# Patient Record
Sex: MALE | URBAN - METROPOLITAN AREA
[De-identification: names, ages, dates, MRNs, and addresses within clinical notes are randomized per-mention and may not be internally consistent; named-entity substitution may affect disease eponyms.]

---

## 2017-05-23 PROBLEM — Z00.00 ENCOUNTER FOR PREVENTIVE HEALTH EXAMINATION: Status: ACTIVE | Noted: 2017-05-23

## 2018-08-28 ENCOUNTER — EMERGENCY (EMERGENCY)
Facility: HOSPITAL | Age: 78
LOS: 1 days | Discharge: HOME | End: 2018-08-28
Attending: EMERGENCY MEDICINE
Payer: COMMERCIAL

## 2018-08-28 VITALS
SYSTOLIC BLOOD PRESSURE: 140 MMHG | HEART RATE: 81 BPM | OXYGEN SATURATION: 97 % | RESPIRATION RATE: 16 BRPM | DIASTOLIC BLOOD PRESSURE: 83 MMHG | TEMPERATURE: 98 F

## 2018-08-28 VITALS
DIASTOLIC BLOOD PRESSURE: 75 MMHG | SYSTOLIC BLOOD PRESSURE: 118 MMHG | OXYGEN SATURATION: 97 % | RESPIRATION RATE: 18 BRPM | TEMPERATURE: 98 F | HEART RATE: 69 BPM

## 2018-08-28 DIAGNOSIS — K40.90 UNILATERAL INGUINAL HERNIA, WITHOUT OBSTRUCTION OR GANGRENE, NOT SPECIFIED AS RECURRENT: Chronic | ICD-10-CM

## 2018-08-28 LAB
ALBUMIN SERPL ELPH-MCNC: 4 G/DL — SIGNIFICANT CHANGE UP (ref 3.3–5)
ALP SERPL-CCNC: 60 U/L — SIGNIFICANT CHANGE UP (ref 40–120)
ALT FLD-CCNC: 17 U/L — SIGNIFICANT CHANGE UP (ref 10–45)
ANION GAP SERPL CALC-SCNC: 11 MMOL/L — SIGNIFICANT CHANGE UP (ref 5–17)
APTT BLD: 39 SEC — HIGH (ref 27.5–37.4)
AST SERPL-CCNC: 18 U/L — SIGNIFICANT CHANGE UP (ref 10–40)
BASOPHILS # BLD AUTO: 0.1 K/UL — SIGNIFICANT CHANGE UP (ref 0–0.2)
BASOPHILS NFR BLD AUTO: 0.8 % — SIGNIFICANT CHANGE UP (ref 0–2)
BILIRUB SERPL-MCNC: 0.7 MG/DL — SIGNIFICANT CHANGE UP (ref 0.2–1.2)
BUN SERPL-MCNC: 21 MG/DL — SIGNIFICANT CHANGE UP (ref 7–23)
CALCIUM SERPL-MCNC: 9.4 MG/DL — SIGNIFICANT CHANGE UP (ref 8.4–10.5)
CHLORIDE SERPL-SCNC: 101 MMOL/L — SIGNIFICANT CHANGE UP (ref 96–108)
CO2 SERPL-SCNC: 25 MMOL/L — SIGNIFICANT CHANGE UP (ref 22–31)
CREAT SERPL-MCNC: 1.15 MG/DL — SIGNIFICANT CHANGE UP (ref 0.5–1.3)
EOSINOPHIL # BLD AUTO: 0.2 K/UL — SIGNIFICANT CHANGE UP (ref 0–0.5)
EOSINOPHIL NFR BLD AUTO: 2.3 % — SIGNIFICANT CHANGE UP (ref 0–6)
GLUCOSE SERPL-MCNC: 108 MG/DL — HIGH (ref 70–99)
HCT VFR BLD CALC: 44.2 % — SIGNIFICANT CHANGE UP (ref 39–50)
HGB BLD-MCNC: 14.6 G/DL — SIGNIFICANT CHANGE UP (ref 13–17)
INR BLD: 1.91 RATIO — HIGH (ref 0.88–1.16)
LYMPHOCYTES # BLD AUTO: 1.9 K/UL — SIGNIFICANT CHANGE UP (ref 1–3.3)
LYMPHOCYTES # BLD AUTO: 21.1 % — SIGNIFICANT CHANGE UP (ref 13–44)
MCHC RBC-ENTMCNC: 31.9 PG — SIGNIFICANT CHANGE UP (ref 27–34)
MCHC RBC-ENTMCNC: 32.9 GM/DL — SIGNIFICANT CHANGE UP (ref 32–36)
MCV RBC AUTO: 96.7 FL — SIGNIFICANT CHANGE UP (ref 80–100)
MONOCYTES # BLD AUTO: 0.7 K/UL — SIGNIFICANT CHANGE UP (ref 0–0.9)
MONOCYTES NFR BLD AUTO: 7.7 % — SIGNIFICANT CHANGE UP (ref 2–14)
NEUTROPHILS # BLD AUTO: 6 K/UL — SIGNIFICANT CHANGE UP (ref 1.8–7.4)
NEUTROPHILS NFR BLD AUTO: 68.1 % — SIGNIFICANT CHANGE UP (ref 43–77)
PLATELET # BLD AUTO: 179 K/UL — SIGNIFICANT CHANGE UP (ref 150–400)
POTASSIUM SERPL-MCNC: 4.2 MMOL/L — SIGNIFICANT CHANGE UP (ref 3.5–5.3)
POTASSIUM SERPL-SCNC: 4.2 MMOL/L — SIGNIFICANT CHANGE UP (ref 3.5–5.3)
PROT SERPL-MCNC: 6.8 G/DL — SIGNIFICANT CHANGE UP (ref 6–8.3)
PROTHROM AB SERPL-ACNC: 21.1 SEC — HIGH (ref 9.8–12.7)
RBC # BLD: 4.57 M/UL — SIGNIFICANT CHANGE UP (ref 4.2–5.8)
RBC # FLD: 12.3 % — SIGNIFICANT CHANGE UP (ref 10.3–14.5)
SODIUM SERPL-SCNC: 137 MMOL/L — SIGNIFICANT CHANGE UP (ref 135–145)
WBC # BLD: 8.8 K/UL — SIGNIFICANT CHANGE UP (ref 3.8–10.5)
WBC # FLD AUTO: 8.8 K/UL — SIGNIFICANT CHANGE UP (ref 3.8–10.5)

## 2018-08-28 PROCEDURE — 73090 X-RAY EXAM OF FOREARM: CPT | Mod: 26,LT

## 2018-08-28 PROCEDURE — 73502 X-RAY EXAM HIP UNI 2-3 VIEWS: CPT

## 2018-08-28 PROCEDURE — 99284 EMERGENCY DEPT VISIT MOD MDM: CPT

## 2018-08-28 PROCEDURE — 70486 CT MAXILLOFACIAL W/O DYE: CPT

## 2018-08-28 PROCEDURE — 80053 COMPREHEN METABOLIC PANEL: CPT

## 2018-08-28 PROCEDURE — 70450 CT HEAD/BRAIN W/O DYE: CPT | Mod: 26

## 2018-08-28 PROCEDURE — 85730 THROMBOPLASTIN TIME PARTIAL: CPT

## 2018-08-28 PROCEDURE — 90471 IMMUNIZATION ADMIN: CPT

## 2018-08-28 PROCEDURE — 85027 COMPLETE CBC AUTOMATED: CPT

## 2018-08-28 PROCEDURE — 85610 PROTHROMBIN TIME: CPT

## 2018-08-28 PROCEDURE — 73090 X-RAY EXAM OF FOREARM: CPT

## 2018-08-28 PROCEDURE — 90715 TDAP VACCINE 7 YRS/> IM: CPT

## 2018-08-28 PROCEDURE — 73502 X-RAY EXAM HIP UNI 2-3 VIEWS: CPT | Mod: 26,LT

## 2018-08-28 PROCEDURE — 70486 CT MAXILLOFACIAL W/O DYE: CPT | Mod: 26

## 2018-08-28 PROCEDURE — 99284 EMERGENCY DEPT VISIT MOD MDM: CPT | Mod: 25

## 2018-08-28 PROCEDURE — 70450 CT HEAD/BRAIN W/O DYE: CPT

## 2018-08-28 RX ORDER — ACETAMINOPHEN 500 MG
650 TABLET ORAL ONCE
Qty: 0 | Refills: 0 | Status: COMPLETED | OUTPATIENT
Start: 2018-08-28 | End: 2018-08-28

## 2018-08-28 RX ORDER — TETANUS TOXOID, REDUCED DIPHTHERIA TOXOID AND ACELLULAR PERTUSSIS VACCINE, ADSORBED 5; 2.5; 8; 8; 2.5 [IU]/.5ML; [IU]/.5ML; UG/.5ML; UG/.5ML; UG/.5ML
0.5 SUSPENSION INTRAMUSCULAR ONCE
Qty: 0 | Refills: 0 | Status: COMPLETED | OUTPATIENT
Start: 2018-08-28 | End: 2018-08-28

## 2018-08-28 RX ORDER — TETANUS AND DIPHTHERIA TOXOIDS ADSORBED 2; 2 [LF]/.5ML; [LF]/.5ML
0.5 INJECTION INTRAMUSCULAR ONCE
Qty: 0 | Refills: 0 | Status: DISCONTINUED | OUTPATIENT
Start: 2018-08-28 | End: 2018-08-28

## 2018-08-28 RX ADMIN — TETANUS TOXOID, REDUCED DIPHTHERIA TOXOID AND ACELLULAR PERTUSSIS VACCINE, ADSORBED 0.5 MILLILITER(S): 5; 2.5; 8; 8; 2.5 SUSPENSION INTRAMUSCULAR at 16:56

## 2018-08-28 RX ADMIN — Medication 650 MILLIGRAM(S): at 14:44
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
## 2018-08-28 NOTE — ED PROVIDER NOTE - NS ED ROS FT
PGY1/MD Liang.   CONST: no fevers, no chills, +trauma on his head/left arm  EYES: +pain when closing the eyes, no visual disturbances  ENT: no sore throat, no epistaxis, no rhinorrhea, no hearing changes  CV: no chest pain, no palpitations, no orthopnea, + lower extremity swelling  RESP: no shortness of breath, no cough, no sputum, no pleurisy, no wheezing  ABD: no abdominal pain, no nausea, no vomiting, no diarrhea, no black or bloody stool  : no dysuria, no hematuria, no frequency, no urgency  MSK: no back pain, no neck pain, no extremity pain  NEURO: no headache, no sensory disturbances, no focal weakness, no dizziness  HEME: +easy bleeding by Xarelto  SKIN: no diaphoresis, no rash

## 2018-08-28 NOTE — ED PROVIDER NOTE - PHYSICAL EXAMINATION
PGY1/MD Liang.   VITALS: reviewed  GEN: No apparent distress, A & O x 4  HEAD/EYES: ecchymosis on his left eye, conjunctival bleeding, PERRL, EOMI, anicteric sclerae, no conjunctival pallor. No discharge from nose/ears.  ENT: mucus membranes moist, oropharynx WNL, trachea midline, no JVD, neck is supple. b/l TM clear, no discharge.  RESP: lungs CTA with equal breath sounds bilaterally, chest wall nontender and atraumatic, no bruises.  CV: heart with reg rhythm S1, S2, no murmur; distal pulses intact and symmetric bilaterally  ABDOMEN: normoactive bowel sounds, soft, nondistended, nontender, no palpable masses, no bruises.  MSK: extremities atraumatic and nontender, +edema on his both lower extremities, no asymmetry. the back is without midline or lateral tenderness, there is no spinal deformity or stepoff and the back is ranged painlessly. the neck has no midline tenderness, deformity, or stepoff, and is ranged painlessly.  SKIN: warm, dry, no rash, no bruising, no cyanosis. color appropriate for ethnicity  NEURO: alert, mentating appropriately, no facial asymmetry. gross sensation, motor, coordination are intact  PSYCH: Affect appropriate PGY1/MD Liang.   VITALS: reviewed  GEN: No apparent distress, A & O x 4  HEAD/EYES: ecchymosis on his left eye, visual acuity 20/30 b/l. +conjunctival bleeding, PERRL, EOMI, intact to upper/lower gaze, anicteric sclerae, no conjunctival pallor. No discharge from nose/ears.  ENT: mucus membranes moist, oropharynx WNL, trachea midline, no JVD, neck is supple. b/l TM clear, no discharge.  RESP: lungs CTA with equal breath sounds bilaterally, chest wall nontender and atraumatic, no bruises.  CV: heart with reg rhythm S1, S2, no murmur; distal pulses intact and symmetric bilaterally  ABDOMEN: normoactive bowel sounds, soft, nondistended, nontender, no palpable masses, no bruises.  MSK: extremities atraumatic and nontender, +edema on his both lower extremities, no asymmetry. the back is without midline or lateral tenderness, there is no spinal deformity or stepoff and the back is ranged painlessly. the neck has no midline tenderness, deformity, or stepoff, and is ranged painlessly.  SKIN: warm, dry, no rash, no bruising, no cyanosis. color appropriate for ethnicity  NEURO: alert, mentating appropriately, no facial asymmetry. gross sensation, motor, coordination are intact  PSYCH: Affect appropriate PGY1/MD Liang.   VITALS: reviewed  GEN: No apparent distress, A & O x 4  HEAD/EYES: ecchymosis on his left eye, visual acuity 20/30 b/l. +conjunctival bleeding, PERRL, EOMI, intact to upper/lower gaze, anicteric sclerae, no conjunctival pallor. No discharge/CSF from nose/ears.  ENT: mucus membranes moist, oropharynx WNL, trachea midline, no JVD, neck is supple. b/l TM clear, no discharge.  RESP: lungs CTA with equal breath sounds bilaterally, chest wall nontender and atraumatic, no bruises.  CV: heart with reg rhythm S1, S2, no murmur; distal pulses intact and symmetric bilaterally  ABDOMEN: normoactive bowel sounds, soft, nondistended, nontender, no palpable masses, no bruises.  MSK: extremities atraumatic and nontender, +edema on his both lower extremities, no asymmetry. the back is without midline or lateral tenderness, there is no spinal deformity or stepoff and the back is ranged painlessly. the neck has no midline tenderness, deformity, or stepoff, and is ranged painlessly.  SKIN: warm, dry, no rash, no bruising, no cyanosis. color appropriate for ethnicity  NEURO: alert, mentating appropriately, no facial asymmetry. gross sensation, motor, coordination are intact  PSYCH: Affect appropriate
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
## 2019-07-08 NOTE — ED ADULT NURSE NOTE - EXTENSIONS OF SELF_ADULT
Previous Messages      ----- Message -----   From: Ruy Torrez MD   Sent: 7/2/2019   9:54 AM EDT   [facility]   Subject: Lab results                                       Labs from me are back and normal except for magnesium being slightly high  A very elevated magnesium level can cause confusion, low blood pressure, nausea, impaired breathing but should not affect tremor  If he is not feeling those effects, then ok  I see he is not currently on magnesium supplements specifically and the AREDs he is on does not contain magnesium as well   Advise avoiding use of magnesium-containing supplements for now        Rest of plan the same; he can continue with primidone if this is working for him      ----- Message -----   From: Interface, Transcription Incoming   Sent: 7/1/2019   5:01 PM EDT   To: Ruy Torrez MD None

## 2019-10-30 ENCOUNTER — INPATIENT (INPATIENT)
Facility: HOSPITAL | Age: 79
LOS: 6 days | Discharge: NURSING, REHAB OR LONG-TERM CARE | DRG: 481 | End: 2019-11-06
Attending: ORTHOPAEDIC SURGERY | Admitting: ORTHOPAEDIC SURGERY
Payer: MEDICARE

## 2019-10-30 ENCOUNTER — TRANSCRIPTION ENCOUNTER (OUTPATIENT)
Age: 79
End: 2019-10-30

## 2019-10-30 VITALS
TEMPERATURE: 97 F | DIASTOLIC BLOOD PRESSURE: 78 MMHG | RESPIRATION RATE: 16 BRPM | OXYGEN SATURATION: 98 % | HEART RATE: 84 BPM | SYSTOLIC BLOOD PRESSURE: 120 MMHG

## 2019-10-30 DIAGNOSIS — K40.90 UNILATERAL INGUINAL HERNIA, WITHOUT OBSTRUCTION OR GANGRENE, NOT SPECIFIED AS RECURRENT: Chronic | ICD-10-CM

## 2019-10-30 PROCEDURE — 71045 X-RAY EXAM CHEST 1 VIEW: CPT | Mod: 26

## 2019-10-30 PROCEDURE — 93010 ELECTROCARDIOGRAM REPORT: CPT

## 2019-10-30 PROCEDURE — 73502 X-RAY EXAM HIP UNI 2-3 VIEWS: CPT | Mod: 26,RT

## 2019-10-30 PROCEDURE — 99285 EMERGENCY DEPT VISIT HI MDM: CPT

## 2019-10-30 PROCEDURE — 73552 X-RAY EXAM OF FEMUR 2/>: CPT | Mod: 26,RT

## 2019-10-30 RX ORDER — MORPHINE SULFATE 50 MG/1
6 CAPSULE, EXTENDED RELEASE ORAL ONCE
Refills: 0 | Status: DISCONTINUED | OUTPATIENT
Start: 2019-10-30 | End: 2019-10-30

## 2019-10-30 RX ADMIN — MORPHINE SULFATE 6 MILLIGRAM(S): 50 CAPSULE, EXTENDED RELEASE ORAL at 23:55

## 2019-10-30 RX ADMIN — MORPHINE SULFATE 6 MILLIGRAM(S): 50 CAPSULE, EXTENDED RELEASE ORAL at 23:25

## 2019-10-30 NOTE — ED PROVIDER NOTE - PHYSICAL EXAMINATION
Attending note. Patient is alert and in moderate to severe pain. Head is normocephalic and atraumatic. There is no signs of trauma. Pupils are 2 mm equal reactive. Neck is nontender. Chest and ribs are nontender. Lungs are clear and equal bilaterally. Heart is irregularly irregular. Abdomen is obese, soft and nontender. There is no distention. There is no CVA tenderness. Pelvis is nontender. Patient is able to move the upper extremities and the left lower extremity without pain or tenderness. Patient has obvious deformity to the right lower extremity with shortening and extra rotation. Patient has tenderness in the right hip to palpation. Distal pulses are nonpalpable however the extremities pink, warm with good capillary refill. There is trace edema in both ankles. EHL is intact. There is no focal neurologic deficit.

## 2019-10-30 NOTE — ED PROVIDER NOTE - CARE PLAN
Principal Discharge DX:	Closed fracture of right hip, initial encounter  Goal:	intertrochanteric fracture of the right hip

## 2019-10-30 NOTE — ED PROVIDER NOTE - CLINICAL SUMMARY MEDICAL DECISION MAKING FREE TEXT BOX
Attending note. Patient with a trip and fall with a right intertrochanteric hip fracture which is displaced. Labs, x-rays, EKG, urinalysis, analgesia. Orthopedic consultation. Admit.

## 2019-10-30 NOTE — ED ADULT NURSE NOTE - OBJECTIVE STATEMENT
79 yr old male arrived to the ED s/p fall. per pt he was standing doing leg lifts to exercise when he lost his balance  falling on his R side. pt c/o of hit pain. upon assessment pt is A&ox4, speaking clearly, answering questions and following commands. pt moving upper extremities tomas and LLE. lower extremity shortened externally rotated. 79 yr old male arrived to the ED s/p fall. per pt he was standing doing leg lifts to exercise when he lost his balance  falling on his R side. pt c/o of hit pain. upon assessment pt is A&ox4, speaking clearly, answering questions and following commands. pt moving upper extremities tomas and LLE. R lower extremity shortened externally rotated and has pain with movement.  pedal pulses strong and equal tomas. lungs clear tomas, respirations even and unlabored. abd is soft, non tender. pt endorses hiving his head, pt on blood thinners

## 2019-10-30 NOTE — ED PROVIDER NOTE - OBJECTIVE STATEMENT
80 yo male brought into the ER by EMS for evaluation of right hip pain  s/p mechanical fall this evening. Pt states "I was doing leg raises when I lost my balance and fell and landed on my hip. I was not able to get up or move without a great deal of pain. I hit my head as well when I fell and I take Xarelto for AFIB. I did not pass out or loose consciousness when I fell". Denies cp, sob, dizziness at this time. 78 yo male brought into the ER by EMS for evaluation of right hip pain  s/p mechanical fall this evening. Pt states "I was doing leg raises when I lost my balance and fell and landed on my hip. I was not able to get up or move without a great deal of pain. I hit my head as well when I fell and I take Xarelto for AFIB. I did not pass out or loose consciousness when I fell". Denies cp, sob, dizziness at this time.      Attending note. She was seen in fast track room #6. Patient tripped and fell landing on his right hip. Patient also struck his head although all. He denies any headache, dizziness, nausea or vomiting. He denies any neck pain, spine pain, chest/rib pain or abdominal pain. He denies any other pain to the other extremities. Patient was brought in by EMS complaining of severe right hip pain. He denies numbness or paresthesia. Patient previous left hip fracture repaired by Dr. Thuan Gilbert. Patient has a history of atrial fibrillation and takes Eliquis.

## 2019-10-31 DIAGNOSIS — S72.001A FRACTURE OF UNSPECIFIED PART OF NECK OF RIGHT FEMUR, INITIAL ENCOUNTER FOR CLOSED FRACTURE: ICD-10-CM

## 2019-10-31 DIAGNOSIS — M19.90 UNSPECIFIED OSTEOARTHRITIS, UNSPECIFIED SITE: ICD-10-CM

## 2019-10-31 DIAGNOSIS — I48.11 LONGSTANDING PERSISTENT ATRIAL FIBRILLATION: ICD-10-CM

## 2019-10-31 PROBLEM — I48.91 UNSPECIFIED ATRIAL FIBRILLATION: Chronic | Status: ACTIVE | Noted: 2018-08-28

## 2019-10-31 LAB
ALBUMIN SERPL ELPH-MCNC: 3.9 G/DL — SIGNIFICANT CHANGE UP (ref 3.3–5)
ALP SERPL-CCNC: 62 U/L — SIGNIFICANT CHANGE UP (ref 40–120)
ALT FLD-CCNC: 16 U/L — SIGNIFICANT CHANGE UP (ref 10–45)
ANION GAP SERPL CALC-SCNC: 10 MMOL/L — SIGNIFICANT CHANGE UP (ref 5–17)
ANION GAP SERPL CALC-SCNC: 11 MMOL/L — SIGNIFICANT CHANGE UP (ref 5–17)
ANION GAP SERPL CALC-SCNC: 13 MMOL/L — SIGNIFICANT CHANGE UP (ref 5–17)
APPEARANCE UR: CLEAR — SIGNIFICANT CHANGE UP
APTT BLD: 33.4 SEC — SIGNIFICANT CHANGE UP (ref 27.5–36.3)
APTT BLD: 34.7 SEC — SIGNIFICANT CHANGE UP (ref 27.5–36.3)
AST SERPL-CCNC: 12 U/L — SIGNIFICANT CHANGE UP (ref 10–40)
BACTERIA # UR AUTO: NEGATIVE — SIGNIFICANT CHANGE UP
BASOPHILS # BLD AUTO: 0.04 K/UL — SIGNIFICANT CHANGE UP (ref 0–0.2)
BASOPHILS # BLD AUTO: 0.04 K/UL — SIGNIFICANT CHANGE UP (ref 0–0.2)
BASOPHILS NFR BLD AUTO: 0.2 % — SIGNIFICANT CHANGE UP (ref 0–2)
BASOPHILS NFR BLD AUTO: 0.3 % — SIGNIFICANT CHANGE UP (ref 0–2)
BILIRUB SERPL-MCNC: 0.5 MG/DL — SIGNIFICANT CHANGE UP (ref 0.2–1.2)
BILIRUB UR-MCNC: NEGATIVE — SIGNIFICANT CHANGE UP
BLD GP AB SCN SERPL QL: NEGATIVE — SIGNIFICANT CHANGE UP
BLD GP AB SCN SERPL QL: NEGATIVE — SIGNIFICANT CHANGE UP
BUN SERPL-MCNC: 24 MG/DL — HIGH (ref 7–23)
BUN SERPL-MCNC: 27 MG/DL — HIGH (ref 7–23)
BUN SERPL-MCNC: 29 MG/DL — HIGH (ref 7–23)
CALCIUM SERPL-MCNC: 8.5 MG/DL — SIGNIFICANT CHANGE UP (ref 8.4–10.5)
CALCIUM SERPL-MCNC: 9.3 MG/DL — SIGNIFICANT CHANGE UP (ref 8.4–10.5)
CALCIUM SERPL-MCNC: 9.6 MG/DL — SIGNIFICANT CHANGE UP (ref 8.4–10.5)
CHLORIDE SERPL-SCNC: 100 MMOL/L — SIGNIFICANT CHANGE UP (ref 96–108)
CHLORIDE SERPL-SCNC: 102 MMOL/L — SIGNIFICANT CHANGE UP (ref 96–108)
CHLORIDE SERPL-SCNC: 105 MMOL/L — SIGNIFICANT CHANGE UP (ref 96–108)
CO2 SERPL-SCNC: 25 MMOL/L — SIGNIFICANT CHANGE UP (ref 22–31)
CO2 SERPL-SCNC: 25 MMOL/L — SIGNIFICANT CHANGE UP (ref 22–31)
CO2 SERPL-SCNC: 29 MMOL/L — SIGNIFICANT CHANGE UP (ref 22–31)
COLOR SPEC: YELLOW — SIGNIFICANT CHANGE UP
CREAT SERPL-MCNC: 1.03 MG/DL — SIGNIFICANT CHANGE UP (ref 0.5–1.3)
CREAT SERPL-MCNC: 1.12 MG/DL — SIGNIFICANT CHANGE UP (ref 0.5–1.3)
CREAT SERPL-MCNC: 1.24 MG/DL — SIGNIFICANT CHANGE UP (ref 0.5–1.3)
DIFF PNL FLD: ABNORMAL
EOSINOPHIL # BLD AUTO: 0.02 K/UL — SIGNIFICANT CHANGE UP (ref 0–0.5)
EOSINOPHIL # BLD AUTO: 0.17 K/UL — SIGNIFICANT CHANGE UP (ref 0–0.5)
EOSINOPHIL NFR BLD AUTO: 0.1 % — SIGNIFICANT CHANGE UP (ref 0–6)
EOSINOPHIL NFR BLD AUTO: 1.4 % — SIGNIFICANT CHANGE UP (ref 0–6)
EPI CELLS # UR: 0 /HPF — SIGNIFICANT CHANGE UP
GLUCOSE SERPL-MCNC: 107 MG/DL — HIGH (ref 70–99)
GLUCOSE SERPL-MCNC: 133 MG/DL — HIGH (ref 70–99)
GLUCOSE SERPL-MCNC: 135 MG/DL — HIGH (ref 70–99)
GLUCOSE UR QL: NEGATIVE — SIGNIFICANT CHANGE UP
HCT VFR BLD CALC: 37.9 % — LOW (ref 39–50)
HCT VFR BLD CALC: 40.5 % — SIGNIFICANT CHANGE UP (ref 39–50)
HCT VFR BLD CALC: 41.9 % — SIGNIFICANT CHANGE UP (ref 39–50)
HGB BLD-MCNC: 12.2 G/DL — LOW (ref 13–17)
HGB BLD-MCNC: 13.7 G/DL — SIGNIFICANT CHANGE UP (ref 13–17)
HGB BLD-MCNC: 14 G/DL — SIGNIFICANT CHANGE UP (ref 13–17)
HYALINE CASTS # UR AUTO: 0 /LPF — SIGNIFICANT CHANGE UP (ref 0–2)
IMM GRANULOCYTES NFR BLD AUTO: 0.5 % — SIGNIFICANT CHANGE UP (ref 0–1.5)
IMM GRANULOCYTES NFR BLD AUTO: 0.7 % — SIGNIFICANT CHANGE UP (ref 0–1.5)
INR BLD: 1.76 RATIO — HIGH (ref 0.88–1.16)
KETONES UR-MCNC: ABNORMAL
LEUKOCYTE ESTERASE UR-ACNC: NEGATIVE — SIGNIFICANT CHANGE UP
LYMPHOCYTES # BLD AUTO: 11.3 % — LOW (ref 13–44)
LYMPHOCYTES # BLD AUTO: 2.05 K/UL — SIGNIFICANT CHANGE UP (ref 1–3.3)
LYMPHOCYTES # BLD AUTO: 3.75 K/UL — HIGH (ref 1–3.3)
LYMPHOCYTES # BLD AUTO: 31.1 % — SIGNIFICANT CHANGE UP (ref 13–44)
MCHC RBC-ENTMCNC: 31.9 PG — SIGNIFICANT CHANGE UP (ref 27–34)
MCHC RBC-ENTMCNC: 32.2 GM/DL — SIGNIFICANT CHANGE UP (ref 32–36)
MCHC RBC-ENTMCNC: 32.3 PG — SIGNIFICANT CHANGE UP (ref 27–34)
MCHC RBC-ENTMCNC: 32.8 PG — SIGNIFICANT CHANGE UP (ref 27–34)
MCHC RBC-ENTMCNC: 33.4 GM/DL — SIGNIFICANT CHANGE UP (ref 32–36)
MCHC RBC-ENTMCNC: 33.8 GM/DL — SIGNIFICANT CHANGE UP (ref 32–36)
MCV RBC AUTO: 96.8 FL — SIGNIFICANT CHANGE UP (ref 80–100)
MCV RBC AUTO: 96.9 FL — SIGNIFICANT CHANGE UP (ref 80–100)
MCV RBC AUTO: 99 FL — SIGNIFICANT CHANGE UP (ref 80–100)
MONOCYTES # BLD AUTO: 1.12 K/UL — HIGH (ref 0–0.9)
MONOCYTES # BLD AUTO: 1.13 K/UL — HIGH (ref 0–0.9)
MONOCYTES NFR BLD AUTO: 6.2 % — SIGNIFICANT CHANGE UP (ref 2–14)
MONOCYTES NFR BLD AUTO: 9.3 % — SIGNIFICANT CHANGE UP (ref 2–14)
NEUTROPHILS # BLD AUTO: 14.76 K/UL — HIGH (ref 1.8–7.4)
NEUTROPHILS # BLD AUTO: 6.92 K/UL — SIGNIFICANT CHANGE UP (ref 1.8–7.4)
NEUTROPHILS NFR BLD AUTO: 57.4 % — SIGNIFICANT CHANGE UP (ref 43–77)
NEUTROPHILS NFR BLD AUTO: 81.5 % — HIGH (ref 43–77)
NITRITE UR-MCNC: NEGATIVE — SIGNIFICANT CHANGE UP
NRBC # BLD: 0 /100 WBCS — SIGNIFICANT CHANGE UP (ref 0–0)
PH UR: 5.5 — SIGNIFICANT CHANGE UP (ref 5–8)
PLATELET # BLD AUTO: 167 K/UL — SIGNIFICANT CHANGE UP (ref 150–400)
PLATELET # BLD AUTO: 172 K/UL — SIGNIFICANT CHANGE UP (ref 150–400)
PLATELET # BLD AUTO: 182 K/UL — SIGNIFICANT CHANGE UP (ref 150–400)
POTASSIUM SERPL-MCNC: 4.1 MMOL/L — SIGNIFICANT CHANGE UP (ref 3.5–5.3)
POTASSIUM SERPL-MCNC: 4.5 MMOL/L — SIGNIFICANT CHANGE UP (ref 3.5–5.3)
POTASSIUM SERPL-MCNC: 4.5 MMOL/L — SIGNIFICANT CHANGE UP (ref 3.5–5.3)
POTASSIUM SERPL-SCNC: 4.1 MMOL/L — SIGNIFICANT CHANGE UP (ref 3.5–5.3)
POTASSIUM SERPL-SCNC: 4.5 MMOL/L — SIGNIFICANT CHANGE UP (ref 3.5–5.3)
POTASSIUM SERPL-SCNC: 4.5 MMOL/L — SIGNIFICANT CHANGE UP (ref 3.5–5.3)
PROT SERPL-MCNC: 6.8 G/DL — SIGNIFICANT CHANGE UP (ref 6–8.3)
PROT UR-MCNC: ABNORMAL
PROTHROM AB SERPL-ACNC: 20.6 SEC — HIGH (ref 10–12.9)
RBC # BLD: 3.83 M/UL — LOW (ref 4.2–5.8)
RBC # BLD: 4.18 M/UL — LOW (ref 4.2–5.8)
RBC # BLD: 4.33 M/UL — SIGNIFICANT CHANGE UP (ref 4.2–5.8)
RBC # FLD: 12.8 % — SIGNIFICANT CHANGE UP (ref 10.3–14.5)
RBC # FLD: 12.8 % — SIGNIFICANT CHANGE UP (ref 10.3–14.5)
RBC # FLD: 12.9 % — SIGNIFICANT CHANGE UP (ref 10.3–14.5)
RBC CASTS # UR COMP ASSIST: 11 /HPF — HIGH (ref 0–4)
RH IG SCN BLD-IMP: POSITIVE — SIGNIFICANT CHANGE UP
RH IG SCN BLD-IMP: POSITIVE — SIGNIFICANT CHANGE UP
SODIUM SERPL-SCNC: 138 MMOL/L — SIGNIFICANT CHANGE UP (ref 135–145)
SODIUM SERPL-SCNC: 140 MMOL/L — SIGNIFICANT CHANGE UP (ref 135–145)
SODIUM SERPL-SCNC: 142 MMOL/L — SIGNIFICANT CHANGE UP (ref 135–145)
SP GR SPEC: 1.03 — HIGH (ref 1.01–1.02)
UROBILINOGEN FLD QL: NEGATIVE — SIGNIFICANT CHANGE UP
WBC # BLD: 12.06 K/UL — HIGH (ref 3.8–10.5)
WBC # BLD: 16.66 K/UL — HIGH (ref 3.8–10.5)
WBC # BLD: 18.13 K/UL — HIGH (ref 3.8–10.5)
WBC # FLD AUTO: 12.06 K/UL — HIGH (ref 3.8–10.5)
WBC # FLD AUTO: 16.66 K/UL — HIGH (ref 3.8–10.5)
WBC # FLD AUTO: 18.13 K/UL — HIGH (ref 3.8–10.5)
WBC UR QL: 1 /HPF — SIGNIFICANT CHANGE UP (ref 0–5)

## 2019-10-31 PROCEDURE — 70450 CT HEAD/BRAIN W/O DYE: CPT | Mod: 26

## 2019-10-31 PROCEDURE — 72125 CT NECK SPINE W/O DYE: CPT | Mod: 26

## 2019-10-31 RX ORDER — SODIUM CHLORIDE 9 MG/ML
1000 INJECTION, SOLUTION INTRAVENOUS
Refills: 0 | Status: DISCONTINUED | OUTPATIENT
Start: 2019-10-31 | End: 2019-11-06

## 2019-10-31 RX ORDER — ONDANSETRON 8 MG/1
4 TABLET, FILM COATED ORAL ONCE
Refills: 0 | Status: DISCONTINUED | OUTPATIENT
Start: 2019-10-31 | End: 2019-10-31

## 2019-10-31 RX ORDER — HYDROMORPHONE HYDROCHLORIDE 2 MG/ML
0.5 INJECTION INTRAMUSCULAR; INTRAVENOUS; SUBCUTANEOUS EVERY 4 HOURS
Refills: 0 | Status: DISCONTINUED | OUTPATIENT
Start: 2019-10-31 | End: 2019-10-31

## 2019-10-31 RX ORDER — CEFAZOLIN SODIUM 1 G
2000 VIAL (EA) INJECTION EVERY 8 HOURS
Refills: 0 | Status: COMPLETED | OUTPATIENT
Start: 2019-11-01 | End: 2019-11-01

## 2019-10-31 RX ORDER — MAGNESIUM HYDROXIDE 400 MG/1
30 TABLET, CHEWABLE ORAL DAILY
Refills: 0 | Status: DISCONTINUED | OUTPATIENT
Start: 2019-10-31 | End: 2019-11-06

## 2019-10-31 RX ORDER — ACETAMINOPHEN 500 MG
975 TABLET ORAL EVERY 8 HOURS
Refills: 0 | Status: DISCONTINUED | OUTPATIENT
Start: 2019-10-31 | End: 2019-11-06

## 2019-10-31 RX ORDER — OXYCODONE HYDROCHLORIDE 5 MG/1
10 TABLET ORAL EVERY 4 HOURS
Refills: 0 | Status: DISCONTINUED | OUTPATIENT
Start: 2019-10-31 | End: 2019-10-31

## 2019-10-31 RX ORDER — SODIUM CHLORIDE 9 MG/ML
1000 INJECTION, SOLUTION INTRAVENOUS
Refills: 0 | Status: DISCONTINUED | OUTPATIENT
Start: 2019-10-31 | End: 2019-10-31

## 2019-10-31 RX ORDER — HYDROMORPHONE HYDROCHLORIDE 2 MG/ML
0.25 INJECTION INTRAMUSCULAR; INTRAVENOUS; SUBCUTANEOUS
Refills: 0 | Status: DISCONTINUED | OUTPATIENT
Start: 2019-10-31 | End: 2019-10-31

## 2019-10-31 RX ORDER — METOCLOPRAMIDE HCL 10 MG
10 TABLET ORAL EVERY 6 HOURS
Refills: 0 | Status: DISCONTINUED | OUTPATIENT
Start: 2019-10-31 | End: 2019-10-31

## 2019-10-31 RX ORDER — RIVAROXABAN 15 MG-20MG
20 KIT ORAL
Refills: 0 | Status: DISCONTINUED | OUTPATIENT
Start: 2019-10-31 | End: 2019-11-06

## 2019-10-31 RX ORDER — TRAMADOL HYDROCHLORIDE 50 MG/1
50 TABLET ORAL EVERY 8 HOURS
Refills: 0 | Status: DISCONTINUED | OUTPATIENT
Start: 2019-10-31 | End: 2019-11-06

## 2019-10-31 RX ORDER — TRAMADOL HYDROCHLORIDE 50 MG/1
25 TABLET ORAL EVERY 8 HOURS
Refills: 0 | Status: DISCONTINUED | OUTPATIENT
Start: 2019-10-31 | End: 2019-11-06

## 2019-10-31 RX ORDER — OXYCODONE HYDROCHLORIDE 5 MG/1
5 TABLET ORAL EVERY 4 HOURS
Refills: 0 | Status: DISCONTINUED | OUTPATIENT
Start: 2019-10-31 | End: 2019-10-31

## 2019-10-31 RX ADMIN — OXYCODONE HYDROCHLORIDE 5 MILLIGRAM(S): 5 TABLET ORAL at 05:09

## 2019-10-31 RX ADMIN — SODIUM CHLORIDE 75 MILLILITER(S): 9 INJECTION, SOLUTION INTRAVENOUS at 04:40

## 2019-10-31 RX ADMIN — SODIUM CHLORIDE 75 MILLILITER(S): 9 INJECTION, SOLUTION INTRAVENOUS at 17:19

## 2019-10-31 RX ADMIN — OXYCODONE HYDROCHLORIDE 5 MILLIGRAM(S): 5 TABLET ORAL at 04:39

## 2019-10-31 RX ADMIN — HYDROMORPHONE HYDROCHLORIDE 0.25 MILLIGRAM(S): 2 INJECTION INTRAMUSCULAR; INTRAVENOUS; SUBCUTANEOUS at 16:30

## 2019-10-31 RX ADMIN — OXYCODONE HYDROCHLORIDE 5 MILLIGRAM(S): 5 TABLET ORAL at 11:54

## 2019-10-31 RX ADMIN — HYDROMORPHONE HYDROCHLORIDE 0.25 MILLIGRAM(S): 2 INJECTION INTRAMUSCULAR; INTRAVENOUS; SUBCUTANEOUS at 16:20

## 2019-10-31 RX ADMIN — OXYCODONE HYDROCHLORIDE 5 MILLIGRAM(S): 5 TABLET ORAL at 12:24

## 2019-10-31 NOTE — CONSULT NOTE ADULT - SUBJECTIVE AND OBJECTIVE BOX
Patient is a 79y old  Male who presents with a chief complaint of right hip fracture (31 Oct 2019 01:17)      HPI:  79y Male presents to ED s/p [facility] fall c/o severe R Hip pain and inability to ambulate.  Patient denies head hit or LOC. Localizes pain to R hip/femur. Patient denies radiation of pain. Patient denies numbness/tingling/burning in the RLE. Patient denies any other injuries. Patient is a community ambulator with cane/walker at baseline. Had left hip fracture with IMN by Vladimir in 2011 and subsequent removal of hardware/revision IMN with Maeve 2018.   Takes xarelto for afib, last dose AM of 10/30/19. (31 Oct 2019 01:17)      MEDICATIONS  (STANDING):  lactated ringers. 1000 milliLiter(s) (75 mL/Hr) IV Continuous <Continuous>    MEDICATIONS  (PRN):  HYDROmorphone  Injectable 0.5 milliGRAM(s) IV Push every 4 hours PRN Severe Pain (7 - 10)  metoclopramide Injectable 10 milliGRAM(s) IV Push every 6 hours PRN Nausea and/or Vomiting  oxyCODONE    IR 10 milliGRAM(s) Oral every 4 hours PRN Moderate Pain (4 - 6)  oxyCODONE    IR 5 milliGRAM(s) Oral every 4 hours PRN Mild Pain (1 - 3)      Allergies    No Known Allergies    Intolerances      PAST MEDICAL HISTORY:  AF (atrial fibrillation)    PAST SURGICAL HISTORY:  Hernia, inguinal, right      Diet:  (  x ) Regular   (   ) Low Sodium   (   ) Low Cholesterol   (   ) Diabetic   (   ) Other    FAMILY HISTORY:      SOCIAL HISTORY:    Substance Use: ( x ) never used  (  ) other:  Tobacco Usage:  (   ) never smoked   (x   ) former smoker   (   ) current smoker  (   ) pack years  (   ) last cigarette date  Alcohol Usage:  immoderate use in the past - stopped 20 years ago    Advanced Directives: (   ) None    (   ) DNR    (   ) DNI    (   ) Health Care Proxy:     REVIEW OF SYSTEM:  CONSTITUTIONAL: No fever, No change in weight, No fatigue  HEAD: No headache, No dizziness, No recent trauma  EYES: No eye pain, No visual disturbances, No discharge  ENT:  No difficulty hearing, No tinnitus, No vertigo, No sinus pain, No throat pain  NECK: No pain, No stiffness  BREASTS: No pain, No masses, No nipple discharge  RESPIRATORY: No cough, No wheezing, No chills, No hemoptysis, No shortness of breath at rest or exertional shortness of breath  CARDIOVASCULAR: No chest pain, No palpitations, No dizziness, No CHF, No arrhythmia, No cardiomegaly, No leg swelling  GASTROINTESTINAL: No abdominal, No epigastric pain. No nausea, No vomiting, No hematemesis, No diarrhea, No constipation. No melena, No hematochezia. No GERD  GENITOURINARY: No dysuria, No frequency, No hematuria, No incontinence, No nocturia, No hesitancy,  SKIN: No itching, No burning, No rashes, No lesions   LYMPH NODES: No history of enlarged glands  ENDOCRINE: No heat or cold intolerance, No hair loss. No osteoporosis, No thyroid disease  MUSCULOSKELETAL: No joint pain or swelling, No muscle, back, or extremity pain  (+) right hip pain   PSYCHIATRIC: No depression, No anxiety, No mood swings, No difficulty sleeping  HEME/LYMPH: No easy bruising, No anticoagulants, No bleeding disorder, No bleeding gums  ALLERGY AND IMMUNOLOGIC: No hives, No eczema  NEUROLOGICAL: No memory loss, No loss of strength, No numbness, No tremors    VITALS:  Vital Signs Last 24 Hrs  T(C): 36.9 (31 Oct 2019 04:22), Max: 36.9 (31 Oct 2019 04:22)  T(F): 98.4 (31 Oct 2019 04:22), Max: 98.4 (31 Oct 2019 04:22)  HR: 68 (31 Oct 2019 04:22) (68 - 84)  BP: 149/79 (31 Oct 2019 04:22) (111/72 - 149/79)  BP(mean): --  RR: 18 (31 Oct 2019 04:22) (15 - 18)  SpO2: 95% (31 Oct 2019 04:22) (95% - 98%)  I&O's Summary      PHYSICAL EXAM:  GENERAL: NAD, well nourished and conversant  HEAD:  Atraumatic  EYES: EOM, PERRLA, conjunctiva pink and sclera white  ENT: No tonsillar erythema, exudates, or enlargement, moist mucous membranes, good dentition, no lesions  NECK: Supple, No JVD, normal thyroid, carotids with normal upstrokes and no bruits  CHEST/LUNG: Clear to auscultation bilaterally, No rales, rhonchi, wheezing, or rubs  HEART: Regular rate and rhythm, No murmurs, rubs, or gallops  ABDOMEN: Soft, nondistended, no masses, guarding, tenderness or rebound, bowel sounds present  EXTREMITIES:  2+ Peripheral Pulses, No clubbing, cyanosis, or edema.  (+) right hip fracture  LYMPH: No lymphadenopathy noted  SKIN: No rashes or lesions  NERVOUS SYSTEM:  Alert & Oriented X3, normal cognitive function. Motor Strength 5/5 right upper and right lower.  5/5 left upper and left lower extremities, DTRs 2+ intact and symmetric    LABS:    EKG: rate controlled atrial fibrillation    CBC Full  -  ( 31 Oct 2019 00:12 )  WBC Count : 12.06 K/uL  RBC Count : 4.33 M/uL  Hemoglobin : 14.0 g/dL  Hematocrit : 41.9 %  Platelet Count - Automated : 182 K/uL  Mean Cell Volume : 96.8 fl  Mean Cell Hemoglobin : 32.3 pg  Mean Cell Hemoglobin Concentration : 33.4 gm/dL  Auto Neutrophil # : 6.92 K/uL  Auto Lymphocyte # : 3.75 K/uL  Auto Monocyte # : 1.12 K/uL  Auto Eosinophil # : 0.17 K/uL  Auto Basophil # : 0.04 K/uL  Auto Neutrophil % : 57.4 %  Auto Lymphocyte % : 31.1 %  Auto Monocyte % : 9.3 %  Auto Eosinophil % : 1.4 %  Auto Basophil % : 0.3 %    10-31    142  |  102  |  29<H>  ----------------------------<  107<H>  4.5   |  29  |  1.24    Ca    9.6      31 Oct 2019 00:12    TPro  6.8  /  Alb  3.9  /  TBili  0.5  /  DBili  x   /  AST  12  /  ALT  16  /  AlkPhos  62  10-31    LIVER FUNCTIONS - ( 31 Oct 2019 00:12 )  Alb: 3.9 g/dL / Pro: 6.8 g/dL / ALK PHOS: 62 U/L / ALT: 16 U/L / AST: 12 U/L / GGT: x           PT/INR - ( 31 Oct 2019 00:12 )   PT: 20.9 sec;   INR: 1.79 ratio         PTT - ( 31 Oct 2019 00:12 )  PTT:34.7 sec    CAPILLARY BLOOD GLUCOSE    CT brain  unremarkable djd c-spine      RADIOLOGY & ADDITIONAL TESTS:    Consultant(s):    Care Discussed with Consultants/Other Providers [ ] YES  [ ] NO

## 2019-10-31 NOTE — H&P ADULT - HISTORY OF PRESENT ILLNESS
79y Male presents to ED s/p [facility] fall c/o severe R Hip pain and inability to ambulate.  Patient denies head hit or LOC. Localizes pain to R hip/femur. Patient denies radiation of pain. Patient denies numbness/tingling/burning in the RLE. Patient denies any other injuries. Patient is a community ambulator with cane/walker at baseline. Had left hip fracture with IMN by Vladimir in 2011 and subsequent removal of hardware/revision IMN with Maeve 2018.   Takes xarelto for afib, last dose AM of 10/30/19.

## 2019-10-31 NOTE — H&P ADULT - NSHPLABSRESULTS_GEN_ALL_CORE
Imaging:  XR demonstrating R intertrochanteric hip fracture                             14.0   12.06 )-----------( 182      ( 31 Oct 2019 00:12 )             41.9       10-31    142  |  102  |  29<H>  ----------------------------<  107<H>  4.5   |  29  |  1.24    Ca    9.6      31 Oct 2019 00:12    TPro  6.8  /  Alb  3.9  /  TBili  0.5  /  DBili  x   /  AST  12  /  ALT  16  /  AlkPhos  62  10-31                  PTT - ( 31 Oct 2019 00:12 )  PTT:34.7 sec    Lactate Trend            CAPILLARY BLOOD GLUCOSE

## 2019-10-31 NOTE — H&P ADULT - ASSESSMENT
A/P: 79M with R intertrochanteric hip fracture  Plan for IMN with Dr. Godinez today, 10/31/19  NPO except meds  IVFs while NPO  Hold chemical dvt ppx  SCDs  Bedrest  Medical optimization for OR  FU labs/imaging

## 2019-10-31 NOTE — H&P ADULT - NSHPPHYSICALEXAM_GEN_ALL_CORE
T(C): 36.1 (10-30-19 @ 22:50)  HR: 84 (10-30-19 @ 22:50)  BP: 120/78 (10-30-19 @ 22:50)  RR: 16 (10-30-19 @ 22:50)  SpO2: 98% (10-30-19 @ 22:50)  Wt(kg): --    PE RLE:  Skin intact; Compartments soft. No ecchymosis/soft tissue swelling.  + TTP around R Hip. No TTP to knee/leg/ankle/foot. ROM lmited 2/2 pain. Unable to SLR. + Log Roll/Heel Strike. + DP/PT pulses, SILT L2-S1. + TA/EHL/FHL/GS.    Secondary Survey: No TTP over bony prominences, SILT, palpable pulses, full/painless range of motion, compartments soft

## 2019-10-31 NOTE — CONSULT NOTE ADULT - ASSESSMENT
79y Male presents to ED s/p [facility] fall c/o severe R Hip pain and inability to ambulate.  Patient denies head hit or LOC. Localizes pain to R hip/femur. Patient denies radiation of pain. Patient denies numbness/tingling/burning in the RLE. Patient denies any other injuries. Patient is a community ambulator with cane/walker at baseline. Had left hip fracture with IMN by Vladimir in 2011 and subsequent removal of hardware/revision IMN with Maeve 2018.   Takes xarelto for afib, last dose AM of 10/30/19. (31 Oct 2019 01:17)

## 2019-10-31 NOTE — CHART NOTE - NSCHARTNOTEFT_GEN_A_CORE
Resting without complaints.  No Chest Pain, SOB, N/V.    T(C): 37.2 (10-31-19 @ 15:20), Max: 37.2 (10-31-19 @ 15:20)  HR: 70 (10-31-19 @ 16:30) (65 - 84)  BP: 123/76 (10-31-19 @ 16:30) (108/68 - 149/79)  RR: 15 (10-31-19 @ 16:30) (12 - 19)  SpO2: 96% (10-31-19 @ 16:30) (95% - 100%)      Exam:  Alert and oriented, no acute distress  Laterality: RLE hip dressings in place, proximal dressing mildly saturated with serosanguinous drainage   Calves soft, non-tender bilaterally  +PF/DF/EHL/FHL  SILT  + DP pulse    Xray: in chart                          12.2   18.13 )-----------( 172      ( 31 Oct 2019 16:13 )             37.9    10-31    138  |  100  |  27<H>  ----------------------------<  133<H>  4.5   |  25  |  1.12    Ca    9.3      31 Oct 2019 05:21    TPro  6.8  /  Alb  3.9  /  TBili  0.5  /  DBili  x   /  AST  12  /  ALT  16  /  AlkPhos  62  10-31      A/P: 79yMale S/p  R IM Nail. VSS. NAD  -PT/OT-WBAT RLE, OOB when tolerated  -IS encouraged  -DVT PPx with Xarelto 20 mg daily  -Followup AM labs  -Pain Control  -PACU to floor    Kassandra Guerrero PA-C  Team Pager #[phone]

## 2019-11-01 ENCOUNTER — TRANSCRIPTION ENCOUNTER (OUTPATIENT)
Age: 79
End: 2019-11-01

## 2019-11-01 LAB
ANION GAP SERPL CALC-SCNC: 10 MMOL/L — SIGNIFICANT CHANGE UP (ref 5–17)
BUN SERPL-MCNC: 31 MG/DL — HIGH (ref 7–23)
CALCIUM SERPL-MCNC: 8.5 MG/DL — SIGNIFICANT CHANGE UP (ref 8.4–10.5)
CHLORIDE SERPL-SCNC: 102 MMOL/L — SIGNIFICANT CHANGE UP (ref 96–108)
CO2 SERPL-SCNC: 25 MMOL/L — SIGNIFICANT CHANGE UP (ref 22–31)
CREAT SERPL-MCNC: 1.08 MG/DL — SIGNIFICANT CHANGE UP (ref 0.5–1.3)
GLUCOSE BLDC GLUCOMTR-MCNC: 199 MG/DL — HIGH (ref 70–99)
GLUCOSE SERPL-MCNC: 144 MG/DL — HIGH (ref 70–99)
HCT VFR BLD CALC: 31.2 % — LOW (ref 39–50)
HGB BLD-MCNC: 10.1 G/DL — LOW (ref 13–17)
MCHC RBC-ENTMCNC: 32.2 PG — SIGNIFICANT CHANGE UP (ref 27–34)
MCHC RBC-ENTMCNC: 32.4 GM/DL — SIGNIFICANT CHANGE UP (ref 32–36)
MCV RBC AUTO: 99.4 FL — SIGNIFICANT CHANGE UP (ref 80–100)
PLATELET # BLD AUTO: 169 K/UL — SIGNIFICANT CHANGE UP (ref 150–400)
POTASSIUM SERPL-MCNC: 4.7 MMOL/L — SIGNIFICANT CHANGE UP (ref 3.5–5.3)
POTASSIUM SERPL-SCNC: 4.7 MMOL/L — SIGNIFICANT CHANGE UP (ref 3.5–5.3)
RBC # BLD: 3.14 M/UL — LOW (ref 4.2–5.8)
RBC # FLD: 13.1 % — SIGNIFICANT CHANGE UP (ref 10.3–14.5)
SODIUM SERPL-SCNC: 137 MMOL/L — SIGNIFICANT CHANGE UP (ref 135–145)
WBC # BLD: 23.62 K/UL — HIGH (ref 3.8–10.5)
WBC # FLD AUTO: 23.62 K/UL — HIGH (ref 3.8–10.5)

## 2019-11-01 RX ORDER — SODIUM CHLORIDE 9 MG/ML
500 INJECTION, SOLUTION INTRAVENOUS ONCE
Refills: 0 | Status: COMPLETED | OUTPATIENT
Start: 2019-11-01 | End: 2019-11-01

## 2019-11-01 RX ADMIN — TRAMADOL HYDROCHLORIDE 25 MILLIGRAM(S): 50 TABLET ORAL at 16:50

## 2019-11-01 RX ADMIN — TRAMADOL HYDROCHLORIDE 25 MILLIGRAM(S): 50 TABLET ORAL at 23:17

## 2019-11-01 RX ADMIN — Medication 100 MILLIGRAM(S): at 17:55

## 2019-11-01 RX ADMIN — RIVAROXABAN 20 MILLIGRAM(S): KIT at 05:35

## 2019-11-01 RX ADMIN — TRAMADOL HYDROCHLORIDE 25 MILLIGRAM(S): 50 TABLET ORAL at 16:18

## 2019-11-01 RX ADMIN — TRAMADOL HYDROCHLORIDE 25 MILLIGRAM(S): 50 TABLET ORAL at 06:10

## 2019-11-01 RX ADMIN — Medication 100 MILLIGRAM(S): at 11:52

## 2019-11-01 RX ADMIN — SODIUM CHLORIDE 75 MILLILITER(S): 9 INJECTION, SOLUTION INTRAVENOUS at 17:55

## 2019-11-01 RX ADMIN — TRAMADOL HYDROCHLORIDE 25 MILLIGRAM(S): 50 TABLET ORAL at 05:35

## 2019-11-01 RX ADMIN — SODIUM CHLORIDE 1000 MILLILITER(S): 9 INJECTION, SOLUTION INTRAVENOUS at 09:41

## 2019-11-01 NOTE — DISCHARGE NOTE PROVIDER - NSDCMRMEDTOKEN_GEN_ALL_CORE_FT
acetaminophen 325 mg oral tablet: 3 tab(s) orally every 8 hours, As needed, Mild Pain (1 - 3)  bisacodyl 10 mg rectal suppository: 1 suppository(ies) rectal once a day, As needed, If no bowel movement  magnesium hydroxide 8% oral suspension: 30 milliliter(s) orally once a day, As needed, Constipation  rivaroxaban 20 mg oral tablet: 1 tab(s) orally once a day (before a meal)  traMADol 50 mg oral tablet: 0.5 tab(s) orally every 8 hours, As needed, Moderate Pain (4 - 6)  traMADol 50 mg oral tablet: 1 tab(s) orally every 8 hours, As needed, Severe Pain (7 - 10)

## 2019-11-01 NOTE — OCCUPATIONAL THERAPY INITIAL EVALUATION ADULT - LIVES WITH, PROFILE
children/Pt resides with daughter and grandchildren in house w/ 3STE, 1 flight inside +HRs. Pt with bedroom on 1st floor and bathroom, +tub shower.

## 2019-11-01 NOTE — OCCUPATIONAL THERAPY INITIAL EVALUATION ADULT - ADL RETRAINING, OT EVAL
GOAL: Pt will perform LB dressing independently with use of AE in 4 weeks.  GOAL: Patient will perform grooming standing sink level independently with use of rolling walker in 4 weeks.

## 2019-11-01 NOTE — OCCUPATIONAL THERAPY INITIAL EVALUATION ADULT - PRECAUTIONS/LIMITATIONS, REHAB EVAL
(cont from above) Takes xarelto for afib, last dose AM of 10/30/19. Pt s/p intramedullary nailing of right femur 10/31/19./fall precautions

## 2019-11-01 NOTE — PROGRESS NOTE ADULT - SUBJECTIVE AND OBJECTIVE BOX
Patient is a 79y old  Male who presents with a chief complaint of right hip fracture (01 Nov 2019 23:47)      HPI:  ·  POST-OP DIAGNOSIS:  Intertrochanteric fracture of right femur 31-Oct-2019 14:05:21  Michele Camp.  ·  PROCEDURES:  Intramedullary nailing of right femur 31-Oct-2019 14:04:24  Zoë, Max.  Tolerated surgery well Pain 3/10 no sob or chest pain     MEDICATIONS  (STANDING):  lactated ringers. 1000 milliLiter(s) (75 mL/Hr) IV Continuous <Continuous>  rivaroxaban 20 milliGRAM(s) Oral with dinner    MEDICATIONS  (PRN):  acetaminophen   Tablet .. 975 milliGRAM(s) Oral every 8 hours PRN Mild Pain (1 - 3)  bisacodyl Suppository 10 milliGRAM(s) Rectal daily PRN If no bowel movement  magnesium hydroxide Suspension 30 milliLiter(s) Oral daily PRN Constipation  traMADol 25 milliGRAM(s) Oral every 8 hours PRN Moderate Pain (4 - 6)  traMADol 50 milliGRAM(s) Oral every 8 hours PRN Severe Pain (7 - 10)      Allergies    No Known Allergies    Intolerances            Vital Signs Last 24 Hrs  T(C): 36.7 (01 Nov 2019 05:12), Max: 37.2 (31 Oct 2019 15:20)  T(F): 98.1 (01 Nov 2019 05:12), Max: 99 (31 Oct 2019 15:20)  HR: 78 (01 Nov 2019 05:12) (65 - 87)  BP: 130/78 (01 Nov 2019 05:12) (91/61 - 149/72)  BP(mean): 87 (31 Oct 2019 17:15) (87 - 103)  RR: 18 (01 Nov 2019 05:12) (12 - 19)  SpO2: 95% (01 Nov 2019 05:12) (93% - 100%          PHYSICAL EXAM:  GENERAL: NAD, well nourished and conversant  HEAD:  Atraumatic  EYES: EOM, PERRLA, conjunctiva pink and sclera white  ENT: No tonsillar erythema, exudates, or enlargement, moist mucous membranes, good dentition, no lesions  NECK: Supple, No JVD, normal thyroid, carotids with normal upstrokes and no bruits  CHEST/LUNG: Clear to auscultation bilaterally, No rales, rhonchi, wheezing, or rubs  HEART: Regular rate and rhythm, No murmurs, rubs, or gallops  ABDOMEN: Soft, nondistended, no masses, guarding, tenderness or rebound, bowel sounds present  EXTREMITIES:  2+ Peripheral Pulses, No clubbing, cyanosis, or edema.   (+) IM NAIL RIGHT  FEMUR  LYMPH: No lymphadenopathy noted  SKIN: No rashes or lesions  NERVOUS SYSTEM:  Alert & Oriented X3, normal cognitive function. Motor Strength 5/5 right upper and right lower.  5/5 left upper and left lower extremities, DTRs 2+ intact and symmetric    LABS:                          10.1   23.62 )-----------( 169      ( 01 Nov 2019 12:41 )             31.2     11-01    137  |  102  |  31<H>  ----------------------------<  144<H>  4.7   |  25  |  1.08  10-31    140  |  105  |  24<H>  ----------------------------<  135<H>  4.1   |  25  |  1.03  10-31    138  |  100  |  27<H>  ----------------------------<  133<H>  4.5   |  25  |  1.12    Ca    8.5      01 Nov 2019 09:55  Ca    8.5      31 Oct 2019 16:13  Ca    9.3      31 Oct 2019 05:21    TPro  6.8  /  Alb  3.9  /  TBili  0.5  /  DBili  x   /  AST  12  /  ALT  16  /  AlkPhos  62  10-31    CAPILLARY BLOOD GLUCOSE          RADIOLOGY & ADDITIONAL TESTS:      Consultant(s):    Care Discussed with Consultants/Other Providers [ ] YES  [ ] NO

## 2019-11-01 NOTE — PROGRESS NOTE ADULT - ASSESSMENT
A/P: Patient is a 79y y/o Male s/p R IMN, POD # 1    -Pain control/analgesia  -Inc spirometry  -Venodynes/foot pumps  -F/U AM Labs  -PT/OT/WBAT  -Antibiotic  -Anticoagulation

## 2019-11-01 NOTE — PHYSICAL THERAPY INITIAL EVALUATION ADULT - ADDITIONAL COMMENTS
Pt lives with 2 daughters, son-in-law, and 2 grandkids in private home with 3 steps to enter (+) HR, 1 step within. Pt owns cane, walker, crutches.

## 2019-11-01 NOTE — DISCHARGE NOTE PROVIDER - NSDCCPCAREPLAN_GEN_ALL_CORE_FT
PRINCIPAL DISCHARGE DIAGNOSIS  Diagnosis: Closed fracture of right hip, initial encounter  Assessment and Plan of Treatment: IM Nail DC Instructions:  1.	Resume previous diet, regular or diabetic as appropriate  2.	Weight Bearing as Tolerated with assistance and rolling walker  3.	Continue Xarelto 20mg Daily, dontinue until follow up with you PMD as you are taking for Atrial Fibrillation  4.	PT daily  5.	Follow up with Orthopedic Surgeon Dr Godinez in 10-14 Days after Discharge from the Hospital. Call Office asap For Appointment.  6.	Staples to be removed Post-Op Day 14, provided wound is healed, no open areas or copious drainage.  7.	Ice the hip as much as possible  8.	Keep Aquacel bandage on Hip. Change only if wet or soiled using Tegaderm/Paper tape and dry gauze.  9.                OK to shower with Aquacel beige bandage, otherwise sponge bathe only. Will need assistance to shower. PRINCIPAL DISCHARGE DIAGNOSIS  Diagnosis: Closed fracture of right hip, initial encounter  Assessment and Plan of Treatment: IM Nail DC Instructions:  1.	Resume previous diet, regular or diabetic as appropriate  2.	Weight Bearing as Tolerated with assistance and rolling walker  3.	Continue Xarelto 20mg Daily, dontinue until follow up with you PMD as you are taking for Atrial Fibrillation  4.	PT daily  5.	Follow up with Orthopedic Surgeon Dr Godinez in 7 days, 11/13 after Discharge from the Hospital. Call Office asap For Appointment.  6.	Staples to be removed Post-Op Day 14, provided wound is healed, no open areas or copious drainage.  7.	Ice the hip as much as possible  8.	Prevena dressing incsional vac dressing is in place. Please Keep On until Battery power stops. Dressing will be removed in Dr. Gonzalez office In 7 days and changed into a dry dressing.  9.                OK to shower  otherwise sponge bathe only. Will need assistance to shower.

## 2019-11-01 NOTE — DISCHARGE NOTE PROVIDER - CARE PROVIDER_API CALL
Rojas Godinez)  Orthopaedic Surgery  [address] [place] [address], [address]  [place] [address]  Phone: [phone]  Fax: [phone]  Follow Up Time:

## 2019-11-01 NOTE — PHYSICAL THERAPY INITIAL EVALUATION ADULT - PERTINENT HX OF CURRENT PROBLEM, REHAB EVAL
79y Male presents to ED s/p [facility] fall c/o severe R Hip pain and inability to ambulate.  Patient denies head hit or LOC. Localizes pain to R hip/femur 79y Male presents to ED s/p mechanical fall c/o severe R Hip pain and inability to ambulate. Patient denies head hit or LOC. Localizes pain to R hip/femur. EKG: afib with rapid ventricular response. X-ray R hip (10/30): acute comminuted IT fx proximal R femur. CT head (10/31): (-) fx, ankylosis L C2-3 facet, osteophyte at C5-7

## 2019-11-01 NOTE — OCCUPATIONAL THERAPY INITIAL EVALUATION ADULT - PERTINENT HX OF CURRENT PROBLEM, REHAB EVAL
80yo M presents to ED s/p [facility] fall c/o severe R Hip pain and inability to ambulate. Patient denies LOC. Localizes pain to R hip/femur. Patient denies radiation of pain, numbness/tingling/burning in the RLE. Patient denies any other injuries. Patient is a community ambulator with cane/walker at baseline. Had left hip fracture with IMN by Vladimir in 2011 and subsequent removal of hardware/revision IMN with Maeve 2018 (see below)

## 2019-11-01 NOTE — DISCHARGE NOTE PROVIDER - HOSPITAL COURSE
The patient is a 79 year old male status post Open Reduction Surgical Fixation of a Right Intertrochanteric hip Fracture after being admitted through [facility] Emergency Room. The Patient was medically Optimized for the Previously mentioned surgical procedure. The patient was taken to the operating room on date mentioned above. Prophylactic antibiotics were started before the procedure and continued for 24 hours.  There were no complications during the procedure and patient tolerated the procedure well.  The patient was transferred to recovery room in stable condition and subsequently to surgical floor.  Patient was placed on Xarelto for anticoagulation.  All home medications were continued.  The patient received physical therapy daily and daily labs were followed.  The dressing was kept clean, dry, intact and changed on POD 3.  The rest of the hospital stay was unremarkable. The patient was discharged in stable condition to follow up as outpatient. The patient is a 79 year old male status post Open Reduction Surgical Fixation of a Right Intertrochanteric hip Fracture after being admitted through [facility] Emergency Room. The Patient was medically Optimized for the Previously mentioned surgical procedure. The patient was taken to the operating room on date mentioned above. Prophylactic antibiotics were started before the procedure and continued for 24 hours.  There were no complications during the procedure and patient tolerated the procedure well.  The patient was transferred to recovery room in stable condition and subsequently to surgical floor.  Patient was placed on Xarelto for anticoagulation.  All home medications were continued.  The patient received physical therapy daily and daily labs were followed. Patient received  2 Units of PRBC on POD 3. The dressing was kept clean, dry, intact and changed on POD 3.  The rest of the hospital stay was unremarkable. The patient was discharged in stable condition to follow up as outpatient. The patient is a 79 year old male status post Open Reduction Surgical Fixation of a Right Intertrochanteric hip Fracture after being admitted through [facility] Emergency Room. The Patient was medically Optimized for the Previously mentioned surgical procedure. The patient was taken to the operating room on date mentioned above. Prophylactic antibiotics were started before the procedure and continued for 24 hours.  There were no complications during the procedure and patient tolerated the procedure well.  The patient was transferred to recovery room in stable condition and subsequently to surgical floor.  Patient was placed on Xarelto for anticoagulation.  All home medications were continued.  The patient received physical therapy daily and daily labs were followed. Patient received  2 Units of PRBC on POD 3. The dressing was kept clean, dry, intact and changed on POD 4 into a negative pressure vac incisional dressing. The rest of the hospital stay was unremarkable. The patient was discharged in stable condition to follow up as outpatient.

## 2019-11-01 NOTE — OCCUPATIONAL THERAPY INITIAL EVALUATION ADULT - ADDITIONAL COMMENTS
Xray femur 10/30: Status post left proximal femoral ORIF. Visualized hardware is intact. The left greater trochanter and portions of the left iliac bone are excluded from the image. Acute comminuted intertrochanteric fracture of the proximal right femur with varus deformity and superior displacement of major distal fragment. External rotation of distal fragment. Displaced fracture fragment at the medial aspect of the proximal right femoral shaft at the level of the lesser trochanter. No dislocation of the right hip. Lower lumbar spondylosis. CT C-spine & head 10/31: No acute fracture or dislocation of the cervical spine. Cervical spondylosis. Xray Chest 10/31: Clear lungs.

## 2019-11-01 NOTE — PHYSICAL THERAPY INITIAL EVALUATION ADULT - ACTIVE RANGE OF MOTION EXAMINATION, REHAB EVAL
R hip/knee AROM limited due to pain/decr. strength s/p surgery/bilateral upper extremity Active ROM was WFL (within functional limits)/Left LE Active ROM was WFL (within functional limits)

## 2019-11-01 NOTE — PHYSICAL THERAPY INITIAL EVALUATION ADULT - PLANNED THERAPY INTERVENTIONS, PT EVAL
GOAL: Pt will negotiate 3 stairs with 1 HR and step to pattern with mod A in 2 weeks./gait training/bed mobility training/balance training/transfer training/strengthening

## 2019-11-01 NOTE — OCCUPATIONAL THERAPY INITIAL EVALUATION ADULT - TRANSFER TRAINING, PT EVAL
GOAL: Patient will perform sit to stand with supervision within two weeks.  GOAL: Patient will be independent with functional transfer with use of rolling walker  in 4 weeks.

## 2019-11-01 NOTE — OCCUPATIONAL THERAPY INITIAL EVALUATION ADULT - LEVEL OF INDEPENDENCE, REHAB EVAL
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)    [address] [facility] [address]   [phone]            Jul 19, 2018  1:30 PM CDT   Folfiri with BAY 8 INFUSION   [facility]    [address] [facility] [address]   [phone]            Jul 30, 2018 10:15 AM CDT   Return Visit with [facility] ONLY CAN[facility]ER CENTER   [facility] ([facility])    [address] [facility] [address] [phone]            Jul 30, 2018 10:45 AM CDT   Return Visit with LISA Mendieta CNP   [facility])    [address] [facility] [address]   [phone]            Jul 30, 2018 11:30 AM CDT   Folfiri with BAY 4 INFUSION   [facility])    [address] [facility] [address] [phone]            Aug 15, 2018 11:45 AM CDT   Return Visit with [facility]URSE ONLY CANCER CENTER   [facility] ([facility])    [address] [place] [address]  "[facility] [address] [phone]            Aug 15, 2018 12:15 PM CDT   Return Visit with LISA Mendieta CNP   [facility])    [address] [facility] [address] [phone]            Aug 15, 2018  1:30 PM CDT   Folfiri with BAY 6 INFUSION   [facility] ([facility])    [id number] [facility] [address] [phone]              Who to contact     If you have questions or need follow up information about today's clinic visit or your schedule please contact [facility] directly at [phone].  Normal or non-critical lab and imaging results will be communicated to you by MyChart, letter or phone within 4 business days after the clinic has received the results. If you do not hear from us within 7 days, please contact the clinic through MyChart or phone. If you have a critical or abnormal lab result, we will notify you by phone as soon as possible.  Submit refill requests through [employer] or call your pharmacy and they will forward the refill request to us. Please allow 3 business days for your refill to be completed.          Additional Information About Your Visit        Care EveryWhere ID     This is your Care EveryWhere ID. This could be used by other organizations to access your [facility] medical records  [id number]        Your Vitals Were     Pulse Temperature Respirations Height Pulse Oximetry BMI (Body Mass Index)    81 98.4  F (36.9  C) 16 1.511 m (4' 11.49\") 95% 18.87 kg/m2       Blood Pressure from Last 3 Encounters:   06/28/18 154/88   06/13/18 100/68   05/30/18 139/80    Weight from Last 3 Encounters:   06/28/18 43.1 kg (95 lb)   06/13/18 45 kg (99 lb 1.6 oz)   05/30/18 44.9 kg (99 lb)               Primary Care Provider Office Phone # Fax #    Pancho Cope [phone] [address] [facility] [address]        Equal Access " to Services     JOCELINE JORDAN : Joleen Sun, waliam jaquez, qagianarolando kagonzalezcarrie barakat. So [facility] [phone].    ATENCIÓN: Si nikolsa daniels, tiene a ornelas disposición servicios gratuitos de asistencia lingüística. Llame al [phone].    We comply with applicable federal civil rights laws and [place] laws. We do not discriminate on the basis of race, color, national origin, age, disability, sex, sexual orientation, or gender identity.            Thank you!     Thank you for choosing [facility] [[email or web address]]  for your care. Our goal is always to provide you with excellent care. Hearing back from our patients is one way we can continue to improve our services. Please take a few minutes to complete the written survey that you may receive in the mail after your visit with us. Thank you!             Your Updated Medication List - Protect others around you: Learn how to safely use, store and throw away your medicines at www.disposemymeds.org.          This list is accurate as of 6/28/18 11:59 PM.  Always use your most recent med list.                   Brand Name Dispense Instructions for use Diagnosis    amylase-lipase-protease [id number] units Cpep per EC capsule    CREON    180 capsule    Take 1 capsule (24,000 Units) by mouth 3 times daily (with meals)    Pancreatic adenocarcinoma (H)       BIOTIN PO           CLARITIN PO      Take by mouth daily        latanoprost 0.005 % ophthalmic solution    XALATAN    3 Bottle    Place 1 drop into both eyes At Bedtime    Glaucoma suspect, bilateral       lidocaine-prilocaine cream    EMLA    30 g    Apply topically as needed for moderate pain (use dollop of cream to saran wrap 30 min prior to use of port)    Pancreatic adenocarcinoma (H)       loperamide 2 MG capsule    IMODIUM    30 capsule    2 caps at 1st sign of diarrhea & 1 cap every 2hrs until 12hrs diarrhea free. 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

## 2019-11-01 NOTE — DISCHARGE NOTE PROVIDER - NSDCFUADDINST_GEN_ALL_CORE_FT
IM Nail DC Instructions:    1.	Resume previous diet, regular or diabetic as appropriate  2.	Weight Bearing as Tolerated with assistance and rolling walker  3.	Continue Xarelto 20mg Daily, dontinue until follow up with you PMD as you are taking for Atrial Fibrillation  4.	PT daily  5.	Follow up with Orthopedic Surgeon Dr Godinez in 10-14 Days after Discharge from the Hospital. Call Office asap For Appointment.  6.	Staples to be removed Post-Op Day 14, provided wound is healed, no open areas or copious drainage.  7.	Ice the hip as much as possible  8.	Keep Aquacel bandage on Hip. Change only if wet or soiled using Tegaderm/Paper tape and dry gauze.  9.                OK to shower with Aquacel beige bandage, otherwise sponge bathe only. Will need assistance to shower.

## 2019-11-01 NOTE — PROGRESS NOTE ADULT - SUBJECTIVE AND OBJECTIVE BOX
Patient is seen and examined at bedside. Denies CP/SOB/Dizziness/N/V/D/HA. Pain is controlled.     Vital Signs Last 24 Hrs  T(C): 36.7 (01 Nov 2019 05:12), Max: 37.2 (31 Oct 2019 15:20)  T(F): 98.1 (01 Nov 2019 05:12), Max: 99 (31 Oct 2019 15:20)  HR: 78 (01 Nov 2019 05:12) (65 - 87)  BP: 130/78 (01 Nov 2019 05:12) (91/61 - 149/72)  BP(mean): 87 (31 Oct 2019 17:15) (87 - 103)  RR: 18 (01 Nov 2019 05:12) (12 - 19)  SpO2: 95% (01 Nov 2019 05:12) (93% - 100%)    GEN: NAD  LE: Dressing C/D/I.    Motor intact + EHL/FHL/TA/GS in the BL LE. Sensation is grossly intact distal . Extremity warm. Compartments are soft. DP 1+    Labs:                          12.2   18.13 )-----------( 172      ( 31 Oct 2019 16:13 )             37.9       10-31    140  |  105  |  24<H>  ----------------------------<  135<H>  4.1   |  25  |  1.03    Ca    8.5      31 Oct 2019 16:13    TPro  6.8  /  Alb  3.9  /  TBili  0.5  /  DBili  x   /  AST  12  /  ALT  16  /  AlkPhos  62  10-31      A/P: Patient is a 79y y/o Male s/p R IMN, POD # 1    -Pain control/analgesia  -Inc spirometry  -Venodynes/foot pumps  -F/U AM Labs  -PT/OT/WBAT  -Antibiotic  -Anticoagulation

## 2019-11-02 LAB
ANION GAP SERPL CALC-SCNC: 7 MMOL/L — SIGNIFICANT CHANGE UP (ref 5–17)
BASOPHILS # BLD AUTO: 0 K/UL — SIGNIFICANT CHANGE UP (ref 0–0.2)
BASOPHILS NFR BLD AUTO: 0 % — SIGNIFICANT CHANGE UP (ref 0–2)
BUN SERPL-MCNC: 25 MG/DL — HIGH (ref 7–23)
CALCIUM SERPL-MCNC: 8.3 MG/DL — LOW (ref 8.4–10.5)
CHLORIDE SERPL-SCNC: 101 MMOL/L — SIGNIFICANT CHANGE UP (ref 96–108)
CO2 SERPL-SCNC: 26 MMOL/L — SIGNIFICANT CHANGE UP (ref 22–31)
CREAT SERPL-MCNC: 1.03 MG/DL — SIGNIFICANT CHANGE UP (ref 0.5–1.3)
EOSINOPHIL # BLD AUTO: 0.19 K/UL — SIGNIFICANT CHANGE UP (ref 0–0.5)
EOSINOPHIL NFR BLD AUTO: 0.9 % — SIGNIFICANT CHANGE UP (ref 0–6)
GLUCOSE SERPL-MCNC: 122 MG/DL — HIGH (ref 70–99)
HCT VFR BLD CALC: 25.2 % — LOW (ref 39–50)
HGB BLD-MCNC: 8.3 G/DL — LOW (ref 13–17)
LYMPHOCYTES # BLD AUTO: 12.4 % — LOW (ref 13–44)
LYMPHOCYTES # BLD AUTO: 2.58 K/UL — SIGNIFICANT CHANGE UP (ref 1–3.3)
MCHC RBC-ENTMCNC: 32.9 GM/DL — SIGNIFICANT CHANGE UP (ref 32–36)
MCHC RBC-ENTMCNC: 33.3 PG — SIGNIFICANT CHANGE UP (ref 27–34)
MCV RBC AUTO: 101.2 FL — HIGH (ref 80–100)
MONOCYTES # BLD AUTO: 2.39 K/UL — HIGH (ref 0–0.9)
MONOCYTES NFR BLD AUTO: 11.5 % — SIGNIFICANT CHANGE UP (ref 2–14)
NEUTROPHILS # BLD AUTO: 15.63 K/UL — HIGH (ref 1.8–7.4)
NEUTROPHILS NFR BLD AUTO: 75.2 % — SIGNIFICANT CHANGE UP (ref 43–77)
PLATELET # BLD AUTO: 154 K/UL — SIGNIFICANT CHANGE UP (ref 150–400)
POTASSIUM SERPL-MCNC: 4.2 MMOL/L — SIGNIFICANT CHANGE UP (ref 3.5–5.3)
POTASSIUM SERPL-SCNC: 4.2 MMOL/L — SIGNIFICANT CHANGE UP (ref 3.5–5.3)
RBC # BLD: 2.49 M/UL — LOW (ref 4.2–5.8)
RBC # FLD: 13.1 % — SIGNIFICANT CHANGE UP (ref 10.3–14.5)
SODIUM SERPL-SCNC: 134 MMOL/L — LOW (ref 135–145)
WBC # BLD: 20.78 K/UL — HIGH (ref 3.8–10.5)
WBC # FLD AUTO: 20.78 K/UL — HIGH (ref 3.8–10.5)

## 2019-11-02 RX ADMIN — TRAMADOL HYDROCHLORIDE 25 MILLIGRAM(S): 50 TABLET ORAL at 15:01

## 2019-11-02 RX ADMIN — Medication 10 MILLIGRAM(S): at 14:56

## 2019-11-02 RX ADMIN — TRAMADOL HYDROCHLORIDE 25 MILLIGRAM(S): 50 TABLET ORAL at 15:31

## 2019-11-02 RX ADMIN — TRAMADOL HYDROCHLORIDE 25 MILLIGRAM(S): 50 TABLET ORAL at 00:00

## 2019-11-02 RX ADMIN — RIVAROXABAN 20 MILLIGRAM(S): KIT at 05:30

## 2019-11-02 RX ADMIN — MAGNESIUM HYDROXIDE 30 MILLILITER(S): 400 TABLET, CHEWABLE ORAL at 05:57

## 2019-11-02 NOTE — PROGRESS NOTE ADULT - SUBJECTIVE AND OBJECTIVE BOX
Patient is seen and examined at bedside. Denies CP/SOB/Dizziness/N/V/D/HA. Pain is controlled. Had some episodes of hypotension yesterday with PT, given 500ml bolus, improved, able to sit in chair.  Ready to try to walk with PT today.     Vital Signs Last 24 Hrs  T(C): 37 (02 Nov 2019 05:47), Max: 37.5 (01 Nov 2019 21:10)  T(F): 98.6 (02 Nov 2019 05:47), Max: 99.5 (01 Nov 2019 21:10)  HR: 86 (02 Nov 2019 05:47) (76 - 90)  BP: 101/68 (02 Nov 2019 05:47) (99/51 - 114/71)  RR: 18 (02 Nov 2019 05:47) (18 - 18)  SpO2: 95% (02 Nov 2019 05:47) (93% - 99%)    GEN: NAD  LE: Dressing C/D/I this AM, were saturated and changed by nursing  Motor intact + EHL/FHL/TA/GS in the BL LE. Sensation is grossly intact distal . Extremity warm. Compartments are soft. DP 1+, no calf TTP        A/P: Patient is a 79y y/o Male s/p R IMN, POD # 2  - doing well, pain well controlled   -Pain control/analgesia  -Inc spirometry  -Venodynes/foot pumps  -F/U AM Labs- monitor H/H  -PT/OT/WBAT  -Antibiotic  -Anticoagulation- 20mg Xar , home dose for afib  - Dispo planning, LIkely JULIANA monday

## 2019-11-02 NOTE — PROGRESS NOTE ADULT - SUBJECTIVE AND OBJECTIVE BOX
Patient is a 79y old  Male who presents with a chief complaint of right hip fracture (01 Nov 2019 23:47)      HPI:  ·  POST-OP DIAGNOSIS:  Intertrochanteric fracture of right femur 31-Oct-2019 14:05:21  Michele Camp.  ·  PROCEDURES:  Intramedullary nailing of right femur 31-Oct-2019 14:04:24  Zoë, Max.  Tolerated surgery well Pain 3/10 no sob or chest pain     MEDICATIONS  (STANDING):  lactated ringers. 1000 milliLiter(s) (75 mL/Hr) IV Continuous <Continuous>  rivaroxaban 20 milliGRAM(s) Oral with dinner    MEDICATIONS  (PRN):  acetaminophen   Tablet .. 975 milliGRAM(s) Oral every 8 hours PRN Mild Pain (1 - 3)  bisacodyl Suppository 10 milliGRAM(s) Rectal daily PRN If no bowel movement  magnesium hydroxide Suspension 30 milliLiter(s) Oral daily PRN Constipation  traMADol 25 milliGRAM(s) Oral every 8 hours PRN Moderate Pain (4 - 6)  traMADol 50 milliGRAM(s) Oral every 8 hours PRN Severe Pain (7 - 10)      Allergies    No Known Allergies    Intolerances              Vital Signs Last 24 Hrs  T(C): 37 (02 Nov 2019 05:47), Max: 37.5 (01 Nov 2019 21:10)  T(F): 98.6 (02 Nov 2019 05:47), Max: 99.5 (01 Nov 2019 21:10)  HR: 86 (02 Nov 2019 05:47) (76 - 90)  BP: 101/68 (02 Nov 2019 05:47) (99/51 - 114/71)  RR: 18 (02 Nov 2019 05:47) (18 - 18)  SpO2: 95% (02 Nov 2019 05:47) (93% - 99%)        PHYSICAL EXAM:  GENERAL: NAD, well nourished and conversant  HEAD:  Atraumatic  EYES: EOM, PERRLA, conjunctiva pink and sclera white  ENT: No tonsillar erythema, exudates, or enlargement, moist mucous membranes, good dentition, no lesions  NECK: Supple, No JVD, normal thyroid, carotids with normal upstrokes and no bruits  CHEST/LUNG: Clear to auscultation bilaterally, No rales, rhonchi, wheezing, or rubs  HEART: Regular rate and rhythm, No murmurs, rubs, or gallops  ABDOMEN: Soft, nondistended, no masses, guarding, tenderness or rebound, bowel sounds present  EXTREMITIES:  2+ Peripheral Pulses, No clubbing, cyanosis, or edema.   (+) IM NAIL RIGHT  FEMUR  LYMPH: No lymphadenopathy noted  SKIN: No rashes or lesions  NERVOUS SYSTEM:  Alert & Oriented X3, normal cognitive function. Motor Strength 5/5 right upper and right lower.  5/5 left upper and left lower extremities, DTRs 2+ intact and symmetric    LABS:      no labs 11/2                             Consultant(s):    Care Discussed with Consultants/Other Providers [ ] YES  [ ] NO

## 2019-11-03 LAB
ANION GAP SERPL CALC-SCNC: 8 MMOL/L — SIGNIFICANT CHANGE UP (ref 5–17)
BLD GP AB SCN SERPL QL: NEGATIVE — SIGNIFICANT CHANGE UP
BUN SERPL-MCNC: 18 MG/DL — SIGNIFICANT CHANGE UP (ref 7–23)
CALCIUM SERPL-MCNC: 8.1 MG/DL — LOW (ref 8.4–10.5)
CHLORIDE SERPL-SCNC: 99 MMOL/L — SIGNIFICANT CHANGE UP (ref 96–108)
CO2 SERPL-SCNC: 28 MMOL/L — SIGNIFICANT CHANGE UP (ref 22–31)
CREAT SERPL-MCNC: 0.91 MG/DL — SIGNIFICANT CHANGE UP (ref 0.5–1.3)
GLUCOSE SERPL-MCNC: 127 MG/DL — HIGH (ref 70–99)
HCT VFR BLD CALC: 21.3 % — LOW (ref 39–50)
HGB BLD-MCNC: 6.9 G/DL — CRITICAL LOW (ref 13–17)
MCHC RBC-ENTMCNC: 32.4 GM/DL — SIGNIFICANT CHANGE UP (ref 32–36)
MCHC RBC-ENTMCNC: 32.4 PG — SIGNIFICANT CHANGE UP (ref 27–34)
MCV RBC AUTO: 100 FL — SIGNIFICANT CHANGE UP (ref 80–100)
PLATELET # BLD AUTO: 153 K/UL — SIGNIFICANT CHANGE UP (ref 150–400)
POTASSIUM SERPL-MCNC: 3.8 MMOL/L — SIGNIFICANT CHANGE UP (ref 3.5–5.3)
POTASSIUM SERPL-SCNC: 3.8 MMOL/L — SIGNIFICANT CHANGE UP (ref 3.5–5.3)
RBC # BLD: 2.13 M/UL — LOW (ref 4.2–5.8)
RBC # FLD: 13.2 % — SIGNIFICANT CHANGE UP (ref 10.3–14.5)
RH IG SCN BLD-IMP: POSITIVE — SIGNIFICANT CHANGE UP
SODIUM SERPL-SCNC: 135 MMOL/L — SIGNIFICANT CHANGE UP (ref 135–145)
WBC # BLD: 14.64 K/UL — HIGH (ref 3.8–10.5)
WBC # FLD AUTO: 14.64 K/UL — HIGH (ref 3.8–10.5)

## 2019-11-03 RX ORDER — TRAMADOL HYDROCHLORIDE 50 MG/1
1 TABLET ORAL
Qty: 0 | Refills: 0 | DISCHARGE
Start: 2019-11-03

## 2019-11-03 RX ORDER — TRAMADOL HYDROCHLORIDE 50 MG/1
0.5 TABLET ORAL
Qty: 0 | Refills: 0 | DISCHARGE
Start: 2019-11-03

## 2019-11-03 RX ORDER — RIVAROXABAN 15 MG-20MG
1 KIT ORAL
Qty: 0 | Refills: 0 | DISCHARGE
Start: 2019-11-03

## 2019-11-03 RX ORDER — ACETAMINOPHEN 500 MG
3 TABLET ORAL
Qty: 0 | Refills: 0 | DISCHARGE
Start: 2019-11-03

## 2019-11-03 RX ORDER — MAGNESIUM HYDROXIDE 400 MG/1
30 TABLET, CHEWABLE ORAL
Qty: 0 | Refills: 0 | DISCHARGE
Start: 2019-11-03

## 2019-11-03 RX ADMIN — TRAMADOL HYDROCHLORIDE 25 MILLIGRAM(S): 50 TABLET ORAL at 06:30

## 2019-11-03 RX ADMIN — SODIUM CHLORIDE 75 MILLILITER(S): 9 INJECTION, SOLUTION INTRAVENOUS at 21:21

## 2019-11-03 RX ADMIN — RIVAROXABAN 20 MILLIGRAM(S): KIT at 05:53

## 2019-11-03 RX ADMIN — TRAMADOL HYDROCHLORIDE 25 MILLIGRAM(S): 50 TABLET ORAL at 05:53

## 2019-11-03 RX ADMIN — Medication 975 MILLIGRAM(S): at 10:52

## 2019-11-03 RX ADMIN — Medication 975 MILLIGRAM(S): at 12:06

## 2019-11-03 RX ADMIN — Medication 975 MILLIGRAM(S): at 21:50

## 2019-11-03 RX ADMIN — Medication 975 MILLIGRAM(S): at 21:20

## 2019-11-03 NOTE — PROGRESS NOTE ADULT - SUBJECTIVE AND OBJECTIVE BOX
Patient is a 79y old  Male who presents with a chief complaint of right hip fracture (01 Nov 2019 23:47)      HPI:  ·  POST-OP DIAGNOSIS:  Intertrochanteric fracture of right femur 31-Oct-2019 14:05:21  Michele Camp.  ·  PROCEDURES:  Intramedullary nailing of right femur 31-Oct-2019 14:04:24  Zoë, Max.  Tolerated surgery well Pain 3/10 no sob or chest pain    patient with  blood loss anemia  from the     MEDICATIONS  (STANDING):  lactated ringers. 1000 milliLiter(s) (75 mL/Hr) IV Continuous <Continuous>  rivaroxaban 20 milliGRAM(s) Oral with dinner    MEDICATIONS  (PRN):  acetaminophen   Tablet .. 975 milliGRAM(s) Oral every 8 hours PRN Mild Pain (1 - 3)  bisacodyl Suppository 10 milliGRAM(s) Rectal daily PRN If no bowel movement  magnesium hydroxide Suspension 30 milliLiter(s) Oral daily PRN Constipation  traMADol 25 milliGRAM(s) Oral every 8 hours PRN Moderate Pain (4 - 6)  traMADol 50 milliGRAM(s) Oral every 8 hours PRN Severe Pain (7 - 10)      Allergies    No Known Allergies    Intolerances    Vital Signs Last 24 Hrs  T(C): 36.9 (03 Nov 2019 21:27), Max: 37.3 (03 Nov 2019 05:52)  T(F): 98.5 (03 Nov 2019 21:27), Max: 99.2 (03 Nov 2019 16:15)  HR: 74 (03 Nov 2019 21:27) (74 - 91)  BP: 105/68 (03 Nov 2019 21:27) (94/60 - 116/73)  BP(mean): --  RR: 18 (03 Nov 2019 21:27) (17 - 18)  SpO2: 98% (03 Nov 2019 21:27) (95% - 98%)          PHYSICAL EXAM:  GENERAL: NAD, well nourished and conversant  HEAD:  Atraumatic  EYES: EOM, PERRLA, conjunctiva pink and sclera white  ENT: No tonsillar erythema, exudates, or enlargement, moist mucous membranes, good dentition, no lesions  NECK: Supple, No JVD, normal thyroid, carotids with normal upstrokes and no bruits  CHEST/LUNG: Clear to auscultation bilaterally, No rales, rhonchi, wheezing, or rubs  HEART: Regular rate and rhythm, No murmurs, rubs, or gallops  ABDOMEN: Soft, nondistended, no masses, guarding, tenderness or rebound, bowel sounds present  EXTREMITIES:  2+ Peripheral Pulses, No clubbing, cyanosis, or edema.   (+) IM NAIL RIGHT  FEMUR  LYMPH: No lymphadenopathy noted  SKIN: No rashes or lesions  NERVOUS SYSTEM:  Alert & Oriented X3, normal cognitive function. Motor Strength 5/5 right upper and right lower.  5/5 left upper and left lower extremities, DTRs 2+ intact and symmetric          CBC Full  -  ( 03 Nov 2019 09:04 )  WBC Count : 14.64 K/uL  RBC Count : 2.13 M/uL  Hemoglobin : 6.9 g/dL  Hematocrit : 21.3 %  Platelet Count - Automated : 153 K/uL  Mean Cell Volume : 100.0 fl  Mean Cell Hemoglobin : 32.4 pg  Mean Cell Hemoglobin Concentration : 32.4 gm/dL  Auto Neutrophil # : x  Auto Lymphocyte # : x  Auto Monocyte # : x  Auto Eosinophil # : x  Auto Basophil # : x  Auto Neutrophil % : x  Auto Lymphocyte % : x  Auto Monocyte % : x  Auto Eosinophil % : x  Auto Basophil % : x    11-03    135  |  99  |  18  ----------------------------<  127<H>  3.8   |  28  |  0.91    Ca    8.1<L>      03 Nov 2019 07:04      TRANSFUSE 2 UNITS OF prbc                               Consultant(s):    Care Discussed with Consultants/Other Providers [ ] YES  [ ] NO

## 2019-11-03 NOTE — PROGRESS NOTE ADULT - SUBJECTIVE AND OBJECTIVE BOX
Patient is seen and examined at bedside. Denies CP/SOB/Dizziness/N/V/D/HA. Pain is controlled.     Vital Signs Last 24 Hrs  Vital Signs Last 24 Hrs  T(C): 36.9 (03 Nov 2019 00:21), Max: 37.3 (02 Nov 2019 20:48)  T(F): 98.4 (03 Nov 2019 00:21), Max: 99.2 (02 Nov 2019 20:48)  HR: 91 (03 Nov 2019 00:21) (84 - 111)  BP: 102/61 (03 Nov 2019 00:21) (99/51 - 122/75)  BP(mean): --  RR: 18 (03 Nov 2019 00:21) (18 - 20)  SpO2: 95% (03 Nov 2019 00:21) (93% - 99%)    GEN: NAD  LE: Incisions c/d/i, dressing changed  Motor intact + EHL/FHL/TA/GS in the BL LE. Sensation is grossly intact distal . Extremity warm. Compartments are soft. DP 1+, no calf TTP        A/P: Patient is a 79y y/o Male s/p R IMN 10/31    -Pain control/analgesia  -Inc spirometry  -Venodynes/foot pumps  - Monitor H/H, FU labs  -PT/OT/WBAT  -Antibiotic  -Anticoagulation- 20mg Xar , home dose for afib  - Dispo planning, Likely [facility] monday

## 2019-11-03 NOTE — PROVIDER CONTACT NOTE (OTHER) - DATE AND TIME:
03-Nov-2019 11:01 O-T Plasty Text: The defect edges were debeveled with a #15 scalpel blade.  Given the location of the defect, shape of the defect and the proximity to free margins an O-T plasty was deemed most appropriate.  Using a sterile surgical marker, an appropriate O-T plasty was drawn incorporating the defect and placing the expected incisions within the relaxed skin tension lines where possible.    The area thus outlined was incised deep to adipose tissue with a #15 scalpel blade.  The skin margins were undermined to an appropriate distance in all directions utilizing iris scissors.

## 2019-11-04 LAB
HCT VFR BLD CALC: 26.2 % — LOW (ref 39–50)
HGB BLD-MCNC: 8.5 G/DL — LOW (ref 13–17)
MCHC RBC-ENTMCNC: 31.1 PG — SIGNIFICANT CHANGE UP (ref 27–34)
MCHC RBC-ENTMCNC: 32.4 GM/DL — SIGNIFICANT CHANGE UP (ref 32–36)
MCV RBC AUTO: 96 FL — SIGNIFICANT CHANGE UP (ref 80–100)
PLATELET # BLD AUTO: 174 K/UL — SIGNIFICANT CHANGE UP (ref 150–400)
RBC # BLD: 2.73 M/UL — LOW (ref 4.2–5.8)
RBC # FLD: 15.6 % — HIGH (ref 10.3–14.5)
WBC # BLD: 13.75 K/UL — HIGH (ref 3.8–10.5)
WBC # FLD AUTO: 13.75 K/UL — HIGH (ref 3.8–10.5)

## 2019-11-04 RX ORDER — POLYETHYLENE GLYCOL 3350 17 G/17G
17 POWDER, FOR SOLUTION ORAL ONCE
Refills: 0 | Status: COMPLETED | OUTPATIENT
Start: 2019-11-04 | End: 2019-11-04

## 2019-11-04 RX ORDER — SENNA PLUS 8.6 MG/1
2 TABLET ORAL AT BEDTIME
Refills: 0 | Status: DISCONTINUED | OUTPATIENT
Start: 2019-11-04 | End: 2019-11-06

## 2019-11-04 RX ADMIN — Medication 10 MILLIGRAM(S): at 18:54

## 2019-11-04 RX ADMIN — MAGNESIUM HYDROXIDE 30 MILLILITER(S): 400 TABLET, CHEWABLE ORAL at 11:57

## 2019-11-04 RX ADMIN — RIVAROXABAN 20 MILLIGRAM(S): KIT at 06:54

## 2019-11-04 RX ADMIN — SODIUM CHLORIDE 75 MILLILITER(S): 9 INJECTION, SOLUTION INTRAVENOUS at 06:54

## 2019-11-04 RX ADMIN — POLYETHYLENE GLYCOL 3350 17 GRAM(S): 17 POWDER, FOR SOLUTION ORAL at 16:00

## 2019-11-04 RX ADMIN — SENNA PLUS 2 TABLET(S): 8.6 TABLET ORAL at 22:53

## 2019-11-04 NOTE — PROVIDER CONTACT NOTE (OTHER) - ACTION/TREATMENT ORDERED:
Pt is ordered to get 2 units of PRBC , awaiting T+S to be verified and will order blood.
pt is encouraged to drink and will continue to monitor pt
MD will be coming to see pt   Will continue to monitor pt.
Pt placed back in bed, 500 cc LR bolus given. Will attempt to get out of bed with physical therapy. Will continue to monitor.

## 2019-11-04 NOTE — PROGRESS NOTE ADULT - SUBJECTIVE AND OBJECTIVE BOX
78 yo male brought into the ER by EMS for evaluation of right hip pain  s/p mechanical fall this evening. Pt states "I was doing leg raises when I lost my balance and fell and landed on my hip. I was not able to get up or move without a great deal of pain. I hit my head as well when I fell and I take Xarelto for AFIB. Patient seen today. pain well controlled. Patient working with physical therapy.       MEDICATIONS  (STANDING):  lactated ringers. 1000 milliLiter(s) (75 mL/Hr) IV Continuous <Continuous>  rivaroxaban 20 milliGRAM(s) Oral with dinner    MEDICATIONS  (PRN):  acetaminophen   Tablet .. 975 milliGRAM(s) Oral every 8 hours PRN Mild Pain (1 - 3)  bisacodyl Suppository 10 milliGRAM(s) Rectal daily PRN If no bowel movement  magnesium hydroxide Suspension 30 milliLiter(s) Oral daily PRN Constipation  traMADol 25 milliGRAM(s) Oral every 8 hours PRN Moderate Pain (4 - 6)  traMADol 50 milliGRAM(s) Oral every 8 hours PRN Severe Pain (7 - 10)          VITALS:   T(C): 36.7 (11-04-19 @ 17:26), Max: 37.2 (11-04-19 @ 06:17)  HR: 85 (11-04-19 @ 17:26) (64 - 85)  BP: 100/67 (11-04-19 @ 17:26) (100/67 - 115/75)  RR: 18 (11-04-19 @ 17:26) (18 - 18)  SpO2: 100% (11-04-19 @ 17:26) (93% - 100%)  Wt(kg): --      PHYSICAL EXAM:  GENERAL: NAD, well nourished and conversant  HEAD:  Atraumatic  EYES: EOM, PERRLA, conjunctiva pink and sclera white  ENT: No tonsillar erythema, exudates, or enlargement, moist mucous membranes, good dentition, no lesions  NECK: Supple, No JVD, normal thyroid, carotids with normal upstrokes and no bruits  CHEST/LUNG: Clear to auscultation bilaterally, No rales, rhonchi, wheezing, or rubs  HEART: Regular rate and rhythm, No murmurs, rubs, or gallops  ABDOMEN: Soft, nondistended, no masses, guarding, tenderness or rebound, bowel sounds present  EXTREMITIES:  2+ Peripheral Pulses, No clubbing, cyanosis, or edema. s/p re[aojf pf ;ef  LYMPH: No lymphadenopathy noted  SKIN: No rashes or lesions  NERVOUS SYSTEM:  Alert & Oriented X3, normal cognitive function. Motor Strength 5/5 right upper and right lower.  5/5 left upper and left lower extremities, DTRs 2+ intact and symmetric    LABS:        CBC Full  -  ( 04 Nov 2019 08:16 )  WBC Count : 13.75 K/uL  RBC Count : 2.73 M/uL  Hemoglobin : 8.5 g/dL  Hematocrit : 26.2 %  Platelet Count - Automated : 174 K/uL  Mean Cell Volume : 96.0 fl  Mean Cell Hemoglobin : 31.1 pg  Mean Cell Hemoglobin Concentration : 32.4 gm/dL  Auto Neutrophil # : x  Auto Lymphocyte # : x  Auto Monocyte # : x  Auto Eosinophil # : x  Auto Basophil # : x  Auto Neutrophil % : x  Auto Lymphocyte % : x  Auto Monocyte % : x  Auto Eosinophil % : x  Auto Basophil % : x    11-03    135  |  99  |  18  ----------------------------<  127<H>  3.8   |  28  |  0.91    Ca    8.1<L>      03 Nov 2019 07:04            CAPILLARY BLOOD GLUCOSE          RADIOLOGY & ADDITIONAL TESTS:

## 2019-11-04 NOTE — PROVIDER CONTACT NOTE (OTHER) - ASSESSMENT
Pt is on Xarelto   Pt has Low H+ H 21.3, and 6.9
pt has no urge to urinate
pt right leg and calf is swollen and firm to touch  pt is ecchymotic around the right hip area
Pt working with OT, vital signs laying down and sitting WDL. Upon standing pt became diaphoretic , flushed, and stated he felt dizzy and light headed. Unable to obtain blood pressure while standing patient needed to sit down.

## 2019-11-04 NOTE — PROGRESS NOTE ADULT - SUBJECTIVE AND OBJECTIVE BOX
Patient is seen and examined at bedside. Denies CP/SOB/Dizziness/N/V/D/HA. Pain is controlled.         Vital Signs Last 24 Hrs  T(C): 37.2 (04 Nov 2019 06:17), Max: 37.3 (03 Nov 2019 16:15)  T(F): 98.9 (04 Nov 2019 06:17), Max: 99.2 (03 Nov 2019 16:15)  HR: 69 (04 Nov 2019 06:17) (64 - 88)  BP: 108/67 (04 Nov 2019 06:17) (94/60 - 116/73)  BP(mean): --  RR: 18 (04 Nov 2019 06:17) (17 - 18)  SpO2: 97% (04 Nov 2019 06:17) (96% - 98%)    GEN: NAD  LE: Incisions with some saturation  Motor intact + EHL/FHL/TA/GS in the BL LE. Sensation is grossly intact distal . Extremity warm. Compartments are soft. DP 1+, no calf TTP        A/P: Patient is a 79y y/o Male s/p R IMN 10/31    -Dressing was changed today into aquacel  -Pain control/analgesia  -Inc spirometry  -Venodynes/foot pumps  - Monitor H/H, FU labs  -PT/OT/WBAT  -Antibiotic  -Anticoagulation- 20mg Xar , home dose for afib  - Dispo planning, Likely [facility] Today, 11/4

## 2019-11-05 RX ADMIN — RIVAROXABAN 20 MILLIGRAM(S): KIT at 06:08

## 2019-11-05 RX ADMIN — SODIUM CHLORIDE 75 MILLILITER(S): 9 INJECTION, SOLUTION INTRAVENOUS at 22:12

## 2019-11-05 RX ADMIN — SENNA PLUS 2 TABLET(S): 8.6 TABLET ORAL at 22:06

## 2019-11-05 RX ADMIN — SODIUM CHLORIDE 75 MILLILITER(S): 9 INJECTION, SOLUTION INTRAVENOUS at 06:09

## 2019-11-05 RX ADMIN — TRAMADOL HYDROCHLORIDE 50 MILLIGRAM(S): 50 TABLET ORAL at 13:40

## 2019-11-05 RX ADMIN — SODIUM CHLORIDE 75 MILLILITER(S): 9 INJECTION, SOLUTION INTRAVENOUS at 16:09

## 2019-11-05 RX ADMIN — TRAMADOL HYDROCHLORIDE 50 MILLIGRAM(S): 50 TABLET ORAL at 13:02

## 2019-11-05 NOTE — PROGRESS NOTE ADULT - ASSESSMENT
79y Male s/p R IMN 10/31  -Dressing to be changed today  -Pain control/analgesia  -Inc spirometry  -Venodynes/foot pumps  -PT/OT/WBAT  -DVT ppx - 20mg Xarelto  -Dispo planning

## 2019-11-05 NOTE — PROGRESS NOTE ADULT - ASSESSMENT
A/P: Patient is a 80 yo Male presents with a right hip fx seen now s/p R IMN, A/P: Patient is a 78 yo Male presents with a right hip fx seen now s/p R IMN. Right leg is very large consistent with significant loss of blood.

## 2019-11-05 NOTE — PROGRESS NOTE ADULT - SUBJECTIVE AND OBJECTIVE BOX
Ortho Progress Note    S: Patient seen and examined at bedside this morning. No acute events overnight. Pain well controlled with current regimen. Denies lightheadedness/dizziness, CP/SOB. Tolerating diet. Had a bowel movement and has been urinating well.       O:  Physical Exam:  Gen: Laying in bed, NAD, alert and oriented.   Resp: Unlabored breathing  RLE: EHL/FHL/TA/Sol intact          + SILT DP/SP/POWELL/Sa/Tib          +PT, extremity WWP          2+ pitting edema          Acquacel dressings saturated with blood          Moderate ecchymosis in hip/gluteal region    Vital Signs Last 24 Hrs  T(C): 37.2 (05 Nov 2019 06:06), Max: 37.5 (05 Nov 2019 00:43)  T(F): 99 (05 Nov 2019 06:06), Max: 99.5 (05 Nov 2019 00:43)  HR: 83 (05 Nov 2019 06:06) (69 - 85)  BP: 121/76 (05 Nov 2019 06:06) (100/67 - 121/76)  BP(mean): --  RR: 18 (05 Nov 2019 06:06) (18 - 18)  SpO2: 94% (05 Nov 2019 06:06) (93% - 100%)                          8.5    13.75 )-----------( 174      ( 04 Nov 2019 08:16 )             26.2                         6.9    14.64 )-----------( 153      ( 03 Nov 2019 09:04 )             21.3       11-03    135  |  99  |  18  ----------------------------<  127<H>  3.8   |  28  |  0.91 Ortho Progress Note    S: Patient seen and examined at bedside this morning. No acute events overnight. Pain well controlled with current regimen. Denies lightheadedness/dizziness, CP/SOB, calf pain. Tolerating diet. Had a bowel movement and has been urinating well.      O:  Physical Exam:  Gen: Laying in bed, NAD, alert and oriented.   Resp: Unlabored breathing  RLE: EHL/FHL/TA/Sol intact          + SILT DP/SP/POWELL/Sa/Tib          +PT, extremity WWP          2+ pitting edema          Acquacel dressings saturated with blood          Moderate ecchymosis in hip/gluteal region          Calf swelling, but no calf pain    Vital Signs Last 24 Hrs  T(C): 37.2 (05 Nov 2019 06:06), Max: 37.5 (05 Nov 2019 00:43)  T(F): 99 (05 Nov 2019 06:06), Max: 99.5 (05 Nov 2019 00:43)  HR: 83 (05 Nov 2019 06:06) (69 - 85)  BP: 121/76 (05 Nov 2019 06:06) (100/67 - 121/76)  BP(mean): --  RR: 18 (05 Nov 2019 06:06) (18 - 18)  SpO2: 94% (05 Nov 2019 06:06) (93% - 100%)                          8.5    13.75 )-----------( 174      ( 04 Nov 2019 08:16 )             26.2                         6.9    14.64 )-----------( 153      ( 03 Nov 2019 09:04 )             21.3       11-03    135  |  99  |  18  ----------------------------<  127<H>  3.8   |  28  |  0.91

## 2019-11-05 NOTE — PROGRESS NOTE ADULT - SUBJECTIVE AND OBJECTIVE BOX
78 yo male brought into the ER by EMS for evaluation of right hip pain  s/p mechanical fall this evening. Pt states "I was doing leg raises when I lost my balance and fell and landed on my hip. I was not able to get up or move without a great deal of pain. I hit my head as well when I fell and I take Xarelto for AFIB. Patient seen today. pain well controlled. Patient working with physical therapy.       MEDICATIONS  (STANDING):  lactated ringers. 1000 milliLiter(s) (75 mL/Hr) IV Continuous <Continuous>  rivaroxaban 20 milliGRAM(s) Oral with dinner  senna 2 Tablet(s) Oral at bedtime    MEDICATIONS  (PRN):  acetaminophen   Tablet .. 975 milliGRAM(s) Oral every 8 hours PRN Mild Pain (1 - 3)  bisacodyl Suppository 10 milliGRAM(s) Rectal daily PRN If no bowel movement  magnesium hydroxide Suspension 30 milliLiter(s) Oral daily PRN Constipation  traMADol 25 milliGRAM(s) Oral every 8 hours PRN Moderate Pain (4 - 6)  traMADol 50 milliGRAM(s) Oral every 8 hours PRN Severe Pain (7 - 10)          Vital Signs Last 24 Hrs  T(C): 37.2 (05 Nov 2019 06:06), Max: 37.5 (05 Nov 2019 00:43)  T(F): 99 (05 Nov 2019 06:06), Max: 99.5 (05 Nov 2019 00:43)  HR: 83 (05 Nov 2019 06:06) (80 - 85)  BP: 121/76 (05 Nov 2019 06:06) (100/67 - 121/76)  BP(mean): --  RR: 18 (05 Nov 2019 06:06) (18 - 18)  SpO2: 94% (05 Nov 2019 06:06) (94% - 100%)    PHYSICAL EXAM:  GENERAL: NAD, well nourished and conversant  HEAD:  Atraumatic  EYES: EOM, PERRLA, conjunctiva pink and sclera white  ENT: No tonsillar erythema, exudates, or enlargement, moist mucous membranes, good dentition, no lesions  NECK: Supple, No JVD, normal thyroid, carotids with normal upstrokes and no bruits  CHEST/LUNG: Clear to auscultation bilaterally, No rales, rhonchi, wheezing, or rubs  HEART: Regular rate and rhythm, No murmurs, rubs, or gallops  ABDOMEN: Soft, nondistended, no masses, guarding, tenderness or rebound, bowel sounds present  EXTREMITIES:  2+ Peripheral Pulses, No clubbing, cyanosis, or edema. s/p re[aojf pf ;ef  LYMPH: No lymphadenopathy noted  SKIN: No rashes or lesions  NERVOUS SYSTEM:  Alert & Oriented X3, normal cognitive function. Motor Strength 5/5 right upper and right lower.  5/5 left upper and left lower extremities, DTRs 2+ intact and symmetric    LABS: No labs obtained today                        CAPILLARY BLOOD GLUCOSE          RADIOLOGY & ADDITIONAL TESTS: 78 yo male brought into the ER by EMS for evaluation of right hip pain  s/p mechanical fall this evening. Pt states "I was doing leg raises when I lost my balance and fell and landed on my hip. I was not able to get up or move without a great deal of pain. I hit my head as well when I fell and I take Xarelto for AFIB. Patient seen today. Pain well controlled. Patient working with physical therapy. S/P Right hip IM nail ORIF on 10/31/19. Right leg with 4+ edema consistent with loss of blood on Xarelto. I asked the nurse to apply ice around the clock. To repeat CBC and transfuse  if necessary.      MEDICATIONS  (STANDING):  lactated ringers. 1000 milliLiter(s) (75 mL/Hr) IV Continuous <Continuous>  rivaroxaban 20 milliGRAM(s) Oral with dinner  senna 2 Tablet(s) Oral at bedtime    MEDICATIONS  (PRN):  acetaminophen   Tablet .. 975 milliGRAM(s) Oral every 8 hours PRN Mild Pain (1 - 3)  bisacodyl Suppository 10 milliGRAM(s) Rectal daily PRN If no bowel movement  magnesium hydroxide Suspension 30 milliLiter(s) Oral daily PRN Constipation  traMADol 25 milliGRAM(s) Oral every 8 hours PRN Moderate Pain (4 - 6)  traMADol 50 milliGRAM(s) Oral every 8 hours PRN Severe Pain (7 - 10)          Vital Signs Last 24 Hrs  T(C): 37.2 (05 Nov 2019 06:06), Max: 37.5 (05 Nov 2019 00:43)  T(F): 99 (05 Nov 2019 06:06), Max: 99.5 (05 Nov 2019 00:43)  HR: 83 (05 Nov 2019 06:06) (80 - 85)  BP: 121/76 (05 Nov 2019 06:06) (100/67 - 121/76)  BP(mean): --  RR: 18 (05 Nov 2019 06:06) (18 - 18)  SpO2: 94% (05 Nov 2019 06:06) (94% - 100%)    PHYSICAL EXAM:  GENERAL: NAD, well nourished and conversant  HEAD:  Atraumatic  EYES: EOM, PERRLA, conjunctiva pink and sclera white  ENT: No tonsillar erythema, exudates, or enlargement, moist mucous membranes, good dentition, no lesions  NECK: Supple, No JVD, normal thyroid, carotids with normal upstrokes and no bruits  CHEST/LUNG: Clear to auscultation bilaterally, No rales, rhonchi, wheezing, or rubs  HEART: Regular rate and rhythm, No murmurs, rubs, or gallops  ABDOMEN: Soft, nondistended, no masses, guarding, tenderness or rebound, bowel sounds present  EXTREMITIES:  2+ Peripheral Pulses, No clubbing, cyanosis, or edema. s/p re[aojf pf ;ef  LYMPH: No lymphadenopathy noted  SKIN: No rashes or lesions  NERVOUS SYSTEM:  Alert & Oriented X3, normal cognitive function. Motor Strength 5/5 right upper and right lower.  5/5 left upper and left lower extremities, DTRs 2+ intact and symmetric    LABS: No labs obtained today                        CAPILLARY BLOOD GLUCOSE          RADIOLOGY & ADDITIONAL TESTS:

## 2019-11-06 ENCOUNTER — TRANSCRIPTION ENCOUNTER (OUTPATIENT)
Age: 79
End: 2019-11-06

## 2019-11-06 VITALS
RESPIRATION RATE: 18 BRPM | SYSTOLIC BLOOD PRESSURE: 113 MMHG | DIASTOLIC BLOOD PRESSURE: 72 MMHG | OXYGEN SATURATION: 97 % | TEMPERATURE: 98 F | HEART RATE: 76 BPM

## 2019-11-06 LAB
ANION GAP SERPL CALC-SCNC: 10 MMOL/L — SIGNIFICANT CHANGE UP (ref 5–17)
BUN SERPL-MCNC: 19 MG/DL — SIGNIFICANT CHANGE UP (ref 7–23)
CALCIUM SERPL-MCNC: 8.3 MG/DL — LOW (ref 8.4–10.5)
CHLORIDE SERPL-SCNC: 100 MMOL/L — SIGNIFICANT CHANGE UP (ref 96–108)
CO2 SERPL-SCNC: 25 MMOL/L — SIGNIFICANT CHANGE UP (ref 22–31)
CREAT SERPL-MCNC: 0.86 MG/DL — SIGNIFICANT CHANGE UP (ref 0.5–1.3)
GLUCOSE SERPL-MCNC: 107 MG/DL — HIGH (ref 70–99)
HCT VFR BLD CALC: 26.8 % — LOW (ref 39–50)
HGB BLD-MCNC: 8.9 G/DL — LOW (ref 13–17)
MCHC RBC-ENTMCNC: 32 PG — SIGNIFICANT CHANGE UP (ref 27–34)
MCHC RBC-ENTMCNC: 33.2 GM/DL — SIGNIFICANT CHANGE UP (ref 32–36)
MCV RBC AUTO: 96.4 FL — SIGNIFICANT CHANGE UP (ref 80–100)
PLATELET # BLD AUTO: 261 K/UL — SIGNIFICANT CHANGE UP (ref 150–400)
POTASSIUM SERPL-MCNC: 4.1 MMOL/L — SIGNIFICANT CHANGE UP (ref 3.5–5.3)
POTASSIUM SERPL-SCNC: 4.1 MMOL/L — SIGNIFICANT CHANGE UP (ref 3.5–5.3)
RBC # BLD: 2.78 M/UL — LOW (ref 4.2–5.8)
RBC # FLD: 15.3 % — HIGH (ref 10.3–14.5)
SODIUM SERPL-SCNC: 135 MMOL/L — SIGNIFICANT CHANGE UP (ref 135–145)
WBC # BLD: 15.87 K/UL — HIGH (ref 3.8–10.5)
WBC # FLD AUTO: 15.87 K/UL — HIGH (ref 3.8–10.5)

## 2019-11-06 RX ORDER — RIVAROXABAN 15 MG-20MG
20 KIT ORAL
Refills: 0 | Status: DISCONTINUED | OUTPATIENT
Start: 2019-11-06 | End: 2019-11-06

## 2019-11-06 RX ADMIN — Medication 975 MILLIGRAM(S): at 06:44

## 2019-11-06 RX ADMIN — Medication 975 MILLIGRAM(S): at 06:14

## 2019-11-06 NOTE — DISCHARGE NOTE NURSING/CASE MANAGEMENT/SOCIAL WORK - PATIENT PORTAL LINK FT
You can access the FollowMyHealth Patient Portal offered by [facility] by registering at the following website: [email or web address]. By joining [facility]’s FollowMyHealth portal, you will also be able to view your health information using other applications (apps) compatible with our system.

## 2019-11-06 NOTE — DIETITIAN INITIAL EVALUATION ADULT. - ADD RECOMMEND
1) Recommend providing pt with assistance at mealtimes. 2) Pt added to mealtime mate program. 1) Continue current regular diet and monitor for tolerance. 2) Assistance with meal, pt referred to mealtime mate program. 3) Monitor need for nutrition supplements in-house. Pt currently with stock pile from home.

## 2019-11-06 NOTE — DIETITIAN INITIAL EVALUATION ADULT. - ENERGY NEEDS
Pertinent Information: Patient is a 79 year old male with a history of afib. Pt was admitted 10/31 for a right hip fracture. Pt is s/p Intramedullary nailing of right femur (10/31).

## 2019-11-06 NOTE — PROGRESS NOTE ADULT - REASON FOR ADMISSION
right hip fracture

## 2019-11-06 NOTE — DIETITIAN INITIAL EVALUATION ADULT. - OTHER INFO
Pt admitted for fracture to R femur. PTA pt states that appetite and PO intake has been good. Pt states that he eats a diet with plenty of fiber, takes a probiotic and adds psyllium powder to breakfast cereal to ensure regular BM's. Pt lives with daughter and son-in-law and they provide some meals to the pt. Pt supplements with Esnure at home when he feels his dietary intake is inadequate. Pt reports that since his last break 7 years ago he has gradually and intentionally lost weight. Pt achieved wt loss through portion control. Pt reports a stable wt of 215 lbs for the past several years. Pt denies any chewing or swallowing difficulties with no N/V/C/D PTA. Pt is not taking any vitamin or mineral supplements. Pt confirms NFKA.     Wt: Dosing 214.9 lbs,  lbs as per pt    Pt has been on a regular diet since 10/31 with good tolerance. Pt reports some difficulty opening the food containers, however is able to feed himself without assistance. As per EMR, pt has been eating 50-75% of his meals. Pt states that this is normal to him and that he doesn't eat large meals at home. Pt denies any N/V/C/D at this time. As per EMR last BM was yesterday (11/5). Pt was encouraged to eat adequate protein to promote healing in setting of recent surgery. During interview pt seemed fearful at the thought of constipation. Encouraged to drink adequate fluids to ease bowel movements. Pt admitted for fracture to R femur. PTA pt states that appetite and PO intake has been good. Pt states that he eats a diet with plenty of fiber, takes a probiotic and adds psyllium powder to breakfast cereal to ensure regular BM's. Pt lives with daughter and son-in-law and they provide some meals to the pt. Pt supplements with Ensure at home when he feels his dietary intake is inadequate. Pt reports that since his last break 7 years ago he has gradually and intentionally lost weight. Pt achieved wt loss through portion control. Pt reports a stable wt of 215 lbs for the past several years. Pt denies any chewing or swallowing difficulties with no N/V/C/D PTA. Pt is not taking any vitamin or mineral supplements. Pt confirms NFKA.     Wt: Dosing 214.9 lbs,  lbs as per pt    Pt has been on a regular diet since 10/31 with good tolerance. Pt reports some difficulty opening the food containers, however is able to feed himself without assistance. Pt may benefit from mealtime mate program, discussed with RN. As per EMR, pt has been eating 50-75% of his meals. Pt states that this is normal to him and that he doesn't eat large meals at home. Noted Ensure at bedside brought to pt from daughter, pt states he will have ~1 a day while in house, can provide supplements in-house as needed. Pt denies any N/V/C/D at this time. As per EMR, last BM was yesterday (11/5). Pt was encouraged to eat adequate protein to promote healing in setting of recent surgery. During interview pt seemed fearful of constipation. Encouraged to drink adequate fluids to ease bowel movements. Pt noted on bowel regimen.

## 2019-11-06 NOTE — DIETITIAN INITIAL EVALUATION ADULT. - PHYSICAL APPEARANCE
other (specify) Ht: 72 inches  Wt: 214.9 lbs  BMI: 27.6 kg/m2  IBW: 178 lbs (+/- 10%) IBW%: 121%  Skin: Surgical incision to Right hip as per nursing flowsheet. Pt noted with +3 edema R foot/knee/ankle/leg and +2 edema L foot/knee/ankle/leg on nursing flowsheet. As per EMR this edema is consistent with loss of blood on Xarelto.

## 2019-11-06 NOTE — PROGRESS NOTE ADULT - ASSESSMENT
A/P: Patient is a 78 yo Male presents with a right hip fx seen now s/p R IMN. Right leg is very large consistent with significant loss of blood.

## 2019-11-06 NOTE — PROGRESS NOTE ADULT - ATTENDING COMMENTS
Much more comfortable than before surgery. Moving toes/ankle well. Calf soft, nontender    WBAT, Xarelto
Patient transferred to rehab  continue current meds  PO as tolerated  activity as tolerated   follow up with ortho  Patient and family aware of plan
Beginning to ambulate and doing well. No medical complications post-op to date and to proceed with physical therapy, as tolerated. Continues pulmonary toilet to lessen atelectasis, leg exercises as taught to lessen the risk of DVT and supervised pain medications for post-op pain control. I anticipate transfer to rehabilitation to follow when cleared by orthopedics.

## 2019-11-12 PROCEDURE — 97162 PT EVAL MOD COMPLEX 30 MIN: CPT

## 2019-11-12 PROCEDURE — 85730 THROMBOPLASTIN TIME PARTIAL: CPT

## 2019-11-12 PROCEDURE — 71045 X-RAY EXAM CHEST 1 VIEW: CPT

## 2019-11-12 PROCEDURE — 97530 THERAPEUTIC ACTIVITIES: CPT

## 2019-11-12 PROCEDURE — 80053 COMPREHEN METABOLIC PANEL: CPT

## 2019-11-12 PROCEDURE — C1889: CPT

## 2019-11-12 PROCEDURE — 86850 RBC ANTIBODY SCREEN: CPT

## 2019-11-12 PROCEDURE — C1769: CPT

## 2019-11-12 PROCEDURE — 81001 URINALYSIS AUTO W/SCOPE: CPT

## 2019-11-12 PROCEDURE — 72125 CT NECK SPINE W/O DYE: CPT

## 2019-11-12 PROCEDURE — 97110 THERAPEUTIC EXERCISES: CPT

## 2019-11-12 PROCEDURE — 85027 COMPLETE CBC AUTOMATED: CPT

## 2019-11-12 PROCEDURE — 86923 COMPATIBILITY TEST ELECTRIC: CPT

## 2019-11-12 PROCEDURE — 86901 BLOOD TYPING SEROLOGIC RH(D): CPT

## 2019-11-12 PROCEDURE — 76000 FLUOROSCOPY <1 HR PHYS/QHP: CPT

## 2019-11-12 PROCEDURE — 86900 BLOOD TYPING SEROLOGIC ABO: CPT

## 2019-11-12 PROCEDURE — 70450 CT HEAD/BRAIN W/O DYE: CPT

## 2019-11-12 PROCEDURE — P9016: CPT

## 2019-11-12 PROCEDURE — 80048 BASIC METABOLIC PNL TOTAL CA: CPT

## 2019-11-12 PROCEDURE — 93005 ELECTROCARDIOGRAM TRACING: CPT

## 2019-11-12 PROCEDURE — 73502 X-RAY EXAM HIP UNI 2-3 VIEWS: CPT

## 2019-11-12 PROCEDURE — 97116 GAIT TRAINING THERAPY: CPT

## 2019-11-12 PROCEDURE — 82962 GLUCOSE BLOOD TEST: CPT

## 2019-11-12 PROCEDURE — 36430 TRANSFUSION BLD/BLD COMPNT: CPT

## 2019-11-12 PROCEDURE — 73552 X-RAY EXAM OF FEMUR 2/>: CPT

## 2019-11-12 PROCEDURE — 97165 OT EVAL LOW COMPLEX 30 MIN: CPT

## 2019-11-12 PROCEDURE — 99285 EMERGENCY DEPT VISIT HI MDM: CPT | Mod: 25

## 2019-11-12 PROCEDURE — C1713: CPT

## 2019-11-12 PROCEDURE — 85610 PROTHROMBIN TIME: CPT

## 2019-11-12 PROCEDURE — 96374 THER/PROPH/DIAG INJ IV PUSH: CPT

## 2019-12-10 ENCOUNTER — INPATIENT (INPATIENT)
Facility: HOSPITAL | Age: 79
LOS: 5 days | Discharge: NURSING, REHAB OR LONG-TERM CARE | DRG: 920 | End: 2019-12-16
Attending: ORTHOPAEDIC SURGERY | Admitting: ORTHOPAEDIC SURGERY
Payer: MEDICARE

## 2019-12-10 ENCOUNTER — TRANSCRIPTION ENCOUNTER (OUTPATIENT)
Age: 79
End: 2019-12-10

## 2019-12-10 VITALS
OXYGEN SATURATION: 100 % | TEMPERATURE: 98 F | DIASTOLIC BLOOD PRESSURE: 71 MMHG | RESPIRATION RATE: 20 BRPM | HEART RATE: 83 BPM | SYSTOLIC BLOOD PRESSURE: 118 MMHG | WEIGHT: 195.11 LBS

## 2019-12-10 DIAGNOSIS — T81.30XA DISRUPTION OF WOUND, UNSPECIFIED, INITIAL ENCOUNTER: ICD-10-CM

## 2019-12-10 DIAGNOSIS — K40.90 UNILATERAL INGUINAL HERNIA, WITHOUT OBSTRUCTION OR GANGRENE, NOT SPECIFIED AS RECURRENT: Chronic | ICD-10-CM

## 2019-12-10 LAB
ALBUMIN SERPL ELPH-MCNC: 3.7 G/DL — SIGNIFICANT CHANGE UP (ref 3.3–5)
ALP SERPL-CCNC: 95 U/L — SIGNIFICANT CHANGE UP (ref 40–120)
ALT FLD-CCNC: 13 U/L — SIGNIFICANT CHANGE UP (ref 10–45)
ANION GAP SERPL CALC-SCNC: 13 MMOL/L — SIGNIFICANT CHANGE UP (ref 5–17)
APTT BLD: 23.5 SEC — LOW (ref 27.5–36.3)
AST SERPL-CCNC: 19 U/L — SIGNIFICANT CHANGE UP (ref 10–40)
BILIRUB SERPL-MCNC: 0.5 MG/DL — SIGNIFICANT CHANGE UP (ref 0.2–1.2)
BLD GP AB SCN SERPL QL: NEGATIVE — SIGNIFICANT CHANGE UP
BUN SERPL-MCNC: 21 MG/DL — SIGNIFICANT CHANGE UP (ref 7–23)
CALCIUM SERPL-MCNC: 9.9 MG/DL — SIGNIFICANT CHANGE UP (ref 8.4–10.5)
CHLORIDE SERPL-SCNC: 101 MMOL/L — SIGNIFICANT CHANGE UP (ref 96–108)
CO2 SERPL-SCNC: 24 MMOL/L — SIGNIFICANT CHANGE UP (ref 22–31)
CREAT SERPL-MCNC: 0.94 MG/DL — SIGNIFICANT CHANGE UP (ref 0.5–1.3)
GLUCOSE SERPL-MCNC: 102 MG/DL — HIGH (ref 70–99)
HCT VFR BLD CALC: 43.8 % — SIGNIFICANT CHANGE UP (ref 39–50)
HGB BLD-MCNC: 13.5 G/DL — SIGNIFICANT CHANGE UP (ref 13–17)
INR BLD: 1.25 RATIO — HIGH (ref 0.88–1.16)
MCHC RBC-ENTMCNC: 30.2 PG — SIGNIFICANT CHANGE UP (ref 27–34)
MCHC RBC-ENTMCNC: 30.8 GM/DL — LOW (ref 32–36)
MCV RBC AUTO: 98 FL — SIGNIFICANT CHANGE UP (ref 80–100)
NRBC # BLD: 0 /100 WBCS — SIGNIFICANT CHANGE UP (ref 0–0)
PLATELET # BLD AUTO: 261 K/UL — SIGNIFICANT CHANGE UP (ref 150–400)
POTASSIUM SERPL-MCNC: 4.3 MMOL/L — SIGNIFICANT CHANGE UP (ref 3.5–5.3)
POTASSIUM SERPL-SCNC: 4.3 MMOL/L — SIGNIFICANT CHANGE UP (ref 3.5–5.3)
PROT SERPL-MCNC: 7.5 G/DL — SIGNIFICANT CHANGE UP (ref 6–8.3)
PROTHROM AB SERPL-ACNC: 14.5 SEC — HIGH (ref 10–12.9)
RBC # BLD: 4.47 M/UL — SIGNIFICANT CHANGE UP (ref 4.2–5.8)
RBC # FLD: 13.7 % — SIGNIFICANT CHANGE UP (ref 10.3–14.5)
RH IG SCN BLD-IMP: POSITIVE — SIGNIFICANT CHANGE UP
SODIUM SERPL-SCNC: 138 MMOL/L — SIGNIFICANT CHANGE UP (ref 135–145)
WBC # BLD: 12.87 K/UL — HIGH (ref 3.8–10.5)
WBC # FLD AUTO: 12.87 K/UL — HIGH (ref 3.8–10.5)

## 2019-12-10 PROCEDURE — 73552 X-RAY EXAM OF FEMUR 2/>: CPT | Mod: 26,RT

## 2019-12-10 PROCEDURE — 71045 X-RAY EXAM CHEST 1 VIEW: CPT | Mod: 26

## 2019-12-10 PROCEDURE — 73502 X-RAY EXAM HIP UNI 2-3 VIEWS: CPT | Mod: 26,RT

## 2019-12-10 PROCEDURE — 99285 EMERGENCY DEPT VISIT HI MDM: CPT

## 2019-12-10 RX ORDER — SODIUM CHLORIDE 9 MG/ML
1000 INJECTION, SOLUTION INTRAVENOUS
Refills: 0 | Status: DISCONTINUED | OUTPATIENT
Start: 2019-12-10 | End: 2019-12-11

## 2019-12-10 RX ORDER — TRAMADOL HYDROCHLORIDE 50 MG/1
25 TABLET ORAL EVERY 6 HOURS
Refills: 0 | Status: DISCONTINUED | OUTPATIENT
Start: 2019-12-10 | End: 2019-12-11

## 2019-12-10 RX ORDER — ACETAMINOPHEN 500 MG
975 TABLET ORAL EVERY 8 HOURS
Refills: 0 | Status: DISCONTINUED | OUTPATIENT
Start: 2019-12-10 | End: 2019-12-11

## 2019-12-10 RX ORDER — MAGNESIUM HYDROXIDE 400 MG/1
30 TABLET, CHEWABLE ORAL DAILY
Refills: 0 | Status: DISCONTINUED | OUTPATIENT
Start: 2019-12-10 | End: 2019-12-11

## 2019-12-10 RX ORDER — TRAMADOL HYDROCHLORIDE 50 MG/1
50 TABLET ORAL EVERY 6 HOURS
Refills: 0 | Status: DISCONTINUED | OUTPATIENT
Start: 2019-12-10 | End: 2019-12-11

## 2019-12-10 RX ADMIN — Medication 975 MILLIGRAM(S): at 23:00

## 2019-12-10 RX ADMIN — Medication 975 MILLIGRAM(S): at 22:30

## 2019-12-10 NOTE — H&P ADULT - HISTORY OF PRESENT ILLNESS
CC: R hip wound drainage, hematoma s/p R hip IMN 10/31/19  HPI: 79yMale w/ hx of afib on xarelto, s/p L hip IMN (Dr. Gilbert 3/10/13), and most recently s/p R hip IMN (Dr. Godinez 10/31/19) who was sent back from [facility] for sanguinous drainage from R hip wound. He denies numbness/tingling of the RLE. Denies fevers/chills. He is coming back for I&D of R hip/thigh wound for hematoma. He has been off his xarelto since 12/6. Has been walking with PT, ambulates with walker.    Review of systems: As per HPI, otherwise negative.     PAST MEDICAL & SURGICAL HISTORY:  AF (atrial fibrillation)  Hernia, inguinal, right    Medications: MEDICATIONS  (STANDING):  acetaminophen   Tablet .. 975 milliGRAM(s) Oral every 8 hours    MEDICATIONS  (PRN):  bisacodyl Suppository 10 milliGRAM(s) Rectal daily PRN Constipation  magnesium hydroxide Suspension 30 milliLiter(s) Oral daily PRN Constipation  traMADol 25 milliGRAM(s) Oral every 6 hours PRN Moderate Pain (4 - 6)  traMADol 50 milliGRAM(s) Oral every 6 hours PRN Severe Pain (7 - 10)      T(C): 36.7 (12-10-19 @ 17:03), Max: 36.7 (12-10-19 @ 17:03)  HR: 83 (12-10-19 @ 17:03) (83 - 83)  BP: 118/71 (12-10-19 @ 17:03) (118/71 - 118/71)  RR: 20 (12-10-19 @ 17:03) (20 - 20)  SpO2: 100% (12-10-19 @ 17:03) (100% - 100%)                        13.5   12.87 )-----------( 261      ( 10 Dec 2019 18:21 )             43.8             EXAM:  Awake, Alert and in no acute distress    Imaging  XR of the R hip/pelvis/femur and CXR pending    WBC 12.87  ESR pending  CRP pending    A/P: This is a 79y Male w/ hx of afib on xarelto s/p R hip IMN on 10/31/19 who presents with sanguinous drainage from R hip/thigh wound due to a hematoma.    - Admit to [facility], Dr. Godinez   - OR tomorrow for I&D of R hip/thigh wound  - NPO/IVF after midnight  - Consent in chart  - Medical clearance pending  - Venodynes for DVT ppx    Patient discussed with Dr. Godinez who agrees with plan    Raymond Dumont MD

## 2019-12-10 NOTE — CONSULT NOTE ADULT - SUBJECTIVE AND OBJECTIVE BOX
The patient was seen and examined tonight after right hip swelling secondary to a R hip/thigh hematoma s/p R hip IMN 10/31/19 while in rehabilitation. His comorbidities included inactive ASHD, chronic atrial fibrillation with a moderate ventricular response, BPH with mild outlet obstruction,  and advanced age. Exam, lab, EKG and CXR are stable. The patient is medically stable, medically optimized and has no medical contraindication to surgery tomorrow as required. Exam time 70 minutes including > than 50 % for bedside discussion and counseling. Comprehensive consultation dictated #[id number]. The patient was seen and examined tonight after right hip swelling secondary to a R hip/thigh hematoma s/p R hip IMN 10/31/19 while in rehabilitation. His comorbidities included inactive ASHD, chronic atrial fibrillation with a moderate ventricular response, BPH with mild outlet obstruction,  and advanced age. Exam, lab, EKG and CXR are stable. The patient is medically stable, medically optimized and has no medical contraindication to surgery tomorrow as required. Exam time 70 minutes including > than 50 % for bedside discussion and counseling. Comprehensive consultation dictated #[id number]. The patient was seen and examined tonight after right hip swelling secondary to a R hip/thigh hematoma s/p R hip IMN 10/31/19 while in rehabilitation. His comorbidities included inactive ASHD, chronic atrial fibrillation with a moderate ventricular response, BPH with mild outlet obstruction,  and advanced age. Exam, lab, EKG and CXR are stable. The patient is medically stable, medically optimized and has no medical contraindication to surgery tomorrow as required. Exam time 70 minutes including > than 50 % for bedside discussion and counseling. Comprehensive consultation dictated #[id number].      CONSULTING SERVICE:  Medicine.    REASON FOR CONSULTATION:  Right hip hematoma.    HISTORY OF PRESENT ILLNESS:  The patient was previously admitted to [facility] on 10/31/2019 and under our care.  He was seen in consultation by Dr. Hernadez and underwent surgery on 10/31/2019 with a intertrochanteric fracture treated with an IM nail of the right femur.  He has chronic atrial fibrillation and was started on Xarelto postoperatively and had no complications.  He was transferred to rehabilitation on 11/06/2019 for ambulation.  He was doing well and developed increased swelling of his right hip with drainage from the right hip.  X-rays confirmed a right hip hematoma of consequence that requires surgical drainage.  He has no pain in his leg at the present time and he is able to weightbear and ambulate with assistance.    MEDICATIONS:  His medications at the present time include, Xarelto 20 mg once a day, tramadol as needed for pain control, MiraLax as needed, melatonin 5 mg at bedtime for sleep.    ALLERGIES:  THE PATIENT HAS NO KNOWN ALLERGIES.    PAST SURGICAL HISTORY:  Limited to his prior right hip IM nail procedure.  He also had a right inguinal hernia repair in the distant past.    PAST MEDICAL HISTORY:  Limited to chronic atrial fibrillation with a moderate ventricular response on no cardiac medications.    SOCIAL HISTORY:  He is a nonsmoker, nondrinker with no drug history.  The patient socially, lives at home with his daughter who is a nurse at [employer].      DIET:  The patient's diet is regular.  His weight is stable at approximately 190 pounds.    FAMILY HISTORY:  Normal.    REVIEW OF SYSTEMS.  He has no headaches or dizziness.  No changes in hearing or vision.  No sinus trouble.  No dental disease.  No difficulty swallowing.  He has no shortness of breath, cough, congestion or wheezing.  He claims that he has been a runner prior to his hip fracture.  He has chronic atrial fibrillation with no angina, no chest pressure, palpitations or blackouts.  He has no abdominal pain, change in bowel habits or GI bleeding.  He has no dysuria, frequency or incontinence.  He has moderate prostate enlargement with nocturia x2.  He has no rashes or skin disease.  He has no diabetes or thyroid disease.  He has no neurological complaints.    PHYSICAL EXAMINATION:  At this time shows, VITAL SIGNS:  Blood pressure of 120/78, pulse of 70, respirations 16.  HEAD AND NECK:  Examination is normal.  CHEST:  Clear.  CARDIAC:  Examination is unremarkable with chronic atrial fibrillation, but no murmurs.  ABDOMEN:  Within normal limits.  There is no masses, tenderness, guarding or rebound.  EXTREMITIES:  He has got 2+ right hip edema with oozing from a puncture site.  He has good movement of his feet.  He has good 4+ pulses.  NEUROLOGICAL:  Examination is within normal limits.    LABORATORY DATA:  His laboratories that were obtainedshows a CBC with hemoglobin of 13.5, hematocrit of 43.8, white count of 12,800, platelet count of 261,000.  His INR is 1.25.  PTT is 23.5.  Sodium 138, potassium is 4.3, chloride is 101, CO2 is 24, BUN is 21, creatinine 0.9, blood sugar is 102.  Liver function tests are normal.    RADIOLOGY DATA:  EKG was seen and shows atrial fibrillation with a moderate ventricular response.  Normal ST-T waves.  X-ray of the hip was done which shows loss of continuity in the region of the prior fracture consistent with hematoma superimposed on fracture site.          IMPRESSION AND PLAN:  The impression at this time is the patient is medically stable and has no medical contraindications to incision and drainage of right hip to drain the right hip hematoma.  Examination time was 70 minutes of which greater than 50% was necessary for bedside discussion and counseling.  He will continue to be under our medical service while in hospital to lessen the risk of postoperative complications.  Xarelto is to be held with active right hip bleeding.        _______________________    DICT:	SUHA TEIXEIRA MD ([id number]) 12/10/2019 09:22 PM  TRANS:	[id number] 12/10/2019 09:27 PM  JOB:	[id number]     Electronically Signed by:  SUHA TEIXEIRA 12/11/2019 07:02:18 AM

## 2019-12-10 NOTE — ED PROVIDER NOTE - OBJECTIVE STATEMENT
71 yo male in room 2, brought into ER by EMS, sent from rehab [facility] accompanied by his family for evaluation of bleeding at site of hip surgery.  Pt was operated on 10/31 by Dr. Godinez.  Pt denies any pain or discomfort. Denies fevers/chills. 69 yo male in room 2, brought into ER by EMS, sent from rehab [facility] accompanied by his family for evaluation of bleeding at site of hip surgery.  Pt was operated on 10/31 by Dr. Godinez.  Pt denies any pain or discomfort. Denies fevers/chills. Pt denies hip pain.

## 2019-12-10 NOTE — H&P ADULT - I WAS PHYSICALLY PRESENT FOR THE KEY PORTIONS OF THE EVALUATION AND MANAGEMENT (E/M) SERVICE PROVIDED.  I AGREE WITH THE ABOVE HISTORY, PHYSICAL, AND PLAN WHICH I HAVE REVIEWED AND EDITED WHERE APPROPRIATE
Reason For Visit  HAYLEY GORDON is an established patient here today for.   A chaperone is not applicable. She is unaccompanied.        Quality    Adult Wellness CI height documented, discussion of regular exercise, exercising regularly, printed information given for activities, discussion of nutritional quality of diet, patient education given about proper diet, alcohol use (rarely), no tobacco use, has feelings of hopelessness (PHQ-2), Anhedonia (PHQ-2) and not taking medication for depression.   Asthma CI assessment & Intervention History Asthma Symptoms Evaluated - ACT: 20 no tobacco use, does not have feelings of hopelessness (PHQ-2), no Anhedonia (PHQ-2), not taking medication for depression      History of Present Illness  Mrs. Gordon presents to the office today for follow-up and she also is concerned because she has been experiencing decreased ability to concentrate, sleep disturbances highlighted by inability to sleep, weight loss for follow-up 4 pounds because she has loss of appetite associated with crying spells sense of loneliness, and she recognizes that she has depression she had been diagnosed at the age of 11 with depression she had been taking medication on and off the reason that the medication is taken off is because she does so well that that she could be prolonged periods of time without medicine at this time what has triggered his depression is the separation from her  and divorce proceedings, she keeps herself busy during the day she has 2 horses that that she tends to and that helps her stay BC besides other activities during the day she has a counselor that she is going to start seeing once again. Otherwise there is change in to the fall season also worsens her asthma and allergies and she is preparing herself to prevent acute episodes of that problem. Her ankylosing spondylitis is stable.      Review of Systems    Const:. Per HPI.   Allergy & Immunology:. Per HPI.   Eyes:  Normal.   ENT:. Per HPI.   CV: Normal.   Resp: Normal.   GI: Normal.   : Normal.   Endo: Normal.   Heme/Lymph: Normal.   Musc:. Per HPI.   Neuro: Normal.   Psych:. Per HPI.   Skin: Normal.       Allergies  Biaxin  Protonix  sulfa  Levaquin    Current Meds   1. ALPRAZolam 0.25 MG Oral Tablet; TAKE 1 TABLET 4 times daily PRN FOR ANXIETY;   Therapy: 14Nov2014 to (Evaluate:[id number]); Last Rx:07May2018 Ordered   2. Arnuity Ellipta 100 MCG/ACT Inhalation Aerosol Powder Breath Activated; INHALE 1 PUFF   DAILY;   Therapy: 07Nov2016 to Recorded   3. Astelin 137 MCG/SPRAY SOLN; USE 1 TO 2 SPRAYS IN EACH NOSTRIL TWICE DAILY   AS NEEDED;   Therapy: (Recorded:[id number]) to Recorded   4. Atorvastatin Calcium 40 MG Oral Tablet; TAKE 1 TABLET BY MOUTH  EVERY DAY;   Therapy: 14Nov2014 to (Evaluate:03Nov2018)  Requested for: 07May2018; Last   Rx:07May2018 Ordered   5. Carvedilol 6.25 MG Oral Tablet; TAKE 1 TABLET BY MOUTH  TWICE A DAY;   Therapy: 14Nov2014 to (Evaluate:[id number])  Requested for: 18Jun2018; Last   Rx:18Jun2018 Ordered   6. Claritin Reditabs 5 MG Oral Tablet Disintegrating; TAKE 1 TABLET DAILY PRN;   Therapy: (Recorded:08May2015) to Recorded   7. Claritin-D 12 Hour 5-120 MG Oral Tablet Extended Release 12 Hour; 1 po qam prn;   Therapy: (Recorded:08May2015) to Recorded   8. Colace CAPS; TAKE 1 CAPSULE TWICE DAILY AS NEEDED;   Therapy: (Recorded:08May2015) to Recorded   9. Cyclobenzaprine HCl - 10 MG Oral Tablet; TAKE 1 TABLET 3 TIMES DAILY AS NEEDED;   Therapy: (Recorded:08May2015) to Recorded   10. Eletriptan Hydrobromide 40 MG Oral Tablet; TAKE 1 TABLET AT ONSET OF  MIGRAINE.    MAY REPEAT ONCE  AFTER 2 HOURS. MAX 2  DOSES/24 HOURS;    Therapy: 06Mar2018 to (Evaluate:[id number])  Requested for: 07May2018; Last    Rx:07May2018 Ordered   11. Famotidine 10 MG Oral Tablet; take 2 pills twice daily;    Therapy: [id number] to Recorded   12. Fluticasone Propionate 50 MCG/ACT Nasal Suspension; 1 spray each nostril qd;     Therapy: 05Dec2014 to (Last Rx:05Dec2014) Ordered   13. Humira Pen 40 MG/0.8ML Subcutaneous Pen-injector Kit;    Therapy: 09Jul2015 to Recorded   14. Librax 5-2.5 MG Oral Capsule; Take 1 capsule by mouth 4  times daily as needed;    Therapy: 30Nov2015 to (Evaluate:03Nov2018)  Requested for: 07May2018; Last    Rx:07May2018 Ordered   15. Methocarbamol 500 MG Oral Tablet; 1 TAB PRN;    Therapy: (Recorded:72Cog0941) to Recorded   16. MetroNIDAZOLE 0.75 % External Cream; APPLY AND GENTLY MASSAGE INTO    AFFECTED AREA(S) TWICE DAILY  Requested for: 09May2016; Last Rx:09May2016    Ordered   17. Mirena 20 MCG/24HR Intrauterine Intrauterine Device; USE AS DIRECTED;    Therapy: 07Nov2016 to Recorded   18. Multivitamins TABS; TAKE 1 TABLET DAILY;    Therapy: (Recorded:08May2015) to Recorded   19. Omeprazole 10 MG Oral Capsule Delayed Release; One pill twice a day;    Therapy: 07May2018 to Recorded   20. Personal Best Full Range SCOTT; USE AS DIRECTED;    Therapy: (Recorded:05Jge9100) to Recorded   21. ProAir  (90 Base) MCG/ACT Inhalation Aerosol Solution; INHALE 2 PUFFS    EVERY 4-6 HOURS AS NEEDED;    Therapy: (Recorded:08May2015) to Recorded   22. Sucralfate 1 GM Oral Tablet; TAKE 1 TABLET BY MOUTH FOUR TIMES DAILY BEFORE    MEALS AND EVERY NIGHT AT BEDTIME;    Therapy: 22Sfl8900 to (Evaluate:06Jul2018)  Requested for: 07May2018; Last    Rx:07May2018 Ordered   23. Topiramate 50 MG Oral Tablet; TAKE 2 TABLETS BY MOUTH  EVERY MORNING AND 1    TABLET  BY MOUTH EVERY EVENING;    Therapy: 14Nov2014 to (Evaluate:56Rxn0857)  Requested for: 37Olp9049; Last    Rx:[id number] Ordered   24. Voltaren 1 % Transdermal Gel; APPLY SPARINGLY TO AFFECTED AREA(S) ONCE DAILY;    Therapy: (Recorded:08May2015) to Recorded    Active Problems  Allergic rhinitis (J30.9)  Anemia (D64.9)  Ankylosing spondylitis (M45.9)  Anxiety (F41.9)  Asthma (J45.909)  Benign hypertension (I10)  Deficiency of vitamin B12 (E53.8)  GERD (gastroesophageal reflux  disease) (K21.9)  History of hypercholesterolemia (Z86.39)  History of rosacea (Z87.2)  Hyperlipidemia, mixed (E78.2)  Irritable bowel syndrome with constipation (K58.1)  Marital conflict involving divorce (Z63.5)  MN (pityriasis rosea) (L42)    Past Medical History  History of Allergic sinusitis (J30.9)  History of Decreased libido (R68.82)  History of Disorder of kidney and ureter (N28.9)  History of depression (Z86.59)  History of epigastric pain (Z87.19)  History of migraine headaches (Z86.69)  History of IBS (irritable bowel syndrome) (K58.9)  History of Periumbilical abdominal pain (R10.33)  History of Renal colic (N23)    Surgical History  History of Breast Surgery Enlargement Procedure  History of Colonoscopy   12/24/2003  History of Kidney Surgery   kidney stone retrieval  History of Tonsillectomy    Family History  Family History   Family history of Diabetes mellitus  Family history of Heart disease  Family history of High cholesterol  Family history of Hypertension  Family history of Stroke    Social History  Daily caffeine consumption, 1 serving a day  Exercises daily (Z78.9)  Former smoker (Z87.891)  Marital conflict involving divorce (Z63.5)  No drug use  No tobacco/smoke exposure  Occasional alcohol use    Vitals  Signs   Recorded: 09Oct2018 09:39AM   Height: 5 ft 3 in  Weight: 99 lb 13.92 oz  BMI Calculated: 17.69  BSA Calculated: 1.44  Systolic: 109  Diastolic: 66  Heart Rate: 76  Respiration: 16  O2 Saturation: 98    Screening  PHQ-9 Depression Screening:   Over the past 2 weeks, how often have you been bothered by the following problems?   1.) Little interest or pleasure in doing things? Half the days or more.   2.) Feeling down, depressed or hopeless? Half the days or more.   3.) Trouble falling asleep or sleeping too much? Several days.   4.) Feeling tired or having little energy? Several days.   5.) Poor appetite or overeating? Half the days or more.   6.) Feeling bad about yourself, or that  you are a failure, or have let yourself or your family down? Several days.   7.) Trouble concentrating on things, such as reading a newspaper or watching television? Half the days or more.   8.) Moving or speaking so slowly that other people could have noticed, or the opposite, moving or speaking faster than usual? Not at all.   9.) Thoughts that you would be better off dead or of hurting yourself in some way? Not at all.   TOTAL SCORE: 11, severity of depression is moderate.        Physical Exam  Constitutional: alert, in no acute distress, current vital signs reviewed and  well developed, chronically ill, well nourished, underweight and appearance reflects stated age.   Head and Face: .   Eyes: no discharge, normal conjunctiva, no eyelid swelling, no ptosis and the sclerae were normal. pupils equal, round and reactive to light and accommodation, conjugate gaze and extraocular movements were intact.   ENT: normal appearing outer ear, normal appearing nose. examination of the tympanic membrane showed normal landmarks, normal appearing external canal. nasal mucosa moist and pink, no nasal discharge .  normal lips. oral mucosa pink and moist, no oral lesions.   Neck: normal appearing neck, supple neck and no mass was seen. thyroid not enlarged and no thyroid nodules.   Lymphatic: no lymphadenopathy.   Pulmonary: no respiratory distress, normal respiratory rate and effort and no accessory muscle use. breath sounds clear to auscultation bilaterally.   Cardiovascular: normal rate, no murmurs were heard, regular rhythm, normal S1 and normal S2. no right carotid bruit right dorsalis pedis 2+ no left carotid bruit left dorsalis pedis 2+ edema was not present in the lower extremities.   Abdomen: soft, nontender, nondistended, normal bowel sounds and no abdominal mass. no hepatomegaly and no splenomegaly. no umbilical hernia was discovered.   Musculoskeletal: normal gait.   Neurologic: cranial nerves grossly intact. no  sensory deficits noted. no coordination deficits. muscle strength and tone were normal.   Psychiatric: oriented to person, oriented to place and oriented to time. alert and awake, interactive and  Mood and Affect: depressed. judgement not impaired. normal attention span. short term memory intact.   Skin, Hair, Nails: normal skin color and pigmentation and no rash. normal skin turgor. no clubbing or cyanosis of the fingernails.      Immunizations  PPSV --- Series1: 25-Sep-2009     Assessment  Anemia (D64.9)  Ankylosing spondylitis (M45.9)  Anxiety (F41.9)  Benign hypertension (I10)  Deficiency of vitamin B12 (E53.8)  Hyperlipidemia, mixed (E78.2)  Major depression (F32.9)    Plan  Sertraline HCl - 25 MG Oral Tablet; TAKE 1 TABLET BY MOUTH EVERY DAY AS  DIRECTED  From  Librax 5-2.5 MG Oral Capsule Take 1 capsule by mouth 4  times daily  as needed To Chlordiazepoxide-Clidinium 5-2.5 MG Oral Capsule Take 1 capsule by  mouth 4  times daily as needed  ALPRAZolam 0.25 MG Oral Tablet; TAKE 1 TABLET 4 times daily PRN FOR  ANXIETY  Azelastine HCl - 137 MCG/SPRAY Nasal Solution; USE 1 TO 2 SPRAYS IN EACH  NOSTRIL TWICE DAILY AS NEEDED  Eletriptan Hydrobromide 40 MG Oral Tablet; TAKE 1 TABLET AT ONSET OF   MIGRAINE. MAY REPEAT ONCE  AFTER 2 HOURS. MAX 2  DOSES/24 HOURS  Fluticasone Propionate 50 MCG/ACT Nasal Suspension; 1 spray each nostril qd  CBC WITH AUTOMATED DIFFERENTIAL; Status:Active; Requested for:09Oct2018;   COMP METABOLIC PNL; Status:Active; Requested for:09Oct2018;   LIPID PNL; Status:Active; Requested for:09Oct2018;   T4, FREE; Status:Active; Requested for:09Oct2018;   TSH; Status:Active; Requested for:09Oct2018;   VITAMIN B12/FOLATE; Status:Active; Requested for:09Oct2018;   VITAMIN D,25 HYDROXY; Status:Active; Requested for:09Oct2018;   Plans:     Plan:   Anemia:  She was told in July and that she was anemic by her rheumatologist so CBC will be drawn today.  Ankylosing spondylitis:  Is stable on the care of  rheumatology  Anxiety:  Stable by the use of alprazolam, will improve control with the use of sertraline.  Vitamin B12 deficiency:  B12 level today.   Follow-up in the office in 3-6 months or as needed month(s).   Patient education completed on disease process, etiology & prognosis.    Patient expresses understanding of the plan.   Depression:   worsened.   Suicidal Ideation: not present.   Start Medications: SSRI.   The patient will be started on sertraline 25 mg once a day for about a week if she does not feel any improvement she will increase to 50 mg for 1 more week and report progress to me to use alprazolam as necessary for anxiety  The patient also will get involved with counseling right away she already has a counselor.   Hyperlipidemia:   LDL Goal:. LDL goal met.   HDL Goal:. HDL goal met.   Continued present medications.    Patient education completed on following a low cholesterol, low fat diet .   Hypertension: controlled .   continued present medication.      Signatures   Electronically signed by : JULIENNE SPRAGUE MD; Oct  9 2018 10:39AM CST    Electronically signed by : JULIENNE SPRAGUE MD; Oct  9 2018 11:46AM CST     Statement Selected

## 2019-12-10 NOTE — ED PROVIDER NOTE - CLINICAL SUMMARY MEDICAL DECISION MAKING FREE TEXT BOX
s/p right hip repair here 10/31-  sent from [facility] for admission to ortho- seen ortho resident- dressing placed.  admit to ortho

## 2019-12-10 NOTE — ED ADULT NURSE NOTE - OBJECTIVE STATEMENT
1730 pt brought in to er by ems from [facility]. pt is s/p r hip surg on 10/31, now w/bleeding and drainage from post-op surgical site. minimal amt of drainage noted at present. 1730 pt brought in to er by ems from [facility]. pt is s/p r hip surg on 10/31, now w/bleeding and drainage from post-op surgical site. minimal amt of drainage noted at present.1800 seen and eval by ortho

## 2019-12-10 NOTE — ED ADULT NURSE NOTE - NSIMPLEMENTINTERV_GEN_ALL_ED
Implemented All Fall Risk Interventions:  [place] to call system. Call bell, personal items and telephone within reach. Instruct patient to call for assistance. Room bathroom lighting operational. Non-slip footwear when patient is off stretcher. Physically safe environment: no spills, clutter or unnecessary equipment. Stretcher in lowest position, wheels locked, appropriate side rails in place. Provide visual cue, wrist band, yellow gown, etc. Monitor gait and stability. Monitor for mental status changes and reorient to person, place, and time. Review medications for side effects contributing to fall risk. Reinforce activity limits and safety measures with patient and family.

## 2019-12-11 DIAGNOSIS — I48.91 UNSPECIFIED ATRIAL FIBRILLATION: ICD-10-CM

## 2019-12-11 DIAGNOSIS — R52 PAIN, UNSPECIFIED: ICD-10-CM

## 2019-12-11 DIAGNOSIS — T81.30XA DISRUPTION OF WOUND, UNSPECIFIED, INITIAL ENCOUNTER: ICD-10-CM

## 2019-12-11 LAB
ANION GAP SERPL CALC-SCNC: 12 MMOL/L — SIGNIFICANT CHANGE UP (ref 5–17)
APTT BLD: 33.5 SEC — SIGNIFICANT CHANGE UP (ref 27.5–36.3)
BLD GP AB SCN SERPL QL: NEGATIVE — SIGNIFICANT CHANGE UP
BUN SERPL-MCNC: 15 MG/DL — SIGNIFICANT CHANGE UP (ref 7–23)
CALCIUM SERPL-MCNC: 9.6 MG/DL — SIGNIFICANT CHANGE UP (ref 8.4–10.5)
CHLORIDE SERPL-SCNC: 103 MMOL/L — SIGNIFICANT CHANGE UP (ref 96–108)
CO2 SERPL-SCNC: 24 MMOL/L — SIGNIFICANT CHANGE UP (ref 22–31)
CREAT SERPL-MCNC: 0.78 MG/DL — SIGNIFICANT CHANGE UP (ref 0.5–1.3)
CRP SERPL-MCNC: 2.69 MG/DL — HIGH (ref 0–0.4)
GLUCOSE SERPL-MCNC: 100 MG/DL — HIGH (ref 70–99)
HCG SERPL-ACNC: <2 MIU/ML — HIGH
HCT VFR BLD CALC: 39 % — SIGNIFICANT CHANGE UP (ref 39–50)
HGB BLD-MCNC: 12.5 G/DL — LOW (ref 13–17)
INR BLD: 1.31 RATIO — HIGH (ref 0.88–1.16)
MCHC RBC-ENTMCNC: 31.3 PG — SIGNIFICANT CHANGE UP (ref 27–34)
MCHC RBC-ENTMCNC: 32.1 GM/DL — SIGNIFICANT CHANGE UP (ref 32–36)
MCV RBC AUTO: 97.5 FL — SIGNIFICANT CHANGE UP (ref 80–100)
NRBC # BLD: 0 /100 WBCS — SIGNIFICANT CHANGE UP (ref 0–0)
PLATELET # BLD AUTO: 235 K/UL — SIGNIFICANT CHANGE UP (ref 150–400)
POTASSIUM SERPL-MCNC: 3.8 MMOL/L — SIGNIFICANT CHANGE UP (ref 3.5–5.3)
POTASSIUM SERPL-SCNC: 3.8 MMOL/L — SIGNIFICANT CHANGE UP (ref 3.5–5.3)
PROTHROM AB SERPL-ACNC: 15.2 SEC — HIGH (ref 10–12.9)
RBC # BLD: 4 M/UL — LOW (ref 4.2–5.8)
RBC # FLD: 13.6 % — SIGNIFICANT CHANGE UP (ref 10.3–14.5)
RH IG SCN BLD-IMP: POSITIVE — SIGNIFICANT CHANGE UP
SODIUM SERPL-SCNC: 139 MMOL/L — SIGNIFICANT CHANGE UP (ref 135–145)
WBC # BLD: 9.27 K/UL — SIGNIFICANT CHANGE UP (ref 3.8–10.5)
WBC # FLD AUTO: 9.27 K/UL — SIGNIFICANT CHANGE UP (ref 3.8–10.5)

## 2019-12-11 RX ORDER — CEFAZOLIN SODIUM 1 G
2000 VIAL (EA) INJECTION EVERY 8 HOURS
Refills: 0 | Status: COMPLETED | OUTPATIENT
Start: 2019-12-11 | End: 2019-12-11

## 2019-12-11 RX ORDER — SODIUM CHLORIDE 9 MG/ML
1000 INJECTION INTRAMUSCULAR; INTRAVENOUS; SUBCUTANEOUS
Refills: 0 | Status: DISCONTINUED | OUTPATIENT
Start: 2019-12-11 | End: 2019-12-11

## 2019-12-11 RX ORDER — POLYETHYLENE GLYCOL 3350 17 G/17G
17 POWDER, FOR SOLUTION ORAL DAILY
Refills: 0 | Status: DISCONTINUED | OUTPATIENT
Start: 2019-12-11 | End: 2019-12-16

## 2019-12-11 RX ORDER — ONDANSETRON 8 MG/1
4 TABLET, FILM COATED ORAL EVERY 6 HOURS
Refills: 0 | Status: DISCONTINUED | OUTPATIENT
Start: 2019-12-11 | End: 2019-12-16

## 2019-12-11 RX ORDER — ACETAMINOPHEN 500 MG
650 TABLET ORAL EVERY 6 HOURS
Refills: 0 | Status: DISCONTINUED | OUTPATIENT
Start: 2019-12-11 | End: 2019-12-12

## 2019-12-11 RX ORDER — RIVAROXABAN 15 MG-20MG
10 KIT ORAL DAILY
Refills: 0 | Status: DISCONTINUED | OUTPATIENT
Start: 2019-12-12 | End: 2019-12-16

## 2019-12-11 RX ORDER — FAMOTIDINE 10 MG/ML
20 INJECTION INTRAVENOUS DAILY
Refills: 0 | Status: DISCONTINUED | OUTPATIENT
Start: 2019-12-11 | End: 2019-12-16

## 2019-12-11 RX ORDER — ONDANSETRON 8 MG/1
4 TABLET, FILM COATED ORAL ONCE
Refills: 0 | Status: DISCONTINUED | OUTPATIENT
Start: 2019-12-11 | End: 2019-12-11

## 2019-12-11 RX ORDER — SODIUM CHLORIDE 9 MG/ML
1000 INJECTION, SOLUTION INTRAVENOUS
Refills: 0 | Status: DISCONTINUED | OUTPATIENT
Start: 2019-12-11 | End: 2019-12-16

## 2019-12-11 RX ORDER — MAGNESIUM HYDROXIDE 400 MG/1
30 TABLET, CHEWABLE ORAL DAILY
Refills: 0 | Status: DISCONTINUED | OUTPATIENT
Start: 2019-12-11 | End: 2019-12-16

## 2019-12-11 RX ORDER — SENNA PLUS 8.6 MG/1
2 TABLET ORAL AT BEDTIME
Refills: 0 | Status: DISCONTINUED | OUTPATIENT
Start: 2019-12-11 | End: 2019-12-16

## 2019-12-11 RX ORDER — ASCORBIC ACID 60 MG
500 TABLET,CHEWABLE ORAL DAILY
Refills: 0 | Status: DISCONTINUED | OUTPATIENT
Start: 2019-12-11 | End: 2019-12-16

## 2019-12-11 RX ORDER — OXYCODONE HYDROCHLORIDE 5 MG/1
5 TABLET ORAL EVERY 4 HOURS
Refills: 0 | Status: DISCONTINUED | OUTPATIENT
Start: 2019-12-11 | End: 2019-12-16

## 2019-12-11 RX ORDER — LANOLIN ALCOHOL/MO/W.PET/CERES
3 CREAM (GRAM) TOPICAL AT BEDTIME
Refills: 0 | Status: DISCONTINUED | OUTPATIENT
Start: 2019-12-11 | End: 2019-12-16

## 2019-12-11 RX ORDER — MORPHINE SULFATE 50 MG/1
1 CAPSULE, EXTENDED RELEASE ORAL
Refills: 0 | Status: DISCONTINUED | OUTPATIENT
Start: 2019-12-11 | End: 2019-12-16

## 2019-12-11 RX ORDER — OXYCODONE HYDROCHLORIDE 5 MG/1
2.5 TABLET ORAL EVERY 4 HOURS
Refills: 0 | Status: DISCONTINUED | OUTPATIENT
Start: 2019-12-11 | End: 2019-12-16

## 2019-12-11 RX ORDER — HYDROMORPHONE HYDROCHLORIDE 2 MG/ML
0.25 INJECTION INTRAMUSCULAR; INTRAVENOUS; SUBCUTANEOUS
Refills: 0 | Status: DISCONTINUED | OUTPATIENT
Start: 2019-12-11 | End: 2019-12-11

## 2019-12-11 RX ORDER — ACETAMINOPHEN 500 MG
1000 TABLET ORAL ONCE
Refills: 0 | Status: DISCONTINUED | OUTPATIENT
Start: 2019-12-11 | End: 2019-12-12

## 2019-12-11 RX ADMIN — Medication 100 MILLIGRAM(S): at 21:15

## 2019-12-11 RX ADMIN — POLYETHYLENE GLYCOL 3350 17 GRAM(S): 17 POWDER, FOR SOLUTION ORAL at 14:05

## 2019-12-11 RX ADMIN — Medication 1 TABLET(S): at 21:15

## 2019-12-11 RX ADMIN — SODIUM CHLORIDE 100 MILLILITER(S): 9 INJECTION, SOLUTION INTRAVENOUS at 23:59

## 2019-12-11 RX ADMIN — Medication 100 MILLIGRAM(S): at 14:05

## 2019-12-11 RX ADMIN — SODIUM CHLORIDE 125 MILLILITER(S): 9 INJECTION INTRAMUSCULAR; INTRAVENOUS; SUBCUTANEOUS at 10:48

## 2019-12-11 RX ADMIN — FAMOTIDINE 20 MILLIGRAM(S): 10 INJECTION INTRAVENOUS at 21:15

## 2019-12-11 RX ADMIN — Medication 500 MILLIGRAM(S): at 21:15

## 2019-12-11 RX ADMIN — SENNA PLUS 2 TABLET(S): 8.6 TABLET ORAL at 21:15

## 2019-12-11 NOTE — PRE-ANESTHESIA EVALUATION ADULT - NSANTHPMHFT_GEN_ALL_CORE
chart and med note reviewed. afib on xeralto (last 1 wk ago per pt). states et~ 1 flight during PT, no cp/sob. denies other cv pulm dz. no signs of active cad/chf.

## 2019-12-11 NOTE — PROGRESS NOTE ADULT - SUBJECTIVE AND OBJECTIVE BOX
79y Male w/ hx of afib on xarelto, s/p L hip IMN (Dr. Gilbert 3/10/13), and most recently s/p R hip IMN (Dr. Godinez 10/31/19) who was sent back from [facility] rehab for sanguinous drainage from R hip wound. He denies numbness/tingling of the RLE. Denies fevers/chills. He is coming back for I&D of R hip/thigh wound for hematoma. He has been off his xarelto since 12/6. Has been walking with PT, ambulates with walker. Patient seen now post op after I&D of hematoma       MEDICATIONS  (STANDING):  ceFAZolin   IVPB 2000 milliGRAM(s) IV Intermittent every 8 hours  lactated ringers. 1000 milliLiter(s) (75 mL/Hr) IV Continuous <Continuous>  polyethylene glycol 3350 17 Gram(s) Oral daily  senna 2 Tablet(s) Oral at bedtime  sodium chloride 0.9%. 1000 milliLiter(s) (125 mL/Hr) IV Continuous <Continuous>    MEDICATIONS  (PRN):  acetaminophen   Tablet .. 650 milliGRAM(s) Oral every 6 hours PRN Temp greater or equal to 38C (100.4F)  magnesium hydroxide Suspension 30 milliLiter(s) Oral daily PRN Constipation  melatonin 3 milliGRAM(s) Oral at bedtime PRN Insomnia  morphine  - Injectable 1 milliGRAM(s) IV Push every 2 hours PRN Severe Pain (7 - 10)  ondansetron Injectable 4 milliGRAM(s) IV Push every 6 hours PRN Nausea and/or Vomiting  oxyCODONE    IR 2.5 milliGRAM(s) Oral every 4 hours PRN Mild Pain (1 - 3)  oxyCODONE    IR 5 milliGRAM(s) Oral every 4 hours PRN Moderate Pain (4 - 6)          VITALS:   T(C): 36.5 (12-11-19 @ 13:10), Max: 36.7 (12-10-19 @ 17:03)  HR: 76 (12-11-19 @ 13:10) (60 - 85)  BP: 113/84 (12-11-19 @ 13:10) (98/66 - 143/86)  RR: 18 (12-11-19 @ 13:10) (14 - 20)  SpO2: 98% (12-11-19 @ 13:10) (94% - 100%)  Wt(kg): --        LABS:        CBC Full  -  ( 11 Dec 2019 08:33 )  WBC Count : 9.27 K/uL  RBC Count : 4.00 M/uL  Hemoglobin : 12.5 g/dL  Hematocrit : 39.0 %  Platelet Count - Automated : 235 K/uL  Mean Cell Volume : 97.5 fl  Mean Cell Hemoglobin : 31.3 pg  Mean Cell Hemoglobin Concentration : 32.1 gm/dL  Auto Neutrophil # : x  Auto Lymphocyte # : x  Auto Monocyte # : x  Auto Eosinophil # : x  Auto Basophil # : x  Auto Neutrophil % : x  Auto Lymphocyte % : x  Auto Monocyte % : x  Auto Eosinophil % : x  Auto Basophil % : x    12-11    139  |  103  |  15  ----------------------------<  100<H>  3.8   |  24  |  0.78    Ca    9.6      11 Dec 2019 08:33    TPro  7.5  /  Alb  3.7  /  TBili  0.5  /  DBili  x   /  AST  19  /  ALT  13  /  AlkPhos  95  12-10    LIVER FUNCTIONS - ( 10 Dec 2019 18:20 )  Alb: 3.7 g/dL / Pro: 7.5 g/dL / ALK PHOS: 95 U/L / ALT: 13 U/L / AST: 19 U/L / GGT: x           PT/INR - ( 11 Dec 2019 08:33 )   PT: 15.2 sec;   INR: 1.31 ratio         PTT - ( 11 Dec 2019 08:33 )  PTT:33.5 sec    CAPILLARY BLOOD GLUCOSE          RADIOLOGY & ADDITIONAL TESTS:

## 2019-12-11 NOTE — PROGRESS NOTE ADULT - SUBJECTIVE AND OBJECTIVE BOX
Orthopedic Surgery Progress Note  No acute events overnight.  Pain well controlled.  No chest pain, shortness of breath, light-headedness. Ready for OR today.    O:  Vital Signs Last 24 Hrs  T(C): 36.4 (11 Dec 2019 05:16), Max: 36.7 (10 Dec 2019 17:03)  T(F): 97.6 (11 Dec 2019 05:16), Max: 98 (10 Dec 2019 17:03)  HR: 74 (11 Dec 2019 05:16) (74 - 83)  BP: 98/66 (11 Dec 2019 05:16) (98/66 - 124/89)  RR: 18 (11 Dec 2019 05:16) (18 - 20)  SpO2: 99% (11 Dec 2019 05:16) (94% - 100%)    Gen: NAD  RLE  +sanguinous drainage  EHL/FHL/TA/GS intact  SILT DP/SP/POWELL/Sa  WWP distally    Labs:                        13.5   12.87 )-----------( 261      ( 10 Dec 2019 18:21 )             43.8                         11.8   10.02 )-----------( 249      ( 10 Dec 2019 08:47 )             37.9     12-10    138  |  101  |  21  ----------------------------<  102<H>  4.3   |  24  |  0.94    PT/INR - ( 10 Dec 2019 18:21 )   PT: 14.5 sec;   INR: 1.25 ratio      PTT - ( 10 Dec 2019 18:21 )  PTT:23.5 sec    A/P 79y year old male s/p R hip IMN on 10/31/19 with hip/thigh hematoma    Pain Control  DVT PPX, venodynes  OR today for I&D of R hip wound  Consent in chart  NPO/IVF  Medical clearance documented    Raymond Dumont MD

## 2019-12-11 NOTE — PRE-ANESTHESIA EVALUATION ADULT - NSANTHOSAYNRD_GEN_A_CORE
No. STEPHANY screening performed.  STOP BANG Legend: 0-2 = LOW Risk; 3-4 = INTERMEDIATE Risk; 5-8 = HIGH Risk

## 2019-12-11 NOTE — PROGRESS NOTE ADULT - ASSESSMENT
79y Male w/ hx of afib on xarelto, s/p L hip IMN and most recently s/p R hip IMN  who was sent back from [facility] for sanguinous drainage from R hip wound. Patient seen now s/p I&D of wound.

## 2019-12-11 NOTE — PROGRESS NOTE ADULT - SUBJECTIVE AND OBJECTIVE BOX
OPERATIVE FINDINGS:    Large hematoma superficial to the fascia. No gross purulence. Large dead space.    3 cultures taken.    Vac dressing applied to control the dead space.

## 2019-12-11 NOTE — CHART NOTE - NSCHARTNOTEFT_GEN_A_CORE
Resting without complaints.   in good spirits   No Chest Pain, SOB, N/V.    T(C): 36.3 (12-11-19 @ 14:07), Max: 36.7 (12-10-19 @ 17:03)  HR: 76 (12-11-19 @ 14:07) (60 - 85)  BP: 117/73 (12-11-19 @ 14:07) (98/66 - 143/86)  RR: 17 (12-11-19 @ 14:07) (14 - 20)  SpO2: 96% (12-11-19 @ 14:07) (94% - 100%)      Exam:  Alert and [place], No Acute Distress  Card: +S1/S2, RRR  Pulm: CTAB  Abdomen soft / benign  Sweet  [n ]   EXT   RLE       VAC in place       Calves soft       (+) DF  PT;  EHL 5/5        No Sensory Deficits noted        2+ pulses                            12.5<L>  9.27  )-----------( 235      ( 11 Dec 2019 08:33 )             39.0      12-11    139  |  103  |  15  ----------------------------<  100<H>  3.8   |  24  |  0.78      A/P: S/p Irrigation and debridement of right hip w/ VAC placement    - Cont VAC       VAC change on Friday 12/13 w/ PT  - Follow up OR Cx  -PT/OT-WBAT-  -Chk AM Labs  -DVT PPx: Xarelto QD  -Pain Control PO/IV Pain Rx  -Continue Current Tx  -Dispo planning: TBD      ***See Above  Alvaro DAVISON  Orthopedics  B: [phone]  S: [phone]

## 2019-12-11 NOTE — BRIEF OPERATIVE NOTE - NSICDXBRIEFPROCEDURE_GEN_ALL_CORE_FT
PROCEDURES:  Irrigation and debridement of right hip 11-Dec-2019 10:08:32 right hip superficial wound I&D with wound vac placement Jayce Kohler S

## 2019-12-12 LAB
ANION GAP SERPL CALC-SCNC: 14 MMOL/L — SIGNIFICANT CHANGE UP (ref 5–17)
BASOPHILS # BLD AUTO: 0.03 K/UL — SIGNIFICANT CHANGE UP (ref 0–0.2)
BASOPHILS NFR BLD AUTO: 0.2 % — SIGNIFICANT CHANGE UP (ref 0–2)
BUN SERPL-MCNC: 16 MG/DL — SIGNIFICANT CHANGE UP (ref 7–23)
CALCIUM SERPL-MCNC: 9.7 MG/DL — SIGNIFICANT CHANGE UP (ref 8.4–10.5)
CHLORIDE SERPL-SCNC: 99 MMOL/L — SIGNIFICANT CHANGE UP (ref 96–108)
CO2 SERPL-SCNC: 24 MMOL/L — SIGNIFICANT CHANGE UP (ref 22–31)
CREAT SERPL-MCNC: 0.86 MG/DL — SIGNIFICANT CHANGE UP (ref 0.5–1.3)
EOSINOPHIL # BLD AUTO: 0.04 K/UL — SIGNIFICANT CHANGE UP (ref 0–0.5)
EOSINOPHIL NFR BLD AUTO: 0.3 % — SIGNIFICANT CHANGE UP (ref 0–6)
GLUCOSE SERPL-MCNC: 100 MG/DL — HIGH (ref 70–99)
GRAM STN FLD: SIGNIFICANT CHANGE UP
HCT VFR BLD CALC: 38.4 % — LOW (ref 39–50)
HGB BLD-MCNC: 12.3 G/DL — LOW (ref 13–17)
IMM GRANULOCYTES NFR BLD AUTO: 0.4 % — SIGNIFICANT CHANGE UP (ref 0–1.5)
LYMPHOCYTES # BLD AUTO: 16.3 % — SIGNIFICANT CHANGE UP (ref 13–44)
LYMPHOCYTES # BLD AUTO: 2.47 K/UL — SIGNIFICANT CHANGE UP (ref 1–3.3)
MCHC RBC-ENTMCNC: 31 PG — SIGNIFICANT CHANGE UP (ref 27–34)
MCHC RBC-ENTMCNC: 32 GM/DL — SIGNIFICANT CHANGE UP (ref 32–36)
MCV RBC AUTO: 96.7 FL — SIGNIFICANT CHANGE UP (ref 80–100)
MONOCYTES # BLD AUTO: 1.23 K/UL — HIGH (ref 0–0.9)
MONOCYTES NFR BLD AUTO: 8.1 % — SIGNIFICANT CHANGE UP (ref 2–14)
NEUTROPHILS # BLD AUTO: 11.28 K/UL — HIGH (ref 1.8–7.4)
NEUTROPHILS NFR BLD AUTO: 74.7 % — SIGNIFICANT CHANGE UP (ref 43–77)
NRBC # BLD: 0 /100 WBCS — SIGNIFICANT CHANGE UP (ref 0–0)
PLATELET # BLD AUTO: 270 K/UL — SIGNIFICANT CHANGE UP (ref 150–400)
POTASSIUM SERPL-MCNC: 4.6 MMOL/L — SIGNIFICANT CHANGE UP (ref 3.5–5.3)
POTASSIUM SERPL-SCNC: 4.6 MMOL/L — SIGNIFICANT CHANGE UP (ref 3.5–5.3)
RBC # BLD: 3.97 M/UL — LOW (ref 4.2–5.8)
RBC # FLD: 13.5 % — SIGNIFICANT CHANGE UP (ref 10.3–14.5)
SODIUM SERPL-SCNC: 137 MMOL/L — SIGNIFICANT CHANGE UP (ref 135–145)
SPECIMEN SOURCE: SIGNIFICANT CHANGE UP
WBC # BLD: 15.11 K/UL — HIGH (ref 3.8–10.5)
WBC # FLD AUTO: 15.11 K/UL — HIGH (ref 3.8–10.5)

## 2019-12-12 RX ORDER — PIPERACILLIN AND TAZOBACTAM 4; .5 G/20ML; G/20ML
3.38 INJECTION, POWDER, LYOPHILIZED, FOR SOLUTION INTRAVENOUS EVERY 8 HOURS
Refills: 0 | Status: DISCONTINUED | OUTPATIENT
Start: 2019-12-12 | End: 2019-12-14

## 2019-12-12 RX ORDER — ACETAMINOPHEN 500 MG
975 TABLET ORAL EVERY 8 HOURS
Refills: 0 | Status: DISCONTINUED | OUTPATIENT
Start: 2019-12-12 | End: 2019-12-16

## 2019-12-12 RX ADMIN — SENNA PLUS 2 TABLET(S): 8.6 TABLET ORAL at 21:15

## 2019-12-12 RX ADMIN — Medication 975 MILLIGRAM(S): at 10:59

## 2019-12-12 RX ADMIN — Medication 975 MILLIGRAM(S): at 10:29

## 2019-12-12 RX ADMIN — RIVAROXABAN 10 MILLIGRAM(S): KIT at 11:12

## 2019-12-12 RX ADMIN — Medication 500 MILLIGRAM(S): at 11:12

## 2019-12-12 RX ADMIN — FAMOTIDINE 20 MILLIGRAM(S): 10 INJECTION INTRAVENOUS at 11:12

## 2019-12-12 RX ADMIN — POLYETHYLENE GLYCOL 3350 17 GRAM(S): 17 POWDER, FOR SOLUTION ORAL at 11:12

## 2019-12-12 RX ADMIN — Medication 1 TABLET(S): at 11:12

## 2019-12-12 RX ADMIN — PIPERACILLIN AND TAZOBACTAM 25 GRAM(S): 4; .5 INJECTION, POWDER, LYOPHILIZED, FOR SOLUTION INTRAVENOUS at 21:16

## 2019-12-12 NOTE — PHYSICAL THERAPY INITIAL EVALUATION ADULT - LIVES WITH, PROFILE
prior to coming from subacute rehab ambulated with RW ,patient lives in a private house with his dtr and grandkids  and has about 3 steps to negotiate. uses RW/children

## 2019-12-12 NOTE — PROGRESS NOTE ADULT - SUBJECTIVE AND OBJECTIVE BOX
79y Male w/ hx of afib on xarelto, s/p L hip IMN (Dr. Gilbert 3/10/13), and most recently s/p R hip IMN (Dr. Godinez 10/31/19) who was sent back from [facility] rehab for sanguinous drainage from R hip wound. He denies numbness/tingling of the RLE. Denies fevers/chills. He is coming back for I&D of R hip/thigh wound for hematoma. He has been off his xarelto since 12/6. Has been walking with PT, ambulates with walker. Patient seen now post op after I&D of hematoma Patient states pain has been well controlled. has been working with PT    MEDICATIONS  (STANDING):  ascorbic acid 500 milliGRAM(s) Oral daily  famotidine    Tablet 20 milliGRAM(s) Oral daily  lactated ringers. 1000 milliLiter(s) (75 mL/Hr) IV Continuous <Continuous>  multivitamin 1 Tablet(s) Oral daily  polyethylene glycol 3350 17 Gram(s) Oral daily  rivaroxaban 10 milliGRAM(s) Oral daily  senna 2 Tablet(s) Oral at bedtime    MEDICATIONS  (PRN):  acetaminophen   Tablet .. 975 milliGRAM(s) Oral every 8 hours PRN Temp greater or equal to 38C (100.4F), Mild Pain (1 - 3)  magnesium hydroxide Suspension 30 milliLiter(s) Oral daily PRN Constipation  melatonin 3 milliGRAM(s) Oral at bedtime PRN Insomnia  morphine  - Injectable 1 milliGRAM(s) IV Push every 2 hours PRN Severe Pain (7 - 10)  ondansetron Injectable 4 milliGRAM(s) IV Push every 6 hours PRN Nausea and/or Vomiting  oxyCODONE    IR 2.5 milliGRAM(s) Oral every 4 hours PRN Mild Pain (1 - 3)  oxyCODONE    IR 5 milliGRAM(s) Oral every 4 hours PRN Moderate Pain (4 - 6)          VITALS:   T(C): 36.9 (12-12-19 @ 11:05), Max: 36.9 (12-12-19 @ 11:05)  HR: 78 (12-12-19 @ 11:05) (69 - 80)  BP: 112/75 (12-12-19 @ 11:05) (100/63 - 117/73)  RR: 18 (12-12-19 @ 11:05) (17 - 18)  SpO2: 95% (12-12-19 @ 11:05) (95% - 98%)  Wt(kg): --    PHYSICAL EXAM:  GENERAL: NAD, well nourished and conversant  HEAD:  Atraumatic  EYES: EOM, PERRLA, conjunctiva pink and sclera white  ENT: No tonsillar erythema, exudates, or enlargement, moist mucous membranes, good dentition, no lesions  NECK: Supple, No JVD, normal thyroid, carotids with normal upstrokes and no bruits  CHEST/LUNG: Clear to auscultation bilaterally, No rales, rhonchi, wheezing, or rubs  HEART: Regular rate and rhythm, No murmurs, rubs, or gallops  ABDOMEN: Soft, nondistended, no masses, guarding, tenderness or rebound, bowel sounds present  EXTREMITIES:  2+ Peripheral Pulses, No clubbing, cyanosis, or edema. vaccuum dressing on right thigh functioning well  LYMPH: No lymphadenopathy noted  SKIN: No rashes or lesions  NERVOUS SYSTEM:  Alert & Oriented X3, normal cognitive function. Motor Strength 5/5 right upper and right lower.  5/5 left upper and left lower extremities, DTRs 2+ intact and symmetric      LABS:        CBC Full  -  ( 12 Dec 2019 07:00 )  WBC Count : 15.11 K/uL  RBC Count : 3.97 M/uL  Hemoglobin : 12.3 g/dL  Hematocrit : 38.4 %  Platelet Count - Automated : 270 K/uL  Mean Cell Volume : 96.7 fl  Mean Cell Hemoglobin : 31.0 pg  Mean Cell Hemoglobin Concentration : 32.0 gm/dL  Auto Neutrophil # : 11.28 K/uL  Auto Lymphocyte # : 2.47 K/uL  Auto Monocyte # : 1.23 K/uL  Auto Eosinophil # : 0.04 K/uL  Auto Basophil # : 0.03 K/uL  Auto Neutrophil % : 74.7 %  Auto Lymphocyte % : 16.3 %  Auto Monocyte % : 8.1 %  Auto Eosinophil % : 0.3 %  Auto Basophil % : 0.2 %    12-12    137  |  99  |  16  ----------------------------<  100<H>  4.6   |  24  |  0.86    Ca    9.7      12 Dec 2019 06:57    TPro  7.5  /  Alb  3.7  /  TBili  0.5  /  DBili  x   /  AST  19  /  ALT  13  /  AlkPhos  95  12-10    LIVER FUNCTIONS - ( 10 Dec 2019 18:20 )  Alb: 3.7 g/dL / Pro: 7.5 g/dL / ALK PHOS: 95 U/L / ALT: 13 U/L / AST: 19 U/L / GGT: x           PT/INR - ( 11 Dec 2019 08:33 )   PT: 15.2 sec;   INR: 1.31 ratio         PTT - ( 11 Dec 2019 08:33 )  PTT:33.5 sec    CAPILLARY BLOOD GLUCOSE          RADIOLOGY & ADDITIONAL TESTS:

## 2019-12-12 NOTE — PHYSICAL THERAPY INITIAL EVALUATION ADULT - CRITERIA FOR SKILLED THERAPEUTIC INTERVENTIONS
predicted duration of therapy intervention/therapy frequency/functional limitations in following categories/anticipated discharge recommendation/risk reduction/prevention/impairments found/rehab potential

## 2019-12-12 NOTE — PROGRESS NOTE ADULT - SUBJECTIVE AND OBJECTIVE BOX
Pt seen and examined at bedside this am, pain is well controlled. No acute overnight events. Denies numbness/tingling, fever/chill, sob. no other complaints at this time.         Vital Signs Last 24 Hrs  T(C): 36.4 (12 Dec 2019 05:06), Max: 36.8 (11 Dec 2019 16:10)  T(F): 97.5 (12 Dec 2019 05:06), Max: 98.2 (11 Dec 2019 16:10)  HR: 72 (12 Dec 2019 05:06) (60 - 85)  BP: 109/70 (12 Dec 2019 05:06) (98/66 - 143/86)  BP(mean): 94 (11 Dec 2019 12:45) (76 - 98)  RR: 18 (12 Dec 2019 05:06) (14 - 18)  SpO2: 97% (12 Dec 2019 05:06) (95% - 100%)    Exam:  Alert and [place], No Acute Distress  Card: +S1/S2, RRR  Pulm: CTAB  Abdomen soft / benign  Sweet  [n ]   EXT   RLE       VAC in place       Calves soft       (+) DF  PT;  EHL 5/5        No Sensory Deficits noted        2+ pulses                       A/P: S/p Irrigation and debridement of R Hip Hematoma s/p R Hip IMN,  w/ VAC placement    - Cont VAC  - VAC change on Friday 12/13 w/ PT  - Follow up OR Cx  -PT/OT-WBAT-  -Chk AM Labs  -DVT PPx: Xarelto QD  -Pain Control PO/IV Pain Rx  -Continue Current Tx  -Dispo planning: TBD  -Will d/w attending and advise if plan changes.

## 2019-12-12 NOTE — PHYSICAL THERAPY INITIAL EVALUATION ADULT - MODALITIES TREATMENT COMMENTS
PT WC order rec'ed to address wound VAC to pt's R hip surgical wound s/p I&D of R hip hematoma 12/11/19. Pt w/ h/o Irrigation and debridement of R Hip Hematoma s/p R Hip IMN. Pt rec'ed seated in bedside chair, NAD, +call bell, wound VAC to R hip wound, hoang PT WC eval & VAC change w/o adverse reaction. Wound rec'ed C/D/I, no odor, no purulence, no erythema. Wound measuring 4cm x 2cm x 5cm w/ undermining 6:00-11:00 w/ depth of 3cm. Cleansed w/ NS, cavilon to periwound, black granufoam, good seal 125mmHg, continuous. [employer] VAC paperwork completed & left w/ RADHA/Diya. pt left seated in bedside chair, NAD, +call bell, 5Ps.

## 2019-12-12 NOTE — CONSULT NOTE ADULT - ASSESSMENT
79y male with Afib, s/p R femur Fx leading to ORIF- IMN- 10/31.  He was sent to rehab, where oozing, serosang fluid persisted despite holding Xarelto- readmitted 12/10, underwent I&D late last night, hematoma evacuated, no purulence described.    Wound labeled as superficial.   Op Cx rare Polys, few GNRs seen on GS  No surrounding cellulitis  Afebrile since return  Leukocytosis post op likely reactive    Plan:  If indeed faced with superficial process, effective drainage typically definitive  Rare polys on GS suggests probably dealing with hematoma only, but with few GNRs seen, secondary infection possible- will add Zosyn pending final id/sensi  Left message to review op findings with Dr Herbert JAIMES as outlined

## 2019-12-12 NOTE — PHYSICAL THERAPY INITIAL EVALUATION ADULT - PERTINENT HX OF CURRENT PROBLEM, REHAB EVAL
79yMale w/ hx of afib on xarelto, s/p L hip IMN (Dr. Gilbert 3/10/13), and most recently s/p R hip IMN (Dr. Godinez 10/31/19) who was sent back from [facility] rehab for sanguinous drainage from R hip wound. He denies numbness/tingling of the RLE. . He is coming back for I&D of R hip/thigh wound for hematoma. He has been off his xarelto since 12/6. Has been walking with PT, ambulates with walker.

## 2019-12-12 NOTE — PHYSICAL THERAPY INITIAL EVALUATION ADULT - TRANSFER SAFETY CONCERNS NOTED: SIT/STAND, REHAB EVAL
stepping too close to front of assistive device/decreased weight-shifting ability/decreased step length

## 2019-12-12 NOTE — CONSULT NOTE ADULT - SUBJECTIVE AND OBJECTIVE BOX
HPI:   Patient is a 79y male with Afib, remote L hip IMN, s/p R femur Fx leading to ORIF- IMN 10/31.  He was sent to rehab, where oozing, serosang fluid  persisted  despite holding Xarelto- readmitted 12/10, underwent I&D of wound, large gelatinous hematoma evacuated, no purulence described.  Wound described as superficial.  pt denies fever or chills at rehab; afebrile since return, received Cefazolin.    REVIEW OF SYSTEMS:  All other review of systems negative (Comprehensive ROS)    PAST MEDICAL & SURGICAL HISTORY:  AF (atrial fibrillation)  Hernia, inguinal, right      Allergies  No Known Allergies    Antimicrobials Day #  s/p Cefazolin x 1    Other Medications:  acetaminophen   Tablet .. 975 milliGRAM(s) Oral every 8 hours PRN  ascorbic acid 500 milliGRAM(s) Oral daily  famotidine    Tablet 20 milliGRAM(s) Oral daily  lactated ringers. 1000 milliLiter(s) IV Continuous <Continuous>  magnesium hydroxide Suspension 30 milliLiter(s) Oral daily PRN  melatonin 3 milliGRAM(s) Oral at bedtime PRN  morphine  - Injectable 1 milliGRAM(s) IV Push every 2 hours PRN  multivitamin 1 Tablet(s) Oral daily  ondansetron Injectable 4 milliGRAM(s) IV Push every 6 hours PRN  oxyCODONE    IR 2.5 milliGRAM(s) Oral every 4 hours PRN  oxyCODONE    IR 5 milliGRAM(s) Oral every 4 hours PRN  polyethylene glycol 3350 17 Gram(s) Oral daily  rivaroxaban 10 milliGRAM(s) Oral daily  senna 2 Tablet(s) Oral at bedtime      FAMILY HISTORY:  [place]    SOCIAL HISTORY:  Smoking: former    ETOH: denies      Single,      T(F): 98.4 (12-12-19 @ 11:05), Max: 98.4 (12-12-19 @ 11:05)  HR: 78 (12-12-19 @ 11:05)  BP: 112/75 (12-12-19 @ 11:05)  RR: 18 (12-12-19 @ 11:05)  SpO2: 95% (12-12-19 @ 11:05)  Wt(kg): --    PHYSICAL EXAM:  General: alert, no acute distress  Eyes:  anicteric, no conjunctival injection, no discharge  Oropharynx: no lesions or injection 	  Neck: supple, without adenopathy  Lungs: clear to auscultation  Heart: irregular rhythm; no murmur, rubs or gallops  Abdomen: soft, nondistended, nontender, without mass or organomegaly  Skin: no rash  Extremities: minimal leg edema  R proximal thigh wound, small, VAC in place; no surrounding erythema  Neurologic: alert, oriented, moves all extremities    LAB RESULTS:                        12.3   15.11 )-----------( 270      ( 12 Dec 2019 07:00 )             38.4     12-12    137  |  99  |  16  ----------------------------<  100<H>  4.6   |  24  |  0.86    Ca    9.7      12 Dec 2019 06:57    TPro  7.5  /  Alb  3.7  /  TBili  0.5  /  DBili  x   /  AST  19  /  ALT  13  /  AlkPhos  95  12-10      MICROBIOLOGY:  RECENT CULTURES:  12-12 @ 03:16 .Tissue Other  Rare polymorphonuclear leukocytes seen per low power field  Few Gram Negative Rods seen per oil power field    RADIOLOGY REVIEWED:  Xray Hip w/ Pelvis 2 or 3 Views, Right (12.10.19 @ 19:22) >  1.  Status post right proximal femoral ORIF with intact hardware and no   acute fractures.    Xray Chest 1 View AP/PA (12.10.19 @ 19:21) >  Clear lungs.

## 2019-12-12 NOTE — PHYSICAL THERAPY INITIAL EVALUATION ADULT - TRANSFER TRAINING, PT EVAL
patient will perform transfer trianing independent within 2 weeks patient will perform transfer training independent within 2 weeks

## 2019-12-12 NOTE — PHYSICAL THERAPY INITIAL EVALUATION ADULT - IMPAIRMENTS FOUND, PT EVAL
gait, locomotion, and balance/aerobic capacity/endurance/muscle strength gait, locomotion, and balance/aerobic capacity/endurance/integumentary integrity/muscle strength

## 2019-12-12 NOTE — PHYSICAL THERAPY INITIAL EVALUATION ADULT - BED MOBILITY TRAINING, PT EVAL
patient will perform bed mobility training independtly within 2 weeks. patient will perform bed mobility training independently within 2 weeks.

## 2019-12-12 NOTE — CHART NOTE - NSCHARTNOTEFT_GEN_A_CORE
Spoke to Dr Godinez, superficial process, likely early infection of post op hematoma  7-10 day course should suffice; no need for more extended Tx

## 2019-12-12 NOTE — PHYSICAL THERAPY INITIAL EVALUATION ADULT - PLANNED THERAPY INTERVENTIONS, PT EVAL
transfer training/balance training/bed mobility training/gait training transfer training/Negative Pressure Wound Therapy/gait training/balance training/bed mobility training

## 2019-12-13 ENCOUNTER — TRANSCRIPTION ENCOUNTER (OUTPATIENT)
Age: 79
End: 2019-12-13

## 2019-12-13 LAB
-  AMIKACIN: SIGNIFICANT CHANGE UP
-  AMIKACIN: SIGNIFICANT CHANGE UP
-  AMOXICILLIN/CLAVULANIC ACID: SIGNIFICANT CHANGE UP
-  AMOXICILLIN/CLAVULANIC ACID: SIGNIFICANT CHANGE UP
-  AMPICILLIN/SULBACTAM: SIGNIFICANT CHANGE UP
-  AMPICILLIN/SULBACTAM: SIGNIFICANT CHANGE UP
-  AMPICILLIN: SIGNIFICANT CHANGE UP
-  AMPICILLIN: SIGNIFICANT CHANGE UP
-  AZTREONAM: SIGNIFICANT CHANGE UP
-  AZTREONAM: SIGNIFICANT CHANGE UP
-  CEFAZOLIN: SIGNIFICANT CHANGE UP
-  CEFAZOLIN: SIGNIFICANT CHANGE UP
-  CEFEPIME: SIGNIFICANT CHANGE UP
-  CEFEPIME: SIGNIFICANT CHANGE UP
-  CEFOXITIN: SIGNIFICANT CHANGE UP
-  CEFOXITIN: SIGNIFICANT CHANGE UP
-  CEFTRIAXONE: SIGNIFICANT CHANGE UP
-  CEFTRIAXONE: SIGNIFICANT CHANGE UP
-  CIPROFLOXACIN: SIGNIFICANT CHANGE UP
-  CIPROFLOXACIN: SIGNIFICANT CHANGE UP
-  ERTAPENEM: SIGNIFICANT CHANGE UP
-  ERTAPENEM: SIGNIFICANT CHANGE UP
-  GENTAMICIN: SIGNIFICANT CHANGE UP
-  GENTAMICIN: SIGNIFICANT CHANGE UP
-  IMIPENEM: SIGNIFICANT CHANGE UP
-  IMIPENEM: SIGNIFICANT CHANGE UP
-  LEVOFLOXACIN: SIGNIFICANT CHANGE UP
-  LEVOFLOXACIN: SIGNIFICANT CHANGE UP
-  MEROPENEM: SIGNIFICANT CHANGE UP
-  MEROPENEM: SIGNIFICANT CHANGE UP
-  PIPERACILLIN/TAZOBACTAM: SIGNIFICANT CHANGE UP
-  PIPERACILLIN/TAZOBACTAM: SIGNIFICANT CHANGE UP
-  TOBRAMYCIN: SIGNIFICANT CHANGE UP
-  TOBRAMYCIN: SIGNIFICANT CHANGE UP
-  TRIMETHOPRIM/SULFAMETHOXAZOLE: SIGNIFICANT CHANGE UP
-  TRIMETHOPRIM/SULFAMETHOXAZOLE: SIGNIFICANT CHANGE UP
ANION GAP SERPL CALC-SCNC: 8 MMOL/L — SIGNIFICANT CHANGE UP (ref 5–17)
BASOPHILS # BLD AUTO: 0.04 K/UL — SIGNIFICANT CHANGE UP (ref 0–0.2)
BASOPHILS NFR BLD AUTO: 0.3 % — SIGNIFICANT CHANGE UP (ref 0–2)
BUN SERPL-MCNC: 20 MG/DL — SIGNIFICANT CHANGE UP (ref 7–23)
CALCIUM SERPL-MCNC: 9.3 MG/DL — SIGNIFICANT CHANGE UP (ref 8.4–10.5)
CHLORIDE SERPL-SCNC: 104 MMOL/L — SIGNIFICANT CHANGE UP (ref 96–108)
CO2 SERPL-SCNC: 26 MMOL/L — SIGNIFICANT CHANGE UP (ref 22–31)
CREAT SERPL-MCNC: 0.9 MG/DL — SIGNIFICANT CHANGE UP (ref 0.5–1.3)
EOSINOPHIL # BLD AUTO: 0.24 K/UL — SIGNIFICANT CHANGE UP (ref 0–0.5)
EOSINOPHIL NFR BLD AUTO: 2.1 % — SIGNIFICANT CHANGE UP (ref 0–6)
GLUCOSE SERPL-MCNC: 99 MG/DL — SIGNIFICANT CHANGE UP (ref 70–99)
HCT VFR BLD CALC: 36.3 % — LOW (ref 39–50)
HGB BLD-MCNC: 11.3 G/DL — LOW (ref 13–17)
IMM GRANULOCYTES NFR BLD AUTO: 0.6 % — SIGNIFICANT CHANGE UP (ref 0–1.5)
LYMPHOCYTES # BLD AUTO: 2.57 K/UL — SIGNIFICANT CHANGE UP (ref 1–3.3)
LYMPHOCYTES # BLD AUTO: 22.3 % — SIGNIFICANT CHANGE UP (ref 13–44)
MCHC RBC-ENTMCNC: 30.3 PG — SIGNIFICANT CHANGE UP (ref 27–34)
MCHC RBC-ENTMCNC: 31.1 GM/DL — LOW (ref 32–36)
MCV RBC AUTO: 97.3 FL — SIGNIFICANT CHANGE UP (ref 80–100)
METHOD TYPE: SIGNIFICANT CHANGE UP
METHOD TYPE: SIGNIFICANT CHANGE UP
MONOCYTES # BLD AUTO: 1.04 K/UL — HIGH (ref 0–0.9)
MONOCYTES NFR BLD AUTO: 9 % — SIGNIFICANT CHANGE UP (ref 2–14)
NEUTROPHILS # BLD AUTO: 7.54 K/UL — HIGH (ref 1.8–7.4)
NEUTROPHILS NFR BLD AUTO: 65.7 % — SIGNIFICANT CHANGE UP (ref 43–77)
NRBC # BLD: 0 /100 WBCS — SIGNIFICANT CHANGE UP (ref 0–0)
PLATELET # BLD AUTO: 264 K/UL — SIGNIFICANT CHANGE UP (ref 150–400)
POTASSIUM SERPL-MCNC: 4.2 MMOL/L — SIGNIFICANT CHANGE UP (ref 3.5–5.3)
POTASSIUM SERPL-SCNC: 4.2 MMOL/L — SIGNIFICANT CHANGE UP (ref 3.5–5.3)
RBC # BLD: 3.73 M/UL — LOW (ref 4.2–5.8)
RBC # FLD: 13.8 % — SIGNIFICANT CHANGE UP (ref 10.3–14.5)
SODIUM SERPL-SCNC: 138 MMOL/L — SIGNIFICANT CHANGE UP (ref 135–145)
WBC # BLD: 11.5 K/UL — HIGH (ref 3.8–10.5)
WBC # FLD AUTO: 11.5 K/UL — HIGH (ref 3.8–10.5)

## 2019-12-13 RX ADMIN — OXYCODONE HYDROCHLORIDE 2.5 MILLIGRAM(S): 5 TABLET ORAL at 12:18

## 2019-12-13 RX ADMIN — SENNA PLUS 2 TABLET(S): 8.6 TABLET ORAL at 22:12

## 2019-12-13 RX ADMIN — PIPERACILLIN AND TAZOBACTAM 25 GRAM(S): 4; .5 INJECTION, POWDER, LYOPHILIZED, FOR SOLUTION INTRAVENOUS at 22:12

## 2019-12-13 RX ADMIN — FAMOTIDINE 20 MILLIGRAM(S): 10 INJECTION INTRAVENOUS at 12:18

## 2019-12-13 RX ADMIN — Medication 1 TABLET(S): at 12:18

## 2019-12-13 RX ADMIN — Medication 500 MILLIGRAM(S): at 12:18

## 2019-12-13 RX ADMIN — RIVAROXABAN 10 MILLIGRAM(S): KIT at 12:18

## 2019-12-13 RX ADMIN — PIPERACILLIN AND TAZOBACTAM 25 GRAM(S): 4; .5 INJECTION, POWDER, LYOPHILIZED, FOR SOLUTION INTRAVENOUS at 05:06

## 2019-12-13 RX ADMIN — OXYCODONE HYDROCHLORIDE 2.5 MILLIGRAM(S): 5 TABLET ORAL at 12:46

## 2019-12-13 RX ADMIN — PIPERACILLIN AND TAZOBACTAM 25 GRAM(S): 4; .5 INJECTION, POWDER, LYOPHILIZED, FOR SOLUTION INTRAVENOUS at 14:40

## 2019-12-13 NOTE — DISCHARGE NOTE PROVIDER - HOSPITAL COURSE
The patient is a 79 year old s/p R Hip IMN which formed a hematoma. Patient was decided to have hematoma evacuated surgicaly. Patient presented to [facility] after being medically cleared for an elective surgical procedure. The patient was taken to the operating room on date mentioned above. Prophylactic antibiotics were started before the procedure and continued for 24 hours.  There were no complications during the procedure and patient tolerated the procedure well.  The patient was transferred to recovery room in stable condition and subsequently to surgical floor.  Patient was placed on Xarelto for anticoagulation.  All home medications were continued.  The patient received physical therapy daily and daily labs were followed. Wound Vac was placed over incision, with MWF vac changes. Infectious Disease consultation obtained, and followed patient until OR cultures resulted, recs were given regarding antibiotic dosing and duration. The rest of the hospital stay was unremarkable.

## 2019-12-13 NOTE — PROGRESS NOTE ADULT - SUBJECTIVE AND OBJECTIVE BOX
Pt seen and examined at bedside this am, pain is well controlled. No acute overnight events. Denies numbness/tingling, fever/chill, sob. no other complaints at this time.         Vital Signs Last 24 Hrs  T(C): 36.7 (13 Dec 2019 05:52), Max: 36.9 (12 Dec 2019 11:05)  T(F): 98.1 (13 Dec 2019 05:52), Max: 98.4 (12 Dec 2019 11:05)  HR: 69 (13 Dec 2019 05:52) (61 - 84)  BP: 103/63 (13 Dec 2019 05:52) (103/63 - 116/69)  BP(mean): --  RR: 18 (13 Dec 2019 05:52) (18 - 18)  SpO2: 96% (13 Dec 2019 05:52) (95% - 98%)    Exam:  Alert and [place], No Acute Distress  Card: +S1/S2, RRR  Pulm: CTAB  Abdomen soft / benign  Sweet  [n ]   EXT   RLE       VAC in place       Calves soft       (+) DF  PT;  EHL 5/5        No Sensory Deficits noted        2+ pulses                       A/P: S/p Irrigation and debridement of R Hip Hematoma s/p R Hip IMN,  w/ VAC placement    - Cont VAC  - VAC to be changes on Friday 12/13 w/ PT  - Follow up OR Cx, Prelim Growing E Coli. ID aware, plan for short course of IV Abx due to hematoma being superficial.   -PT/OT-WBAT-  -Chk AM Labs  -DVT PPx: Xarelto QD  -Pain Control PO/IV Pain Rx  -Continue Current Tx  -Dispo planning: TBD  -Will d/w attending and advise if plan changes.

## 2019-12-13 NOTE — PROGRESS NOTE ADULT - ASSESSMENT
79y male with Afib, s/p R femur Fx leading to ORIF- IMN- 10/31.  He was sent to rehab, where oozing, serosang fluid persisted despite holding Xarelto- readmitted 12/10, underwent I&D late last night, hematoma evacuated, no purulence described.    Wound labeled as superficial. No deep infection felt to be present  Op Cx rare Polys, few GNRs seen on GS, culture with E coli  No surrounding cellulitis  Afebrile since return  Leukocytosis post op likely reactive  Process superficial  Plan:  1.zosyn okay for now  2.will convert to oral agent if  E Coli is sensitive to complete a 1 week course  3.wound vac per ortho

## 2019-12-13 NOTE — PROGRESS NOTE ADULT - SUBJECTIVE AND OBJECTIVE BOX
79y Male w/ hx of afib on xarelto, s/p L hip IMN (Dr. Gilbert 3/10/13), and most recently s/p R hip IMN (Dr. Godinez 10/31/19) who was sent back from [facility] rehab for sanguinous drainage from R hip wound. He denies numbness/tingling of the RLE. Denies fevers/chills. He is coming back for I&D of R hip/thigh wound for hematoma. He has been off his xarelto since 12/6. Has been walking with PT, ambulates with walker. Patient seen now post op after I&D of hematoma Patient states pain has been well controlled. has been working with PT    MEDICATIONS  (STANDING):  ascorbic acid 500 milliGRAM(s) Oral daily  famotidine    Tablet 20 milliGRAM(s) Oral daily  lactated ringers. 1000 milliLiter(s) (75 mL/Hr) IV Continuous <Continuous>  multivitamin 1 Tablet(s) Oral daily  polyethylene glycol 3350 17 Gram(s) Oral daily  rivaroxaban 10 milliGRAM(s) Oral daily  senna 2 Tablet(s) Oral at bedtime    MEDICATIONS  (PRN):  acetaminophen   Tablet .. 975 milliGRAM(s) Oral every 8 hours PRN Temp greater or equal to 38C (100.4F), Mild Pain (1 - 3)  magnesium hydroxide Suspension 30 milliLiter(s) Oral daily PRN Constipation  melatonin 3 milliGRAM(s) Oral at bedtime PRN Insomnia  morphine  - Injectable 1 milliGRAM(s) IV Push every 2 hours PRN Severe Pain (7 - 10)  ondansetron Injectable 4 milliGRAM(s) IV Push every 6 hours PRN Nausea and/or Vomiting  oxyCODONE    IR 2.5 milliGRAM(s) Oral every 4 hours PRN Mild Pain (1 - 3)  oxyCODONE    IR 5 milliGRAM(s) Oral every 4 hours PRN Moderate Pain (4 - 6)        Vital Signs Last 24 Hrs  T(C): 36.8 (13 Dec 2019 17:51), Max: 36.8 (13 Dec 2019 10:38)  T(F): 98.2 (13 Dec 2019 17:51), Max: 98.3 (13 Dec 2019 10:38)  HR: 87 (13 Dec 2019 17:51) (69 - 87)  BP: 115/71 (13 Dec 2019 17:51) (103/63 - 115/71)  RR: 18 (13 Dec 2019 17:51) (18 - 18)  SpO2: 97% (13 Dec 2019 17:51) (95% - 97%)      PHYSICAL EXAM:  GENERAL: NAD, well nourished and conversant  HEAD:  Atraumatic  EYES: EOM, PERRLA, conjunctiva pink and sclera white  ENT: No tonsillar erythema, exudates, or enlargement, moist mucous membranes, good dentition, no lesions  NECK: Supple, No JVD, normal thyroid, carotids with normal upstrokes and no bruits  CHEST/LUNG: Clear to auscultation bilaterally, No rales, rhonchi, wheezing, or rubs  HEART: Regular rate and rhythm, No murmurs, rubs, or gallops  ABDOMEN: Soft, nondistended, no masses, guarding, tenderness or rebound, bowel sounds present  EXTREMITIES:  2+ Peripheral Pulses, No clubbing, cyanosis, or edema. vaccuum dressing on right thigh functioning well  LYMPH: No lymphadenopathy noted  SKIN: No rashes or lesions  NERVOUS SYSTEM:  Alert & Oriented X3, normal cognitive function. Motor Strength 5/5 right upper and right lower.  5/5 left upper and left lower extremities, DTRs 2+ intact and symmetric      LABS:          CBC Full  -  ( 13 Dec 2019 07:04 )  WBC Count : 11.50 K/uL  RBC Count : 3.73 M/uL  Hemoglobin : 11.3 g/dL  Hematocrit : 36.3 %  Platelet Count - Automated : 264 K/uL  Mean Cell Volume : 97.3 fl  Mean Cell Hemoglobin : 30.3 pg  Mean Cell Hemoglobin Concentration : 31.1 gm/dL  Auto Neutrophil # : 7.54 K/uL  Auto Lymphocyte # : 2.57 K/uL  Auto Monocyte # : 1.04 K/uL  Auto Eosinophil # : 0.24 K/uL  Auto Basophil # : 0.04 K/uL  Auto Neutrophil % : 65.7 %  Auto Lymphocyte % : 22.3 %  Auto Monocyte % : 9.0 %  Auto Eosinophil % : 2.1 %  Auto Basophil % : 0.3 %    12-13    138  |  104  |  20  ----------------------------<  99  4.2   |  26  |  0.90    Ca    9.3      13 Dec 2019 07:04              CBC Full  -  ( 12 Dec 2019 07:00 )  WBC Count : 15.11 K/uL  RBC Count : 3.97 M/uL  Hemoglobin : 12.3 g/dL  Hematocrit : 38.4 %  Platelet Count - Automated : 270 K/uL  Mean Cell Volume : 96.7 fl  Mean Cell Hemoglobin : 31.0 pg  Mean Cell Hemoglobin Concentration : 32.0 gm/dL  Auto Neutrophil # : 11.28 K/uL  Auto Lymphocyte # : 2.47 K/uL  Auto Monocyte # : 1.23 K/uL  Auto Eosinophil # : 0.04 K/uL  Auto Basophil # : 0.03 K/uL  Auto Neutrophil % : 74.7 %  Auto Lymphocyte % : 16.3 %  Auto Monocyte % : 8.1 %  Auto Eosinophil % : 0.3 %  Auto Basophil % : 0.2 %    12-12    137  |  99  |  16  ----------------------------<  100<H>  4.6   |  24  |  0.86    Ca    9.7      12 Dec 2019 06:57    TPro  7.5  /  Alb  3.7  /  TBili  0.5  /  DBili  x   /  AST  19  /  ALT  13  /  AlkPhos  95  12-10    LIVER FUNCTIONS - ( 10 Dec 2019 18:20 )  Alb: 3.7 g/dL / Pro: 7.5 g/dL / ALK PHOS: 95 U/L / ALT: 13 U/L / AST: 19 U/L / GGT: x           PT/INR - ( 11 Dec 2019 08:33 )   PT: 15.2 sec;   INR: 1.31 ratio         PTT - ( 11 Dec 2019 08:33 )  PTT:33.5 sec    CAPILLARY BLOOD GLUCOSE          RADIOLOGY & ADDITIONAL TESTS:

## 2019-12-13 NOTE — DISCHARGE NOTE PROVIDER - NSDCMRMEDTOKEN_GEN_ALL_CORE_FT
acetaminophen 325 mg oral tablet: 3 tab(s) orally every 8 hours, As needed, Mild Pain (1 - 3)  bisacodyl 10 mg rectal suppository: 1 suppository(ies) rectal once a day, As needed, If no bowel movement  magnesium hydroxide 8% oral suspension: 30 milliliter(s) orally once a day, As needed, Constipation  rivaroxaban 20 mg oral tablet: 1 tab(s) orally once a day (before a meal)  traMADol 50 mg oral tablet: 0.5 tab(s) orally every 8 hours, As needed, Moderate Pain (4 - 6)  traMADol 50 mg oral tablet: 1 tab(s) orally every 8 hours, As needed, Severe Pain (7 - 10) acetaminophen 325 mg oral tablet: 3 tab(s) orally every 8 hours, As needed, Temp greater or equal to 38C (100.4F), Mild Pain (1 - 3)  ascorbic acid 500 mg oral tablet: 1 tab(s) orally once a day  bisacodyl 10 mg rectal suppository: 1 suppository(ies) rectal once a day, As needed, If no bowel movement  cefuroxime 250 mg oral tablet: 1 tab(s) orally every 12 hours  famotidine 20 mg oral tablet: 1 tab(s) orally once a day  magnesium hydroxide 8% oral suspension: 30 milliliter(s) orally once a day, As needed, Constipation  melatonin 3 mg oral tablet: 1 tab(s) orally once a day (at bedtime), As needed, Insomnia  Multiple Vitamins oral tablet: 1 tab(s) orally once a day  oxyCODONE 5 mg oral tablet: 1 tab(s) orally every 4 hours, As needed, Severe Pain (7 - 10)  polyethylene glycol 3350 oral powder for reconstitution: 17 gram(s) orally once a day  rivaroxaban 20 mg oral tablet: 1 tab(s) orally once a day (before a meal)  senna oral tablet: 2 tab(s) orally once a day (at bedtime)  traMADol 50 mg oral tablet: 0.5 tab(s) orally every 8 hours, As needed, Moderate Pain (4 - 6)  traMADol 50 mg oral tablet: 1 tab(s) orally every 8 hours, As needed, Moderte Pain (4 - 6)

## 2019-12-13 NOTE — PROGRESS NOTE ADULT - SUBJECTIVE AND OBJECTIVE BOX
CC: f/u for rt hip hematoma    Patient reports; no complaints, comfortable with wound vac in place    REVIEW OF SYSTEMS:  All other review of systems negative (Comprehensive ROS)    Antimicrobials Day #  :day 2  piperacillin/tazobactam IVPB.. 3.375 Gram(s) IV Intermittent every 8 hours    Other Medications Reviewed    T(F): 98 (12-13-19 @ 13:29), Max: 98.3 (12-13-19 @ 10:38)  HR: 69 (12-13-19 @ 13:29)  BP: 108/69 (12-13-19 @ 13:29)  RR: 18 (12-13-19 @ 13:29)  SpO2: 96% (12-13-19 @ 13:29)  Wt(kg): --    PHYSICAL EXAM:  General: alert, no acute distress  Eyes:  anicteric, no conjunctival injection, no discharge  Oropharynx: no lesions or injection 	  Neck: supple, without adenopathy  Lungs: clear to auscultation  Heart: irregular rate and rhythm; no murmur, rubs or gallops  Abdomen: soft, nondistended, nontender, without mass or organomegaly  Skin: no lesions, wound vac in rt hip  Extremities: no clubbing, cyanosis, trace edema on rt.  Neurologic: alert, oriented, moves all extremities    LAB RESULTS:                        11.3   11.50 )-----------( 264      ( 13 Dec 2019 07:04 )             36.3     12-13    138  |  104  |  20  ----------------------------<  99  4.2   |  26  |  0.90    Ca    9.3      13 Dec 2019 07:04          MICROBIOLOGY:  RECENT CULTURES:  12-12 @ 03:16 .Tissue Other     Moderate Escherichia coli    Rare polymorphonuclear leukocytes seen per low power field  Few Gram Negative Rods seen per oil power field    12-12 @ 00:43 .Other Other     Testing in progress      12-12 @ 00:39 .Other Other     Testing in progress      12-12 @ 00:38 .Surgical Swab 2. right hip #2     Moderate Escherichia coli          RADIOLOGY REVIEWED:

## 2019-12-13 NOTE — DISCHARGE NOTE PROVIDER - CARE PROVIDER_API CALL
Rojas Godinez)  Orthopaedic Surgery  [address] [place] [address], [address]  [place] [address]  Phone: [phone]  Fax: [phone]  Follow Up Time:

## 2019-12-13 NOTE — DISCHARGE NOTE PROVIDER - NSDCCPCAREPLAN_GEN_ALL_CORE_FT
PRINCIPAL DISCHARGE DIAGNOSIS  Diagnosis: Wound dehiscence  Assessment and Plan of Treatment: 1.	Pain Control  2.	Walking with full weight bearing as tolerated, with assistive devices (walker/Cane as Needed)  3.	DVT Prophylaxis for 30 days, Xaraleto  4.	PT   5.	Follow up with Dr. Baugh as Outpatient in 10-14 Days after Discharge from the Hospital or Rehab. Call Office For Appointment.  6.	Wound Vac in place, definitive wound closure plan with Vac. M/W/F vac Changes.   7.	Ice affected area as Needed  8.	Keep Dressing  Clean and dry.   9. Continue taking antibiotics prescribed. See MEd rec for duration and dosing. Follow up with Infectious Disease physisian as outpatient.

## 2019-12-14 LAB
-  AMIKACIN: SIGNIFICANT CHANGE UP
-  AMOXICILLIN/CLAVULANIC ACID: SIGNIFICANT CHANGE UP
-  AMPICILLIN/SULBACTAM: SIGNIFICANT CHANGE UP
-  AMPICILLIN: SIGNIFICANT CHANGE UP
-  AZTREONAM: SIGNIFICANT CHANGE UP
-  CEFAZOLIN: SIGNIFICANT CHANGE UP
-  CEFEPIME: SIGNIFICANT CHANGE UP
-  CEFOXITIN: SIGNIFICANT CHANGE UP
-  CEFTRIAXONE: SIGNIFICANT CHANGE UP
-  CIPROFLOXACIN: SIGNIFICANT CHANGE UP
-  ERTAPENEM: SIGNIFICANT CHANGE UP
-  GENTAMICIN: SIGNIFICANT CHANGE UP
-  IMIPENEM: SIGNIFICANT CHANGE UP
-  LEVOFLOXACIN: SIGNIFICANT CHANGE UP
-  MEROPENEM: SIGNIFICANT CHANGE UP
-  PIPERACILLIN/TAZOBACTAM: SIGNIFICANT CHANGE UP
-  TOBRAMYCIN: SIGNIFICANT CHANGE UP
-  TRIMETHOPRIM/SULFAMETHOXAZOLE: SIGNIFICANT CHANGE UP
ANION GAP SERPL CALC-SCNC: 14 MMOL/L — SIGNIFICANT CHANGE UP (ref 5–17)
BASOPHILS # BLD AUTO: 0.06 K/UL — SIGNIFICANT CHANGE UP (ref 0–0.2)
BASOPHILS NFR BLD AUTO: 0.6 % — SIGNIFICANT CHANGE UP (ref 0–2)
BUN SERPL-MCNC: 15 MG/DL — SIGNIFICANT CHANGE UP (ref 7–23)
CALCIUM SERPL-MCNC: 9.7 MG/DL — SIGNIFICANT CHANGE UP (ref 8.4–10.5)
CHLORIDE SERPL-SCNC: 99 MMOL/L — SIGNIFICANT CHANGE UP (ref 96–108)
CO2 SERPL-SCNC: 23 MMOL/L — SIGNIFICANT CHANGE UP (ref 22–31)
CREAT SERPL-MCNC: 0.96 MG/DL — SIGNIFICANT CHANGE UP (ref 0.5–1.3)
EOSINOPHIL # BLD AUTO: 0.23 K/UL — SIGNIFICANT CHANGE UP (ref 0–0.5)
EOSINOPHIL NFR BLD AUTO: 2.2 % — SIGNIFICANT CHANGE UP (ref 0–6)
GLUCOSE SERPL-MCNC: 94 MG/DL — SIGNIFICANT CHANGE UP (ref 70–99)
HCT VFR BLD CALC: 38.7 % — LOW (ref 39–50)
HGB BLD-MCNC: 12.5 G/DL — LOW (ref 13–17)
IMM GRANULOCYTES NFR BLD AUTO: 0.6 % — SIGNIFICANT CHANGE UP (ref 0–1.5)
LYMPHOCYTES # BLD AUTO: 2.85 K/UL — SIGNIFICANT CHANGE UP (ref 1–3.3)
LYMPHOCYTES # BLD AUTO: 27.7 % — SIGNIFICANT CHANGE UP (ref 13–44)
MCHC RBC-ENTMCNC: 31.1 PG — SIGNIFICANT CHANGE UP (ref 27–34)
MCHC RBC-ENTMCNC: 32.3 GM/DL — SIGNIFICANT CHANGE UP (ref 32–36)
MCV RBC AUTO: 96.3 FL — SIGNIFICANT CHANGE UP (ref 80–100)
METHOD TYPE: SIGNIFICANT CHANGE UP
MONOCYTES # BLD AUTO: 1.04 K/UL — HIGH (ref 0–0.9)
MONOCYTES NFR BLD AUTO: 10.1 % — SIGNIFICANT CHANGE UP (ref 2–14)
NEUTROPHILS # BLD AUTO: 6.06 K/UL — SIGNIFICANT CHANGE UP (ref 1.8–7.4)
NEUTROPHILS NFR BLD AUTO: 58.8 % — SIGNIFICANT CHANGE UP (ref 43–77)
NRBC # BLD: 0 /100 WBCS — SIGNIFICANT CHANGE UP (ref 0–0)
OB PNL STL: NEGATIVE — SIGNIFICANT CHANGE UP
PLATELET # BLD AUTO: 276 K/UL — SIGNIFICANT CHANGE UP (ref 150–400)
POTASSIUM SERPL-MCNC: 3.7 MMOL/L — SIGNIFICANT CHANGE UP (ref 3.5–5.3)
POTASSIUM SERPL-SCNC: 3.7 MMOL/L — SIGNIFICANT CHANGE UP (ref 3.5–5.3)
RBC # BLD: 4.02 M/UL — LOW (ref 4.2–5.8)
RBC # FLD: 13.7 % — SIGNIFICANT CHANGE UP (ref 10.3–14.5)
SODIUM SERPL-SCNC: 136 MMOL/L — SIGNIFICANT CHANGE UP (ref 135–145)
WBC # BLD: 10.3 K/UL — SIGNIFICANT CHANGE UP (ref 3.8–10.5)
WBC # FLD AUTO: 10.3 K/UL — SIGNIFICANT CHANGE UP (ref 3.8–10.5)

## 2019-12-14 RX ORDER — CEFUROXIME AXETIL 250 MG
250 TABLET ORAL EVERY 12 HOURS
Refills: 0 | Status: DISCONTINUED | OUTPATIENT
Start: 2019-12-14 | End: 2019-12-16

## 2019-12-14 RX ADMIN — Medication 500 MILLIGRAM(S): at 11:43

## 2019-12-14 RX ADMIN — POLYETHYLENE GLYCOL 3350 17 GRAM(S): 17 POWDER, FOR SOLUTION ORAL at 11:43

## 2019-12-14 RX ADMIN — Medication 975 MILLIGRAM(S): at 17:38

## 2019-12-14 RX ADMIN — FAMOTIDINE 20 MILLIGRAM(S): 10 INJECTION INTRAVENOUS at 11:43

## 2019-12-14 RX ADMIN — Medication 1 TABLET(S): at 11:43

## 2019-12-14 RX ADMIN — PIPERACILLIN AND TAZOBACTAM 25 GRAM(S): 4; .5 INJECTION, POWDER, LYOPHILIZED, FOR SOLUTION INTRAVENOUS at 05:49

## 2019-12-14 RX ADMIN — RIVAROXABAN 10 MILLIGRAM(S): KIT at 11:43

## 2019-12-14 RX ADMIN — Medication 250 MILLIGRAM(S): at 17:38

## 2019-12-14 NOTE — PROGRESS NOTE ADULT - ASSESSMENT
79y male with Afib, s/p R femur Fx leading to ORIF- IMN- 10/31.  He was sent to rehab, where oozing, serosang fluid persisted despite holding Xarelto- readmitted 12/10, underwent I&D, hematoma evacuated, no purulence described.    Wound superficial as r/w Dr Godinez  Op Cx sensitive E coli  No surrounding cellulitis  Afebrile since return  Leukocytosis resolved    Plan:  Switch to Ceftin 250 mg po BID- another 4 days should suffice  Wound vac per ortho

## 2019-12-14 NOTE — PROGRESS NOTE ADULT - SUBJECTIVE AND OBJECTIVE BOX
CC: f/u for R hip infected hematoma    Patient remains comfortable, VAC continues    REVIEW OF SYSTEMS:  All other review of systems negative (Comprehensive ROS)    Antimicrobials Day # 3  piperacillin/tazobactam IVPB.. 3.375 Gram(s) IV Intermittent every 8 hours    Other Medications Reviewed    Vital Signs Last 24 Hrs  T(F): 98 (14 Dec 2019 08:55), Max: 98.3 (13 Dec 2019 10:38)  HR: 74 (14 Dec 2019 08:55) (68 - 87)  BP: 113/73 (14 Dec 2019 08:55) (99/66 - 115/71)  BP(mean): --  RR: 17 (14 Dec 2019 08:55) (16 - 18)  SpO2: 96% (14 Dec 2019 08:55) (95% - 97%)    PHYSICAL EXAM:  General: alert, no acute distress  Eyes:  anicteric, no conjunctival injection, no discharge  Oropharynx: no lesions or injection 	  Neck: supple, without adenopathy  Lungs: clear to auscultation  Heart: irregular rate and rhythm; no murmur, rubs or gallops  Abdomen: soft, nondistended, nontender, without mass or organomegaly  Skin: no lesions  Extremities: trace edema, R hip VAC intact, no surrounding erythema  Neurologic: alert, oriented, moves all extremities    LAB RESULTS:                        12.5   10.30 )-----------( 276      ( 14 Dec 2019 07:35 )             38.7   12-14    136  |  99  |  15  ----------------------------<  94  3.7   |  23  |  0.96    Ca    9.7      14 Dec 2019 07:29    MICROBIOLOGY:  RECENT CULTURES:  Culture - Tissue with Gram Stain (12.12.19 @ 03:16)   Moderate Escherichia coli    Culture - Surgical Swab (12.12.19 @ 00:43)    -  Ampicillin: S <=8 These ampicillin results predict results for amoxicillin    -  Ampicillin/Sulbactam: S <=4/2 Enterobacter, Citrobacter, and Serratia may develop resistance during prolonged therapy (3-4 days)    -  Amoxicillin/Clavulanic Acid: S <=8/4    -  Amikacin: S <=16    -  Piperacillin/Tazobactam: S <=8    -  Meropenem: S <=1    -  Levofloxacin: S <=2    -  Imipenem: S <=1    -  Gentamicin: S <=2    -  Ertapenem: S <=0.5    -  Ciprofloxacin: S <=1    -  Ceftriaxone: S <=1 Enterobacter, Citrobacter, and Serratia may develop resistance during prolonged therapy    -  Cefoxitin: S <=8    -  Cefepime: S <=2    -  Cefazolin: S <=2 Enterobacter, Citrobacter, and Serratia may develop resistance during prolonged therapy (3-4 days)    -  Aztreonam: S <=4    -  Trimethoprim/Sulfamethoxazole: S <=2/38    -  Tobramycin: S <=2    Specimen Source: .Surgical Swab 3. right hip #3    Culture Results:   Moderate Escherichia coli    RADIOLOGY REVIEWED

## 2019-12-14 NOTE — PROVIDER CONTACT NOTE (OTHER) - ASSESSMENT
blood when cleaning patient, blood coating the stool. Perianal skin intact. Pt states he has history of internal hemorrhoids.

## 2019-12-14 NOTE — PROGRESS NOTE ADULT - SUBJECTIVE AND OBJECTIVE BOX
Pt seen and examined at bedside this am, pain is well controlled. No acute overnight events. Denies numbness/tingling, fever/chill, sob. no other complaints at this time.         Vital Signs Last 24 Hrs  T(C): 36.7 (14 Dec 2019 08:55), Max: 36.8 (13 Dec 2019 10:38)  T(F): 98 (14 Dec 2019 08:55), Max: 98.3 (13 Dec 2019 10:38)  HR: 74 (14 Dec 2019 08:55) (68 - 87)  BP: 113/73 (14 Dec 2019 08:55) (99/66 - 115/71)  BP(mean): --  RR: 17 (14 Dec 2019 08:55) (16 - 18)  SpO2: 96% (14 Dec 2019 08:55) (95% - 97%)      Exam:  Alert and [place], No Acute Distress  Card: +S1/S2, RRR  Pulm: CTAB  Abdomen soft / benign  Sweet  [n ]   EXT   RLE       VAC in place       Calves soft       (+) DF  PT;  EHL 5/5        No Sensory Deficits noted        2+ pulses                       A/P: S/p Irrigation and debridement of R Hip Hematoma s/p R Hip IMN,  w/ VAC placement    - Cont VAC  - VAC to be changes on Friday 12/13 w/ PT. Patient will be discharged to [facility] with VAC.   - Follow up OR Cx, Prelim Growing E Coli. ID aware, plan for short course of IV Abx due to hematoma being superficial.   -Spoke with ID yesterday 12/13, plan for transition to PO Abx when sensitivities present. Sensitivities are back, FU Regarding which PO abx pt may be discharged to rehab with.   -PT/OT-WBAT-  -Chk AM Labs  -DVT PPx: Xarelto QD  -Pain Control PO/IV Pain Rx  -Continue Current Tx  -Dispo planning: TBD  -Will d/w attending and advise if plan changes.

## 2019-12-14 NOTE — PROGRESS NOTE ADULT - SUBJECTIVE AND OBJECTIVE BOX
79y Male w/ hx of afib on xarelto, s/p L hip IMN (Dr. Gilbert 3/10/13), and most recently s/p R hip IMN (Dr. Godinez 10/31/19) who was sent back from [facility] rehab for sanguinous drainage from R hip wound. He denies numbness/tingling of the RLE. Denies fevers/chills. He is coming back for I&D of R hip/thigh wound for hematoma. He has been off his xarelto since 12/6. Has been walking with PT, ambulates with walker. Patient seen now post op after I&D of hematoma Patient states pain has been well controlled. has been working with PT.  infection seems superficial and not involving the hardware  Continue antibiotics as per ID    MEDICATIONS  (STANDING):  ascorbic acid 500 milliGRAM(s) Oral daily  famotidine    Tablet 20 milliGRAM(s) Oral daily  lactated ringers. 1000 milliLiter(s) (75 mL/Hr) IV Continuous <Continuous>  multivitamin 1 Tablet(s) Oral daily  polyethylene glycol 3350 17 Gram(s) Oral daily  rivaroxaban 10 milliGRAM(s) Oral daily  senna 2 Tablet(s) Oral at bedtime    MEDICATIONS  (PRN):  acetaminophen   Tablet .. 975 milliGRAM(s) Oral every 8 hours PRN Temp greater or equal to 38C (100.4F), Mild Pain (1 - 3)  magnesium hydroxide Suspension 30 milliLiter(s) Oral daily PRN Constipation  melatonin 3 milliGRAM(s) Oral at bedtime PRN Insomnia  morphine  - Injectable 1 milliGRAM(s) IV Push every 2 hours PRN Severe Pain (7 - 10)  ondansetron Injectable 4 milliGRAM(s) IV Push every 6 hours PRN Nausea and/or Vomiting  oxyCODONE    IR 2.5 milliGRAM(s) Oral every 4 hours PRN Mild Pain (1 - 3)  oxyCODONE    IR 5 milliGRAM(s) Oral every 4 hours PRN Moderate Pain (4 - 6)        Vital Signs Last 24 Hrs  T(C): 36.8 (13 Dec 2019 17:51), Max: 36.8 (13 Dec 2019 10:38)  T(F): 98.2 (13 Dec 2019 17:51), Max: 98.3 (13 Dec 2019 10:38)  HR: 87 (13 Dec 2019 17:51) (69 - 87)  BP: 115/71 (13 Dec 2019 17:51) (103/63 - 115/71)  RR: 18 (13 Dec 2019 17:51) (18 - 18)  SpO2: 97% (13 Dec 2019 17:51) (95% - 97%)      PHYSICAL EXAM:  GENERAL: NAD, well nourished and conversant  HEAD:  Atraumatic  EYES: EOM, PERRLA, conjunctiva pink and sclera white  ENT: No tonsillar erythema, exudates, or enlargement, moist mucous membranes, good dentition, no lesions  NECK: Supple, No JVD, normal thyroid, carotids with normal upstrokes and no bruits  CHEST/LUNG: Clear to auscultation bilaterally, No rales, rhonchi, wheezing, or rubs  HEART: Regular rate and rhythm, No murmurs, rubs, or gallops  ABDOMEN: Soft, nondistended, no masses, guarding, tenderness or rebound, bowel sounds present  EXTREMITIES:  2+ Peripheral Pulses, No clubbing, cyanosis, or edema. vaccuum dressing on right thigh functioning well  LYMPH: No lymphadenopathy noted  SKIN: No rashes or lesions  NERVOUS SYSTEM:  Alert & Oriented X3, normal cognitive function. Motor Strength 5/5 right upper and right lower.  5/5 left upper and left lower extremities, DTRs 2+ intact and symmetric      LABS:            CBC Full  -  ( 14 Dec 2019 07:35 )  WBC Count : 10.30 K/uL  RBC Count : 4.02 M/uL  Hemoglobin : 12.5 g/dL  Hematocrit : 38.7 %  Platelet Count - Automated : 276 K/uL  Mean Cell Volume : 96.3 fl  Mean Cell Hemoglobin : 31.1 pg  Mean Cell Hemoglobin Concentration : 32.3 gm/dL  Auto Neutrophil # : 6.06 K/uL  Auto Lymphocyte # : 2.85 K/uL  Auto Monocyte # : 1.04 K/uL  Auto Eosinophil # : 0.23 K/uL  Auto Basophil # : 0.06 K/uL  Auto Neutrophil % : 58.8 %  Auto Lymphocyte % : 27.7 %  Auto Monocyte % : 10.1 %  Auto Eosinophil % : 2.2 %  Auto Basophil % : 0.6 %    12-14    136  |  99  |  15  ----------------------------<  94  3.7   |  23  |  0.96    Ca    9.7      14 Dec 2019 07:29              CBC Full  -  ( 13 Dec 2019 07:04 )  WBC Count : 11.50 K/uL  RBC Count : 3.73 M/uL  Hemoglobin : 11.3 g/dL  Hematocrit : 36.3 %  Platelet Count - Automated : 264 K/uL  Mean Cell Volume : 97.3 fl  Mean Cell Hemoglobin : 30.3 pg  Mean Cell Hemoglobin Concentration : 31.1 gm/dL  Auto Neutrophil # : 7.54 K/uL  Auto Lymphocyte # : 2.57 K/uL  Auto Monocyte # : 1.04 K/uL  Auto Eosinophil # : 0.24 K/uL  Auto Basophil # : 0.04 K/uL  Auto Neutrophil % : 65.7 %  Auto Lymphocyte % : 22.3 %  Auto Monocyte % : 9.0 %  Auto Eosinophil % : 2.1 %  Auto Basophil % : 0.3 %    12-13    138  |  104  |  20  ----------------------------<  99  4.2   |  26  |  0.90    Ca    9.3      13 Dec 2019 07:04              CBC Full  -  ( 12 Dec 2019 07:00 )  WBC Count : 15.11 K/uL  RBC Count : 3.97 M/uL  Hemoglobin : 12.3 g/dL  Hematocrit : 38.4 %  Platelet Count - Automated : 270 K/uL  Mean Cell Volume : 96.7 fl  Mean Cell Hemoglobin : 31.0 pg  Mean Cell Hemoglobin Concentration : 32.0 gm/dL  Auto Neutrophil # : 11.28 K/uL  Auto Lymphocyte # : 2.47 K/uL  Auto Monocyte # : 1.23 K/uL  Auto Eosinophil # : 0.04 K/uL  Auto Basophil # : 0.03 K/uL  Auto Neutrophil % : 74.7 %  Auto Lymphocyte % : 16.3 %  Auto Monocyte % : 8.1 %  Auto Eosinophil % : 0.3 %  Auto Basophil % : 0.2 %    12-12    137  |  99  |  16  ----------------------------<  100<H>  4.6   |  24  |  0.86    Ca    9.7      12 Dec 2019 06:57    TPro  7.5  /  Alb  3.7  /  TBili  0.5  /  DBili  x   /  AST  19  /  ALT  13  /  AlkPhos  95  12-10    LIVER FUNCTIONS - ( 10 Dec 2019 18:20 )  Alb: 3.7 g/dL / Pro: 7.5 g/dL / ALK PHOS: 95 U/L / ALT: 13 U/L / AST: 19 U/L / GGT: x           PT/INR - ( 11 Dec 2019 08:33 )   PT: 15.2 sec;   INR: 1.31 ratio         PTT - ( 11 Dec 2019 08:33 )  PTT:33.5 sec    CAPILLARY BLOOD GLUCOSE          RADIOLOGY & ADDITIONAL TESTS:

## 2019-12-14 NOTE — PROGRESS NOTE ADULT - ASSESSMENT
79y Male w/ hx of afib on xarelto, s/p L hip IMN and most recently s/p R hip IMN  who was sent back from [facility] re[facility] for sanguinous drainage from R hip wound. Patient seen now s/p I&D of wound.  It appears thst the infection is superficial and does nor involve the  hardware   Continue iv antibiotics

## 2019-12-14 NOTE — PROVIDER CONTACT NOTE (OTHER) - SITUATION
pt on  xarelto, had BM. End of BM noted to be coated w blood and when cleaning the patient blood is present.

## 2019-12-15 RX ADMIN — OXYCODONE HYDROCHLORIDE 5 MILLIGRAM(S): 5 TABLET ORAL at 05:45

## 2019-12-15 RX ADMIN — Medication 500 MILLIGRAM(S): at 12:04

## 2019-12-15 RX ADMIN — RIVAROXABAN 10 MILLIGRAM(S): KIT at 12:04

## 2019-12-15 RX ADMIN — OXYCODONE HYDROCHLORIDE 5 MILLIGRAM(S): 5 TABLET ORAL at 04:45

## 2019-12-15 RX ADMIN — POLYETHYLENE GLYCOL 3350 17 GRAM(S): 17 POWDER, FOR SOLUTION ORAL at 12:04

## 2019-12-15 RX ADMIN — SENNA PLUS 2 TABLET(S): 8.6 TABLET ORAL at 22:52

## 2019-12-15 RX ADMIN — Medication 250 MILLIGRAM(S): at 04:45

## 2019-12-15 RX ADMIN — FAMOTIDINE 20 MILLIGRAM(S): 10 INJECTION INTRAVENOUS at 12:04

## 2019-12-15 RX ADMIN — Medication 1 TABLET(S): at 12:04

## 2019-12-15 RX ADMIN — Medication 250 MILLIGRAM(S): at 17:11

## 2019-12-15 NOTE — PROGRESS NOTE ADULT - SUBJECTIVE AND OBJECTIVE BOX
79y Male w/ hx of afib on xarelto, s/p L hip IMN (Dr. Gilbert 3/10/13), and most recently s/p R hip IMN (Dr. Godinez 10/31/19) who was sent back from [facility] rehab for sanguinous drainage from R hip wound. He denies numbness/tingling of the RLE. Denies fevers/chills. He is coming back for I&D of R hip/thigh wound for hematoma. He has been off his xarelto since 12/6. Has been walking with PT, ambulates with walker. Patient seen now post op after I&D of hematoma Patient states pain has been well controlled. has been working with PT.  infection seems superficial and not involving the hardware  Continue antibiotics as per ID    MEDICATIONS  (STANDING):  ascorbic acid 500 milliGRAM(s) Oral daily  famotidine    Tablet 20 milliGRAM(s) Oral daily  lactated ringers. 1000 milliLiter(s) (75 mL/Hr) IV Continuous <Continuous>  multivitamin 1 Tablet(s) Oral daily  polyethylene glycol 3350 17 Gram(s) Oral daily  rivaroxaban 10 milliGRAM(s) Oral daily  senna 2 Tablet(s) Oral at bedtime    MEDICATIONS  (PRN):  acetaminophen   Tablet .. 975 milliGRAM(s) Oral every 8 hours PRN Temp greater or equal to 38C (100.4F), Mild Pain (1 - 3)  magnesium hydroxide Suspension 30 milliLiter(s) Oral daily PRN Constipation  melatonin 3 milliGRAM(s) Oral at bedtime PRN Insomnia  morphine  - Injectable 1 milliGRAM(s) IV Push every 2 hours PRN Severe Pain (7 - 10)  ondansetron Injectable 4 milliGRAM(s) IV Push every 6 hours PRN Nausea and/or Vomiting  oxyCODONE    IR 2.5 milliGRAM(s) Oral every 4 hours PRN Mild Pain (1 - 3)  oxyCODONE    IR 5 milliGRAM(s) Oral every 4 hours PRN Moderate Pain (4 - 6)      Vital Signs Last 24 Hrs  T(F): 98.2 (15 Dec 2019 13:29), Max: 98.4 (15 Dec 2019 10:21)  HR: 73 (15 Dec 2019 13:29) (61 - 81)  BP: 109/73 (15 Dec 2019 13:29) (100/67 - 134/78)  BP(mean): --  RR: 16 (15 Dec 2019 13:29) (16 - 18)  SpO2: 95% (15 Dec 2019 13:29) (95% - 97%)            PHYSICAL EXAM:  GENERAL: NAD, well nourished and conversant  HEAD:  Atraumatic  EYES: EOM, PERRLA, conjunctiva pink and sclera white  ENT: No tonsillar erythema, exudates, or enlargement, moist mucous membranes, good dentition, no lesions  NECK: Supple, No JVD, normal thyroid, carotids with normal upstrokes and no bruits  CHEST/LUNG: Clear to auscultation bilaterally, No rales, rhonchi, wheezing, or rubs  HEART: Regular rate and rhythm, No murmurs, rubs, or gallops  ABDOMEN: Soft, nondistended, no masses, guarding, tenderness or rebound, bowel sounds present  EXTREMITIES:  2+ Peripheral Pulses, No clubbing, cyanosis, or edema. vaccuum dressing on right thigh functioning well  LYMPH: No lymphadenopathy noted  SKIN: No rashes or lesions  NERVOUS SYSTEM:  Alert & Oriented X3, normal cognitive function. Motor Strength 5/5 right upper and right lower.  5/5 left upper and left lower extremities, DTRs 2+ intact and symmetric      LABS    No labs 12/15    CBC Full  -  ( 14 Dec 2019 07:35 )  WBC Count : 10.30 K/uL  RBC Count : 4.02 M/uL  Hemoglobin : 12.5 g/dL  Hematocrit : 38.7 %  Platelet Count - Automated : 276 K/uL  Mean Cell Volume : 96.3 fl  Mean Cell Hemoglobin : 31.1 pg  Mean Cell Hemoglobin Concentration : 32.3 gm/dL  Auto Neutrophil # : 6.06 K/uL  Auto Lymphocyte # : 2.85 K/uL  Auto Monocyte # : 1.04 K/uL  Auto Eosinophil # : 0.23 K/uL  Auto Basophil # : 0.06 K/uL  Auto Neutrophil % : 58.8 %  Auto Lymphocyte % : 27.7 %  Auto Monocyte % : 10.1 %  Auto Eosinophil % : 2.2 %  Auto Basophil % : 0.6 %    12-14    136  |  99  |  15  ----------------------------<  94  3.7   |  23  |  0.96    Ca    9.7      14 Dec 2019 07:29              CBC Full  -  ( 13 Dec 2019 07:04 )  WBC Count : 11.50 K/uL  RBC Count : 3.73 M/uL  Hemoglobin : 11.3 g/dL  Hematocrit : 36.3 %  Platelet Count - Automated : 264 K/uL  Mean Cell Volume : 97.3 fl  Mean Cell Hemoglobin : 30.3 pg  Mean Cell Hemoglobin Concentration : 31.1 gm/dL  Auto Neutrophil # : 7.54 K/uL  Auto Lymphocyte # : 2.57 K/uL  Auto Monocyte # : 1.04 K/uL  Auto Eosinophil # : 0.24 K/uL  Auto Basophil # : 0.04 K/uL  Auto Neutrophil % : 65.7 %  Auto Lymphocyte % : 22.3 %  Auto Monocyte % : 9.0 %  Auto Eosinophil % : 2.1 %  Auto Basophil % : 0.3 %    12-13    138  |  104  |  20  ----------------------------<  99  4.2   |  26  |  0.90    Ca    9.3      13 Dec 2019 07:04              CBC Full  -  ( 12 Dec 2019 07:00 )  WBC Count : 15.11 K/uL  RBC Count : 3.97 M/uL  Hemoglobin : 12.3 g/dL  Hematocrit : 38.4 %  Platelet Count - Automated : 270 K/uL  Mean Cell Volume : 96.7 fl  Mean Cell Hemoglobin : 31.0 pg  Mean Cell Hemoglobin Concentration : 32.0 gm/dL  Auto Neutrophil # : 11.28 K/uL  Auto Lymphocyte # : 2.47 K/uL  Auto Monocyte # : 1.23 K/uL  Auto Eosinophil # : 0.04 K/uL  Auto Basophil # : 0.03 K/uL  Auto Neutrophil % : 74.7 %  Auto Lymphocyte % : 16.3 %  Auto Monocyte % : 8.1 %  Auto Eosinophil % : 0.3 %  Auto Basophil % : 0.2 %    12-12    137  |  99  |  16  ----------------------------<  100<H>  4.6   |  24  |  0.86    Ca    9.7      12 Dec 2019 06:57    TPro  7.5  /  Alb  3.7  /  TBili  0.5  /  DBili  x   /  AST  19  /  ALT  13  /  AlkPhos  95  12-10    LIVER FUNCTIONS - ( 10 Dec 2019 18:20 )  Alb: 3.7 g/dL / Pro: 7.5 g/dL / ALK PHOS: 95 U/L / ALT: 13 U/L / AST: 19 U/L / GGT: x           PT/INR - ( 11 Dec 2019 08:33 )   PT: 15.2 sec;   INR: 1.31 ratio         PTT - ( 11 Dec 2019 08:33 )  PTT:33.5 sec    CAPILLARY BLOOD GLUCOSE          RADIOLOGY & ADDITIONAL TESTS:

## 2019-12-15 NOTE — PROGRESS NOTE ADULT - SUBJECTIVE AND OBJECTIVE BOX
Pt seen and examined at bedside this am, pain is well controlled. No acute overnight events. Denies numbness/tingling, fever/chill, sob. no other complaints at this time.  Has been ambulating with PT.     Vital Signs Last 24 Hrs  T(C): 36.3 (15 Dec 2019 05:54), Max: 36.7 (14 Dec 2019 08:55)  T(F): 97.4 (15 Dec 2019 05:54), Max: 98 (14 Dec 2019 08:55)  HR: 61 (15 Dec 2019 05:54) (61 - 81)  BP: 114/73 (15 Dec 2019 05:54) (104/66 - 134/78)  BP(mean): --  RR: 16 (15 Dec 2019 05:54) (16 - 17)  SpO2: 97% (15 Dec 2019 05:54) (95% - 98%)      Exam:  Alert and [place], No Acute Distress    RLE       VAC in place       Calves soft       (+) DF/PT;  EHL 5/5        No Sensory Deficits noted        2+ pulses        compartments soft                   A/P: S/p Irrigation and debridement of R Hip Hematoma s/p R Hip IMN,  w/ VAC placement POD 4    - Cont VAC  - Patient will be discharged to [facility] with VAC.   - Follow up OR Cx, Prelim Growing E Coli. ID aware, plan for short course of IV Abx due to hematoma being superficial.   -ID recommends short course of oral abx  -PT/OT-WBAT  -DVT PPx: Xarelto QD  -Pain Control PO/IV Pain Rx  -Continue Current Tx  -Dispo planning: [facility] tomorrow   -Will d/w attending and advise if plan changes.

## 2019-12-15 NOTE — PROGRESS NOTE ADULT - SUBJECTIVE AND OBJECTIVE BOX
CC: f/u for R hip infected hematoma    Patient remains comfortable, VAC continues    REVIEW OF SYSTEMS:  All other review of systems negative (Comprehensive ROS)    Antimicrobials Day # 4  Medications Reviewed    Vital Signs Last 24 Hrs  T(F): 98.2 (15 Dec 2019 13:29), Max: 98.4 (15 Dec 2019 10:21)  HR: 73 (15 Dec 2019 13:29) (61 - 81)  BP: 109/73 (15 Dec 2019 13:29) (100/67 - 134/78)  BP(mean): --  RR: 16 (15 Dec 2019 13:29) (16 - 18)  SpO2: 95% (15 Dec 2019 13:29) (95% - 97%)    PHYSICAL EXAM:  General: alert, no acute distress  Eyes:  anicteric, no conjunctival injection, no discharge  Oropharynx: no lesions or injection 	  Neck: supple, without adenopathy  Lungs: clear to auscultation  Heart: irregular rate and rhythm; no murmur, rubs or gallops  Abdomen: soft, nondistended, nontender, without mass or organomegaly  Skin: no lesions  Extremities: R post thigh edema, R hip VAC intact, no surrounding erythema  Neurologic: alert, oriented, moves all extremities    LAB RESULTS:                        12.5   10.30 )-----------( 276      ( 14 Dec 2019 07:35 )             38.7   12-14    136  |  99  |  15  ----------------------------<  94  3.7   |  23  |  0.96    Ca    9.7      14 Dec 2019 07:29      MICROBIOLOGY:  RECENT CULTURES:  Culture - Tissue with Gram Stain (12.12.19 @ 03:16)   Moderate Escherichia coli    Culture - Surgical Swab (12.12.19 @ 00:43)    -  Ampicillin: S <=8 These ampicillin results predict results for amoxicillin    -  Ampicillin/Sulbactam: S <=4/2 Enterobacter, Citrobacter, and Serratia may develop resistance during prolonged therapy (3-4 days)    -  Amoxicillin/Clavulanic Acid: S <=8/4    -  Amikacin: S <=16    -  Piperacillin/Tazobactam: S <=8    -  Meropenem: S <=1    -  Levofloxacin: S <=2    -  Imipenem: S <=1    -  Gentamicin: S <=2    -  Ertapenem: S <=0.5    -  Ciprofloxacin: S <=1    -  Ceftriaxone: S <=1 Enterobacter, Citrobacter, and Serratia may develop resistance during prolonged therapy    -  Cefoxitin: S <=8    -  Cefepime: S <=2    -  Cefazolin: S <=2 Enterobacter, Citrobacter, and Serratia may develop resistance during prolonged therapy (3-4 days)    -  Aztreonam: S <=4    -  Trimethoprim/Sulfamethoxazole: S <=2/38    -  Tobramycin: S <=2    Specimen Source: .Surgical Swab 3. right hip #3    Culture Results:   Moderate Escherichia coli    RADIOLOGY REVIEWED

## 2019-12-15 NOTE — PROGRESS NOTE ADULT - ASSESSMENT
79y Male w/ hx of afib on xarelto, s/p L hip IMN and most recently s/p R hip IMN  who was sent back from [facility] rehab for sanguinous drainage from R hip wound. Patient seen now s/p I&D of wound.  It appears thst the infection is superficial and does nor involve the  hardware   Continue iv antibiotics as per Dr Serrano.

## 2019-12-15 NOTE — PROGRESS NOTE ADULT - ASSESSMENT
79y male with Afib, s/p R femur Fx leading to ORIF- IMN- 10/31.  He was sent to rehab, where oozing, serosang fluid persisted despite holding Xarelto- readmitted 12/10, underwent I&D, hematoma evacuated, no purulence described.    Wound superficial as r/w Dr Godinez  All Op Cxs sensitive E coli  No surrounding cellulitis  Afebrile since return  Leukocytosis resolved    Plan:  Continue Ceftin 250 mg po BID- another 3 days should suffice  Wound vac per ortho

## 2019-12-16 ENCOUNTER — TRANSCRIPTION ENCOUNTER (OUTPATIENT)
Age: 79
End: 2019-12-16

## 2019-12-16 VITALS
SYSTOLIC BLOOD PRESSURE: 119 MMHG | DIASTOLIC BLOOD PRESSURE: 78 MMHG | OXYGEN SATURATION: 98 % | RESPIRATION RATE: 16 BRPM | HEART RATE: 69 BPM | TEMPERATURE: 98 F

## 2019-12-16 LAB
CULTURE RESULTS: SIGNIFICANT CHANGE UP
CULTURE RESULTS: SIGNIFICANT CHANGE UP
ORGANISM # SPEC MICROSCOPIC CNT: SIGNIFICANT CHANGE UP
SPECIMEN SOURCE: SIGNIFICANT CHANGE UP
SPECIMEN SOURCE: SIGNIFICANT CHANGE UP

## 2019-12-16 PROCEDURE — 85027 COMPLETE CBC AUTOMATED: CPT

## 2019-12-16 PROCEDURE — 87186 SC STD MICRODIL/AGAR DIL: CPT

## 2019-12-16 PROCEDURE — 73502 X-RAY EXAM HIP UNI 2-3 VIEWS: CPT

## 2019-12-16 PROCEDURE — 97605 NEG PRS WND THER DME<=50SQCM: CPT

## 2019-12-16 PROCEDURE — 84702 CHORIONIC GONADOTROPIN TEST: CPT

## 2019-12-16 PROCEDURE — 73552 X-RAY EXAM OF FEMUR 2/>: CPT

## 2019-12-16 PROCEDURE — 93005 ELECTROCARDIOGRAM TRACING: CPT

## 2019-12-16 PROCEDURE — 86901 BLOOD TYPING SEROLOGIC RH(D): CPT

## 2019-12-16 PROCEDURE — 85610 PROTHROMBIN TIME: CPT

## 2019-12-16 PROCEDURE — 97162 PT EVAL MOD COMPLEX 30 MIN: CPT

## 2019-12-16 PROCEDURE — 86140 C-REACTIVE PROTEIN: CPT

## 2019-12-16 PROCEDURE — 86900 BLOOD TYPING SEROLOGIC ABO: CPT

## 2019-12-16 PROCEDURE — 80048 BASIC METABOLIC PNL TOTAL CA: CPT

## 2019-12-16 PROCEDURE — 99285 EMERGENCY DEPT VISIT HI MDM: CPT

## 2019-12-16 PROCEDURE — 80053 COMPREHEN METABOLIC PANEL: CPT

## 2019-12-16 PROCEDURE — 87070 CULTURE OTHR SPECIMN AEROBIC: CPT

## 2019-12-16 PROCEDURE — 86850 RBC ANTIBODY SCREEN: CPT

## 2019-12-16 PROCEDURE — 85730 THROMBOPLASTIN TIME PARTIAL: CPT

## 2019-12-16 PROCEDURE — 71045 X-RAY EXAM CHEST 1 VIEW: CPT

## 2019-12-16 PROCEDURE — 82272 OCCULT BLD FECES 1-3 TESTS: CPT

## 2019-12-16 PROCEDURE — 85652 RBC SED RATE AUTOMATED: CPT

## 2019-12-16 PROCEDURE — 87102 FUNGUS ISOLATION CULTURE: CPT

## 2019-12-16 RX ORDER — CEFUROXIME AXETIL 250 MG
1 TABLET ORAL
Qty: 0 | Refills: 0 | DISCHARGE
Start: 2019-12-16 | End: 2019-12-19

## 2019-12-16 RX ORDER — LANOLIN ALCOHOL/MO/W.PET/CERES
1 CREAM (GRAM) TOPICAL
Qty: 0 | Refills: 0 | DISCHARGE
Start: 2019-12-16

## 2019-12-16 RX ORDER — FAMOTIDINE 10 MG/ML
1 INJECTION INTRAVENOUS
Qty: 0 | Refills: 0 | DISCHARGE
Start: 2019-12-16

## 2019-12-16 RX ORDER — POLYETHYLENE GLYCOL 3350 17 G/17G
17 POWDER, FOR SOLUTION ORAL
Qty: 0 | Refills: 0 | DISCHARGE
Start: 2019-12-16

## 2019-12-16 RX ORDER — OXYCODONE HYDROCHLORIDE 5 MG/1
1 TABLET ORAL
Qty: 0 | Refills: 0 | DISCHARGE
Start: 2019-12-16

## 2019-12-16 RX ORDER — SENNA PLUS 8.6 MG/1
2 TABLET ORAL
Qty: 0 | Refills: 0 | DISCHARGE
Start: 2019-12-16

## 2019-12-16 RX ORDER — ACETAMINOPHEN 500 MG
3 TABLET ORAL
Qty: 0 | Refills: 0 | DISCHARGE
Start: 2019-12-16

## 2019-12-16 RX ORDER — ASCORBIC ACID 60 MG
1 TABLET,CHEWABLE ORAL
Qty: 0 | Refills: 0 | DISCHARGE
Start: 2019-12-16

## 2019-12-16 RX ADMIN — Medication 1 TABLET(S): at 11:56

## 2019-12-16 RX ADMIN — Medication 250 MILLIGRAM(S): at 05:17

## 2019-12-16 RX ADMIN — Medication 500 MILLIGRAM(S): at 11:56

## 2019-12-16 RX ADMIN — RIVAROXABAN 10 MILLIGRAM(S): KIT at 11:56

## 2019-12-16 RX ADMIN — FAMOTIDINE 20 MILLIGRAM(S): 10 INJECTION INTRAVENOUS at 11:56

## 2019-12-16 NOTE — DIETITIAN INITIAL EVALUATION ADULT. - ETIOLOGY
Increased physiological needs in setting of recent surgery, now with hematoma Increased demand for nutrients

## 2019-12-16 NOTE — PROGRESS NOTE ADULT - NSHPATTENDINGPLANDISCUSS_GEN_ALL_CORE
Patient and staff

## 2019-12-16 NOTE — DIETITIAN INITIAL EVALUATION ADULT. - NS FNS WEIGHT CHANGE REASON
intentional/Pt reports intentional weight loss through decreased PO intake, from  pounds to 200 pounds. Weight as per previous RD note (11/6/19) 214.9 pounds. Weight as per current documentation (12/16) 208.3 pounds.

## 2019-12-16 NOTE — DIETITIAN INITIAL EVALUATION ADULT. - OTHER INFO
Pt reports good appetite and PO intake at home; states he followed a regular diet. As per chart, NKFA. Pt reports taking vitamin D and vitamin C at home.     Pt reports good appetite and PO intake at this time. Denies difficulties chewing/swallowing. Denies nausea/vomiting presently. Last BM as per flow sheets (12/14).    Briefly spoke to patient about weight maintenance and the importance of protein intake to help maintain muscle mass and to help with recovery following surgery and drainage. Pt was amenable, RD remains available. Pt reports good appetite and PO intake at home; states he followed a regular diet. As per chart, NKFA. Pt reports taking vitamin D and vitamin C at home. Pt reports living alone, with his daughter providing assistance shopping and cooking.    Pt reports good appetite and PO intake at this time. Denies difficulties chewing/swallowing. Denies nausea/vomiting presently. Last BM as per flow sheets (12/14).    Briefly spoke to patient about current weight maintenance and the importance of protein intake to help maintain muscle mass and to help with recovery following surgery and drainage. Pt was amenable, RD remains available.

## 2019-12-16 NOTE — DIETITIAN INITIAL EVALUATION ADULT. - ADD RECOMMEND
1. Continue monitoring weight, skin integrity, diet tolerance, and intake as applicable. 2. Encourage PO intake and obtain menu preferences as needed. 3. Monitor need for nutritional supplements, will provide upon request. 4. Educated pt on importance of protein rich foods and current weight maintenance at this time. Pt was amenable, RD remains available.

## 2019-12-16 NOTE — PROGRESS NOTE ADULT - REASON FOR ADMISSION
R hip/thigh hematoma s/p R hip IMN 10/31/19

## 2019-12-16 NOTE — PROGRESS NOTE ADULT - SUBJECTIVE AND OBJECTIVE BOX
79y Male w/ hx of afib on xarelto, s/p L hip IMN (Dr. Gilbert 3/10/13), and most recently s/p R hip IMN (Dr. Godinez 10/31/19) who was sent back from [facility] rehab for sanguinous drainage from R hip wound. He denies numbness/tingling of the RLE. Denies fevers/chills. He is coming back for I&D of R hip/thigh wound for hematoma. He has been off his xarelto since 12/6. Has been walking with PT, ambulates with walker. Patient seen now post op after I&D of hematoma Patient states pain has been well controlled. has been working with PT.  infection seems superficial and not involving the hardware      MEDICATIONS  (STANDING):  ascorbic acid 500 milliGRAM(s) Oral daily  famotidine    Tablet 20 milliGRAM(s) Oral daily  lactated ringers. 1000 milliLiter(s) (75 mL/Hr) IV Continuous <Continuous>  multivitamin 1 Tablet(s) Oral daily  polyethylene glycol 3350 17 Gram(s) Oral daily  rivaroxaban 10 milliGRAM(s) Oral daily  senna 2 Tablet(s) Oral at bedtime    MEDICATIONS  (PRN):  acetaminophen   Tablet .. 975 milliGRAM(s) Oral every 8 hours PRN Temp greater or equal to 38C (100.4F), Mild Pain (1 - 3)  magnesium hydroxide Suspension 30 milliLiter(s) Oral daily PRN Constipation  melatonin 3 milliGRAM(s) Oral at bedtime PRN Insomnia  morphine  - Injectable 1 milliGRAM(s) IV Push every 2 hours PRN Severe Pain (7 - 10)  ondansetron Injectable 4 milliGRAM(s) IV Push every 6 hours PRN Nausea and/or Vomiting  oxyCODONE    IR 2.5 milliGRAM(s) Oral every 4 hours PRN Mild Pain (1 - 3)  oxyCODONE    IR 5 milliGRAM(s) Oral every 4 hours PRN Moderate Pain (4 - 6)      Vital Signs Last 24 Hrs  T(F): 98.2 (15 Dec 2019 13:29), Max: 98.4 (15 Dec 2019 10:21)  HR: 73 (15 Dec 2019 13:29) (61 - 81)  BP: 109/73 (15 Dec 2019 13:29) (100/67 - 134/78)  BP(mean): --  RR: 16 (15 Dec 2019 13:29) (16 - 18)  SpO2: 95% (15 Dec 2019 13:29) (95% - 97%)            PHYSICAL EXAM:  GENERAL: NAD, well nourished and conversant  HEAD:  Atraumatic  EYES: EOM, PERRLA, conjunctiva pink and sclera white  ENT: No tonsillar erythema, exudates, or enlargement, moist mucous membranes, good dentition, no lesions  NECK: Supple, No JVD, normal thyroid, carotids with normal upstrokes and no bruits  CHEST/LUNG: Clear to auscultation bilaterally, No rales, rhonchi, wheezing, or rubs  HEART: Regular rate and rhythm, No murmurs, rubs, or gallops  ABDOMEN: Soft, nondistended, no masses, guarding, tenderness or rebound, bowel sounds present  EXTREMITIES:  2+ Peripheral Pulses, No clubbing, cyanosis, or edema. vaccuum dressing on right thigh functioning well  LYMPH: No lymphadenopathy noted  SKIN: No rashes or lesions  NERVOUS SYSTEM:  Alert & Oriented X3, normal cognitive function. Motor Strength 5/5 right upper and right lower.  5/5 left upper and left lower extremities, DTRs 2+ intact and symmetric      LABS    No labs 12/15    CBC Full  -  ( 14 Dec 2019 07:35 )  WBC Count : 10.30 K/uL  RBC Count : 4.02 M/uL  Hemoglobin : 12.5 g/dL  Hematocrit : 38.7 %  Platelet Count - Automated : 276 K/uL  Mean Cell Volume : 96.3 fl  Mean Cell Hemoglobin : 31.1 pg  Mean Cell Hemoglobin Concentration : 32.3 gm/dL  Auto Neutrophil # : 6.06 K/uL  Auto Lymphocyte # : 2.85 K/uL  Auto Monocyte # : 1.04 K/uL  Auto Eosinophil # : 0.23 K/uL  Auto Basophil # : 0.06 K/uL  Auto Neutrophil % : 58.8 %  Auto Lymphocyte % : 27.7 %  Auto Monocyte % : 10.1 %  Auto Eosinophil % : 2.2 %  Auto Basophil % : 0.6 %    12-14    136  |  99  |  15  ----------------------------<  94  3.7   |  23  |  0.96    Ca    9.7      14 Dec 2019 07:29              CBC Full  -  ( 13 Dec 2019 07:04 )  WBC Count : 11.50 K/uL  RBC Count : 3.73 M/uL  Hemoglobin : 11.3 g/dL  Hematocrit : 36.3 %  Platelet Count - Automated : 264 K/uL  Mean Cell Volume : 97.3 fl  Mean Cell Hemoglobin : 30.3 pg  Mean Cell Hemoglobin Concentration : 31.1 gm/dL  Auto Neutrophil # : 7.54 K/uL  Auto Lymphocyte # : 2.57 K/uL  Auto Monocyte # : 1.04 K/uL  Auto Eosinophil # : 0.24 K/uL  Auto Basophil # : 0.04 K/uL  Auto Neutrophil % : 65.7 %  Auto Lymphocyte % : 22.3 %  Auto Monocyte % : 9.0 %  Auto Eosinophil % : 2.1 %  Auto Basophil % : 0.3 %    12-13    138  |  104  |  20  ----------------------------<  99  4.2   |  26  |  0.90    Ca    9.3      13 Dec 2019 07:04              CBC Full  -  ( 12 Dec 2019 07:00 )  WBC Count : 15.11 K/uL  RBC Count : 3.97 M/uL  Hemoglobin : 12.3 g/dL  Hematocrit : 38.4 %  Platelet Count - Automated : 270 K/uL  Mean Cell Volume : 96.7 fl  Mean Cell Hemoglobin : 31.0 pg  Mean Cell Hemoglobin Concentration : 32.0 gm/dL  Auto Neutrophil # : 11.28 K/uL  Auto Lymphocyte # : 2.47 K/uL  Auto Monocyte # : 1.23 K/uL  Auto Eosinophil # : 0.04 K/uL  Auto Basophil # : 0.03 K/uL  Auto Neutrophil % : 74.7 %  Auto Lymphocyte % : 16.3 %  Auto Monocyte % : 8.1 %  Auto Eosinophil % : 0.3 %  Auto Basophil % : 0.2 %    12-12    137  |  99  |  16  ----------------------------<  100<H>  4.6   |  24  |  0.86    Ca    9.7      12 Dec 2019 06:57    TPro  7.5  /  Alb  3.7  /  TBili  0.5  /  DBili  x   /  AST  19  /  ALT  13  /  AlkPhos  95  12-10    LIVER FUNCTIONS - ( 10 Dec 2019 18:20 )  Alb: 3.7 g/dL / Pro: 7.5 g/dL / ALK PHOS: 95 U/L / ALT: 13 U/L / AST: 19 U/L / GGT: x           PT/INR - ( 11 Dec 2019 08:33 )   PT: 15.2 sec;   INR: 1.31 ratio         PTT - ( 11 Dec 2019 08:33 )  PTT:33.5 sec    CAPILLARY BLOOD GLUCOSE          RADIOLOGY & ADDITIONAL TESTS:

## 2019-12-16 NOTE — DIETITIAN INITIAL EVALUATION ADULT. - REASON INDICATOR FOR ASSESSMENT
Pt seen for length of stay initial assessment 9MON.  Information obtained from: medical record, pt, RN.    Pertinent information as per chart: Pt is a 80 y/o M with PMH of chronic atrial fibrillation, inguinal hernia. Presented following increased swelling and oozing of right hip and thigh S/P right hip IMN. Found with right hip hematoma requiring surgical drainage (12/11). Per RN, pt is scheduled for release to rehab later today (12/16).

## 2019-12-16 NOTE — DISCHARGE NOTE NURSING/CASE MANAGEMENT/SOCIAL WORK - PATIENT PORTAL LINK FT
You can access the FollowMyHealth Patient Portal offered by [facility] by registering at the following website: [email or web address]. By joining [employer]’s FollowMyHealth portal, you will also be able to view your health information using other applications (apps) compatible with our system.

## 2019-12-16 NOTE — DIETITIAN INITIAL EVALUATION ADULT. - SIGNS/SYMPTOMS
Pt S/P intramedullary nail surgery in right hip, now with hematoma and drainage Pt S/P IMN surgery in right hip, now with hematoma and E.coli infection

## 2019-12-16 NOTE — DIETITIAN INITIAL EVALUATION ADULT. - PHYSICAL APPEARANCE
other (specify)/Pt showed no visible, overt signs of muscle/fat loss at this time; will continue to monitor and perform as needed. Ht: 73 inches Wt: 208.3 pounds BMI: 27.5 kg/m2 IBW: 184 pounds (+/-10%) %IBW: 113%  Edema: 1+ right leg, right hip noted as per documentation.  Skin: right hip surgical wound noted, no pressure injuries noted as per documentation.

## 2019-12-16 NOTE — PROGRESS NOTE ADULT - ASSESSMENT
A/P 79y year old female s/p Irrigation and debridement of right hip with vac placement POD    Pain Control  DVT PPX with xarelto  continue po antibiotics per ID  OR cultures growing E. coli, FU finals  PT/OOB   WBAT   Dispo Planning - discharge to rehab today

## 2019-12-16 NOTE — PROGRESS NOTE ADULT - SUBJECTIVE AND OBJECTIVE BOX
Orthopedic Surgery Progress Note    S: No acute events overnight, pain is well controlled.  No chest pain, shortness of breath, light-headedness.    O:  Vital Signs Last 24 Hrs  T(C): 36.5 (16 Dec 2019 05:16), Max: 36.9 (15 Dec 2019 10:21)  T(F): 97.7 (16 Dec 2019 05:16), Max: 98.4 (15 Dec 2019 10:21)  HR: 65 (16 Dec 2019 05:16) (64 - 90)  BP: 120/80 (16 Dec 2019 05:16) (100/67 - 120/80)  BP(mean): --  RR: 17 (16 Dec 2019 05:16) (16 - 18)  SpO2: 99% (16 Dec 2019 05:16) (95% - 99%)    Gen: Alert and oriented x3, NAD  RLE:  wound vac in place holding good suction, no surrounding erythema/swelling  EHL/FHL/TA/GS motor intact  SILT DP/SP/Oden/Sa  WWP distally, cap refill <2s    Labs:                        12.5   10.30 )-----------( 276      ( 14 Dec 2019 07:35 )             38.7       12-14    136  |  99  |  15  ----------------------------<  94  3.7   |  23  |  0.96

## 2019-12-16 NOTE — PROGRESS NOTE ADULT - ATTENDING COMMENTS
Patient Dced to rehab  continue current meds  PO as tolerated  activity as tolerated  Patient and family aware of plan  follow up with ortho

## 2019-12-17 LAB
CULTURE RESULTS: SIGNIFICANT CHANGE UP
ORGANISM # SPEC MICROSCOPIC CNT: SIGNIFICANT CHANGE UP
ORGANISM # SPEC MICROSCOPIC CNT: SIGNIFICANT CHANGE UP
SPECIMEN SOURCE: SIGNIFICANT CHANGE UP

## 2020-01-11 LAB
CULTURE RESULTS: SIGNIFICANT CHANGE UP
SPECIMEN SOURCE: SIGNIFICANT CHANGE UP

## 2020-02-18 ENCOUNTER — APPOINTMENT (OUTPATIENT)
Dept: WOUND CARE | Facility: CLINIC | Age: 80
End: 2020-02-18
Payer: MEDICARE

## 2020-02-18 PROCEDURE — 99204 OFFICE O/P NEW MOD 45 MIN: CPT

## 2020-02-18 RX ORDER — RIVAROXABAN 20 MG/1
20 TABLET, FILM COATED ORAL
Refills: 0 | Status: ACTIVE | COMMUNITY

## 2020-02-26 NOTE — HISTORY OF PRESENT ILLNESS
[FreeTextEntry1] : Mr. MARY BO   presents to the office with a wound for over 2 months duration.\par  Pt is s/p fall with pinning of right hip, 10/19. Then in 12/19 developed a hematoma, went for evacuation of hematoma, with subequent vac placement, recently removed from vac, has visiting nurse ]\par The wound is located on  the right hip.  \par The patient has complaints of pain in wound and radiating.   \par The patient has been dressing the wound with 1/4 inch iodoform.\par   The patient denies fevers or chills.  The patient has localized pain to the wound upon dressing changes.  The patient has no other complaints or associated symptoms.  \par had a vac before\par \par

## 2020-02-26 NOTE — DATA REVIEWED
[FreeTextEntry1] : admit dec 10-16 IMN 10/31/19  with hematoma, s/p drainage\par previously on tylenol, leticia c , dulcolax, cefuroxime, fantomidine, mom, melatonin, mvi, peg, rivaroxaban, senna, tramadol

## 2020-02-26 NOTE — REVIEW OF SYSTEMS
[Nocturia] : nocturia [Joint Stiffness] : joint stiffness [Arthralgias] : arthralgias [Skin Wound] : skin wound [Negative] : Neurological [FreeTextEntry3] : dry eyes

## 2020-02-26 NOTE — PHYSICAL EXAM
[Ankle Swelling (On Exam)] : present [Ankle Swelling Bilaterally] : bilaterally  [Ankle Swelling On The Left] : moderate [JVD] : no jugular venous distention  [Normal Breath Sounds] : Normal breath sounds [Normal Rate and Rhythm] : normal rate and rhythm [2+] : right 2+ [Abdomen Tenderness] : ~T ~M No abdominal tenderness [1+] : left 1+ [de-identified] : NAD- previously 214lb lost weight [de-identified] : antolin [FreeTextEntry1] : hip [FreeTextEntry2] : 1 [FreeTextEntry3] : 0.4 [de-identified] : 6 0'clock 2 cm [FreeTextEntry4] : 2 [de-identified] : aquacel [FreeTextEntry5] : carlosette #3 1 mm deeper into sub cu [TWNoteComboBox6] : Surgical [de-identified] : Yes [TWNoteComboBox1] : Right [de-identified] : Yes [de-identified] : Debridement excisional [TWNoteComboBox7] : Aurora

## 2020-02-26 NOTE — PLAN
[FreeTextEntry1] : 2/18/20\par Plan -\par pack with aquacel, prontosan gel to wound base. prontosan ordered.\par vac reordered

## 2020-02-26 NOTE — REASON FOR VISIT
[Family Member] : family member [Consultation] : a consultation visit [FreeTextEntry1] : right hip ulcer

## 2020-02-26 NOTE — ASSESSMENT
[FreeTextEntry1] : 79 yr old male with a fib, open wound right hip, s/p hematoma evacuation\par  s/p fall with pinning of right hip for femur frax, 10/19.n 12/19 developed a hematoma s/p evacuation , subequent vac placement, recently removed from vac, has visiting nurse ]\par   pain in wound and radiating.   doing pt\par dc 1/4 inch iodoform., restart vac\par no  fevers or sepsis no angina/ complaints  \par  (on blood thinners - tolerated sharp debridement  minimal bleeding stopped with packing\par  datacomplexity-mod  lab, xr, old rec, test resultsreviewed, visualize image  previous\par if there is no progress will have to revaluate \par risk surgery-mod\par

## 2020-03-05 ENCOUNTER — APPOINTMENT (OUTPATIENT)
Dept: WOUND CARE | Facility: CLINIC | Age: 80
End: 2020-03-05
Payer: MEDICARE

## 2020-03-05 PROCEDURE — 99213 OFFICE O/P EST LOW 20 MIN: CPT

## 2020-03-05 NOTE — REVIEW OF SYSTEMS
[Nocturia] : nocturia [Arthralgias] : arthralgias [Joint Stiffness] : joint stiffness [Skin Wound] : skin wound [Negative] : Psychiatric [FreeTextEntry3] : dry eyes

## 2020-03-05 NOTE — PHYSICAL EXAM
[Normal Breath Sounds] : Normal breath sounds [Normal Rate and Rhythm] : normal rate and rhythm [2+] : left 2+ [1+] : left 1+ [Ankle Swelling (On Exam)] : present [Ankle Swelling Bilaterally] : bilaterally  [Ankle Swelling On The Left] : moderate [JVD] : no jugular venous distention  [Abdomen Tenderness] : ~T ~M No abdominal tenderness [de-identified] : NAD- previously 214lb lost weight [de-identified] : antolin [FreeTextEntry1] : hip [FreeTextEntry2] : .8 [FreeTextEntry3] : .7 [FreeTextEntry4] : 1.5 [de-identified] : 6 0'clock 1.5 cm [de-identified] : vac [de-identified] : previously1/0.4/2 [TWNoteComboBox1] : Right [TWNoteComboBox6] : Surgical [de-identified] : Yes [de-identified] : None [de-identified] : Yes [TWNoteComboBox7] : Mechanical [de-identified] : False

## 2020-03-05 NOTE — PLAN
[FreeTextEntry1] : 3/5/20\par Plan - re- order VAC, white foam, c/w drop of prontosan gel into wound bed, \par orders for nurse given,\par  advised to take more protein in diet and take calcium with vitamin D supplement.\par

## 2020-03-05 NOTE — HISTORY OF PRESENT ILLNESS
[FreeTextEntry1] : Mr. MARY BO   presents to the office with a wound for over 2 months duration.\par started alginate last visit, no fevers\par \par  Pt is s/p fall with pinning of right hip, 10/19. Then in 12/19 developed a hematoma, went for evacuation of hematoma, with subequent vac placement, recently removed from vac, has visiting nurse ]\par The wound is located on  the right hip.  \par The patient has complaints of pain in wound and radiating.   \par The patient has been dressing the wound with 1/4 inch iodoform.\par   The patient denies fevers or chills.  The patient has localized pain to the wound upon dressing changes.  The patient has no other complaints or associated symptoms.  \par had a vac before\par \par

## 2020-03-05 NOTE — ASSESSMENT
[FreeTextEntry1] : 79 yr old male with a fib, open wound right hip, s/p hematoma evacuation\par  s/p fall with pinning of right hip for femur frax, 10/19.n 12/19 developed a hematoma s/p evacuation , \par subequent \par improvement in depth, but volume similar\par discussed starting the vac\par  \par complexlow-lab, xr lance,test reviewed\par risk- low, no sharp debridement\par right hip wound  improved but depth and undermining still persist\par  using aquacel and prontosan gel

## 2020-03-05 NOTE — REASON FOR VISIT
[Consultation] : a consultation visit [Family Member] : family member [FreeTextEntry1] : right hip ulcer

## 2020-04-02 ENCOUNTER — APPOINTMENT (OUTPATIENT)
Dept: WOUND CARE | Facility: CLINIC | Age: 80
End: 2020-04-02
Payer: MEDICARE

## 2020-04-02 VITALS — HEIGHT: 74 IN | TEMPERATURE: 97.6 F | BODY MASS INDEX: 25.67 KG/M2 | WEIGHT: 200 LBS

## 2020-04-02 PROCEDURE — 11042 DBRDMT SUBQ TIS 1ST 20SQCM/<: CPT

## 2020-04-02 NOTE — PHYSICAL EXAM
[Normal Breath Sounds] : Normal breath sounds [Normal Rate and Rhythm] : normal rate and rhythm [2+] : left 2+ [1+] : left 1+ [Ankle Swelling (On Exam)] : present [Ankle Swelling Bilaterally] : bilaterally  [Ankle Swelling On The Left] : moderate [JVD] : no jugular venous distention  [Abdomen Tenderness] : ~T ~M No abdominal tenderness [de-identified] : NAD- previously 214lb lost weight [FreeTextEntry1] : hip [FreeTextEntry2] : 2 cm [FreeTextEntry3] : 1 cm [FreeTextEntry4] : 0.2 cm [de-identified] : skin and subcutaneous tissue to allow for proper packing [de-identified] : VAC  [de-identified] : previously .8/.7 [FreeTextEntry7] : lateral hip [FreeTextEntry8] : 1 [FreeTextEntry9] : 1 [de-identified] : 0.3 [de-identified] : skin and subcutaneous tissue [de-identified] : alginate [TWNoteComboBox1] : Right [TWNoteComboBox3] : FT [TWNoteComboBox4] : Moderate [TWNoteComboBox5] : No [TWNoteComboBox6] : Surgical [de-identified] : No [de-identified] : <20% [de-identified] : 50% [de-identified] : Yes [de-identified] : 1% Lidocaine Injection [TWNoteComboBox7] : Aurora [de-identified] : Debridement performed of all devitalized tissue to bleeding viable tissue [TWNoteComboBox9] : Right [de-identified] : FT [de-identified] : Small [de-identified] : No [de-identified] : Surgical [de-identified] : No [de-identified] : Normal [de-identified] : None [de-identified] : >75% [de-identified] : Yes [de-identified] : 2.5% Lidocaine Topical [TWNoteComboBox8] : Aurora [de-identified] : Debridement performed of all devitalized tissue to bleeding viable tissue

## 2020-04-02 NOTE — ASSESSMENT
[FreeTextEntry1] : 79 yr old male with a fib, open wound right hip, s/p hematoma evacuation\par s/p fall with pinning of right hip for femur frax, 10/19.n 12/19 developed a hematoma s/p evacuation , \par with subsequent improvement on VAC therapy.  Patient had incisional site <5mm today which required an incision to allow for proper VAC placement.\par Continue VAC placement\par No clinical sign of infection\par  \par

## 2020-04-02 NOTE — PLAN
[FreeTextEntry1] : 3/5/20\par Plan - re- order VAC, white foam, irrigate with  prontosan into wound bed, \par orders for nurse given,\par  advised to take more protein in diet and take calcium with vitamin D supplement.\par

## 2020-04-05 ENCOUNTER — TRANSCRIPTION ENCOUNTER (OUTPATIENT)
Age: 80
End: 2020-04-05

## 2020-04-06 ENCOUNTER — APPOINTMENT (OUTPATIENT)
Dept: WOUND CARE | Facility: CLINIC | Age: 80
End: 2020-04-06
Payer: MEDICARE

## 2020-04-06 PROCEDURE — 99423 OL DIG E/M SVC 21+ MIN: CPT

## 2020-04-08 NOTE — REASON FOR VISIT
[Consultation] : a consultation visit [Family Member] : family member [Formal Caregiver] : formal caregiver [FreeTextEntry1] : urgent - telehealth visit for right hip ulcer

## 2020-04-08 NOTE — PHYSICAL EXAM
[Normal Breath Sounds] : Normal breath sounds [Normal Rate and Rhythm] : normal rate and rhythm [2+] : left 2+ [1+] : left 1+ [Ankle Swelling (On Exam)] : present [Ankle Swelling Bilaterally] : bilaterally  [Ankle Swelling On The Left] : moderate [JVD] : no jugular venous distention  [Abdomen Tenderness] : ~T ~M No abdominal tenderness [de-identified] : NAD- previously 214lb lost weight [de-identified] : antolin [de-identified] : telfa  / adaptic  on base [FreeTextEntry1] : hip [FreeTextEntry2] : 2 cm [FreeTextEntry3] : 1 cm [FreeTextEntry4] : 2 [de-identified] : skin and subcutaneous tissue to allow for proper packing [de-identified] : VAC -duoderm  [de-identified] :  \par 2.5x1.2x 1.6\par previously .8/.7\par \par prontosan [FreeTextEntry7] : lateral hip [de-identified] : skin and subcutaneous tissue [TWNoteComboBox1] : Right [TWNoteComboBox3] : FT [TWNoteComboBox4] : Moderate [TWNoteComboBox5] : No [TWNoteComboBox6] : Surgical [de-identified] : No [de-identified] : <20% [de-identified] : >75% [de-identified] : Yes [de-identified] : 1% Lidocaine Injection [TWNoteComboBox7] : Aurora [de-identified] : Debridement performed of all devitalized tissue to bleeding viable tissue [TWNoteComboBox9] : Right [de-identified] : FT [de-identified] : Small [de-identified] : No [de-identified] : Surgical [de-identified] : No [de-identified] : Normal [de-identified] : None [de-identified] : >75% [de-identified] : Yes [de-identified] : 2.5% Lidocaine Topical [TWNoteComboBox8] : Aurora [de-identified] : Debridement performed of all devitalized tissue to bleeding viable tissue

## 2020-04-08 NOTE — ASSESSMENT
[FreeTextEntry1] : 79 yr old male with a fib, open wound right hip, s/p hematoma evacuation\par s/p fall with pinning of right hip for femur frax, 10/19.n 12/19 developed a hematoma s/p evacuation , \par with subsequent improvement on VAC therapy.  Patient had incisional site <5mm today which required an incision to allow for proper VAC placement.\par Continue VAC placement\par No clinical sign of infection\par \par  technical difficuty:- none-\par appoint for F-T-F -na\par virtual appointment 1 week\par referral to PCPna\par \par \par

## 2020-04-08 NOTE — PLAN
[FreeTextEntry1] :  \par Plan - re- order VAC, dc white foam, black foam with adaptic on base and duoderm periwound, telfa for skin tear\par  irrigate with  prontosan into wound bed, \par orders for nurse given,\par  advised to take more protein in diet and take calcium with vitamin D supplement.\par

## 2020-04-08 NOTE — HISTORY OF PRESENT ILLNESS
[Home] : at home, [unfilled] , at the time of the visit. [Other Location: e.g. Home (Enter Location, City,State)___] : at [unfilled] [FreeTextEntry4] : [place] [FreeTextEntry1] : Mr. MARY BO   presents to the office with a wound for over 2 months duration.\par started alginate last visit, no fevers\par vac is still on  opened up space , some bleeding\par \par  Pt is s/p fall with pinning of right hip, 10/19. Then in 12/19 developed a hematoma, went for evacuation of hematoma, with subequent vac placement, recently removed from vac, has visiting nurse ]\par The wound is located on  the right hip.  \par The patient has complaints of pain in wound and radiating.   \par The patient has been dressing the wound with 1/4 inch iodoform.\par  The patient denies fevers or chills.  The patient has localized pain to the wound upon dressing changes.  The patient has no other complaints or associated symptoms.  \par had a vac before\par \par

## 2020-04-13 ENCOUNTER — APPOINTMENT (OUTPATIENT)
Dept: WOUND CARE | Facility: CLINIC | Age: 80
End: 2020-04-13
Payer: MEDICARE

## 2020-04-13 DIAGNOSIS — Z78.9 OTHER SPECIFIED HEALTH STATUS: ICD-10-CM

## 2020-04-13 PROCEDURE — 99423 OL DIG E/M SVC 21+ MIN: CPT

## 2020-04-13 NOTE — REVIEW OF SYSTEMS
[Nocturia] : nocturia [Arthralgias] : arthralgias [Joint Stiffness] : joint stiffness [Skin Wound] : skin wound [Negative] : Psychiatric

## 2020-04-15 PROBLEM — Z78.9 NON-SMOKER: Status: ACTIVE | Noted: 2020-02-26

## 2020-04-20 ENCOUNTER — APPOINTMENT (OUTPATIENT)
Dept: WOUND CARE | Facility: CLINIC | Age: 80
End: 2020-04-20
Payer: MEDICARE

## 2020-04-20 DIAGNOSIS — S71.001A UNSPECIFIED OPEN WOUND, RIGHT HIP, INITIAL ENCOUNTER: ICD-10-CM

## 2020-04-20 PROCEDURE — 99423 OL DIG E/M SVC 21+ MIN: CPT

## 2020-04-20 NOTE — HISTORY OF PRESENT ILLNESS
[Home] : at home, [unfilled] , at the time of the visit. [FreeTextEntry1] : Mr. MARY BO   presents to the office with a wound for over 2 months duration.\par  vac these past weeks since last visit, no fevers\par vac is still on  opened up space , some bleeding\par no more undermining, kept vac on all week\par \par  Pt is s/p fall with pinning of right hip, 10/19. Then in 12/19 developed a hematoma, went for evacuation of hematoma, with subequent vac placement, recently removed from vac, has visiting nurse ]\par The wound is located on  the right hip.  \par The patient has complaints of pain in wound and radiating.   \par The patient has been dressing the wound with 1/4 inch iodoform.\par  The patient denies fevers or chills.  The patient has localized pain to the wound upon dressing changes.  The patient has no other complaints or associated symptoms.  \par had a vac before\par \par  [Other Location: e.g. Home (Enter Location, City,State)___] : at [unfilled] [FreeTextEntry4] : [place]

## 2020-04-20 NOTE — PLAN
[FreeTextEntry1] :  \par Plan - hold VAC, this week\par s/p black foam with adaptic on base and duoderm periwound,\par healed telfa for skin tear\par  irrigate with  prontosan into wound bed, \par orders for nurse given,\par  advised to take more protein in diet and take calcium with vitamin D supplement.\par

## 2020-04-20 NOTE — ASSESSMENT
[FreeTextEntry1] : 79 yr old male with a fib, open wound right hip, s/p hematoma evacuation ,s/p fall with pinning of right hip for femur frax, 10/19.n 12/19 developed a hematoma s/p evacuation , \par with subsequent improvement on VAC therapy. s/p incision to allow for proper VAC placement.\par vac vacation this week due to depth reduction/VAC placement\par No clinical sign of infection\par has some right knee pain- consider supprt garment/wrap\par  technical difficuty:- none-\par appoint for F-T-F -na\par virtual appointment 1 week\par referral to PCPna\par \par \par

## 2020-04-20 NOTE — PHYSICAL EXAM
[Normal Breath Sounds] : Normal breath sounds [Normal Rate and Rhythm] : normal rate and rhythm [2+] : left 2+ [1+] : left 1+ [Ankle Swelling (On Exam)] : present [Ankle Swelling Bilaterally] : bilaterally  [Ankle Swelling On The Left] : moderate [JVD] : no jugular venous distention  [Abdomen Tenderness] : ~T ~M No abdominal tenderness [de-identified] : NAD- previously 214lb lost weight [de-identified] : antolin [de-identified] : s/p hip replacemt [de-identified] : s/p adaptic  on base [FreeTextEntry1] : hip [FreeTextEntry2] : 1 [FreeTextEntry3] : .5 [FreeTextEntry4] : .5 [de-identified] : telehealth visit [de-identified] : 0 .5 depth- only\par vac vacation this week to start            ag quacel\par prontosan\par \par previous no undermining\par 2.5x1.2x 1.6\par previously .8/.7\par \par    [FreeTextEntry7] : lateral hip [de-identified] : pigmented at 9ock from drape [TWNoteComboBox1] : Right [TWNoteComboBox3] : FT [TWNoteComboBox4] : Small [TWNoteComboBox5] : No [TWNoteComboBox6] : Surgical [de-identified] : >75% [de-identified] : No [de-identified] : Yes [TWNoteComboBox9] : Right [de-identified] : FT [de-identified] : None [de-identified] : No [de-identified] : Surgical [de-identified] : No [de-identified] : other [de-identified] : None [de-identified] : >75%

## 2020-04-20 NOTE — REASON FOR VISIT
[Consultation] : a consultation visit [Formal Caregiver] : formal caregiver [Family Member] : family member [FreeTextEntry1] : urgent - telehealth visit for right hip ulcer

## 2020-04-27 ENCOUNTER — APPOINTMENT (OUTPATIENT)
Dept: WOUND CARE | Facility: CLINIC | Age: 80
End: 2020-04-27

## 2020-04-27 ENCOUNTER — APPOINTMENT (OUTPATIENT)
Dept: WOUND CARE | Facility: CLINIC | Age: 80
End: 2020-04-27
Payer: MEDICARE

## 2020-04-27 DIAGNOSIS — Z86.79 PERSONAL HISTORY OF OTHER DISEASES OF THE CIRCULATORY SYSTEM: ICD-10-CM

## 2020-04-27 DIAGNOSIS — L97.112 NON-PRESSURE CHRONIC ULCER OF RIGHT THIGH WITH FAT LAYER EXPOSED: ICD-10-CM

## 2020-04-27 PROCEDURE — 99214 OFFICE O/P EST MOD 30 MIN: CPT | Mod: 95

## 2020-04-27 NOTE — HISTORY OF PRESENT ILLNESS
[FreeTextEntry1] : Mr. MARY BO   presents to the office with a wound for months duration.\par had vac for several weeks, has currently been on vac vacation with alginate/prontosan\par getting smaller\par started alginate last visit, no fevers - has vac black foam s/p visit with  r o, and prontosan\par vac is still on  opened up space , some bleeding\par \par  Pt is s/p fall with pinning of right hip, 10/19. Then in 12/19 developed a hematoma, went for evacuation of hematoma, with subequent vac placement, recently removed from vac, has visiting nurse ]\par The wound is located on  the right hip.  \par The patient has complaints of pain in wound and radiating.   \par The patient has been dressing the wound with 1/4 inch iodoform.\par  The patient denies fevers or chills.  The patient has localized pain to the wound upon dressing changes.  The patient has no other complaints or associated symptoms.  \par had a vac before\par \par  [Patient] : the patient [Self] : self [FreeTextEntry4] : aman elias

## 2020-04-27 NOTE — ASSESSMENT
[FreeTextEntry1] : 79 yr old male with a fib, open wound right hip, s/p hematoma evacuation\par s/p fall with pinning of right hip for femur frax, 10/19.n 12/19 developed a hematoma s/p evacuation , \par with subsequent improvement on VAC therapy.\par doing well with downstaged to alginate, wound appears smaller\par \par  s/p incisional site  \par  No clinical sign of infection\par  \par  technical difficuty:- none-\par appoint for F-T-F -na\par virtual appointment2 week\par referral to PCPna\par \par \par

## 2020-04-27 NOTE — PLAN
[FreeTextEntry1] :  \par Plan   duoderm periwound,alginate/foam\par  irrigate with  prontosan into wound bed, \par orders for nurse given,\par  advised to take more protein in diet and take calcium with vitamin D supplement.\par fup 2 weeks\par

## 2020-04-27 NOTE — PHYSICAL EXAM
[JVD] : no jugular venous distention  [Abdomen Tenderness] : ~T ~M No abdominal tenderness [de-identified] : antolin [de-identified] : NAD- previously 214lb lost weight [de-identified] : s/p hip replacemt [de-identified] : telfa  / adaptic  on base [FreeTextEntry1] : hip [FreeTextEntry7] : lateral hip [de-identified] : teleheath/ alginate [de-identified] :  smaller\par prontosan\par \par no undermining\par 2.5x1.2x 1.6\par previously .8/.7\par \par prontosan [de-identified] : skin and subcutaneous tissue to          [de-identified] : tape  [TWNoteComboBox1] : Right [TWNoteComboBox4] : Moderate [TWNoteComboBox3] : FT [de-identified] : No [TWNoteComboBox6] : Surgical [TWNoteComboBox5] : No [TWNoteComboBox9] : Right [de-identified] : Yes [de-identified] : >75% [de-identified] : FT [de-identified] : Small [de-identified] : No [de-identified] : No [de-identified] : Normal [de-identified] : Surgical [de-identified] : None [de-identified] : Yes [de-identified] : >75%

## 2020-05-06 NOTE — PHYSICAL EXAM
[Normal Breath Sounds] : Normal breath sounds [Normal Rate and Rhythm] : normal rate and rhythm [Ankle Swelling Bilaterally] : bilaterally  [Ankle Swelling (On Exam)] : present [Ankle Swelling On The Left] : moderate [Skin Ulcer] : ulcer [Alert] : alert [Oriented to Place] : oriented to place [Oriented to Person] : oriented to person [Oriented to Time] : oriented to time [Calm] : calm [de-identified] : s/p hip replacemt [de-identified] : tape  [JVD] : no jugular venous distention  [Abdomen Tenderness] : ~T ~M No abdominal tenderness [de-identified] : NAD- previously 214lb lost weight [de-identified] : antolin [de-identified] : telfa  / adaptic  on base [FreeTextEntry1] : hip [FreeTextEntry2] : 2 cm [FreeTextEntry3] : 1 cm [FreeTextEntry4] : 2 [de-identified] : skin and subcutaneous tissue to allow for proper packing [de-identified] : VAC -duoderm  [de-identified] :  \par 2.5x1.2x 1.6\par previously .8/.7\par \par prontosan [FreeTextEntry7] : lateral hip [de-identified] : skin and subcutaneous tissue [TWNoteComboBox1] : Right [TWNoteComboBox3] : FT [TWNoteComboBox4] : Moderate [TWNoteComboBox5] : No [TWNoteComboBox6] : Surgical [de-identified] : No [de-identified] : >75% [de-identified] : Yes [de-identified] : 1% Lidocaine Injection [de-identified] : Debridement performed of all devitalized tissue to bleeding viable tissue [TWNoteComboBox9] : Right [de-identified] : FT [de-identified] : Small [de-identified] : No [de-identified] : Surgical [de-identified] : No [de-identified] : Normal [de-identified] : None [de-identified] : >75% [de-identified] : Yes [de-identified] : 2.5% Lidocaine Topical [TWNoteComboBox8] : Aurora [de-identified] : Debridement performed of all devitalized tissue to bleeding viable tissue

## 2020-05-06 NOTE — PHYSICAL EXAM
[Normal Breath Sounds] : Normal breath sounds [Normal Rate and Rhythm] : normal rate and rhythm [Ankle Swelling (On Exam)] : present [Ankle Swelling Bilaterally] : bilaterally  [Ankle Swelling On The Left] : moderate [Skin Ulcer] : ulcer [Alert] : alert [Oriented to Place] : oriented to place [Oriented to Person] : oriented to person [Oriented to Time] : oriented to time [Calm] : calm [de-identified] : s/p hip replacemt [de-identified] : tape  [JVD] : no jugular venous distention  [Abdomen Tenderness] : ~T ~M No abdominal tenderness [de-identified] : NAD- previously 214lb lost weight [de-identified] : antolin [de-identified] : telfa  / adaptic  on base [FreeTextEntry1] : hip [FreeTextEntry2] : 2 cm [FreeTextEntry3] : 1 cm [FreeTextEntry4] : 2 [de-identified] : skin and subcutaneous tissue to allow for proper packing [de-identified] : VAC -duoderm  [de-identified] :  \par 2.5x1.2x 1.6\par previously .8/.7\par \par prontosan [FreeTextEntry7] : lateral hip [de-identified] : skin and subcutaneous tissue [TWNoteComboBox1] : Right [TWNoteComboBox3] : FT [TWNoteComboBox4] : Moderate [TWNoteComboBox5] : No [TWNoteComboBox6] : Surgical [de-identified] : No [de-identified] : >75% [de-identified] : Yes [de-identified] : 1% Lidocaine Injection [de-identified] : Debridement performed of all devitalized tissue to bleeding viable tissue [TWNoteComboBox9] : Right [de-identified] : FT [de-identified] : Small [de-identified] : No [de-identified] : Surgical [de-identified] : No [de-identified] : Normal [de-identified] : None [de-identified] : >75% [de-identified] : Yes [de-identified] : 2.5% Lidocaine Topical [TWNoteComboBox8] : Aurora [de-identified] : Debridement performed of all devitalized tissue to bleeding viable tissue

## 2020-05-06 NOTE — HISTORY OF PRESENT ILLNESS
[Home] : at home, [unfilled] , at the time of the visit. [Medical Office: ([facility])___] : at ~his/her~ medical office located in [place] [Spouse] : spouse [Formal Caregiver] : formal caregiver [FreeTextEntry1] : Mr. MARY BO   presents to the office with a wound for over 2 months duration.\par started alginate last visit, no fevers\par vac is still on  opened up space , some bleeding\par \par  Pt is s/p fall with pinning of right hip, 10/19. Then in 12/19 developed a hematoma, went for evacuation of hematoma, with subequent vac placement, recently removed from vac, has visiting nurse ]\par The wound is located on  the right hip.  \par The patient has complaints of pain in wound and radiating.   \par The patient has been dressing the wound with 1/4 inch iodoform.\par  The patient denies fevers or chills.  The patient has localized pain to the wound upon dressing changes.  The patient has no other complaints or associated symptoms.  \par had a vac before\par \par  [FreeTextEntry4] : aman elias

## 2021-02-19 NOTE — OCCUPATIONAL THERAPY INITIAL EVALUATION ADULT - STANDING BALANCE: STATIC
Drink plenty of fluids. Rest.  Take cough medicine as needed. Return for worsening or concerning symptoms.
poor balance

## 2022-09-12 NOTE — ED PROVIDER NOTE - NS ED MD DISPO ISOLATION TYPES
Quality 111:Pneumonia Vaccination Status For Older Adults: Pneumococcal Vaccination Previously Received Detail Level: Detailed Quality 110: Preventive Care And Screening: Influenza Immunization: Influenza Immunization previously received during influenza season None

## 2022-12-05 ENCOUNTER — INPATIENT (INPATIENT)
Facility: HOSPITAL | Age: 82
LOS: 15 days | Discharge: NURSING, REHAB OR LONG-TERM CARE | DRG: 470 | End: 2022-12-21
Attending: INTERNAL MEDICINE | Admitting: INTERNAL MEDICINE
Payer: MEDICARE

## 2022-12-05 VITALS
WEIGHT: 210.1 LBS | HEIGHT: 72 IN | HEART RATE: 88 BPM | DIASTOLIC BLOOD PRESSURE: 71 MMHG | OXYGEN SATURATION: 100 % | SYSTOLIC BLOOD PRESSURE: 107 MMHG | RESPIRATION RATE: 18 BRPM

## 2022-12-05 DIAGNOSIS — R29.898 OTHER SYMPTOMS AND SIGNS INVOLVING THE MUSCULOSKELETAL SYSTEM: ICD-10-CM

## 2022-12-05 DIAGNOSIS — Z29.9 ENCOUNTER FOR PROPHYLACTIC MEASURES, UNSPECIFIED: ICD-10-CM

## 2022-12-05 DIAGNOSIS — R53.1 WEAKNESS: ICD-10-CM

## 2022-12-05 DIAGNOSIS — K40.90 UNILATERAL INGUINAL HERNIA, WITHOUT OBSTRUCTION OR GANGRENE, NOT SPECIFIED AS RECURRENT: Chronic | ICD-10-CM

## 2022-12-05 DIAGNOSIS — D72.829 ELEVATED WHITE BLOOD CELL COUNT, UNSPECIFIED: ICD-10-CM

## 2022-12-05 DIAGNOSIS — R09.89 OTHER SPECIFIED SYMPTOMS AND SIGNS INVOLVING THE CIRCULATORY AND RESPIRATORY SYSTEMS: ICD-10-CM

## 2022-12-05 DIAGNOSIS — Z91.89 OTHER SPECIFIED PERSONAL RISK FACTORS, NOT ELSEWHERE CLASSIFIED: ICD-10-CM

## 2022-12-05 DIAGNOSIS — I48.20 CHRONIC ATRIAL FIBRILLATION, UNSPECIFIED: ICD-10-CM

## 2022-12-05 LAB
ALBUMIN SERPL ELPH-MCNC: 4.3 G/DL — SIGNIFICANT CHANGE UP (ref 3.3–5)
ALP SERPL-CCNC: 76 U/L — SIGNIFICANT CHANGE UP (ref 40–120)
ALT FLD-CCNC: 20 U/L — SIGNIFICANT CHANGE UP (ref 10–45)
ANION GAP SERPL CALC-SCNC: 14 MMOL/L — SIGNIFICANT CHANGE UP (ref 5–17)
APPEARANCE UR: CLEAR — SIGNIFICANT CHANGE UP
APTT BLD: 35.2 SEC — SIGNIFICANT CHANGE UP (ref 27.5–35.5)
AST SERPL-CCNC: 24 U/L — SIGNIFICANT CHANGE UP (ref 10–40)
BACTERIA # UR AUTO: NEGATIVE — SIGNIFICANT CHANGE UP
BASE EXCESS BLDV CALC-SCNC: 1.9 MMOL/L — SIGNIFICANT CHANGE UP (ref -2–3)
BASOPHILS # BLD AUTO: 0.05 K/UL — SIGNIFICANT CHANGE UP (ref 0–0.2)
BASOPHILS NFR BLD AUTO: 0.3 % — SIGNIFICANT CHANGE UP (ref 0–2)
BILIRUB SERPL-MCNC: 0.8 MG/DL — SIGNIFICANT CHANGE UP (ref 0.2–1.2)
BILIRUB UR-MCNC: NEGATIVE — SIGNIFICANT CHANGE UP
BUN SERPL-MCNC: 25 MG/DL — HIGH (ref 7–23)
CA-I SERPL-SCNC: 1.24 MMOL/L — SIGNIFICANT CHANGE UP (ref 1.15–1.33)
CALCIUM SERPL-MCNC: 9.8 MG/DL — SIGNIFICANT CHANGE UP (ref 8.4–10.5)
CHLORIDE BLDV-SCNC: 100 MMOL/L — SIGNIFICANT CHANGE UP (ref 96–108)
CHLORIDE SERPL-SCNC: 101 MMOL/L — SIGNIFICANT CHANGE UP (ref 96–108)
CO2 BLDV-SCNC: 30 MMOL/L — HIGH (ref 22–26)
CO2 SERPL-SCNC: 24 MMOL/L — SIGNIFICANT CHANGE UP (ref 22–31)
COLOR SPEC: YELLOW — SIGNIFICANT CHANGE UP
CREAT SERPL-MCNC: 1.08 MG/DL — SIGNIFICANT CHANGE UP (ref 0.5–1.3)
DIFF PNL FLD: ABNORMAL
EGFR: 69 ML/MIN/1.73M2 — SIGNIFICANT CHANGE UP
EOSINOPHIL # BLD AUTO: 0.02 K/UL — SIGNIFICANT CHANGE UP (ref 0–0.5)
EOSINOPHIL NFR BLD AUTO: 0.1 % — SIGNIFICANT CHANGE UP (ref 0–6)
EPI CELLS # UR: 2 /HPF — SIGNIFICANT CHANGE UP
FLUAV AG NPH QL: SIGNIFICANT CHANGE UP
FLUBV AG NPH QL: SIGNIFICANT CHANGE UP
GAS PNL BLDV: 135 MMOL/L — LOW (ref 136–145)
GAS PNL BLDV: SIGNIFICANT CHANGE UP
GLUCOSE BLDV-MCNC: 98 MG/DL — SIGNIFICANT CHANGE UP (ref 70–99)
GLUCOSE SERPL-MCNC: 106 MG/DL — HIGH (ref 70–99)
GLUCOSE UR QL: NEGATIVE — SIGNIFICANT CHANGE UP
HCO3 BLDV-SCNC: 28 MMOL/L — SIGNIFICANT CHANGE UP (ref 22–29)
HCT VFR BLD CALC: 44.8 % — SIGNIFICANT CHANGE UP (ref 39–50)
HCT VFR BLDA CALC: 40 % — SIGNIFICANT CHANGE UP (ref 39–51)
HGB BLD CALC-MCNC: 13.3 G/DL — SIGNIFICANT CHANGE UP (ref 12.6–17.4)
HGB BLD-MCNC: 14.6 G/DL — SIGNIFICANT CHANGE UP (ref 13–17)
HYALINE CASTS # UR AUTO: 1 /LPF — SIGNIFICANT CHANGE UP (ref 0–2)
IMM GRANULOCYTES NFR BLD AUTO: 0.9 % — SIGNIFICANT CHANGE UP (ref 0–0.9)
INR BLD: 2.29 RATIO — HIGH (ref 0.88–1.16)
KETONES UR-MCNC: ABNORMAL
LACTATE BLDV-MCNC: 1.8 MMOL/L — SIGNIFICANT CHANGE UP (ref 0.5–2)
LEUKOCYTE ESTERASE UR-ACNC: NEGATIVE — SIGNIFICANT CHANGE UP
LYMPHOCYTES # BLD AUTO: 1.15 K/UL — SIGNIFICANT CHANGE UP (ref 1–3.3)
LYMPHOCYTES # BLD AUTO: 6.7 % — LOW (ref 13–44)
MCHC RBC-ENTMCNC: 32.6 GM/DL — SIGNIFICANT CHANGE UP (ref 32–36)
MCHC RBC-ENTMCNC: 32.7 PG — SIGNIFICANT CHANGE UP (ref 27–34)
MCV RBC AUTO: 100.2 FL — HIGH (ref 80–100)
MONOCYTES # BLD AUTO: 1.05 K/UL — HIGH (ref 0–0.9)
MONOCYTES NFR BLD AUTO: 6.1 % — SIGNIFICANT CHANGE UP (ref 2–14)
NEUTROPHILS # BLD AUTO: 14.8 K/UL — HIGH (ref 1.8–7.4)
NEUTROPHILS NFR BLD AUTO: 85.9 % — HIGH (ref 43–77)
NITRITE UR-MCNC: NEGATIVE — SIGNIFICANT CHANGE UP
NRBC # BLD: 0 /100 WBCS — SIGNIFICANT CHANGE UP (ref 0–0)
PCO2 BLDV: 50 MMHG — SIGNIFICANT CHANGE UP (ref 42–55)
PH BLDV: 7.36 — SIGNIFICANT CHANGE UP (ref 7.32–7.43)
PH UR: 6 — SIGNIFICANT CHANGE UP (ref 5–8)
PLATELET # BLD AUTO: 168 K/UL — SIGNIFICANT CHANGE UP (ref 150–400)
PO2 BLDV: 28 MMHG — SIGNIFICANT CHANGE UP (ref 25–45)
POTASSIUM BLDV-SCNC: 4.6 MMOL/L — SIGNIFICANT CHANGE UP (ref 3.5–5.1)
POTASSIUM SERPL-MCNC: 4.1 MMOL/L — SIGNIFICANT CHANGE UP (ref 3.5–5.3)
POTASSIUM SERPL-SCNC: 4.1 MMOL/L — SIGNIFICANT CHANGE UP (ref 3.5–5.3)
PROT SERPL-MCNC: 7.4 G/DL — SIGNIFICANT CHANGE UP (ref 6–8.3)
PROT UR-MCNC: ABNORMAL
PROTHROM AB SERPL-ACNC: 26.8 SEC — HIGH (ref 10.5–13.4)
RBC # BLD: 4.47 M/UL — SIGNIFICANT CHANGE UP (ref 4.2–5.8)
RBC # FLD: 12.9 % — SIGNIFICANT CHANGE UP (ref 10.3–14.5)
RBC CASTS # UR COMP ASSIST: 11 /HPF — HIGH (ref 0–4)
RSV RNA NPH QL NAA+NON-PROBE: SIGNIFICANT CHANGE UP
SAO2 % BLDV: 47.6 % — LOW (ref 67–88)
SARS-COV-2 RNA SPEC QL NAA+PROBE: SIGNIFICANT CHANGE UP
SODIUM SERPL-SCNC: 139 MMOL/L — SIGNIFICANT CHANGE UP (ref 135–145)
SP GR SPEC: 1.05 — HIGH (ref 1.01–1.02)
TROPONIN T, HIGH SENSITIVITY RESULT: 26 NG/L — SIGNIFICANT CHANGE UP (ref 0–51)
UROBILINOGEN FLD QL: NEGATIVE — SIGNIFICANT CHANGE UP
WBC # BLD: 17.22 K/UL — HIGH (ref 3.8–10.5)
WBC # FLD AUTO: 17.22 K/UL — HIGH (ref 3.8–10.5)
WBC UR QL: 3 /HPF — SIGNIFICANT CHANGE UP (ref 0–5)

## 2022-12-05 PROCEDURE — 70496 CT ANGIOGRAPHY HEAD: CPT | Mod: 26,MA

## 2022-12-05 PROCEDURE — 70498 CT ANGIOGRAPHY NECK: CPT | Mod: 26,MA

## 2022-12-05 PROCEDURE — 99223 1ST HOSP IP/OBS HIGH 75: CPT

## 2022-12-05 PROCEDURE — 72131 CT LUMBAR SPINE W/O DYE: CPT | Mod: 26

## 2022-12-05 PROCEDURE — 73502 X-RAY EXAM HIP UNI 2-3 VIEWS: CPT | Mod: 26,RT

## 2022-12-05 PROCEDURE — 71045 X-RAY EXAM CHEST 1 VIEW: CPT | Mod: 26

## 2022-12-05 PROCEDURE — 99285 EMERGENCY DEPT VISIT HI MDM: CPT | Mod: GC

## 2022-12-05 PROCEDURE — 73552 X-RAY EXAM OF FEMUR 2/>: CPT | Mod: 26,RT

## 2022-12-05 RX ORDER — LANOLIN ALCOHOL/MO/W.PET/CERES
3 CREAM (GRAM) TOPICAL AT BEDTIME
Refills: 0 | Status: DISCONTINUED | OUTPATIENT
Start: 2022-12-05 | End: 2022-12-16

## 2022-12-05 RX ORDER — ONDANSETRON 8 MG/1
4 TABLET, FILM COATED ORAL EVERY 8 HOURS
Refills: 0 | Status: DISCONTINUED | OUTPATIENT
Start: 2022-12-05 | End: 2022-12-16

## 2022-12-05 RX ORDER — ACETAMINOPHEN 500 MG
650 TABLET ORAL EVERY 6 HOURS
Refills: 0 | Status: DISCONTINUED | OUTPATIENT
Start: 2022-12-05 | End: 2022-12-16

## 2022-12-05 RX ORDER — SODIUM CHLORIDE 9 MG/ML
1000 INJECTION INTRAMUSCULAR; INTRAVENOUS; SUBCUTANEOUS ONCE
Refills: 0 | Status: COMPLETED | OUTPATIENT
Start: 2022-12-05 | End: 2022-12-05

## 2022-12-05 RX ORDER — RIVAROXABAN 15 MG-20MG
20 KIT ORAL
Refills: 0 | Status: DISCONTINUED | OUTPATIENT
Start: 2022-12-05 | End: 2022-12-09

## 2022-12-05 RX ADMIN — SODIUM CHLORIDE 1000 MILLILITER(S): 9 INJECTION INTRAMUSCULAR; INTRAVENOUS; SUBCUTANEOUS at 15:37

## 2022-12-05 RX ADMIN — Medication 100 MILLIGRAM(S): at 19:57

## 2022-12-05 NOTE — ED PROVIDER NOTE - CARE PLAN
1 Principal Discharge DX:	Weakness   Principal Discharge DX:	Right leg weakness  Secondary Diagnosis:	Leukocytosis

## 2022-12-05 NOTE — H&P ADULT - ASSESSMENT
82M w/ pmhx of afib on xarelto, s/p L hip IMN (most recently s/p R hip IMN (Dr. Godinez 10/31/19) presents with acute onset R sided weakness

## 2022-12-05 NOTE — H&P ADULT - PROBLEM SELECTOR PLAN 1
Unclear etiology of weakness. PT with known orthopedic issue in R hip. Appreciate neurology recommendations. Current exam relatively reassuring, CT head negative for acute CVA but will still need MRI for f/u. Overall wide differential. Discussed hip plain film findings with ortho briefly: if progression then may need more hip surgery on non-urgent basis, would further eval spine on acute basis. Will look for other possible medical/ metabolic etiologies as well.  -Falls precautions  -MRI brain w/o contrast ordered  -CT L spine ordered  -PT consult  -Cont. Telemetry for now  -TTE, can cancel if other source determined  -Orthostatics  -F/u neurology recommendations  -F/u leukocytosis work up  -F/u bilateral LE dopplers (daughter requested)  -Need to place formal consult with ortho for recommendations.

## 2022-12-05 NOTE — H&P ADULT - NSHPLABSRESULTS_GEN_ALL_CORE
I have reviewed the labs, imaging and EKG. CXR w/o focal consolidations. EKG with afib HR 70 QTc 400s, RBBB, PVC

## 2022-12-05 NOTE — H&P ADULT - HISTORY OF PRESENT ILLNESS
82M w/ pmhx of afib on xarelto, s/p L hip IMN (most recently s/p R hip IMN (Dr. Godinez 10/31/19) presents with acute onset RLE weakness. LKN 12/5/22 at 7am. Pt last took xarelto at 6:30am. Per pt and pt's daughter at bedside, pt woke up normally this morning and went to shower. While showering, he felt RLE sudden onset weakness, no associated pain or back pain at that time. He denies any numbness/tingling, vision changes, or UE weakness. He went to shave and while at sink felt too weak to stand and had to hold on to sink. Daughter came to bathroom ~2 hours later to find him holding sink leaning with R side. He aslo reports increased R hip area pain and numbness/tingling with associated leg shaking due to pain. Pt was hanging onto the sink for at least 2-3 hours and reports not being able to get up due to RLE weakness. At baseline, pt ambulates with a walker, lives with his daughter, does most ADLs independently. Denies any recent symptoms such as fevers, chills, cough, dysuria, change in urination, incontinence. States he had mechanical fall 3 months ago which he did not tell daughter in living room.     In ER: Given NS 1L, IV Clindamycin

## 2022-12-05 NOTE — ED ADULT NURSE NOTE - CAS TRG GENERAL AIRWAY, MLM
She nicked her skin in armpit when shaving, it is healing well   This happened 2 weeks ago   Has some pus coming from the site, it is red but not painful anymore and is getting smaller  No fever, chills or appetite changes  No similar spots anywhere else in body    CHIEF COMPLAINT:  Chief Complaint   Patient presents with   • Lesion     LT armpit       HISTORY OF PRESENT ILLNESS: Soledad Murphy is a pleasant 63 year old female who presents with the followin.abscess left axilla      Allergies  ALLERGIES:   Allergen Reactions   • Codeine HIVES     fever  Itching,rash,fever,nausea   • Keflex NAUSEA       Medical History  Past Medical History:   Diagnosis Date   • Bilateral bunions    • Colon polyps    • DM (diabetes mellitus) (CMS/HCC)     diet controlled   • HTN (hypertension)    • Hypercholesterolemia    • Keloid 2013    mid-abdomen - first treated  w/unknown type laser; lesion recurred 7 years later. Lesion \"removed approx \". Lesion recurred approx     • Venous insufficiency        Surgical History  Past Surgical History:   Procedure Laterality Date   • Colonoscopy  10/01/2008   • Colonoscopy w/ polypectomy  2014    Dr. Ratliff( [facility])- Colon polyp noted- repeat in 5 years   • Ct guided needle placement  2015    SAY Injection, L5-S1   • Keloid excision  2013    CO2 laser keloid excision, mid-abdomen   • Mammo screening bilateral  2012   • Pap smear,routine  2007   • Fernandez earlobes  2006   • Pre-malignant / benign skin lesion excision  2006   • Total abdominal hysterectomy w/ bilateral salpingoophorectomy  1990        Social History  Social History     Socioeconomic History   • Marital status: /Civil Union     Spouse name: Not on file   • Number of children: Not on file   • Years of education: Not on file   • Highest education level: Not on file   Occupational History   • Not on file   Tobacco Use   • Smoking status: Former Smoker      Packs/day: 0.50     Years: 5.00     Pack years: 2.50     Types: Cigarettes     Quit date: 1993     Years since quittin.3   • Smokeless tobacco: Never Used   Vaping Use   • Vaping Use: never used   Substance and Sexual Activity   • Alcohol use: No   • Drug use: No   • Sexual activity: Not on file   Other Topics Concern   • Not on file   Social History Narrative        Lives with  and 3 children    No pets    Exercise - 1-2/day- at home    Works at [employer]     Social Determinants of Health     Financial Resource Strain:    • Social Determinants: Financial Resource Strain:    Food Insecurity:    • Social Determinants: Food Insecurity:    Transportation Needs:    • Lack of Transportation (Medical):    • Lack of Transportation (Non-Medical):    Physical Activity:    • Days of Exercise per Week:    • Minutes of Exercise per Session:    Stress:    • Social Determinants: Stress:    Social Connections:    • Social Determinants: Social Connections:    Intimate Partner Violence:    • Social Determinants: Intimate Partner Violence Past Fear:    • Social Determinants: Intimate Partner Violence Current Fear:        Family History  Family History   Problem Relation Age of Onset   • Diabetes Mother    • Diabetes Sister    • Heart disease Sister    • Diabetes Brother        Medications  Current Outpatient Medications   Medication Sig Dispense Refill   • amLODIPine (NORVASC) 10 MG tablet TAKE 1 TABLET DAILY 90 tablet 3   • lisinopril (ZESTRIL) 40 MG tablet Take 1 tablet by mouth daily. 90 tablet 1   • raloxifene (EVISTA) 60 MG tablet Take 1 tablet by mouth daily. 90 tablet 3   • atenolol (TENORMIN) 50 MG tablet Take 1 tablet by mouth daily. 90 tablet 3   • ibuprofen (MOTRIN) 600 MG tablet Take 1 tablet by mouth every 6 hours as needed for Pain. 60 tablet 3   • rosuvastatin (CRESTOR) 10 MG tablet TAKE 1 TABLET DAILY ( NEED MD APPOINTMENT BEFORE FURTHER REFILLS ) 30 tablet 11   • aspirin 81 MG tablet Take 1 tablet  by mouth daily.     • Cholecalciferol (VITAMIN D3) 5000 UNITS Tab Take 1 tablet by mouth daily.     • Ferrous Gluconate 324 (37.5 FE) MG Tab Take  by mouth 2 times daily.     • amoxicillin-clavulanate (Augmentin) 875-125 MG per tablet Take 1 tablet by mouth every 12 hours. 20 tablet 0   • potassium CHLORIDE (KLOR-CON M) 20 MEQ kristina ER tablet Take 1 tablet by mouth daily. 7 tablet 0     No current facility-administered medications for this visit.       REVIEW OF SYSTEMS:   As above per the HPI (History of Present Illness).    Constitutional: Negative for fever and chills.   Skin: positive for rash.   HEENT: Negative for ear pain and sore throat.  Respiratory: Negative cough and  shortness of breath.    Cardiovascular: Negative for chest pain and chest pressure.   Gastrointestinal: Negative for nausea, vomiting, diarrhea and  abdominal pain.   Genitourinary: Negative for dysuria, urgency and  frequency.  Extremities:  Negative for joint swelling and joint pain or edema.         PHYSICAL EXAM:   Visit Vitals  /81 (BP Location: RUE - Right upper extremity, Patient Position: Sitting, Cuff Size: Regular)   Pulse 66   Temp 98.5 °F (36.9 °C) (Oral)   Ht 5' 6\" (1.676 m)   Wt 74 kg   SpO2 99%   BMI 26.33 kg/m²      GENERAL: The patient appears in no acute distress.   HEENT: Head is normocephalic, atraumatic. Pupils equal and reacting to light. Extraocular muscles intact.   Nose: midline septum, normal nasal mucosa.   Ears TM (tympanic membrane)  reflective of light. No erythema.  Mouth: moist oral mucosa. No drainage noted.  NECK: Neck is supple, midline trachea. No JVD (jugular venous distention). No lymphadenopathy.  CHEST: Clear to auscultation bilaterally. Respiratory system clear.  CARDIOVASCULAR SYSTEM: S1-S2 heard.  .  SKIN: tender around 3 cm payton indurated area noted left axilla with raised tender lump with central pore oozing pus noted left axilla  PSYCHIATRIC: Mood: Congruent affect.  NEUROLOGIC: Grossly  intact.         DEPRESSION ASSESSMENT/PLAN:  Depression screening is negative no further plan needed.    Body mass index is 26.33 kg/m².    BMI (BODY MASS INDEX) ASSESSMENT/PLAN:  Patient is overweight.    45 minutes of physical activity a day       ASSESSMENT/PLAN:    Abscess of axilla, left  (primary encounter diagnosis)    Plan: DRAIN SKIN ABSCESS SIMPLE         I and D done after explaining procedure to pt, verbal consent obtained, pt tolerated procedure well   no packing done   Advised motrin tid, warm compresses, advised to  Squeeze the area everyday after cleaning to get any remaining drainage out  augmentin BID for 10 days, neosporin ,guaze and bandaid applied on top, advised to keep dressing the area like this once a day  Fu if not better  Would need packing if it is not working well      Discussed with pt regarding risk, benefit and se of the medications in detail and of not treating the condition    Recent External records - notes and labs reviewed , interpreted and explained  with pt    Current  and recent labs ,  timing of Future labs  discussed with pt  Patient will be informed on the lab results and further management     Treatment options discussed with patient and explained in detail. The risks, benefits and potential side effects of possible medications were reviewed. Alternatives were discussed. Medication instructions and consequences of not taking the medications were discussed. Patient's understanding was assessed and patient agreed with the plan. Monitoring parameters and expected course outlined. Patient to call or come in if symptoms fail to respond as outlined, or worsen in any way.    If symptoms are not better need to follow up here or if worse need to go to   ED (Emergency Department).    Patient explained of the pathophysiology of the disease process and treatment plan.  Medication side effects versus benefits discussed with patient. Patient verbalizes understanding and agreement of the  treatment plan.       Patent

## 2022-12-05 NOTE — ED PROVIDER NOTE - NS ED ROS FT
GENERAL: no fever, no chills, no weight loss  EYES: no change in vision, no irritation, no discharge, no redness, no pain  HEENT: no trouble swallowing or speaking, no throat pain, no ear pain  CARDIAC: no chest pain, no palpitations   PULMONARY: no cough, no shortness of breath, no wheezing  GI: no abdominal pain, no nausea, no vomiting, no diarrhea, no constipation, no melena, no hematochezia, no hematemesis  : no changes in urination, no dysuria, no frequency, no hematuria, no discharge  SKIN: no rashes  NEURO: no headache, no numbness, +weakness  MSK: no joint pain, no muscle pain, no back pain, no calf pain

## 2022-12-05 NOTE — H&P ADULT - NSHPPHYSICALEXAM_GEN_ALL_CORE
Vital Signs Last 24 Hrs  T(C): 36.9 (12-05-22 @ 19:50), Max: 36.9 (12-05-22 @ 19:50)  T(F): 98.5 (12-05-22 @ 19:50), Max: 98.5 (12-05-22 @ 19:50)  HR: 72 (12-05-22 @ 19:50) (67 - 88)  BP: 120/78 (12-05-22 @ 19:50) (107/71 - 130/75)  BP(mean): 87 (12-05-22 @ 19:50) (87 - 87)  RR: 15 (12-05-22 @ 19:50) (15 - 18)  SpO2: 98% (12-05-22 @ 19:50) (96% - 100%)

## 2022-12-05 NOTE — ED PROVIDER NOTE - OBJECTIVE STATEMENT
83 yo M PMHx of Afib on xarelto, L and R IMN presents with R sided LE weakness since 9.00 AM this morning. Patient was shaving when he had sudden R lower leg weakness that made him stumble. Has had hx of surgery on the leg. No fevers, chills, nausea, vomiting, numbness, tingling. 81 yo M PMHx of Afib on xarelto, L and R IMN presents with R sided LE weakness since 9.00 AM this morning. Patient was shaving when he had sudden R lower leg weakness that made him lose balance and stumble, becoming wedged in standing position between bathroom sink and wall for couple hours before dtr found him. No fall to ground. Still w c/o R leg weakness and unable to walk. No HA, vision changes, CP, back pain, SOB, slurred speech, loc,  fevers, chills, nausea, vomiting, numbness, tingling.

## 2022-12-05 NOTE — CONSULT NOTE ADULT - ASSESSMENT
79M RH w/ hx of afib on xarelto, s/p L hip IMN (2013), s/p R hip IMN (2019) presents with acute onset RLE weakness. LKN 12/5/22 at 7am. Pt last took xarelto at 6:30am. While showering, he felt RLE sudden onset weakness, no associated pain or back pain at that time. He reports increased R hip area pain and numbness/tingling with associated leg shaking due to pain. Pt was hanging onto the sink for at least 3-4 hours and reports not being able to get up due to RLE weakness. NIHSS: 0, preMRS: 2 (ambulates with a walker). Neuro exam notable for slight +R pronator, +straight leg raise.    Impression: acute RLE weakness and pain likely 2/2 R hip pathology vs. lumbosacral pathology, less likely stroke    Recommendations:  []          82M RH w/ hx of afib on xarelto, s/p L hip IMN (2013), s/p R hip IMN (2019) presents with acute onset RLE weakness. LKN 12/5/22 at 7am. Pt last took xarelto at 6:30am. While showering, he felt RLE sudden onset weakness, no associated pain or back pain at that time. He reports increased R hip area pain and numbness/tingling with associated leg shaking due to pain. Pt was hanging onto the sink for at least 3-4 hours and reports not being able to get up due to RLE weakness. NIHSS: 0, preMRS: 2 (ambulates with a walker). Neuro exam notable for slight +R pronator, +straight leg raise. CTH/CTA neg    Impression: acute RLE weakness and pain likely 2/2 R hip pathology vs. lumbosacral pathology, less likely stroke    Recommendations:  [] f/u hip imaging  [] MRI brain w/o contrast   [] consider orthopedic evaluation  [] continue home xarelto for stroke prevention  [] monitor on telemetry  [] stroke risk factor modification and counseling   [] check HA1c, lipid panel, Utox  [] NPO until bedside dysphagia screen    Case discussed with Dr. Forrest, to be seen in AM with attending

## 2022-12-05 NOTE — ED PROVIDER NOTE - PHYSICAL EXAMINATION
GENERAL: no acute distress, non-toxic appearing  HEAD: normocephalic, atraumatic  HEENT: normal conjunctiva, oral mucosa moist, neck supple  CARDIAC: regular rate and rhythm, normal S1 and S2,  no appreciable murmurs  PULM: clear to ascultation bilaterally, no crackles, rales, rhonchi, or wheezing  GI: abdomen nondistended, soft, nontender, no guarding or rebound tenderness  : no CVA tenderness, no suprapubic tenderness  NEURO: +R sided lower extremity weakness 4/5, 5/5 LLE, alert and oriented x 3, normal speech, PERRLA, EOMI,  MSK: no visible deformities, no peripheral edema, calf tenderness/redness/swelling  SKIN: no visible rashes, dry, well-perfused  PSYCH: appropriate mood and affect no GENERAL: no acute distress, non-toxic appearing  HEAD: normocephalic, atraumatic  HEENT: normal conjunctiva, oral mucosa moist, neck supple  CARDIAC: normal S1 and S2,  no appreciable murmurs  PULM: clear to ascultation bilaterally, no crackles, rales, rhonchi, or wheezing  GI: abdomen nondistended, soft, nontender, no guarding or rebound tenderness  : no CVA tenderness, no suprapubic tenderness  NEURO: +R sided lower extremity weakness 4/5, 5/5 LLE, alert and oriented x 3, normal speech, PERRLA, EOMI,  MSK: no visible deformities, no peripheral edema, no calf tenderness/redness/swelling, no pelvic girdle ttp   SKIN: no visible rashes, dry, well-perfused  PSYCH: appropriate mood and affect

## 2022-12-05 NOTE — CONSULT NOTE ADULT - ATTENDING COMMENTS
code stroke called in ED and neurology emergently assessed patient. Briefly   82M RH w/ afib on xarelto, s/p L hip IMN (2013), s/p R hip IMN (2019) presents with acute onset RLE weakness. LKN 12/5/22 at 7am. Pt last took xarelto at 6:30am. While showering, he felt RLE sudden onset weakness, no associated pain or back pain at that time. He reports increased R hip area pain and numbness/tingling with associated leg shaking due to pain. Pt was hanging onto the sink for at least 3-4 hours and reports not being able to get up due to RLE weakness. NIHSS: 0, preMRS: 2 (ambulates with a walker). Neuro exam notable for slight +R pronator, +straight leg raise. CTH/CTA neg  NIHSS4  premrs2-3 walks with cane/walker     Impression: acute RLE weakness and pain likely 2/2 R hip pathology vs. lumbosacral pathology, less likely stroke    Recommendations:  - f/u hip imaging  - r/o infection   - MRI brain w/o contrast can be obtained but unlikely to  as he will need DOAC for AF regardless  - consider orthopedic evaluation  - continue home xarelto for stroke prevention  - monitor on telemetry  - stroke risk factor modification and counseling   - check HA1c, lipid panel, Utox  - NPO until bedside dysphagia screen   - High dose statin therapy - atorvastatin 40mg PO daily. LDL goal <70mg/dL.   - TTE no tneed   - PT/OT/SS/SLP, OOBC  - check FS, glucose control <180  - GI/DVT ppx  - Counseling on diet, exercise, and medication adherence was done  - Counseling on smoking cessation and alcohol consumption offered when appropriate.  - Pain assessed and judicious use of narcotics when appropriate was discussed.    - Stroke education given when appropriate.  - Importance of fall prevention discussed.   - Differential diagnosis and plan of care discussed with patient and/or family and primary team  - Thank you for allowing me to participate in the care of this patient. Call with questions.       Carloz Del Castillo MD  Vascular Neurology  Office: [phone]

## 2022-12-05 NOTE — ED ADULT NURSE NOTE - OBJECTIVE STATEMENT
81 yo presents to the ED from home. A&Ox4 by EMS c/o weakness. as per EMS, reports bilateral leg weakness but pt reports R side leg weakness starting after shower at approximately 1000. pt woke up and was in normal state at 0630 as per daughter. pt reports that when he felt weak he leaned against sink to avoid falling and daughter found him in this position 4 hours later when she returned home. pt lives at home with both daughters, ambulates with walker. history of Afib on xarelto. denies recent fever chills urinary symptoms. CODE STROKE called upon exam of pt 1449. 20G LAC. Patient undressed and placed into gown, call bell in hand and side rails up for safety. warm blanket provided, vital signs stable, pt in no acute distress. MD at bedside for eval.

## 2022-12-05 NOTE — ED PROVIDER NOTE - CLINICAL SUMMARY MEDICAL DECISION MAKING FREE TEXT BOX
83 yo F PMHx of b/l IMN, Afib on xarelto presents with RLE weakness sudden onset. Concern for CVA. patient will be a code stroke. Will XR extremity if negative and do an infectious workup (UA/CXR) to assess for weakness. 81 yo M PMHx of b/l IMN, Afib on xarelto presents with RLE weakness sudden onset. Concern for CVA vs rhabdo vs fracture vs infection vs electrolyte abn as cause for sx. Code stroke activated given focal RLE weakness with onset of ~9AM, though not tPA candidate due to time of onset and on AC. In addition to CTH/CTA H/N for code stroke, Will XR extremityto r/o fracture and do an infectious/metabolic workup to assess for causes for weakness. Plan to admit given continued RLE weakness and inability to safely ambulate as result.

## 2022-12-05 NOTE — CONSULT NOTE ADULT - SUBJECTIVE AND OBJECTIVE BOX
HPI:  79yMale w/ hx of afib on xarelto, s/p L hip IMN (Dr. Gilbert 3/10/13), and most recently s/p R hip IMN (Dr. Godinez 10/31/19)     (Stroke only)  LKN:  NIHSS:   preMRS: 2 (ambulates with a walker)  Pt is not a candidate for tpa due to [outside tpa window   Pt is not a candidate for mechanical thrombectomy due to no large vessel occlusion on CTA    REVIEW OF SYSTEMS    A 10-system ROS was performed and is negative except for those items noted above and/or in the HPI.    PAST MEDICAL & SURGICAL HISTORY:  AF (atrial fibrillation)      Hernia, inguinal, right    MEDICATIONS (HOME):  Home Medications:  acetaminophen 325 mg oral tablet: 3 tab(s) orally every 8 hours, As needed, Temp greater or equal to 38C (100.4F), Mild Pain (1 - 3) (16 Dec 2019 08:48)  ascorbic acid 500 mg oral tablet: 1 tab(s) orally once a day (16 Dec 2019 08:48)  bisacodyl 10 mg rectal suppository: 1 suppository(ies) rectal once a day, As needed, If no bowel movement (03 Nov 2019 06:13)  cefuroxime 250 mg oral tablet: 1 tab(s) orally every 12 hours (16 Dec 2019 08:48)  famotidine 20 mg oral tablet: 1 tab(s) orally once a day (16 Dec 2019 08:48)  magnesium hydroxide 8% oral suspension: 30 milliliter(s) orally once a day, As needed, Constipation (03 Nov 2019 06:13)  melatonin 3 mg oral tablet: 1 tab(s) orally once a day (at bedtime), As needed, Insomnia (16 Dec 2019 08:48)  Multiple Vitamins oral tablet: 1 tab(s) orally once a day (16 Dec 2019 08:48)  oxyCODONE 5 mg oral tablet: 1 tab(s) orally every 4 hours, As needed, Severe Pain (7 - 10) (16 Dec 2019 08:48)  polyethylene glycol 3350 oral powder for reconstitution: 17 gram(s) orally once a day (16 Dec 2019 08:48)  rivaroxaban 20 mg oral tablet: 1 tab(s) orally once a day (before a meal) (03 Nov 2019 06:13)  senna oral tablet: 2 tab(s) orally once a day (at bedtime) (16 Dec 2019 08:48)  traMADol 50 mg oral tablet: 1 tab(s) orally every 8 hours, As needed, Moderte Pain (4 - 6) (16 Dec 2019 08:48)    MEDICATIONS  (STANDING):    MEDICATIONS  (PRN):    ALLERGIES/INTOLERANCES:  Allergies  No Known Allergies    Intolerances    VITALS & EXAMINATION:  Vital Signs Last 24 Hrs  T(C): --  T(F): --  HR: 88 (05 Dec 2022 14:04) (88 - 88)  BP: 107/71 (05 Dec 2022 14:04) (107/71 - 107/71)  BP(mean): --  RR: 18 (05 Dec 2022 14:04) (18 - 18)  SpO2: 100% (05 Dec 2022 14:04) (100% - 100%)    Parameters below as of 05 Dec 2022 14:04  Patient On (Oxygen Delivery Method): room air      General:  Constitutional: Male, appears stated age, in no apparent distress including pain  Head: Normocephalic & atraumatic.  Respiratory: No increased work of breathing  Extremities: No cyanosis, clubbing, or edema.  Skin: No rashes, bruising, or discoloration.    Cardiovascular (>2): RRR no murmurs. Carotid pulsations symmetric, no bruits. Normal capillary beds refill, 1-2 seconds or less.     Neurological (>12):  MS: Awake, alert, oriented to person, place, situation, time. Normal affect. Follows all commands.    Language: Speech is clear, fluent with good repetition & comprehension (able to name objects___)    CNs: PERRLA (R = 3mm, L = 3mm). VFF. EOMI no nystagmus, no diplopia. V1-3 intact to LT/pinprick, well developed masseter muscles b/l. No facial asymmetry b/l, full eye closure strength b/l. Hearing grossly normal (rubbing fingers) b/l. Symmetric palate elevation in midline. Gag reflex deferred. Head turning & shoulder shrug intact b/l. Tongue midline, normal movements, no atrophy.    Motor: Normal muscle bulk & tone. No noticeable tremor or seizure. No pronator drift.              Deltoid	Biceps	Triceps	Wrist	Finger ABd	   R	5	5	5	5	5		5 	  L	5	5	5	5	5		5    	H-Flex	H-Ext	H-ABd	H-ADd	K-Flex	K-Ext	D-Flex	P-Flex  R	5	5	5	5	5	5	5	5 	   L	5	5	5	5	5	5	5	5	     Sensation: Intact to LT/PP/Temp/Vibration/Position b/l throughout.     Cortical: Extinction on DSS (neglect): none    Reflexes:              Biceps(C5)       BR(C6)     Triceps(C7)               Patellar(L4)    Achilles(S1)    Plantar Resp  R	2	          2	             2		        2		    2		Down   L	2	          2	             2		        2		    2		Down     Coordination: intact rapid-alt movements. No dysmetria to FTN/HTS    Gait: Normal Romberg. No postural instability. Normal stance and tandem gait.     LABORATORY:      STUDIES & IMAGING:   HPI:  79M RH w/ hx of afib on xarelto, s/p L hip IMN (Dr. Gilbert 3/10/13), and most recently s/p R hip IMN (Dr. Godinez 10/31/19) presents with acute onset RLE weakness. LKN 12/5/22 at 7am. Pt last took xarelto at 6:30am. Per pt and pt's daughter at bedside, pt woke up normally this morning and went to shower. While showering, he felt RLE sudden onset weakness, no associated pain or back pain at that time. He denies any numbness/tingling, vision changes, or UE weakness. He reports increased R hip area pain and numbness/tingling with associated leg shaking due to pain. Pt was hanging onto the sink for at least 3-4 hours and reports not being able to get up due to RLE weakness. At baseline, pt ambulates with a walker, lives with his daughter, does most ADLs independently.     LKN 12/5/22 at 7am  NIHSS: 0  preMRS: 2 (ambulates with a walker)  Pt is not a candidate for tpa due to outside tpa window   Pt is not a candidate for mechanical thrombectomy due to no large vessel occlusion on CTA    REVIEW OF SYSTEMS    A 10-system ROS was performed and is negative except for those items noted above and/or in the HPI.    PAST MEDICAL & SURGICAL HISTORY:  AF (atrial fibrillation)      Hernia, inguinal, right    MEDICATIONS (HOME):  Home Medications:  acetaminophen 325 mg oral tablet: 3 tab(s) orally every 8 hours, As needed, Temp greater or equal to 38C (100.4F), Mild Pain (1 - 3) (16 Dec 2019 08:48)  ascorbic acid 500 mg oral tablet: 1 tab(s) orally once a day (16 Dec 2019 08:48)  bisacodyl 10 mg rectal suppository: 1 suppository(ies) rectal once a day, As needed, If no bowel movement (03 Nov 2019 06:13)  cefuroxime 250 mg oral tablet: 1 tab(s) orally every 12 hours (16 Dec 2019 08:48)  famotidine 20 mg oral tablet: 1 tab(s) orally once a day (16 Dec 2019 08:48)  magnesium hydroxide 8% oral suspension: 30 milliliter(s) orally once a day, As needed, Constipation (03 Nov 2019 06:13)  melatonin 3 mg oral tablet: 1 tab(s) orally once a day (at bedtime), As needed, Insomnia (16 Dec 2019 08:48)  Multiple Vitamins oral tablet: 1 tab(s) orally once a day (16 Dec 2019 08:48)  oxyCODONE 5 mg oral tablet: 1 tab(s) orally every 4 hours, As needed, Severe Pain (7 - 10) (16 Dec 2019 08:48)  polyethylene glycol 3350 oral powder for reconstitution: 17 gram(s) orally once a day (16 Dec 2019 08:48)  rivaroxaban 20 mg oral tablet: 1 tab(s) orally once a day (before a meal) (03 Nov 2019 06:13)  senna oral tablet: 2 tab(s) orally once a day (at bedtime) (16 Dec 2019 08:48)  traMADol 50 mg oral tablet: 1 tab(s) orally every 8 hours, As needed, Moderte Pain (4 - 6) (16 Dec 2019 08:48)    MEDICATIONS  (STANDING):    MEDICATIONS  (PRN):    ALLERGIES/INTOLERANCES:  Allergies  No Known Allergies    Intolerances    VITALS & EXAMINATION:  Vital Signs Last 24 Hrs  T(C): --  T(F): --  HR: 88 (05 Dec 2022 14:04) (88 - 88)  BP: 107/71 (05 Dec 2022 14:04) (107/71 - 107/71)  BP(mean): --  RR: 18 (05 Dec 2022 14:04) (18 - 18)  SpO2: 100% (05 Dec 2022 14:04) (100% - 100%)    Parameters below as of 05 Dec 2022 14:04  Patient On (Oxygen Delivery Method): room air      General:  Constitutional: Male, appears stated age, in no apparent distress including pain  Head: Normocephalic & atraumatic.  Respiratory: No increased work of breathing  Extremities: b/l LE pitting edema  Skin: +b/l overlying chronic skin changes over b/l LE    Cardiovascular (>2): atrial fibrillation on monitor, HR 80s.    Neurological (>12):  MS: Awake, alert, oriented to person, place, situation, time. Normal affect. Follows all commands.    Language: Speech is clear, fluent with good repetition & comprehension (able to name objects thumb)    CNs: VFF to counting fingers. EOMI no nystagmus, no diplopia. V1-3 intact to LT, well developed masseter muscles b/l. No facial asymmetry b/l, full eye closure strength b/l. Hearing grossly normal (rubbing fingers) b/l. Symmetric palate elevation in midline. Gag reflex deferred. Head turning & shoulder shrug intact b/l. Tongue midline, normal movements, no atrophy.    Motor: Normal muscle bulk. b/l UE postural and action tremor.  Slight R pronator drift.              Deltoid	Biceps	Triceps	Wrist	Finger ABd	   R	4+	5	5	5	4+		4+ 	  L	5	5	5	5	5		5    	H-Flex	K-Flex	K-Ext	D-Flex	P-Flex  R	4+	5	5	4+	5	RLE with +straight raise	   L	5	5	5	5	5	     Sensation: Intact to LT b/l throughout.     Cortical: Extinction on DSS (neglect): none    Reflexes: with significant pain when checking for ankle clonus, deferred              Biceps(C5)       BR(C6)     Triceps(C7)               Patellar(L4)    Achilles(S1)    Plantar Resp  R	2	          2	             2		        1		    		mute  L	2	          2	             2		        1		    		mute    Coordination: intact rapid-alt movements. No dysmetria to FTN     Gait: unable to assess due to pain    LABORATORY:                        14.6   17.22 )-----------( 168      ( 05 Dec 2022 15:00 )             44.8       12-05    139  |  101  |  25<H>  ----------------------------<  106<H>  4.1   |  24  |  1.08    Ca    9.8      05 Dec 2022 15:00    TPro  7.4  /  Alb  4.3  /  TBili  0.8  /  DBili  x   /  AST  24  /  ALT  20  /  AlkPhos  76  12-05       LIVER FUNCTIONS - ( 05 Dec 2022 15:00 )  Alb: 4.3 g/dL / Pro: 7.4 g/dL / ALK PHOS: 76 U/L / ALT: 20 U/L / AST: 24 U/L / GGT: x                PT/INR - ( 05 Dec 2022 15:00 )   PT: 26.8 sec;   INR: 2.29 ratio         PTT - ( 05 Dec 2022 15:00 )  PTT:35.2 sec    CAPILLARY BLOOD GLUCOSE  98 (05 Dec 2022 15:00)  POCT Blood Glucose.: 104 mg/dL (05 Dec 2022 15:11)      RADIOLOGY & ADDITIONAL TESTS:     < from: CT Angio Neck Stroke Protocol w/ IV Cont (12.05.22 @ 15:25) >  IMPRESSION:    HEAD CT: No acute intracranial hemorrhage or acute territorial infarction.    If symptoms persist consider follow up head CT or MRI, MRA  if no   contraindication.    CTA COW:  Patent intracranial circulation without flow limiting stenosis   or large vessel occlusion.    CTA NECK: Patent, ECAs, ICAs, no  hemodynamically significant stenosis at    ICA origins by NASCET criteria.  Bilateral vertebral arteries are patent without flow limiting stenosis.    < end of copied text >   HPI:  82M RH w/ hx of afib on xarelto, s/p L hip IMN (Dr. Gilbert 3/10/13), and most recently s/p R hip IMN (Dr. Godinez 10/31/19) presents with acute onset RLE weakness. LKN 12/5/22 at 7am. Pt last took xarelto at 6:30am. Per pt and pt's daughter at bedside, pt woke up normally this morning and went to shower. While showering, he felt RLE sudden onset weakness, no associated pain or back pain at that time. He denies any numbness/tingling, vision changes, or UE weakness. He reports increased R hip area pain and numbness/tingling with associated leg shaking due to pain. Pt was hanging onto the sink for at least 3-4 hours and reports not being able to get up due to RLE weakness. At baseline, pt ambulates with a walker, lives with his daughter, does most ADLs independently.     LKN 12/5/22 at 7am  NIHSS: 0  preMRS: 2 (ambulates with a walker)  Pt is not a candidate for tpa due to outside tpa window   Pt is not a candidate for mechanical thrombectomy due to no large vessel occlusion on CTA    REVIEW OF SYSTEMS    A 10-system ROS was performed and is negative except for those items noted above and/or in the HPI.    PAST MEDICAL & SURGICAL HISTORY:  AF (atrial fibrillation)      Hernia, inguinal, right    MEDICATIONS (HOME):  Home Medications:  acetaminophen 325 mg oral tablet: 3 tab(s) orally every 8 hours, As needed, Temp greater or equal to 38C (100.4F), Mild Pain (1 - 3) (16 Dec 2019 08:48)  ascorbic acid 500 mg oral tablet: 1 tab(s) orally once a day (16 Dec 2019 08:48)  bisacodyl 10 mg rectal suppository: 1 suppository(ies) rectal once a day, As needed, If no bowel movement (03 Nov 2019 06:13)  cefuroxime 250 mg oral tablet: 1 tab(s) orally every 12 hours (16 Dec 2019 08:48)  famotidine 20 mg oral tablet: 1 tab(s) orally once a day (16 Dec 2019 08:48)  magnesium hydroxide 8% oral suspension: 30 milliliter(s) orally once a day, As needed, Constipation (03 Nov 2019 06:13)  melatonin 3 mg oral tablet: 1 tab(s) orally once a day (at bedtime), As needed, Insomnia (16 Dec 2019 08:48)  Multiple Vitamins oral tablet: 1 tab(s) orally once a day (16 Dec 2019 08:48)  oxyCODONE 5 mg oral tablet: 1 tab(s) orally every 4 hours, As needed, Severe Pain (7 - 10) (16 Dec 2019 08:48)  polyethylene glycol 3350 oral powder for reconstitution: 17 gram(s) orally once a day (16 Dec 2019 08:48)  rivaroxaban 20 mg oral tablet: 1 tab(s) orally once a day (before a meal) (03 Nov 2019 06:13)  senna oral tablet: 2 tab(s) orally once a day (at bedtime) (16 Dec 2019 08:48)  traMADol 50 mg oral tablet: 1 tab(s) orally every 8 hours, As needed, Moderte Pain (4 - 6) (16 Dec 2019 08:48)    MEDICATIONS  (STANDING):    MEDICATIONS  (PRN):    ALLERGIES/INTOLERANCES:  Allergies  No Known Allergies    Intolerances    VITALS & EXAMINATION:  Vital Signs Last 24 Hrs  T(C): --  T(F): --  HR: 88 (05 Dec 2022 14:04) (88 - 88)  BP: 107/71 (05 Dec 2022 14:04) (107/71 - 107/71)  BP(mean): --  RR: 18 (05 Dec 2022 14:04) (18 - 18)  SpO2: 100% (05 Dec 2022 14:04) (100% - 100%)    Parameters below as of 05 Dec 2022 14:04  Patient On (Oxygen Delivery Method): room air      General:  Constitutional: Male, appears stated age, in no apparent distress including pain  Head: Normocephalic & atraumatic.  Respiratory: No increased work of breathing  Extremities: b/l LE pitting edema  Skin: +b/l overlying chronic skin changes over b/l LE    Cardiovascular (>2): atrial fibrillation on monitor, HR 80s.    Neurological (>12):  MS: Awake, alert, oriented to person, place, situation, time. Normal affect. Follows all commands.    Language: Speech is clear, fluent with good repetition & comprehension (able to name objects thumb)    CNs: VFF to counting fingers. EOMI no nystagmus, no diplopia. V1-3 intact to LT, well developed masseter muscles b/l. No facial asymmetry b/l, full eye closure strength b/l. Hearing grossly normal (rubbing fingers) b/l. Symmetric palate elevation in midline. Gag reflex deferred. Head turning & shoulder shrug intact b/l. Tongue midline, normal movements, no atrophy.    Motor: Normal muscle bulk. b/l UE postural and action tremor.  Slight R pronator drift.              Deltoid	Biceps	Triceps	Wrist	Finger ABd	   R	4+	5	5	5	4+		4+ 	  L	5	5	5	5	5		5    	H-Flex	K-Flex	K-Ext	D-Flex	P-Flex  R	4+	5	5	4+	5	RLE with +straight raise	   L	5	5	5	5	5	     Sensation: Intact to LT b/l throughout.     Cortical: Extinction on DSS (neglect): none    Reflexes: with significant pain when checking for ankle clonus, deferred              Biceps(C5)       BR(C6)     Triceps(C7)               Patellar(L4)    Achilles(S1)    Plantar Resp  R	2	          2	             2		        1		    		mute  L	2	          2	             2		        1		    		mute    Coordination: intact rapid-alt movements. No dysmetria to FTN     Gait: unable to assess due to pain    LABORATORY:                        14.6   17.22 )-----------( 168      ( 05 Dec 2022 15:00 )             44.8       12-05    139  |  101  |  25<H>  ----------------------------<  106<H>  4.1   |  24  |  1.08    Ca    9.8      05 Dec 2022 15:00    TPro  7.4  /  Alb  4.3  /  TBili  0.8  /  DBili  x   /  AST  24  /  ALT  20  /  AlkPhos  76  12-05       LIVER FUNCTIONS - ( 05 Dec 2022 15:00 )  Alb: 4.3 g/dL / Pro: 7.4 g/dL / ALK PHOS: 76 U/L / ALT: 20 U/L / AST: 24 U/L / GGT: x                PT/INR - ( 05 Dec 2022 15:00 )   PT: 26.8 sec;   INR: 2.29 ratio         PTT - ( 05 Dec 2022 15:00 )  PTT:35.2 sec    CAPILLARY BLOOD GLUCOSE  98 (05 Dec 2022 15:00)  POCT Blood Glucose.: 104 mg/dL (05 Dec 2022 15:11)      RADIOLOGY & ADDITIONAL TESTS:     < from: CT Angio Neck Stroke Protocol w/ IV Cont (12.05.22 @ 15:25) >  IMPRESSION:    HEAD CT: No acute intracranial hemorrhage or acute territorial infarction.    If symptoms persist consider follow up head CT or MRI, MRA  if no   contraindication.    CTA COW:  Patent intracranial circulation without flow limiting stenosis   or large vessel occlusion.    CTA NECK: Patent, ECAs, ICAs, no  hemodynamically significant stenosis at    ICA origins by NASCET criteria.  Bilateral vertebral arteries are patent without flow limiting stenosis.    < end of copied text >

## 2022-12-06 LAB
ALBUMIN SERPL ELPH-MCNC: 3.6 G/DL — SIGNIFICANT CHANGE UP (ref 3.3–5)
ALP SERPL-CCNC: 58 U/L — SIGNIFICANT CHANGE UP (ref 40–120)
ALT FLD-CCNC: 26 U/L — SIGNIFICANT CHANGE UP (ref 10–45)
ANION GAP SERPL CALC-SCNC: 13 MMOL/L — SIGNIFICANT CHANGE UP (ref 5–17)
AST SERPL-CCNC: 57 U/L — HIGH (ref 10–40)
BASOPHILS # BLD AUTO: 0 K/UL — SIGNIFICANT CHANGE UP (ref 0–0.2)
BASOPHILS NFR BLD AUTO: 0 % — SIGNIFICANT CHANGE UP (ref 0–2)
BILIRUB SERPL-MCNC: 1 MG/DL — SIGNIFICANT CHANGE UP (ref 0.2–1.2)
BUN SERPL-MCNC: 23 MG/DL — SIGNIFICANT CHANGE UP (ref 7–23)
CALCIUM SERPL-MCNC: 9.1 MG/DL — SIGNIFICANT CHANGE UP (ref 8.4–10.5)
CHLORIDE SERPL-SCNC: 104 MMOL/L — SIGNIFICANT CHANGE UP (ref 96–108)
CO2 SERPL-SCNC: 23 MMOL/L — SIGNIFICANT CHANGE UP (ref 22–31)
CREAT SERPL-MCNC: 0.96 MG/DL — SIGNIFICANT CHANGE UP (ref 0.5–1.3)
EGFR: 79 ML/MIN/1.73M2 — SIGNIFICANT CHANGE UP
EOSINOPHIL # BLD AUTO: 0.11 K/UL — SIGNIFICANT CHANGE UP (ref 0–0.5)
EOSINOPHIL NFR BLD AUTO: 0.9 % — SIGNIFICANT CHANGE UP (ref 0–6)
GLUCOSE BLDC GLUCOMTR-MCNC: 102 MG/DL — HIGH (ref 70–99)
GLUCOSE SERPL-MCNC: 86 MG/DL — SIGNIFICANT CHANGE UP (ref 70–99)
HCT VFR BLD CALC: 37.5 % — LOW (ref 39–50)
HGB BLD-MCNC: 12.2 G/DL — LOW (ref 13–17)
LYMPHOCYTES # BLD AUTO: 1.39 K/UL — SIGNIFICANT CHANGE UP (ref 1–3.3)
LYMPHOCYTES # BLD AUTO: 11.3 % — LOW (ref 13–44)
MAGNESIUM SERPL-MCNC: 1.8 MG/DL — SIGNIFICANT CHANGE UP (ref 1.6–2.6)
MCHC RBC-ENTMCNC: 32.5 GM/DL — SIGNIFICANT CHANGE UP (ref 32–36)
MCHC RBC-ENTMCNC: 32.8 PG — SIGNIFICANT CHANGE UP (ref 27–34)
MCV RBC AUTO: 100.8 FL — HIGH (ref 80–100)
MONOCYTES # BLD AUTO: 1.28 K/UL — HIGH (ref 0–0.9)
MONOCYTES NFR BLD AUTO: 10.4 % — SIGNIFICANT CHANGE UP (ref 2–14)
NEUTROPHILS # BLD AUTO: 9.54 K/UL — HIGH (ref 1.8–7.4)
NEUTROPHILS NFR BLD AUTO: 77.4 % — HIGH (ref 43–77)
NT-PROBNP SERPL-SCNC: 2760 PG/ML — HIGH (ref 0–300)
PLATELET # BLD AUTO: 128 K/UL — LOW (ref 150–400)
POTASSIUM SERPL-MCNC: 3.9 MMOL/L — SIGNIFICANT CHANGE UP (ref 3.5–5.3)
POTASSIUM SERPL-SCNC: 3.9 MMOL/L — SIGNIFICANT CHANGE UP (ref 3.5–5.3)
PROT SERPL-MCNC: 6 G/DL — SIGNIFICANT CHANGE UP (ref 6–8.3)
RBC # BLD: 3.72 M/UL — LOW (ref 4.2–5.8)
RBC # FLD: 13.2 % — SIGNIFICANT CHANGE UP (ref 10.3–14.5)
SODIUM SERPL-SCNC: 140 MMOL/L — SIGNIFICANT CHANGE UP (ref 135–145)
TROPONIN T, HIGH SENSITIVITY RESULT: 45 NG/L — SIGNIFICANT CHANGE UP (ref 0–51)
WBC # BLD: 12.32 K/UL — HIGH (ref 3.8–10.5)
WBC # FLD AUTO: 12.32 K/UL — HIGH (ref 3.8–10.5)

## 2022-12-06 PROCEDURE — 93970 EXTREMITY STUDY: CPT | Mod: 26

## 2022-12-06 PROCEDURE — 93306 TTE W/DOPPLER COMPLETE: CPT | Mod: 26

## 2022-12-06 PROCEDURE — 70551 MRI BRAIN STEM W/O DYE: CPT | Mod: 26

## 2022-12-06 RX ADMIN — Medication 650 MILLIGRAM(S): at 23:51

## 2022-12-06 RX ADMIN — Medication 650 MILLIGRAM(S): at 17:30

## 2022-12-06 RX ADMIN — Medication 100 MILLIGRAM(S): at 05:30

## 2022-12-06 RX ADMIN — Medication 650 MILLIGRAM(S): at 02:30

## 2022-12-06 RX ADMIN — Medication 650 MILLIGRAM(S): at 01:29

## 2022-12-06 RX ADMIN — Medication 650 MILLIGRAM(S): at 16:44

## 2022-12-06 RX ADMIN — RIVAROXABAN 20 MILLIGRAM(S): KIT at 16:44

## 2022-12-06 NOTE — CONSULT NOTE ADULT - SUBJECTIVE AND OBJECTIVE BOX
[facility] DISE[facility]ES  GIOVANY Ferguson S. Shah, Y. Patel, G. Delmer  [phone]  ([phone] - weekdays after 5pm and weekends)    MARY BO  82y, Male  [id number]    HPI:  82M w/ pmhx of afib on xarelto, s/p L hip IMN (most recently s/p R hip IMN (Dr. Godinez 10/31/19) presents with acute onset RLE weakness. LKN 22 at 7am. Pt last took xarelto at 6:30am. Per pt and pt's daughter at bedside, pt woke up normally this morning and went to shower. While showering, he felt RLE sudden onset weakness, no associated pain or back pain at that time. He denies any numbness/tingling, vision changes, or UE weakness. He went to shave and while at sink felt too weak to stand and had to hold on to sink. Daughter came to bathroom ~2 hours later to find him holding sink leaning with R side. He aslo reports increased R hip area pain and numbness/tingling with associated leg shaking due to pain. Pt was hanging onto the sink for at least 2-3 hours and reports not being able to get up due to RLE weakness. At baseline, pt ambulates with a walker, lives with his daughter, does most ADLs independently. Denies any recent symptoms such as fevers, chills, cough, dysuria, change in urination, incontinence. States he had mechanical fall 3 months ago which he did not tell daughter in living room.   In ER: Given NS 1L, IV Clindamycin (05 Dec 2022 21:53) Patient seen and examined at bedside this morning, daughter at bedside.   ROS: 14 point review of systems completed, pertinent positives and negatives as per HPI.    Allergies: No Known Allergies  PMH -- AF (atrial fibrillation)  PSH -- Hernia, inguinal, right  FH -- No pertinent family history in first degree relatives  Social History -- denies tobacco, alcohol or illicit drug use    Physical Exam--  Vital Signs Last 24 Hrs  T(F): 97.7 (06 Dec 2022 09:23), Max: 98.5 (05 Dec 2022 19:50)  HR: 65 (06 Dec 2022 09:23) (55 - 88)  BP: 94/64 (06 Dec 2022 09:23) (94/64 - 132/69)  RR: 18 (06 Dec 2022 09:23) (15 - 18)  SpO2: 97% (06 Dec 2022 09:23) (96% - 100%)  General: no acute distress  HEENT: NC/AT, EOMI, anicteric, neck supple  Lungs: Clear bilaterally without rales, wheezing or rhonchi  Heart: S1, S2 present, RRR. No murmur, rub or gallop.  Abdomen: Soft. Nondistended. Nontender. BS present.   Neuro: AAOx3, no obvious focal deficits   Back: No spinal tenderness. No costovertebral angle tenderness.  Extremities: No cyanosis or clubbing. No edema.   Skin: Warm. Dry. Good turgor. No visible rash. No vasculitic stigmata.  Psychiatric: Appropriate affect and mood for situation.   Lines: PIV    Laboratory & Imaging Data--  CBC:                       12.2   12.32 )-----------( 128      ( 06 Dec 2022 07:43 )             37.5     WBC Count: 12.32 K/uL (22 @ 07:43)  WBC Count: 17.22 K/uL (22 @ 15:00)    CMP:     140  |  104  |  23  ----------------------------<  86  3.9   |  23  |  0.96    Ca    9.1      06 Dec 2022 07:43  Mg     1.8         TPro  6.0  /  Alb  3.6  /  TBili  1.0  /  DBili  x   /  AST  57<H>  /  ALT  26  /  AlkPhos  58  12    LIVER FUNCTIONS - ( 06 Dec 2022 07:43 )  Alb: 3.6 g/dL / Pro: 6.0 g/dL / ALK PHOS: 58 U/L / ALT: 26 U/L / AST: 57 U/L / GGT: x           Urinalysis Basic - ( 05 Dec 2022 18:24 )    Color: Yellow / Appearance: Clear / S.049 / pH: x  Gluc: x / Ketone: Small  / Bili: Negative / Urobili: Negative   Blood: x / Protein: 30 mg/dL / Nitrite: Negative   Leuk Esterase: Negative / RBC: 11 /hpf / WBC 3 /HPF   Sq Epi: x / Non Sq Epi: 2 /hpf / Bacteria: Negative    Microbiology: reviewed   SARS-CoV-2 Result: Danyell (05 Dec 2022 18:25)    Radiology--reviewed    Active Medications--  acetaminophen     Tablet .. 650 milliGRAM(s) Oral every 6 hours PRN  clindamycin IVPB      clindamycin IVPB 600 milliGRAM(s) IV Intermittent every 8 hours  melatonin 3 milliGRAM(s) Oral at bedtime PRN  ondansetron Injectable 4 milliGRAM(s) IV Push every 8 hours PRN  rivaroxaban 20 milliGRAM(s) Oral with dinner    Current Antimicrobials:   clindamycin IVPB      clindamycin IVPB 600 milliGRAM(s) IV Intermittent every 8 hours    Prior/Completed Antimicrobials:  clindamycin IVPB     [facility]  GIOVANY Ferguson S. Shah, Y. Patel, G. [facility]  [phone]  ([phone] - weekdays after 5pm and weekends)    MARY BO  82y, Male  [id number]    HPI:  Patient is a 82 year old male with PMH of Afib on xarelto, s/p L hip IMN (most recently s/p R hip IMN (Dr. Godinez 10/31/19) presents with acute onset RLE weakness. LKN 22 at 7am. Pt last took xarelto at 6:30am. Per pt and pt's daughter at bedside, pt woke up normally this morning and went to shower. While showering, he felt RLE sudden onset weakness, no associated pain or back pain at that time. He denies any numbness/tingling, vision changes, or UE weakness. He went to shave and while at sink felt too weak to stand and had to hold on to sink. Daughter came to bathroom ~4 hours later to find him holding sink leaning with R side. He also reports increased R hip area pain and numbness/tingling with associated leg shaking due to pain. Pt was hanging onto the sink for at least 2-3 hours and reports not being able to get up due to RLE weakness, states he couldn't move to get to the phone. Daughter called and given no answer, became concerned and came home. At baseline, pt ambulates with a walker, lives with his daughter, does most ADLs independently. Denies any recent symptoms such as fevers, chills, cough, dysuria, change in urination, incontinence. States he had mechanical fall 3 months ago which he did not tell daughter in living room.  In ER: Given NS 1L, IV Clindamycin (05 Dec 2022 21:53)   Patient seen and examined at bedside this morning, daughter at bedside, she confirms above history. She has a picture of patients legs when she found him, they were erythematous and swollen, now much improved and closer to his baseline. He currently denies any pain, fevers, chills, tooth pain, chest pain, cough, abdominal pain, nausea, vomiting, diarrhea, dysuria. He has 2 small chronic wounds on his b/l 2nd toes, per daughter they appear the same and have not had any drainage. She does take him for pedicures monthly and feels the location is clean.   ROS: 14 point review of systems completed, pertinent positives and negatives as per HPI.    Allergies: No Known Allergies  PMH -- AF (atrial fibrillation)  PSH -- Hernia, inguinal, right  FH -- No pertinent family history in first degree relatives  Social History -- denies tobacco, alcohol or illicit drug use    Physical Exam--  Vital Signs Last 24 Hrs  T(F): 97.7 (06 Dec 2022 09:23), Max: 98.5 (05 Dec 2022 19:50)  HR: 65 (06 Dec 2022 09:23) (55 - 88)  BP: 94/64 (06 Dec 2022 09:23) (94/64 - 132/69)  RR: 18 (06 Dec 2022 09:23) (15 - 18)  SpO2: 97% (06 Dec 2022 09:23) (96% - 100%)  General: no acute distress  HEENT: NC/AT, EOMI, anicteric, neck supple  Lungs: Clear bilaterally without rales, wheezing or rhonchi  Heart: S1, S2 present, RRR. No murmur, rub or gallop.  Abdomen: Soft. Nondistended. Nontender. BS present.   Neuro: AAOx3, no obvious focal deficits   Back: No spinal tenderness. No costovertebral angle tenderness.  Extremities: No cyanosis or clubbing. No edema.   Skin: Warm. Dry. Good turgor. No visible rash. No vasculitic stigmata.  Psychiatric: Appropriate affect and mood for situation.   Lines: PIV    Laboratory & Imaging Data--  CBC:                       12.2   12.32 )-----------( 128      ( 06 Dec 2022 07:43 )             37.5     WBC Count: 12.32 K/uL (22 @ 07:43)  WBC Count: 17.22 K/uL (22 @ 15:00)    CMP:     140  |  104  |  23  ----------------------------<  86  3.9   |  23  |  0.96    Ca    9.1      06 Dec 2022 07:43  Mg     1.8         TPro  6.0  /  Alb  3.6  /  TBili  1.0  /  DBili  x   /  AST  57<H>  /  ALT  26  /  AlkPhos  58      LIVER FUNCTIONS - ( 06 Dec 2022 07:43 )  Alb: 3.6 g/dL / Pro: 6.0 g/dL / ALK PHOS: 58 U/L / ALT: 26 U/L / AST: 57 U/L / GGT: x           Urinalysis Basic - ( 05 Dec 2022 18:24 )  Color: Yellow / Appearance: Clear / S.049 / pH: x  Gluc: x / Ketone: Small  / Bili: Negative / Urobili: Negative   Blood: x / Protein: 30 mg/dL / Nitrite: Negative   Leuk Esterase: Negative / RBC: 11 /hpf / WBC 3 /HPF   Sq Epi: x / Non Sq Epi: 2 /hpf / Bacteria: Negative    Microbiology: reviewed   SARS-CoV-2 Result: NotDetec (05 Dec 2022 18:25)    Radiology--reviewed  VA Duplex Lower Ext Vein Scan, Bilat (22 @ 11:32) > IMPRESSION: No evidence of deep venous thrombosis in either lower extremity.    CT Lumbar Spine No Cont (22 @ 23:56) > IMPRESSION:  Advanced degenerative changes as described which contributes to central   canal, subarticular zone and neural foraminal narrowing throughout the   lumbar spine. Notably at L4-L5 there is severe right subarticular zone   stenosis and severe right neural foraminal stenosis. No definite   high-grade central canal stenosis throughout the lumbar spine.    Xray Hip 2-3 Views, Right (22 @ 17:33) > IMPRESSION:  Bilateral hip fixation hardware with intramedullary rods with associated   heterotopic ossification. Right hip screw purchase his the femoral head   cortex, not present on prior radiograph and may represent some degree of   subsidence of the bone at the fracture site. The distal tip of the right   intramedullary simón appears to extend into the right lateral cortex with   cortical thinning.  No acute fractures.  Contrast opacifies the bladder.    Xray Chest 1 View AP/PA (22 @ 17:31) > IMPRESSION: Clear lungs    CT Angio Neck Stroke Protocol w/ IV Cont (22 @ 15:25) > IMPRESSION:  HEAD CT: No acute intracranial hemorrhage or acute territorial infarction. If symptoms persist consider follow up head CT or MRI, MRA  if no  contraindication.  CTA COW:  Patent intracranial circulation without flow limiting stenosis  or large vessel occlusion.  CTA NECK: Patent, ECAs, ICAs, no  hemodynamically significant stenosis at   ICA origins by NASCET criteria. Bilateral vertebral arteries are patent without flow limiting stenosis.  Notification to clinician of alert:Dr. Ferraro was notified about the noncontrast head CT findings at 3:25 PM on  2022 with readback confirmation. The opportunity for questions was provided and all questions asked were answered.    Active Medications--  acetaminophen     Tablet .. 650 milliGRAM(s) Oral every 6 hours PRN  clindamycin IVPB      clindamycin IVPB 600 milliGRAM(s) IV Intermittent every 8 hours  melatonin 3 milliGRAM(s) Oral at bedtime PRN  ondansetron Injectable 4 milliGRAM(s) IV Push every 8 hours PRN  rivaroxaban 20 milliGRAM(s) Oral with dinner    Current Antimicrobials:   clindamycin IVPB      clindamycin IVPB 600 milliGRAM(s) IV Intermittent every 8 hours    Prior/Completed Antimicrobials:  clindamycin IVPB

## 2022-12-06 NOTE — PHYSICAL THERAPY INITIAL EVALUATION ADULT - PERTINENT HX OF CURRENT PROBLEM, REHAB EVAL
82M w/ pmhx of afib on xarelto, s/p L hip IMN (most recently s/p R hip IMN (Dr. Godinez 10/31/19) presents with acute onset RLE weakness. LKN 12/5/22 at 7am. Pt last took xarelto at 6:30am. Per pt and pt's daughter at bedside, pt woke up normally this morning and went to shower. While showering, he felt RLE sudden onset weakness, no associated pain or back pain at that time. He denies any numbness/tingling, vision changes, or UE weakness. He also reports increased R hip area pain and numbness/tingling with associated leg shaking due to pain. At baseline, pt ambulates with a walker, lives with his daughter, does most ADLs independently. States he had mechanical fall 3 months ago which he did not tell daughter in living room. 82M w/ pmhx of afib on xarelto, s/p L hip IMN (most recently s/p R hip IMN (Dr. Godinez 10/31/19) presents with acute onset RLE weakness. LKN 12/5/22 at 7am. Pt last took xarelto at 6:30am. Per pt and pt's daughter at bedside, pt woke up normally this morning and went to shower. While showering, he felt RLE sudden onset weakness, no associated pain or back pain at that time. He denies any numbness/tingling, vision changes, or UE weakness. He also reports increased R hip area pain and numbness/tingling with associated leg shaking due to pain. At baseline, pt ambulates with a walker, lives with his daughter, does most ADLs independently. States he had mechanical fall 3 months ago which he did not tell daughter in living room. Hosp course: 12/5 CT Head, Brain, Neck: HEAD CT: No acute intracranial hemorrhage or acute territorial infarction. CTA COW:  Patent intracranial circulation without flow limiting stenosis or large vessel occlusion. CTA NECK: Patent, ECAs, ICAs, no hemodynamically significant stenosis at ICA origins by NASCET criteria. Bilateral vertebral arteries are patent without flow limiting stenosis. 12/5 Negative CXR; 12/5 Xray R femur/hip/pelvis bilateral hip fixation hardware with intramedullary rods with associated heterotopic ossification. Right hip screw purchase his the femoral head cortex, not present on prior radiograph and may represent some degree of subsidence of the bone at the fracture site. The distal tip of the right intramedullary simón appears to extend into the right lateral cortex with cortical thinning. 12/5 CT L/s Advanced degenerative changes as described which contributes to central canal, subarticular zone and neural foraminal narrowing throughout the lumbar spine. Notably at L4-L5 there is severe right subarticular zone stenosis and severe right neural foraminal stenosis. No definite high-grade central canal stenosis throughout the lumbar spine. 12/6 BLE Duplex negative DVT.

## 2022-12-06 NOTE — PROGRESS NOTE ADULT - SUBJECTIVE AND OBJECTIVE BOX
Patient is a 82y old  Male who presents with a chief complaint of R sided weakness (06 Dec 2022 07:43)      SUBJECTIVE / OVERNIGHT EVENTS:    Patient seen and examined. co left hip pain leg and foot pain even with lying flat.      Vital Signs Last 24 Hrs  T(C): 36.5 (06 Dec 2022 09:23), Max: 36.9 (05 Dec 2022 19:50)  T(F): 97.7 (06 Dec 2022 09:23), Max: 98.5 (05 Dec 2022 19:50)  HR: 65 (06 Dec 2022 09:23) (55 - 88)  BP: 94/64 (06 Dec 2022 09:23) (94/64 - 132/69)  BP(mean): 87 (05 Dec 2022 23:46) (87 - 87)  RR: 18 (06 Dec 2022 09:23) (15 - 18)  SpO2: 97% (06 Dec 2022 09:23) (96% - 100%)    Parameters below as of 06 Dec 2022 09:23  Patient On (Oxygen Delivery Method): room air      I&O's Summary    05 Dec 2022 07:01  -  06 Dec 2022 07:00  --------------------------------------------------------  IN: 0 mL / OUT: 400 mL / NET: -400 mL        PE:  GENERAL: NAD, AAOx3  CHEST/LUNG: CTABL, No wheeze  HEART: Regular rate and rhythm; + murmur  ABDOMEN: Soft, Nontender, Nondistended; Bowel sounds present  EXTREMITIES:  2+ Peripheral Pulses, chronic venous stasis, FROM, + 2 le edema  NEURO: No focal deficits    LABS:                        12.2   12.32 )-----------( 128      ( 06 Dec 2022 07:43 )             37.5     12-06    140  |  104  |  23  ----------------------------<  86  3.9   |  23  |  0.96    Ca    9.1      06 Dec 2022 07:43  Mg     1.8     12-06    TPro  6.0  /  Alb  3.6  /  TBili  1.0  /  DBili  x   /  AST  57<H>  /  ALT  26  /  AlkPhos  58  12-06    PT/INR - ( 05 Dec 2022 15:00 )   PT: 26.8 sec;   INR: 2.29 ratio         PTT - ( 05 Dec 2022 15:00 )  PTT:35.2 sec  CAPILLARY BLOOD GLUCOSE  98 (05 Dec 2022 15:00)      POCT Blood Glucose.: 102 mg/dL (06 Dec 2022 07:37)  POCT Blood Glucose.: 104 mg/dL (05 Dec 2022 15:11)    CARDIAC MARKERS ( 05 Dec 2022 15:00 )  x     / x     / 192 U/L / x     / x          Urinalysis Basic - ( 05 Dec 2022 18:24 )    Color: Yellow / Appearance: Clear / S.049 / pH: x  Gluc: x / Ketone: Small  / Bili: Negative / Urobili: Negative   Blood: x / Protein: 30 mg/dL / Nitrite: Negative   Leuk Esterase: Negative / RBC: 11 /hpf / WBC 3 /HPF   Sq Epi: x / Non Sq Epi: 2 /hpf / Bacteria: Negative        RADIOLOGY & ADDITIONAL TESTS:    Imaging Personally Reviewed:  [x] YES  [ ] NO    Consultant(s) Notes Reviewed:  [x] YES  [ ] NO    MEDICATIONS  (STANDING):  clindamycin IVPB      clindamycin IVPB 600 milliGRAM(s) IV Intermittent every 8 hours  rivaroxaban 20 milliGRAM(s) Oral with dinner    MEDICATIONS  (PRN):  acetaminophen     Tablet .. 650 milliGRAM(s) Oral every 6 hours PRN Temp greater or equal to 38C (100.4F), Mild Pain (1 - 3)  melatonin 3 milliGRAM(s) Oral at bedtime PRN Insomnia  ondansetron Injectable 4 milliGRAM(s) IV Push every 8 hours PRN Nausea and/or Vomiting      Care Discussed with Consultants/Other Providers [x] YES  [ ] NO    HEALTH ISSUES - PROBLEM Dx:  Suspected pulmonary embolism    Suspected deep vein thrombosis (DVT)    Weakness of lower extremity, unspecified laterality    Chronic atrial fibrillation    Leukocytosis    At risk for depression    DVT prophylaxis

## 2022-12-06 NOTE — PHYSICAL THERAPY INITIAL EVALUATION ADULT - ADDITIONAL COMMENTS
Per chart review, pt lives with his daughter and ambulated with RW prior to admission. Pt reported that he was independent with most ADLs. Pt lives with family in private home, 3 steps to enter, first floor set-up. Prior to admission pt was independent with all functional mobility with use of RW. Pt is home alone for most of the day.

## 2022-12-06 NOTE — PROGRESS NOTE ADULT - ASSESSMENT
82M w/ pmhx of afib on xarelto, s/p L hip IMN (most recently s/p R hip IMN (Dr. Godinez 10/31/19) presents with acute onset R sided weakness.    # Weakness of lower extremity, unspecified laterality  Unclear etiology of weakness. PT with known orthopedic issue in R hip. Appreciate neurology recommendations. Current exam relatively reassuring, CT head negative for acute CVA but will still need MRI for f/u. Overall wide differential. Discussed hip plain film findings with ortho briefly: if progression then may need more hip surgery on non-urgent basis.  -Falls precautions  -MRI brain w/o contrast ordered  -CT L spine ordered  -PT consult  -Cont. Telemetry for now  -TTE  -Orthostatics  -F/u neurology recommendations  -F/u leukocytosis work up  -F/u bilateral LE dopplers (daughter requested)  -ortho consult for "subsidence of the bone at the fracture site"    # Leukocytosis  No obvious systemic infectious symptoms. CXR and UA relatively unremarkable. Pt has known infected tooth with plans to pull, just received clearance from cards to hold Xarelto so has not yet scheduled appt with dentist. 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  ID consult  fu BCxs  Trend leukocytosis and fever curve    # Chronic atrial fibrillation.  Cont. Xarelto QHS    # At risk for depression  On Lexapro at home, cannot remember dose currently    DVT prophylaxis. Xarelto.    Please call [facility] with questions [phone]. 82M w/ pmhx of afib on xarelto, s/p L hip IMN (most recently s/p R hip IMN (Dr. Godinez 10/31/19) presents with acute onset R sided weakness.    # Weakness of lower extremity, unspecified laterality  Unclear etiology of weakness. PT with known orthopedic issue in R hip. Appreciate neurology recommendations. Current exam relatively reassuring, CT head negative for acute CVA but will still need MRI for f/u. Overall wide differential. Discussed hip plain film findings with ortho briefly: if progression then may need more hip surgery on non-urgent basis.  -Falls precautions  -MRI brain w/o contrast ordered  -CT L spine ordered  -PT consult  -Cont. Telemetry for now  -TTE  -Orthostatics  -F/u neurology recommendations  -F/u leukocytosis work up  -F/u bilateral LE dopplers  -ortho consult for "subsidence of the bone at the fracture site"    # Leukocytosis  No obvious systemic infectious symptoms. CXR and UA relatively unremarkable. Pt has known infected tooth with plans to pull, just received clearance from cards to hold Xarelto so has not yet scheduled appt with dentist. 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  ID consult  fu BCxs  Trend leukocytosis and fever curve    # Chronic atrial fibrillation  Cont. Xarelto QHS    # At risk for depression  On Lexapro at home, cannot remember dose currently    DVT prophylaxis. Xarelto.    Please call [facility] with questions [phone]. 82M w/ pmhx of afib on xarelto, s/p L hip IMN (most recently s/p R hip IMN (Dr. Godinez 10/31/19) presents with acute onset R sided weakness.    # Weakness of lower extremity, unspecified laterality  Unclear etiology of weakness. PT with known orthopedic issue in R hip. Appreciate neurology recommendations. Current exam relatively reassuring, CT head negative for acute CVA but will still need MRI for f/u. Overall wide differential. Discussed hip plain film findings with ortho briefly: if progression then may need more hip surgery on non-urgent basis.  -Falls precautions  -MRI brain w/o contrast ordered  -CT L spine L4-L5 there is severe right subarticular zone stenosis and severe right neural foraminal stenosis  -PT consult  -Cont. Telemetry for now  -TTE, BNP 2700  -Orthostatics  -neuro following  -F/u bilateral LE dopplers  -ortho consult for "subsidence of the bone at the fracture site"    # Leukocytosis  No obvious systemic infectious symptoms. CXR and UA relatively unremarkable. Pt has known infected tooth with plans to pull, just received clearance from cards to hold Xarelto so has not yet scheduled appt with dentist. 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  ID consult  fu BCxs  Trend leukocytosis and fever curve    # Chronic atrial fibrillation  Cont. Xarelto QHS    # At risk for depression  On Lexapro at home, cannot remember dose currently    DVT prophylaxis. Xarelto.    Please call [facility] with questions [phone].

## 2022-12-06 NOTE — CONSULT NOTE ADULT - ASSESSMENT
Patient is a 82 year old male with PMH of Afib on xarelto, s/p L hip IMN (most recently s/p R hip IMN (Dr. Godinez 10/31/19) presents with acute onset RLE weakness and was unable to move from sink for at least 2-3 hours until daughter came home and called EMS. Patient noted with significant erythematous legs with swelling, daughter has picture on phone- now has resolved.  Acute onset RLE weakness, unclear etiology  Leukocytosis - suspect likely reactive in setting of above events   Rule out infection   afebrile, WBC trended down   UA negative, no urinary sx   CXR negative, no resp sx    abd benign, nontender   started on empiric clindamycin in the ER   has loose tooth with no pain, was planning for outpt extraction    b/l LE erythema resolved, 2 small b/l 2nd toe wounds with eschar, does not appear infected    Imaging reviewed as above     CTH negative for CVA, plan for MRI    DVT study negative    CT L spine with no infectious source, has advanced degenerative changes, high grade spinal stenosis      Recommendations:  Follow blood cultures  Discontinue clindamycin - no s/o infection noted  Monitor off antibiotics  Neurology following  PT following  Monitor temps/WBC    D/w Dr. Yuan and daughter  Slim Jackson M.D.  [facility], Division of Infectious Diseases  [phone]  After 5pm on weekdays and all day on weekends - please call [phone]

## 2022-12-07 LAB
CULTURE RESULTS: SIGNIFICANT CHANGE UP
HCT VFR BLD CALC: 39.8 % — SIGNIFICANT CHANGE UP (ref 39–50)
HGB BLD-MCNC: 13 G/DL — SIGNIFICANT CHANGE UP (ref 13–17)
MCHC RBC-ENTMCNC: 32.3 PG — SIGNIFICANT CHANGE UP (ref 27–34)
MCHC RBC-ENTMCNC: 32.7 GM/DL — SIGNIFICANT CHANGE UP (ref 32–36)
MCV RBC AUTO: 99 FL — SIGNIFICANT CHANGE UP (ref 80–100)
NRBC # BLD: 0 /100 WBCS — SIGNIFICANT CHANGE UP (ref 0–0)
PLATELET # BLD AUTO: 131 K/UL — LOW (ref 150–400)
RBC # BLD: 4.02 M/UL — LOW (ref 4.2–5.8)
RBC # FLD: 13 % — SIGNIFICANT CHANGE UP (ref 10.3–14.5)
SPECIMEN SOURCE: SIGNIFICANT CHANGE UP
WBC # BLD: 10.23 K/UL — SIGNIFICANT CHANGE UP (ref 3.8–10.5)
WBC # FLD AUTO: 10.23 K/UL — SIGNIFICANT CHANGE UP (ref 3.8–10.5)

## 2022-12-07 RX ORDER — FUROSEMIDE 40 MG
20 TABLET ORAL
Refills: 0 | Status: DISCONTINUED | OUTPATIENT
Start: 2022-12-07 | End: 2022-12-16

## 2022-12-07 RX ORDER — NYSTATIN CREAM 100000 [USP'U]/G
1 CREAM TOPICAL
Refills: 0 | Status: DISCONTINUED | OUTPATIENT
Start: 2022-12-07 | End: 2022-12-16

## 2022-12-07 RX ORDER — CHLORHEXIDINE GLUCONATE 213 G/1000ML
1 SOLUTION TOPICAL
Refills: 0 | Status: DISCONTINUED | OUTPATIENT
Start: 2022-12-07 | End: 2022-12-16

## 2022-12-07 RX ORDER — FUROSEMIDE 40 MG
20 TABLET ORAL ONCE
Refills: 0 | Status: COMPLETED | OUTPATIENT
Start: 2022-12-07 | End: 2022-12-07

## 2022-12-07 RX ADMIN — Medication 650 MILLIGRAM(S): at 01:09

## 2022-12-07 RX ADMIN — NYSTATIN CREAM 1 APPLICATION(S): 100000 CREAM TOPICAL at 05:12

## 2022-12-07 RX ADMIN — RIVAROXABAN 20 MILLIGRAM(S): KIT at 17:14

## 2022-12-07 RX ADMIN — CHLORHEXIDINE GLUCONATE 1 APPLICATION(S): 213 SOLUTION TOPICAL at 17:16

## 2022-12-07 RX ADMIN — NYSTATIN CREAM 1 APPLICATION(S): 100000 CREAM TOPICAL at 17:14

## 2022-12-07 RX ADMIN — Medication 650 MILLIGRAM(S): at 08:55

## 2022-12-07 RX ADMIN — Medication 650 MILLIGRAM(S): at 08:25

## 2022-12-07 RX ADMIN — Medication 20 MILLIGRAM(S): at 15:02

## 2022-12-07 NOTE — PROGRESS NOTE ADULT - SUBJECTIVE AND OBJECTIVE BOX
Neurology Progress Note    S: Patient seen and examined. No new events overnight MR neg for stroke     Medication:  acetaminophen     Tablet .. 650 milliGRAM(s) Oral every 6 hours PRN  melatonin 3 milliGRAM(s) Oral at bedtime PRN  nystatin Powder 1 Application(s) Topical two times a day  ondansetron Injectable 4 milliGRAM(s) IV Push every 8 hours PRN  rivaroxaban 20 milliGRAM(s) Oral with dinner      Vitals:  Vital Signs Last 24 Hrs  T(C): 36.8 (07 Dec 2022 06:30), Max: 37.1 (06 Dec 2022 21:40)  T(F): 98.2 (07 Dec 2022 06:30), Max: 98.7 (06 Dec 2022 21:40)  HR: 51 (07 Dec 2022 06:30) (51 - 58)  BP: 123/78 (07 Dec 2022 06:30) (108/71 - 129/81)  BP(mean): --  RR: 18 (07 Dec 2022 06:30) (18 - 19)  SpO2: 94% (07 Dec 2022 06:30) (93% - 96%)    Parameters below as of 07 Dec 2022 06:30  Patient On (Oxygen Delivery Method): room air        General Exam:   Constitutional: Male, appears stated age, in no apparent distress including pain  Head: Normocephalic & atraumatic.  Respiratory: No increased work of breathing  Extremities: b/l LE pitting edema  Skin: +b/l overlying chronic skin changes over b/l LE    Cardiovascular (>2): atrial fibrillation on monitor, HR 80s.    Neurological (>12):  MS: Awake, alert, oriented to person, place, situation, time. Normal affect. Follows all commands.    Language: Speech is clear, fluent with good repetition & comprehension (able to name objects thumb)    CNs: VFF to counting fingers. EOMI no nystagmus, no diplopia. V1-3 intact to LT, well developed masseter muscles b/l. No facial asymmetry b/l, full eye closure strength b/l. Hearing grossly normal (rubbing fingers) b/l. Symmetric palate elevation in midline. Gag reflex deferred. Head turning & shoulder shrug intact b/l. Tongue midline, normal movements, no atrophy.    Motor: Normal muscle bulk. b/l UE postural and action tremor.  Slight R pronator drift.              Deltoid	Biceps	Triceps	Wrist	Finger ABd	   R	4+	5	5	5	4+		4+ 	  L	5	5	5	5	5		5    	H-Flex	K-Flex	K-Ext	D-Flex	P-Flex  R	4+	5	5	4+	5	RLE with +straight raise	   L	5	5	5	5	5	     Sensation: Intact to LT b/l throughout.     Cortical: Extinction on DSS (neglect): none    Reflexes: with significant pain when checking for ankle clonus, deferred              Biceps(C5)       BR(C6)     Triceps(C7)               Patellar(L4)    Achilles(S1)    Plantar Resp  R	2	          2	             2		        1		    		mute  L	2	          2	             2		        1		    		mute    Coordination: intact rapid-alt movements. No dysmetria to FTN     Gait: unable to assess due to pain      I personally reviewed the below data/images/labs:      CBC Full  -  ( 06 Dec 2022 07:43 )  WBC Count : 12.32 K/uL  RBC Count : 3.72 M/uL  Hemoglobin : 12.2 g/dL  Hematocrit : 37.5 %  Platelet Count - Automated : 128 K/uL  Mean Cell Volume : 100.8 fl  Mean Cell Hemoglobin : 32.8 pg  Mean Cell Hemoglobin Concentration : 32.5 gm/dL  Auto Neutrophil # : 9.54 K/uL  Auto Lymphocyte # : 1.39 K/uL  Auto Monocyte # : 1.28 K/uL  Auto Eosinophil # : 0.11 K/uL  Auto Basophil # : 0.00 K/uL  Auto Neutrophil % : 77.4 %  Auto Lymphocyte % : 11.3 %  Auto Monocyte % : 10.4 %  Auto Eosinophil % : 0.9 %  Auto Basophil % : 0.0 %        140  |  104  |  23  ----------------------------<  86  3.9   |  23  |  0.96    Ca    9.1      06 Dec 2022 07:43  Mg     1.8         TPro  6.0  /  Alb  3.6  /  TBili  1.0  /  DBili  x   /  AST  57<H>  /  ALT  26  /  AlkPhos  58      LIVER FUNCTIONS - ( 06 Dec 2022 07:43 )  Alb: 3.6 g/dL / Pro: 6.0 g/dL / ALK PHOS: 58 U/L / ALT: 26 U/L / AST: 57 U/L / GGT: x           PT/INR - ( 05 Dec 2022 15:00 )   PT: 26.8 sec;   INR: 2.29 ratio         PTT - ( 05 Dec 2022 15:00 )  PTT:35.2 sec  Urinalysis Basic - ( 05 Dec 2022 18:24 )    Color: Yellow / Appearance: Clear / S.049 / pH: x  Gluc: x / Ketone: Small  / Bili: Negative / Urobili: Negative   Blood: x / Protein: 30 mg/dL / Nitrite: Negative   Leuk Esterase: Negative / RBC: 11 /hpf / WBC 3 /HPF   Sq Epi: x / Non Sq Epi: 2 /hpf / Bacteria: Negative      < from: CT Angio Neck Stroke Protocol w/ IV Cont (22 @ 15:25) >  IMPRESSION:    HEAD CT: No acute intracranial hemorrhage or acute territorial infarction.    If symptoms persist consider follow up head CT or MRI, MRA  if no   contraindication.    CTA COW:  Patent intracranial circulation without flow limiting stenosis   or large vessel occlusion.    CTA NECK: Patent, ECAs, ICAs, no  hemodynamically significant stenosis at    ICA origins by NASCET criteria.  Bilateral vertebral arteries are patent without flow limiting stenosis.    < end of copied text >    < from: MR Head No Cont (22 @ 19:43) >    ACC: [id number] EXAM:  MR BRAIN                          PROCEDURE DATE:  2022          INTERPRETATION:  CLINICAL STATEMENT: Pain.    TECHNIQUE: MRI of the brain was performed without gadolinium.    COMPARISON: CT head 2022    FINDINGS:  There is mild diffuse parenchymal volume loss. There are T2 prolongation   signal abnormalities in the periventricular subcortical white matter   likely related to mild chronic microvascular ischemic changes.    There is no acute parenchymal hemorrhage, parenchymal mass, mass effect   or midline shift. There is no extra-axial fluid collection.  There is no   hydrocephalus.  There is no acute infarct.    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    IMPRESSION:  No acute intracranial hemorrhage or acute infarct.    --- End of Report ---            WOLF PENA MD; Attending Radiologist  This document has been electronically signed. Dec  7 2022  8:44AM    < end of copied text >

## 2022-12-07 NOTE — CONSULT NOTE ADULT - ASSESSMENT
82M w/ pmhx of afib on xarelto, s/p L hip IMN (most recently s/p R hip IMN (Dr. Godinez 10/31/19) presents with acute onset RLE weakness.    # WCT -7 beats  -ECHO revealed normal LVEF, w R sided enlargement and mod-severe TR  -some pitting edema in the LE  -Venous dopplers negative for DVT  -likely related to r sided CHF  -start low dose lasix 20mg PO Q48hrs  -pt in slow afib off meds which preclude BB use  -if he develops further arrhythmia may need to consider PPM eval    #Afib  -cont AC  -rate controlled/freda off meds  -ECHO as above    #Weakness  -neuro w/u neg for CVA  -w/u per primary team    70 minutes spent on total encounter; more than 50% of the visit was spent counseling and/or coordinating care by the attending physician.

## 2022-12-07 NOTE — PROGRESS NOTE ADULT - ASSESSMENT
82M w/ pmhx of afib on xarelto, s/p L hip IMN (most recently s/p R hip IMN (Dr. Godinez 10/31/19) presents with acute onset R sided weakness.    # unsteady gait  MRI no acute infarct  fall precautions  CT L spine L4-L5 there is severe right subarticular zone stenosis and severe right neural foraminal stenosis  PT consult  LE dopplers neg dvt  TTE Severe right atrial enlargement. borderline pulmonary hypertension. Moderate-severe tricuspid regurgitation. EF64%  ortho consult for "subsidence of the bone at the fracture site" sp recent IMN    # Leukocytosis  # LE wound  BC NTD  wound care  fu CBC  Trend leukocytosis and fever curve  ID following monitor off abx    # Chronic atrial fibrillation  Cont. Xarelto QHS    # At risk for depression  On Lexapro    DVT prophylaxis. Xarelto.    Please call [facility] with questions [phone]. 82M w/ pmhx of afib on xarelto, s/p L hip IMN (most recently s/p R hip IMN (Dr. Godinez 10/31/19) presents with acute onset R sided weakness.    # unsteady gait  MRI no acute infarct  fall precautions  CT L spine L4-L5 there is severe right subarticular zone stenosis and severe right neural foraminal stenosis  PT consult  LE dopplers neg dvt  ortho consult for "subsidence of the bone at the fracture site" sp recent IMN    # Leukocytosis  # LE wound  BC NTD  wound care  fu CBC  Trend leukocytosis and fever curve  ID following monitor off abx    # Chronic atrial fibrillation  Cont. Xarelto QHS  TTE Severe right atrial enlargement. borderline pulmonary hypertension. Moderate-severe tricuspid regurgitation. EF64%  cardio consult for WCT    # At risk for depression  On Lexapro    DVT prophylaxis. Xarelto.    Please call [facility] with questions [phone]. 82M w/ pmhx of afib on xarelto, s/p L hip IMN (most recently s/p R hip IMN (Dr. Godinez 10/31/19) presents with acute onset R sided weakness.    # unsteady gait  MRI no acute infarct  fall precautions  CT L spine L4-L5 there is severe right subarticular zone stenosis and severe right neural foraminal stenosis  PT consult  LE dopplers neg dvt  ortho consult for "subsidence of the bone at the fracture site" sp recent IMN    # Leukocytosis  # LE wound  BC NTD  wound care  fu CBC  Trend leukocytosis and fever curve  ID following monitor off abx    # Chronic atrial fibrillation  Cont. Xarelto QHS  TTE Severe right atrial enlargement. borderline pulmonary hypertension. Moderate-severe tricuspid regurgitation. EF64%  cardio consult for WCT    # At risk for depression  On Lexapro    DVT prophylaxis. Xarelto.    dw son in law at bedside  lives home with whole family including son in law    Please call [facility] with questions [phone].

## 2022-12-07 NOTE — PROGRESS NOTE ADULT - ASSESSMENT
82M RH w/ afib on xarelto, s/p L hip IMN (2013), s/p R hip IMN (2019) presents with acute onset RLE weakness. LKN 12/5/22 at 7am. Pt last took xarelto at 6:30am. While showering, he felt RLE sudden onset weakness, no associated pain or back pain at that time. He reports increased R hip area pain and numbness/tingling with associated leg shaking due to pain. Pt was hanging onto the sink for at least 3-4 hours and reports not being able to get up due to RLE weakness. NIHSS: 0, preMRS: 2 (ambulates with a walker). Neuro exam notable for slight +R pronator, +straight leg raise. CTH/CTA neg  NIHSS4  premrs2-3 walks with cane/walker   MRI brain neg for infarct     Impression: acute RLE weakness and pain likely 2/2 R hip pathology vs. lumbosacral pathology, less likely stroke; Stroke ruled out     Recommendations:  - f/u hip imaging  - r/o infection   - consider orthopedic evaluation  - continue home xarelto for stroke prevention  - monitor on telemetry  - stroke risk factor modification and counseling   - check HA1c, lipid panel, Utox  - NPO until bedside dysphagia screen   - High dose statin therapy - atorvastatin 40mg PO daily. LDL goal <70mg/dL.   - PT/OT/SS/SLP, OOBC  - check FS, glucose control <180  - GI/DVT ppx  - Thank you for allowing me to participate in the care of this patient. Call with questions.       Carloz Del Castillo MD  Vascular Neurology  Office: [phone] .

## 2022-12-07 NOTE — PATIENT PROFILE ADULT - FALL HARM RISK - HARM RISK INTERVENTIONS

## 2022-12-07 NOTE — PROGRESS NOTE ADULT - ASSESSMENT
Patient is a 82 year old male with PMH of Afib on xarelto, s/p L hip IMN (most recently s/p R hip IMN (Dr. Godinez 10/31/19) presents with acute onset RLE weakness and was unable to move from sink for at least 2-3 hours until daughter came home and called EMS. Patient noted with significant erythematous legs with swelling, daughter has picture on phone- now has resolved.  Acute onset RLE weakness, unclear etiology, no stroke on MRI  Leukocytosis - suspect likely reactive in setting of above events   Rule out infection   afebrile, WBC trended down   UA/Ucx negative, no urinary sx   CXR negative, no resp sx    abd benign, nontender   started on empiric clindamycin in the ER   has loose tooth with no pain, was planning for outpt extraction    b/l LE erythema resolved, 2 small b/l 2nd toe wounds with eschar, does not appear infected    R groin/inguinal wound with no s/o infection   Imaging reviewed as above     CTH and MRI negative for CVA    DVT study negative    CT L spine with no infectious source, has advanced degenerative changes, high grade spinal stenosis      Recommendations:  Follow blood cultures  off clindamycin - no s/o infection noted  Monitor off antibiotics  Neurology following  PT following  Monitor temps/WBC      D/w Dr. Kwabena Jackson M.D.  [facility], Division of Infectious Diseases  [phone]  After 5pm on weekdays and all day on weekends - please call [phone]

## 2022-12-07 NOTE — CONSULT NOTE ADULT - SUBJECTIVE AND OBJECTIVE BOX
CARDIOLOGY CONSULT - Dr. Marrufo  Date of Service: 12/7/2022      HPI:  82M w/ pmhx of afib on xarelto, s/p L hip IMN (most recently s/p R hip IMN (Dr. Godinez 10/31/19) presents with acute onset RLE weakness. LKN 12/5/22 at 7am. Pt last took xarelto at 6:30am. Per pt and pt's daughter at bedside, pt woke up normally this morning and went to shower. While showering, he felt RLE sudden onset weakness, no associated pain or back pain at that time. He denies any numbness/tingling, vision changes, or UE weakness. He went to shave and while at sink felt too weak to stand and had to hold on to sink. Daughter came to bathroom ~2 hours later to find him holding sink leaning with R side. He aslo reports increased R hip area pain and numbness/tingling with associated leg shaking due to pain. Pt was hanging onto the sink for at least 2-3 hours and reports not being able to get up due to RLE weakness. At baseline, pt ambulates with a walker, lives with his daughter, does most ADLs independently. Denies any recent symptoms such as fevers, chills, cough, dysuria, change in urination, incontinence. States he had mechanical fall 3 months ago which he did not tell daughter in living room.     In ER: Given NS 1L, IV Clindamycin (05 Dec 2022 21:53)      PAST MEDICAL & SURGICAL HISTORY:  AF (atrial fibrillation)      Hernia, inguinal, right              PREVIOUS DIAGNOSTIC TESTING:    [ ] Echocardiogram:  [ ]  Catheterization:  [ ] Stress Test:  	    MEDICATIONS:  MEDICATIONS  (STANDING):  nystatin Powder 1 Application(s) Topical two times a day  rivaroxaban 20 milliGRAM(s) Oral with dinner      FAMILY HISTORY:  No pertinent family history in first degree relatives        SOCIAL HISTORY:    [ ] Non-smoker  [ ] Smoker  [ ] Alcohol    Allergies    No Known Allergies    Intolerances    	    REVIEW OF SYSTEMS:  CONSTITUTIONAL: No fever, weight loss, or fatigue  EYES: No eye pain, visual disturbances, or discharge  ENMT:  No difficulty hearing, tinnitus, vertigo; No sinus or throat pain  NECK: No pain or stiffness  RESPIRATORY: No cough, wheezing, chills or hemoptysis; No Shortness of Breath  CARDIOVASCULAR: No chest pain, palpitations, passing out, dizziness, or leg swelling  GASTROINTESTINAL: No abdominal or epigastric pain. No nausea, vomiting, or hematemesis; No diarrhea or constipation. No melena or hematochezia.  GENITOURINARY: No dysuria, frequency, hematuria, or incontinence  NEUROLOGICAL: No headaches, memory loss, loss of strength, numbness, or tremors  SKIN: No itching, burning, rashes, or lesions   	    [ ] All others negative	  [ ] Unable to obtain    PHYSICAL EXAM:  T(C): 36.7 (12-07-22 @ 11:13), Max: 37.1 (12-06-22 @ 21:40)  HR: 55 (12-07-22 @ 11:13) (51 - 58)  BP: 120/74 (12-07-22 @ 11:13) (108/71 - 129/81)  RR: 18 (12-07-22 @ 11:13) (18 - 19)  SpO2: 94% (12-07-22 @ 11:13) (93% - 96%)  Wt(kg): --  I&O's Summary    06 Dec 2022 07:01  -  07 Dec 2022 07:00  --------------------------------------------------------  IN: 240 mL / OUT: 300 mL / NET: -60 mL    07 Dec 2022 07:01  -  07 Dec 2022 13:35  --------------------------------------------------------  IN: 480 mL / OUT: 1250 mL / NET: -770 mL        Appearance: Normal	  Psychiatry: A & O x 3, Mood & affect appropriate  HEENT:   Normal oral mucosa, PERRL, EOMI	  Lymphatic: No lymphadenopathy  Cardiovascular: Normal S1 S2,RRR, No JVD, No murmurs  Respiratory: Lungs clear to auscultation	  Gastrointestinal:  Soft, Non-tender, + BS	  Skin: No rashes, No ecchymoses, No cyanosis	  Neurologic: Non-focal  Extremities: Normal range of motion, No clubbing, cyanosis or edema  Vascular: Peripheral pulses palpable 2+ bilaterally    TELEMETRY: 	    ECG:  	  RADIOLOGY:  OTHER: 	  	  LABS:	 	    CARDIAC MARKERS:                                  12.2   12.32 )-----------( 128      ( 06 Dec 2022 07:43 )             37.5     12-06    140  |  104  |  23  ----------------------------<  86  3.9   |  23  |  0.96    Ca    9.1      06 Dec 2022 07:43  Mg     1.8     12-06    TPro  6.0  /  Alb  3.6  /  TBili  1.0  /  DBili  x   /  AST  57<H>  /  ALT  26  /  AlkPhos  58  12-06    PT/INR - ( 05 Dec 2022 15:00 )   PT: 26.8 sec;   INR: 2.29 ratio         PTT - ( 05 Dec 2022 15:00 )  PTT:35.2 sec  proBNP:   Lipid Profile:   HgA1c:   TSH:     ASSESSMENT/PLAN: 	              70 minutes spent on total encounter; more than 50% of the visit was spent counseling and/or coordinating care by the attending physician.

## 2022-12-07 NOTE — PROVIDER CONTACT NOTE (OTHER) - BACKGROUND
82 year old male, admitted for weakness
82 year old male admitted for weakness. PMG AFib, leukocytosis.

## 2022-12-07 NOTE — PROVIDER CONTACT NOTE (OTHER) - ACTION/TREATMENT ORDERED:
Monitoring in progress.
FRAN- Niyah Guevara made aware and agreed to put an order for wound care consult and to continue monitor the patient.

## 2022-12-07 NOTE — PROGRESS NOTE ADULT - SUBJECTIVE AND OBJECTIVE BOX
Patient is a 82y old  Male who presents with a chief complaint of R sided weakness (07 Dec 2022 09:04)      SUBJECTIVE / OVERNIGHT EVENTS:    Patient seen and examined. no cp sob.      Vital Signs Last 24 Hrs  T(C): 36.7 (07 Dec 2022 11:13), Max: 37.1 (06 Dec 2022 21:40)  T(F): 98 (07 Dec 2022 11:), Max: 98.7 (06 Dec 2022 21:40)  HR: 55 (07 Dec 2022 11:) (51 - 58)  BP: 120/74 (07 Dec 2022 11:13) (108/71 - 129/81)  BP(mean): --  RR: 18 (07 Dec 2022 11:) (18 - 19)  SpO2: 94% (07 Dec 2022 11:) (93% - 96%)    Parameters below as of 07 Dec 2022 11:13  Patient On (Oxygen Delivery Method): room air      I&O's Summary    06 Dec 2022 07:01  -  07 Dec 2022 07:00  --------------------------------------------------------  IN: 240 mL / OUT: 300 mL / NET: -60 mL    07 Dec 2022 07:01  -  07 Dec 2022 12:13  --------------------------------------------------------  IN: 480 mL / OUT: 800 mL / NET: -320 mL        PE:  GENERAL: NAD, AAOx3  CHEST/LUNG: CTABL, No wheeze  HEART: Regular rate and rhythm; + murmur  ABDOMEN: Soft, Nontender, Nondistended; Bowel sounds present  EXTREMITIES:  2+ Peripheral Pulses, chronic venous stasis, FROM, + 2 le edema  NEURO: No focal deficits    LABS:                        12.2   12.32 )-----------( 128      ( 06 Dec 2022 07:43 )             37.5     12-06    140  |  104  |  23  ----------------------------<  86  3.9   |  23  |  0.96    Ca    9.1      06 Dec 2022 07:43  Mg     1.8         TPro  6.0  /  Alb  3.6  /  TBili  1.0  /  DBili  x   /  AST  57<H>  /  ALT  26  /  AlkPhos  58  12    PT/INR - ( 05 Dec 2022 15:00 )   PT: 26.8 sec;   INR: 2.29 ratio         PTT - ( 05 Dec 2022 15:00 )  PTT:35.2 sec  CAPILLARY BLOOD GLUCOSE        CARDIAC MARKERS ( 05 Dec 2022 15:00 )  x     / x     / 192 U/L / x     / x          Urinalysis Basic - ( 05 Dec 2022 18:24 )    Color: Yellow / Appearance: Clear / S.049 / pH: x  Gluc: x / Ketone: Small  / Bili: Negative / Urobili: Negative   Blood: x / Protein: 30 mg/dL / Nitrite: Negative   Leuk Esterase: Negative / RBC: 11 /hpf / WBC 3 /HPF   Sq Epi: x / Non Sq Epi: 2 /hpf / Bacteria: Negative        RADIOLOGY & ADDITIONAL TESTS:    Imaging Personally Reviewed:  [x] YES  [ ] NO    Consultant(s) Notes Reviewed:  [x] YES  [ ] NO    MEDICATIONS  (STANDING):  nystatin Powder 1 Application(s) Topical two times a day  rivaroxaban 20 milliGRAM(s) Oral with dinner    MEDICATIONS  (PRN):  acetaminophen     Tablet .. 650 milliGRAM(s) Oral every 6 hours PRN Temp greater or equal to 38C (100.4F), Mild Pain (1 - 3)  melatonin 3 milliGRAM(s) Oral at bedtime PRN Insomnia  ondansetron Injectable 4 milliGRAM(s) IV Push every 8 hours PRN Nausea and/or Vomiting      Care Discussed with Consultants/Other Providers [x] YES  [ ] NO    HEALTH ISSUES - PROBLEM Dx:  Suspected pulmonary embolism    Suspected deep vein thrombosis (DVT)    Weakness of lower extremity, unspecified laterality    Chronic atrial fibrillation    Leukocytosis    At risk for depression    DVT prophylaxis

## 2022-12-07 NOTE — PROVIDER CONTACT NOTE (OTHER) - ASSESSMENT
Pt resting, asymptomatic VSS: /78, HR 51, Temp 98.2, SpO2 94%, RR 18
pt. AOx4. VSS. c/o pain when ambulating. DTI on Right heel noted.

## 2022-12-07 NOTE — PROGRESS NOTE ADULT - SUBJECTIVE AND OBJECTIVE BOX
[facility] DIVISION OF INFECTIOUS DISEASES  GIOVANY Ferguson Y. Patel, S. Shah, G. Delmer  [phone]  ([phone] - weekdays after 5pm and weekends)    Name: MARY BO  Age/Gender: 82y Male  MRN: [id number]    Interval History:  Patient seen and examined this morning.   Feels better, has some R rib pain from standing too long at home  States he has as superficial wound along his right groin/inguinal area  Notes reviewed. No concerning overnight events.  Afebrile.   Allergies: No Known Allergies      Objective:  Vitals:   T(F): 98.2 (22 @ 06:30), Max: 98.7 (22 @ 21:40)  HR: 51 (22 @ 06:30) (51 - 58)  BP: 123/78 (22 @ 06:30) (108/71 - 129/81)  RR: 18 (22 @ 06:30) (18 - 19)  SpO2: 94% (22 @ 06:30) (93% - 96%)  Physical Examination:  General: no acute distress  HEENT: NC/AT, EOMI, anicteric, neck supple  Cardio: S1, S2 present, normal rate  Resp: clear to auscultation bilaterally  Abd: soft, NT, ND, + BS  Neuro: AAOx3, no obvious focal deficits  Ext: bl LE edema, no cyanosis  Skin: no visible rash, superficial wound in R inguinal area no s/o infection  Psych: appropriate affect and mood for situation  Lines: PIV    Laboratory Studies:  CBC:                       12.2   12.32 )-----------( 128      ( 06 Dec 2022 07:43 )             37.5     WBC Trend:  12.32 22 @ 07:43  17.22 22 @ 15:00    CMP:     140  |  104  |  23  ----------------------------<  86  3.9   |  23  |  0.96    Ca    9.1      06 Dec 2022 07:43  Mg     1.8         TPro  6.0  /  Alb  3.6  /  TBili  1.0  /  DBili  x   /  AST  57<H>  /  ALT  26  /  AlkPhos  58      Creatinine, Serum: 0.96 mg/dL (22 @ 07:43)  Creatinine, Serum: 1.08 mg/dL (22 @ 15:00)    LIVER FUNCTIONS - ( 06 Dec 2022 07:43 )  Alb: 3.6 g/dL / Pro: 6.0 g/dL / ALK PHOS: 58 U/L / ALT: 26 U/L / AST: 57 U/L / GGT: x           Urinalysis Basic - ( 05 Dec 2022 18:24 )  Color: Yellow / Appearance: Clear / S.049 / pH: x  Gluc: x / Ketone: Small  / Bili: Negative / Urobili: Negative   Blood: x / Protein: 30 mg/dL / Nitrite: Negative   Leuk Esterase: Negative / RBC: 11 /hpf / WBC 3 /HPF   Sq Epi: x / Non Sq Epi: 2 /hpf / Bacteria: Negative    Microbiology: reviewed   Culture - Urine (collected 22 @ 18:24)  Source: Clean Catch Clean Catch (Midstream)  Final Report (22 @ 07:48):    <10,000 CFU/mL Normal Urogenital Marla    SARS-CoV-2 Result: NotDetec (05 Dec 2022 18:25)    Radiology: reviewed     Medications:  acetaminophen     Tablet .. 650 milliGRAM(s) Oral every 6 hours PRN  melatonin 3 milliGRAM(s) Oral at bedtime PRN  nystatin Powder 1 Application(s) Topical two times a day  ondansetron Injectable 4 milliGRAM(s) IV Push every 8 hours PRN  rivaroxaban 20 milliGRAM(s) Oral with dinner    Current Antimicrobials: none  Prior/Completed Antimicrobials:  clindamycin IVPB

## 2022-12-08 LAB
CRP SERPL-MCNC: 22 MG/L — HIGH (ref 0–4)
ERYTHROCYTE [SEDIMENTATION RATE] IN BLOOD: 5 MM/HR — SIGNIFICANT CHANGE UP (ref 0–20)
HCT VFR BLD CALC: 40.2 % — SIGNIFICANT CHANGE UP (ref 39–50)
HGB BLD-MCNC: 13.4 G/DL — SIGNIFICANT CHANGE UP (ref 13–17)
MCHC RBC-ENTMCNC: 32.4 PG — SIGNIFICANT CHANGE UP (ref 27–34)
MCHC RBC-ENTMCNC: 33.3 GM/DL — SIGNIFICANT CHANGE UP (ref 32–36)
MCV RBC AUTO: 97.1 FL — SIGNIFICANT CHANGE UP (ref 80–100)
MRSA PCR RESULT.: SIGNIFICANT CHANGE UP
NRBC # BLD: 0 /100 WBCS — SIGNIFICANT CHANGE UP (ref 0–0)
PLATELET # BLD AUTO: 139 K/UL — LOW (ref 150–400)
RBC # BLD: 4.14 M/UL — LOW (ref 4.2–5.8)
RBC # FLD: 13 % — SIGNIFICANT CHANGE UP (ref 10.3–14.5)
S AUREUS DNA NOSE QL NAA+PROBE: SIGNIFICANT CHANGE UP
WBC # BLD: 10.71 K/UL — HIGH (ref 3.8–10.5)
WBC # FLD AUTO: 10.71 K/UL — HIGH (ref 3.8–10.5)

## 2022-12-08 PROCEDURE — 76377 3D RENDER W/INTRP POSTPROCES: CPT | Mod: 26

## 2022-12-08 PROCEDURE — 72192 CT PELVIS W/O DYE: CPT | Mod: 26

## 2022-12-08 RX ORDER — LIDOCAINE 4 G/100G
1 CREAM TOPICAL EVERY 24 HOURS
Refills: 0 | Status: DISCONTINUED | OUTPATIENT
Start: 2022-12-08 | End: 2022-12-16

## 2022-12-08 RX ADMIN — NYSTATIN CREAM 1 APPLICATION(S): 100000 CREAM TOPICAL at 17:43

## 2022-12-08 RX ADMIN — LIDOCAINE 1 PATCH: 4 CREAM TOPICAL at 13:12

## 2022-12-08 RX ADMIN — CHLORHEXIDINE GLUCONATE 1 APPLICATION(S): 213 SOLUTION TOPICAL at 05:23

## 2022-12-08 RX ADMIN — NYSTATIN CREAM 1 APPLICATION(S): 100000 CREAM TOPICAL at 05:23

## 2022-12-08 RX ADMIN — Medication 650 MILLIGRAM(S): at 22:31

## 2022-12-08 RX ADMIN — RIVAROXABAN 20 MILLIGRAM(S): KIT at 16:39

## 2022-12-08 RX ADMIN — LIDOCAINE 1 PATCH: 4 CREAM TOPICAL at 19:00

## 2022-12-08 RX ADMIN — Medication 2 DROP(S): at 05:43

## 2022-12-08 RX ADMIN — Medication 650 MILLIGRAM(S): at 23:30

## 2022-12-08 NOTE — PROGRESS NOTE ADULT - ASSESSMENT
Patient is a 82 year old male with PMH of Afib on xarelto, s/p L hip IMN (most recently s/p R hip IMN (Dr. Godinez 10/31/19) presents with acute onset RLE weakness and was unable to move from sink for at least 2-3 hours until daughter came home and called EMS. Patient noted with significant erythematous legs with swelling, daughter has picture on phone- now has resolved.    Acute onset RLE weakness, unclear etiology, no stroke on MRI  Leukocytosis - suspect likely reactive in setting of above events   No infectious source found to date    afebrile, WBC trended down   UA/Ucx negative, CXR negative   Blood cultures NGTD  x2   abd benign, nontender   s/p clindamycin in the ER, now off abx    has loose tooth with no pain, was planning for outpt extraction    b/l LE erythema resolved, 2 small b/l 2nd toe wounds with eschar, does not appear infected    R groin/inguinal wound with no s/o infection   Imaging reviewed, no infectious source found     Recommendations:  Continue off antibiotics  PT following  Monitor temps/WBC      Slim Jackson M.D.  [facility], Division of Infectious Diseases  [phone]  After 5pm on weekdays and all day on weekends - please call [phone]

## 2022-12-08 NOTE — PROGRESS NOTE ADULT - ASSESSMENT
82M w/ pmhx of afib on xarelto, s/p L hip IMN (most recently s/p R hip IMN (Dr. Godinez 10/31/19) presents with acute onset R sided weakness.    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 Xarelto QHS  TTE Severe right atrial enlargement. borderline pulmonary hypertension. Moderate-severe tricuspid regurgitation. EF64%  cardio following  lasix  MWF    # depression  On Lexapro    DVT prophylaxis. Xarelto.    dw son in law at bedside 12/7  lives home with whole family including son in law    Please call [facility] with questions [phone].

## 2022-12-08 NOTE — PROGRESS NOTE ADULT - SUBJECTIVE AND OBJECTIVE BOX
CARDIOLOGY FOLLOW UP NOTE - DR. ALVES    Patient Name: MARY BO  Date of Service: 12-08-22 @ 10:47    Patient seen and examined    Subjective:    cv: denies chest pain, dyspnea, palpitations, dizziness  pulmonary: denies cough  GI: denies abdominal pain, nausea, vomiting  vascular/legs: no edema   skin: no rash  ROS: otherwise negative   overnight events:      PHYSICAL EXAM:  T(C): 36.5 (12-08-22 @ 08:15), Max: 37 (12-08-22 @ 04:19)  HR: 57 (12-08-22 @ 08:15) (54 - 74)  BP: 132/74 (12-08-22 @ 08:15) (118/67 - 133/86)  RR: 18 (12-08-22 @ 08:15) (18 - 18)  SpO2: 95% (12-08-22 @ 08:15) (93% - 96%)  Wt(kg): --  I&O's Summary    07 Dec 2022 07:01  -  08 Dec 2022 07:00  --------------------------------------------------------  IN: 480 mL / OUT: 1250 mL / NET: -770 mL      Daily     Daily     Appearance: Normal	  Cardiovascular: Normal S1 S2, freda irreg   Respiratory: Lungs clear to auscultation	  Gastrointestinal:  Soft, Non-tender, + BS	  Extremities: Normal range of motion, b/l edema       Home Medications:  escitalopram:  (05 Dec 2022 21:58)  rivaroxaban 20 mg oral tablet: 1 tab(s) orally once a day (before a meal) (05 Dec 2022 21:58)      MEDICATIONS  (STANDING):  chlorhexidine 2% Cloths 1 Application(s) Topical <User Schedule>  furosemide    Tablet 20 milliGRAM(s) Oral <User Schedule>  nystatin Powder 1 Application(s) Topical two times a day  rivaroxaban 20 milliGRAM(s) Oral with dinner      TELEMETRY: 	    ECG:  	  RADIOLOGY:   DIAGNOSTIC TESTING:  [ ] Echocardiogram:  [ ] Catheterization:  [ ] Stress Test:    OTHER: 	    LABS:	 	    CARDIAC MARKERS:        Troponin T, High Sensitivity Result: 45 ng/L (12-06 @ 07:43)  Troponin T, High Sensitivity Result: 26 ng/L (12-05 @ 15:00)                                13.4   10.71 )-----------( 139      ( 08 Dec 2022 06:42 )             40.2           proBNP:     Lipid Profile:   HgA1c:     Creatinine, Serum: 0.96 mg/dL (12-06-22 @ 07:43)  Creatinine, Serum: 1.08 mg/dL (12-05-22 @ 15:00)

## 2022-12-08 NOTE — PROGRESS NOTE ADULT - SUBJECTIVE AND OBJECTIVE BOX
Patient is a 82y old  Male who presents with a chief complaint of R sided weakness (08 Dec 2022 10:47)      SUBJECTIVE / OVERNIGHT EVENTS:    Patient seen and examined. no cp sob. denies hip pain sitting. co right flank pain.      Vital Signs Last 24 Hrs  T(C): 36.6 (08 Dec 2022 11:15), Max: 37 (08 Dec 2022 04:19)  T(F): 97.8 (08 Dec 2022 11:15), Max: 98.6 (08 Dec 2022 04:19)  HR: 55 (08 Dec 2022 11:15) (55 - 74)  BP: 121/68 (08 Dec 2022 11:15) (118/67 - 133/86)  BP(mean): --  RR: 18 (08 Dec 2022 11:15) (18 - 18)  SpO2: 95% (08 Dec 2022 11:15) (93% - 96%)    Parameters below as of 08 Dec 2022 11:15  Patient On (Oxygen Delivery Method): room air      I&O's Summary    07 Dec 2022 07:01  -  08 Dec 2022 07:00  --------------------------------------------------------  IN: 480 mL / OUT: 1250 mL / NET: -770 mL    08 Dec 2022 07:01  -  08 Dec 2022 12:11  --------------------------------------------------------  IN: 480 mL / OUT: 0 mL / NET: 480 mL        PE:  GENERAL: NAD, AAOx3  CHEST/LUNG: CTABL, No wheeze  HEART: Regular rate and rhythm; + murmur  ABDOMEN: Soft, Nontender, Nondistended; Bowel sounds present  EXTREMITIES:  2+ Peripheral Pulses, chronic venous stasis, FROM, + 1 le edema  NEURO: No focal deficits    LABS:                        13.4   10.71 )-----------( 139      ( 08 Dec 2022 06:42 )             40.2             CAPILLARY BLOOD GLUCOSE                RADIOLOGY & ADDITIONAL TESTS:    Imaging Personally Reviewed:  [x] YES  [ ] NO    Consultant(s) Notes Reviewed:  [x] YES  [ ] NO    MEDICATIONS  (STANDING):  chlorhexidine 2% Cloths 1 Application(s) Topical <User Schedule>  furosemide    Tablet 20 milliGRAM(s) Oral <User Schedule>  nystatin Powder 1 Application(s) Topical two times a day  rivaroxaban 20 milliGRAM(s) Oral with dinner    MEDICATIONS  (PRN):  acetaminophen     Tablet .. 650 milliGRAM(s) Oral every 6 hours PRN Temp greater or equal to 38C (100.4F), Mild Pain (1 - 3)  artificial tears (preservative free) Ophthalmic Solution 2 Drop(s) Both EYES daily PRN Dry Eyes  melatonin 3 milliGRAM(s) Oral at bedtime PRN Insomnia  ondansetron Injectable 4 milliGRAM(s) IV Push every 8 hours PRN Nausea and/or Vomiting      Care Discussed with Consultants/Other Providers [x] YES  [ ] NO    HEALTH ISSUES - PROBLEM Dx:  Suspected pulmonary embolism    Suspected deep vein thrombosis (DVT)    Weakness of lower extremity, unspecified laterality    Chronic atrial fibrillation    Leukocytosis    At risk for depression    DVT prophylaxis

## 2022-12-08 NOTE — PROGRESS NOTE ADULT - SUBJECTIVE AND OBJECTIVE BOX
Neurology Progress Note    S: Patient seen and examined. No new events overnight MR neg for stroke     Medication:    MEDICATIONS  (STANDING):  chlorhexidine 2% Cloths 1 Application(s) Topical <User Schedule>  furosemide    Tablet 20 milliGRAM(s) Oral <User Schedule>  nystatin Powder 1 Application(s) Topical two times a day  rivaroxaban 20 milliGRAM(s) Oral with dinner    MEDICATIONS  (PRN):  acetaminophen     Tablet .. 650 milliGRAM(s) Oral every 6 hours PRN Temp greater or equal to 38C (100.4F), Mild Pain (1 - 3)  artificial tears (preservative free) Ophthalmic Solution 2 Drop(s) Both EYES daily PRN Dry Eyes  melatonin 3 milliGRAM(s) Oral at bedtime PRN Insomnia  ondansetron Injectable 4 milliGRAM(s) IV Push every 8 hours PRN Nausea and/or Vomiting      Vitals:    Vital Signs Last 24 Hrs  T(C): 36.5 (22 @ 08:15), Max: 37 (22 @ 04:19)  T(F): 97.7 (22 @ 08:15), Max: 98.6 (22 @ 04:19)  HR: 57 (22 @ 08:15) (54 - 74)  BP: 132/74 (22 @ 08:15) (118/67 - 133/86)  BP(mean): --  RR: 18 (22 @ 08:15) (18 - 18)  SpO2: 95% (22 @ 08:15) (93% - 96%)          General Exam:   Constitutional: Male, appears stated age, in no apparent distress including pain  Head: Normocephalic & atraumatic.  Respiratory: No increased work of breathing  Extremities: b/l LE pitting edema  Skin: +b/l overlying chronic skin changes over b/l LE    Cardiovascular (>2): atrial fibrillation on monitor, HR 80s.    Neurological (>12):  MS: Awake, alert, oriented to person, place, situation, time. Normal affect. Follows all commands.    Language: Speech is clear, fluent with good repetition & comprehension (able to name objects thumb)    CNs: VFF to counting fingers. EOMI no nystagmus, no diplopia. V1-3 intact to LT, well developed masseter muscles b/l. No facial asymmetry b/l, full eye closure strength b/l. Hearing grossly normal (rubbing fingers) b/l. Symmetric palate elevation in midline. Gag reflex deferred. Head turning & shoulder shrug intact b/l. Tongue midline, normal movements, no atrophy.    Motor: Normal muscle bulk. b/l UE postural and action tremor.  Slight R pronator drift.              Deltoid	Biceps	Triceps	Wrist	Finger ABd	   R	4+	5	5	5	4+		4+ 	  L	5	5	5	5	5		5    	H-Flex	K-Flex	K-Ext	D-Flex	P-Flex  R	4+	5	5	4+	5	RLE with +straight raise	   L	5	5	5	5	5	     Sensation: Intact to LT b/l throughout.     Cortical: Extinction on DSS (neglect): none    Reflexes: with significant pain when checking for ankle clonus, deferred              Biceps(C5)       BR(C6)     Triceps(C7)               Patellar(L4)    Achilles(S1)    Plantar Resp  R	2	          2	             2		        1		    		mute  L	2	          2	             2		        1		    		mute    Coordination: intact rapid-alt movements. No dysmetria to FTN     Gait: unable to assess due to pain      I personally reviewed the below data/images/labs:  CBC Full  -  ( 08 Dec 2022 06:42 )  WBC Count : 10.71 K/uL  RBC Count : 4.14 M/uL  Hemoglobin : 13.4 g/dL  Hematocrit : 40.2 %  Platelet Count - Automated : 139 K/uL  Mean Cell Volume : 97.1 fl  Mean Cell Hemoglobin : 32.4 pg  Mean Cell Hemoglobin Concentration : 33.3 gm/dL  Auto Neutrophil # : x  Auto Lymphocyte # : x  Auto Monocyte # : x  Auto Eosinophil # : x  Auto Basophil # : x  Auto Neutrophil % : x  Auto Lymphocyte % : x  Auto Monocyte % : x  Auto Eosinophil % : x  Auto Basophil % : x        Urinalysis Basic - ( 05 Dec 2022 18:24 )    Color: Yellow / Appearance: Clear / S.049 / pH: x  Gluc: x / Ketone: Small  / Bili: Negative / Urobili: Negative   Blood: x / Protein: 30 mg/dL / Nitrite: Negative   Leuk Esterase: Negative / RBC: 11 /hpf / WBC 3 /HPF   Sq Epi: x / Non Sq Epi: 2 /hpf / Bacteria: Negative      < from: CT Angio Neck Stroke Protocol w/ IV Cont (22 @ 15:25) >  IMPRESSION:    HEAD CT: No acute intracranial hemorrhage or acute territorial infarction.    If symptoms persist consider follow up head CT or MRI, MRA  if no   contraindication.    CTA COW:  Patent intracranial circulation without flow limiting stenosis   or large vessel occlusion.    CTA NECK: Patent, ECAs, ICAs, no  hemodynamically significant stenosis at    ICA origins by NASCET criteria.  Bilateral vertebral arteries are patent without flow limiting stenosis.    < end of copied text >    < from: MR Head No Cont (22 @ 19:43) >    ACC: [id number] EXAM:  MR BRAIN                          PROCEDURE DATE:  2022          INTERPRETATION:  CLINICAL STATEMENT: Pain.    TECHNIQUE: MRI of the brain was performed without gadolinium.    COMPARISON: CT head 2022    FINDINGS:  There is mild diffuse parenchymal volume loss. There are T2 prolongation   signal abnormalities in the periventricular subcortical white matter   likely related to mild chronic microvascular ischemic changes.    There is no acute parenchymal hemorrhage, parenchymal mass, mass effect   or midline shift. There is no extra-axial fluid collection.  There is no   hydrocephalus.  There is no acute infarct.    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    IMPRESSION:  No acute intracranial hemorrhage or acute infarct.    --- End of Report ---            WOLF PENA MD; Attending Radiologist  This document has been electronically signed. Dec  7 2022  8:44AM    < end of copied text >

## 2022-12-08 NOTE — PROGRESS NOTE ADULT - SUBJECTIVE AND OBJECTIVE BOX
[facility] DIVISION OF INFECTIOUS DISEASES  GIOVANY Ferguson Y. Patel, S. Shah, G. Delmer  [phone]  ([phone] - weekdays after 5pm and weekends)    Name: MARY BO  Age/Gender: 82y Male  MRN: [id number]    Interval History:  Patient seen and examined this morning.   Resting comfortably, has R rib pain unchanged  No new complaints noted. Notes reviewed  Afebrile   Allergies: No Known Allergies    Objective:  Vitals:   T(F): 97.7 (12-08-22 @ 08:15), Max: 98.6 (12-08-22 @ 04:19)  HR: 57 (12-08-22 @ 08:15) (54 - 74)  BP: 132/74 (12-08-22 @ 08:15) (118/67 - 133/86)  RR: 18 (12-08-22 @ 08:15) (18 - 18)  SpO2: 95% (12-08-22 @ 08:15) (93% - 96%)  Physical Examination:  General: no acute distress  HEENT: NC/AT, anicteric, neck supple  Respiratory: no acc muscle use, breathing comfortably  Cardiovascular: S1 and S2 present  Gastrointestinal: normal appearing, nondistended  Extremities: mild LE edema, no cyanosis  Skin: no visible rash    Laboratory Studies:  CBC:                       13.4   10.71 )-----------( 139      ( 08 Dec 2022 06:42 )             40.2     WBC Trend:  10.71 12-08-22 @ 06:42  10.23 12-07-22 @ 13:33  12.32 12-06-22 @ 07:43  17.22 12-05-22 @ 15:00    CMP:   Creatinine, Serum: 0.96 mg/dL (12-06-22 @ 07:43)  Creatinine, Serum: 1.08 mg/dL (12-05-22 @ 15:00)    Microbiology: reviewed   Culture - Blood (collected 12-06-22 @ 05:05)  Source: .Blood Blood-Venous  Preliminary Report (12-07-22 @ 18:02):    No growth to date.    Culture - Blood (collected 12-06-22 @ 05:00)  Source: .Blood Blood-Venous  Preliminary Report (12-07-22 @ 18:02):    No growth to date.    Culture - Urine (collected 12-05-22 @ 18:24)  Source: Clean Catch Clean Catch (Midstream)  Final Report (12-07-22 @ 07:48):    <10,000 CFU/mL Normal Urogenital Marla    SARS-CoV-2 Result: NotDetec (05 Dec 2022 18:25)    Radiology: reviewed     Medications:  acetaminophen     Tablet .. 650 milliGRAM(s) Oral every 6 hours PRN  artificial tears (preservative free) Ophthalmic Solution 2 Drop(s) Both EYES daily PRN  chlorhexidine 2% Cloths 1 Application(s) Topical <User Schedule>  furosemide    Tablet 20 milliGRAM(s) Oral <User Schedule>  melatonin 3 milliGRAM(s) Oral at bedtime PRN  nystatin Powder 1 Application(s) Topical two times a day  ondansetron Injectable 4 milliGRAM(s) IV Push every 8 hours PRN  rivaroxaban 20 milliGRAM(s) Oral with dinner    Current Antimicrobials: none  Prior/Completed Antimicrobials:  clindamycin IVPB

## 2022-12-08 NOTE — CONSULT NOTE ADULT - SUBJECTIVE AND OBJECTIVE BOX
p ([id number])     HPI: 82M Afib xarelto, mult med hx, p/f episode of acute RLE weakness (just gave out) associated R hip pain. Ortho following for R hip IM nail subsidance and R calcar frx. CTA neck w/C3/4 & C4/5 disc osteophytes w/sig canal stenosis. CT L spine showing multilevel degenerative changes. Normal MR brain. No incontinence/paresthesias, no radicular symptoms.  Exam: BUE 5/5, BLE 5/5, SILT, neg gray, neg clonus.    --Anticoagulation:  rivaroxaban 20 milliGRAM(s) Oral with dinner    =====================  PAST MEDICAL HISTORY   AF (atrial fibrillation)      PAST SURGICAL HISTORY   Hernia, inguinal, right          MEDICATIONS:  Antibiotics:    Neuro:  acetaminophen     Tablet .. 650 milliGRAM(s) Oral every 6 hours PRN  melatonin 3 milliGRAM(s) Oral at bedtime PRN  ondansetron Injectable 4 milliGRAM(s) IV Push every 8 hours PRN    Other:  furosemide    Tablet 20 milliGRAM(s) Oral <User Schedule>      SOCIAL HISTORY:   Occupation:   Marital Status:     FAMILY HISTORY:  No pertinent family history in first degree relatives        ROS: Negative except per HPI    LABS:                          13.4   10.71 )-----------( 139      ( 08 Dec 2022 06:42 )             40.2

## 2022-12-08 NOTE — CONSULT NOTE ADULT - SUBJECTIVE AND OBJECTIVE BOX
82y Male admitted for R hip pain and weakness. Patient states the pain started 3 years ago following fixation of his R hip fracture with IMN. Patient had an IMN in 2019 with Dr. Godinez that subsequently underwent washout for suspected infection later that year. Patient has been ambulating on the RLE with a walker but notes that of late the right hip has become more weak and painful. Denies numbness/tingling in the affected extremity. Denies head strike/LOC/other orthopedic injuries at this time.       AF (atrial fibrillation)      Hernia, inguinal, right        Home Medications:  escitalopram:  (05 Dec 2022 21:58)  rivaroxaban 20 mg oral tablet: 1 tab(s) orally once a day (before a meal) (05 Dec 2022 21:58)    Allergies    No Known Allergies    Intolerances                              13.0   10.23 )-----------( 131      ( 07 Dec 2022 13:33 )             39.8     12-06    140  |  104  |  23  ----------------------------<  86  3.9   |  23  |  0.96    Ca    9.1      06 Dec 2022 07:43  Mg     1.8     12-06    TPro  6.0  /  Alb  3.6  /  TBili  1.0  /  DBili  x   /  AST  57<H>  /  ALT  26  /  AlkPhos  58  12-06            Vital Signs Last 24 Hrs  T(C): 37 (08 Dec 2022 04:19), Max: 37 (08 Dec 2022 04:19)  T(F): 98.6 (08 Dec 2022 04:19), Max: 98.6 (08 Dec 2022 04:19)  HR: 67 (08 Dec 2022 04:19) (51 - 74)  BP: 126/73 (08 Dec 2022 04:19) (118/67 - 133/86)  BP(mean): --  RR: 18 (08 Dec 2022 04:19) (18 - 18)  SpO2: 93% (08 Dec 2022 04:19) (93% - 96%)    Parameters below as of 08 Dec 2022 04:19  Patient On (Oxygen Delivery Method): room air        PHYSICAL EXAM  General: NAD, Awake and Alert    RIGHT Lower Extremity:   Skin intact, well healed surgical incision, no erythema, ecchymosis, edema, or gross deformity   Mild TTP over the GT o/w NTTP over the bony prominences of the hip/knee/ankle/foot/toes  Painless passive/active ROM of the hip/knee/ankle/foot  L2-S1 SILT  Motor grossly intact throughout hip flexors/quads/hams/TA/EHL/FHL/GSC  + DP/PT pulses  No pain with log roll, No pain on axial loading  Compartments soft and compressible  Calf nontender     Secondary Survey:   No TTP over bony prominences, SILT, palpable pulses, full/painless A/PROM, compartments soft. No TTP over spinous processes or paraspinal muscles at C/T/L spine. No palpable step off. No other injuries or complaints.       IMAGING:  XR R hip: possible IM nail subsidence    Assessment/Plan:  82y Male with possible R hip IM nail subsidence    -Pain control as needed  -DVT ppx per primary team  -WBAT RLE  -Patient may need surgical intervention in the future, possible calcar replacing JESS, however this can planned electively.  -Will discuss with attending, and advise if plan changes

## 2022-12-08 NOTE — PROGRESS NOTE ADULT - ASSESSMENT
82M RH w/ afib on xarelto, s/p L hip IMN (2013), s/p R hip IMN (2019) presents with acute onset RLE weakness. LKN 12/5/22 at 7am. Pt last took xarelto at 6:30am. While showering, he felt RLE sudden onset weakness, no associated pain or back pain at that time. He reports increased R hip area pain and numbness/tingling with associated leg shaking due to pain. Pt was hanging onto the sink for at least 3-4 hours and reports not being able to get up due to RLE weakness. NIHSS: 0, preMRS: 2 (ambulates with a walker). Neuro exam notable for slight +R pronator, +straight leg raise. CTH/CTA neg  NIHSS4  premrs2-3 walks with cane/walker   MRI brain neg for infarct   CRP 22     Impression: acute RLE weakness and pain likely 2/2 R hip pathology vs. lumbosacral pathology, less likely stroke; Stroke ruled out     Recommendations:     - r/o infection ; ID recs appreciated. monitoring off abx   -  f/u orthopedic    - continue home xarelto for stroke prevention  - monitor on telemetry  - stroke risk factor modification and counseling   - check HA1c, lipid panel, Utox  - NPO until bedside dysphagia screen   - High dose statin therapy - atorvastatin 40mg PO daily. LDL goal <70mg/dL.   - PT/OT/SS/SLP, OOBC  - check FS, glucose control <180  - GI/DVT ppx  - Thank you for allowing me to participate in the care of this patient. Call with questions.       Carloz Del Castillo MD  Vascular Neurology  Office: [phone] .

## 2022-12-08 NOTE — PROGRESS NOTE ADULT - ASSESSMENT
82M w/ pmhx of afib on xarelto, s/p L hip IMN (most recently s/p R hip IMN (Dr. Godinez 10/31/19) presents with acute onset RLE weakness.    # WCT -7 beats  -cont to monitor   -no further sig WCT, unable to give bb due to freda   -echo with nl EF    #Right Sided HF  -ECHO revealed normal LVEF, w R sided enlargement and mod-severe TR  -pitting edema in the LE  -Venous dopplers negative for DVT  -likely related to r sided CHF  -cont lasix 20mg PO Q48hrs  -pt in slow afib off meds which preclude BB use  -if he develops further arrhythmia may need to consider PPM eval    #Afib  -cont AC  -rate controlled/freda off meds  -no sig freda, pauses    #Weakness  -neuro w/u neg for CVA  -w/u per primary team    35 minutes spent on total encounter; more than 50% of the visit was spent counseling and/or coordinating care by the attending physician.

## 2022-12-08 NOTE — CONSULT NOTE ADULT - ASSESSMENT
Cory Youssef  82M Afib xarelto, mult med hx, p/f episode of acute RLE weakness (just gave out) associated R hip pain. Ortho following for R hip IM nail subsidance and R calcar frx. CTA neck w/C3/4 & C4/5 disc osteophytes w/sig canal stenosis. CT L spine showing multilevel degenerative changes. Normal MR brain. No incontinence/paresthesias, no radicular symptoms.  Exam: BUE 5/5, BLE 5/5, SILT, neg gray, neg clonus.  -MR C-spine  -Patient would be high risk for OR 2/2 comorbidities/age. Please assess for risk/optimize for possible OR

## 2022-12-09 LAB
ANION GAP SERPL CALC-SCNC: 12 MMOL/L — SIGNIFICANT CHANGE UP (ref 5–17)
APTT BLD: 36.5 SEC — HIGH (ref 27.5–35.5)
BUN SERPL-MCNC: 19 MG/DL — SIGNIFICANT CHANGE UP (ref 7–23)
CALCIUM SERPL-MCNC: 9.1 MG/DL — SIGNIFICANT CHANGE UP (ref 8.4–10.5)
CHLORIDE SERPL-SCNC: 104 MMOL/L — SIGNIFICANT CHANGE UP (ref 96–108)
CO2 SERPL-SCNC: 23 MMOL/L — SIGNIFICANT CHANGE UP (ref 22–31)
CREAT SERPL-MCNC: 0.9 MG/DL — SIGNIFICANT CHANGE UP (ref 0.5–1.3)
EGFR: 85 ML/MIN/1.73M2 — SIGNIFICANT CHANGE UP
GLUCOSE SERPL-MCNC: 99 MG/DL — SIGNIFICANT CHANGE UP (ref 70–99)
HCT VFR BLD CALC: 41.8 % — SIGNIFICANT CHANGE UP (ref 39–50)
HGB BLD-MCNC: 13.9 G/DL — SIGNIFICANT CHANGE UP (ref 13–17)
INR BLD: 1.66 RATIO — HIGH (ref 0.88–1.16)
MCHC RBC-ENTMCNC: 32.7 PG — SIGNIFICANT CHANGE UP (ref 27–34)
MCHC RBC-ENTMCNC: 33.3 GM/DL — SIGNIFICANT CHANGE UP (ref 32–36)
MCV RBC AUTO: 98.4 FL — SIGNIFICANT CHANGE UP (ref 80–100)
NRBC # BLD: 0 /100 WBCS — SIGNIFICANT CHANGE UP (ref 0–0)
PLATELET # BLD AUTO: 142 K/UL — LOW (ref 150–400)
POTASSIUM SERPL-MCNC: 4.1 MMOL/L — SIGNIFICANT CHANGE UP (ref 3.5–5.3)
POTASSIUM SERPL-SCNC: 4.1 MMOL/L — SIGNIFICANT CHANGE UP (ref 3.5–5.3)
PROTHROM AB SERPL-ACNC: 19.4 SEC — HIGH (ref 10.5–13.4)
RBC # BLD: 4.25 M/UL — SIGNIFICANT CHANGE UP (ref 4.2–5.8)
RBC # FLD: 13 % — SIGNIFICANT CHANGE UP (ref 10.3–14.5)
SARS-COV-2 RNA SPEC QL NAA+PROBE: SIGNIFICANT CHANGE UP
SODIUM SERPL-SCNC: 139 MMOL/L — SIGNIFICANT CHANGE UP (ref 135–145)
WBC # BLD: 9.1 K/UL — SIGNIFICANT CHANGE UP (ref 3.8–10.5)
WBC # FLD AUTO: 9.1 K/UL — SIGNIFICANT CHANGE UP (ref 3.8–10.5)

## 2022-12-09 PROCEDURE — 72141 MRI NECK SPINE W/O DYE: CPT | Mod: 26

## 2022-12-09 PROCEDURE — 20611 DRAIN/INJ JOINT/BURSA W/US: CPT | Mod: RT

## 2022-12-09 RX ADMIN — Medication 650 MILLIGRAM(S): at 11:15

## 2022-12-09 RX ADMIN — CHLORHEXIDINE GLUCONATE 1 APPLICATION(S): 213 SOLUTION TOPICAL at 05:22

## 2022-12-09 RX ADMIN — LIDOCAINE 1 PATCH: 4 CREAM TOPICAL at 16:29

## 2022-12-09 RX ADMIN — NYSTATIN CREAM 1 APPLICATION(S): 100000 CREAM TOPICAL at 05:22

## 2022-12-09 RX ADMIN — LIDOCAINE 1 PATCH: 4 CREAM TOPICAL at 01:04

## 2022-12-09 RX ADMIN — Medication 20 MILLIGRAM(S): at 09:13

## 2022-12-09 RX ADMIN — Medication 650 MILLIGRAM(S): at 23:34

## 2022-12-09 RX ADMIN — Medication 650 MILLIGRAM(S): at 22:34

## 2022-12-09 RX ADMIN — Medication 650 MILLIGRAM(S): at 10:30

## 2022-12-09 RX ADMIN — LIDOCAINE 1 PATCH: 4 CREAM TOPICAL at 10:30

## 2022-12-09 NOTE — DIETITIAN INITIAL EVALUATION ADULT - NSFNSNUTRHOMESUPPLEMENTFT_GEN_A_CORE
Patient reports drinking Ensure at home, unsure of which type; no other supplements or vitamins reported. Patient agreeable to receiving Ensure in-house.

## 2022-12-09 NOTE — DIETITIAN INITIAL EVALUATION ADULT - ENERGY INTAKE
Patient reports good, normal appetite and PO intake currently; patient preferences taken and to be honored by RD as able. Adequate (%)

## 2022-12-09 NOTE — DIETITIAN INITIAL EVALUATION ADULT - REASON INDICATOR FOR ASSESSMENT
RD consult for pressure injury stage 2 or greater. Chart reviewed, events noted.  Source: EMR, patient  RD consult for right heel DTI. Chart reviewed, events noted.  Source: EMR, patient

## 2022-12-09 NOTE — PRE PROCEDURE NOTE - PRE PROCEDURE EVALUATION
Interventional Radiology Pre-Procedure Note    Diagnosis/Indication: Patient is a 82y old  Male with right femur intramedullary simón with loosening presenting for right hip aspiration.    Per chart "82M w/ pmhx of afib on xarelto, s/p L hip IMN (most recently s/p R hip IMN (Dr. Godinez 10/31/19) presents with acute onset R sided weakness." (09 Dec 2022 14:13)      PAST MEDICAL & SURGICAL HISTORY:  AF (atrial fibrillation)      Hernia, inguinal, right           Allergies: No Known Allergies      LABS:                        13.9   9.10  )-----------( 142      ( 09 Dec 2022 06:57 )             41.8     12-09    139  |  104  |  19  ----------------------------<  99  4.1   |  23  |  0.90    Ca    9.1      09 Dec 2022 06:54      PT/INR - ( 09 Dec 2022 11:09 )   PT: 19.4 sec;   INR: 1.66 ratio         PTT - ( 09 Dec 2022 11:09 )  PTT:36.5 sec    Procedure/ risks/ benefits were explained, informed consent obtained from patient's daughter, verbalizes understanding.

## 2022-12-09 NOTE — PROGRESS NOTE ADULT - ASSESSMENT
Assessment/plan:    Right posterior heel/Achilles DTI/bulla: Present on admission noninfected    Aseptic drainage of bulla right posterior heel with use of suture scissors.   Recommend Betadine paint with use of Allevyn foam dressing daily to right right heel wound.  Recommend Continued decubitus precautions and use of Z flow boots.  Will continue to monitor for signs of infection and wound care recommendations

## 2022-12-09 NOTE — PROGRESS NOTE ADULT - ASSESSMENT
82M w/ pmhx of afib on xarelto, s/p L hip IMN (most recently s/p R hip IMN (Dr. Godinez 10/31/19) presents with acute onset R sided weakness.    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 Xarelto QHS  TTE Severe right atrial enlargement. borderline pulmonary hypertension. Moderate-severe tricuspid regurgitation. EF64%  cardio following  lasix MWF    # depression  On Lexapro    DVT prophylaxis. Xarelto.    dw son in law at bedside 12/7  lives home with whole family including son in law    dispo fu MRI cervical spine, fu nsgy and ortho recs    Please call [facility] with questions [phone].

## 2022-12-09 NOTE — PROGRESS NOTE ADULT - ASSESSMENT
82M RH w/ afib on xarelto, s/p L hip IMN (2013), s/p R hip IMN (2019) presents with acute onset RLE weakness. LKN 12/5/22 at 7am. Pt last took xarelto at 6:30am. While showering, he felt RLE sudden onset weakness, no associated pain or back pain at that time. He reports increased R hip area pain and numbness/tingling with associated leg shaking due to pain. Pt was hanging onto the sink for at least 3-4 hours and reports not being able to get up due to RLE weakness. NIHSS: 0, preMRS: 2 (ambulates with a walker). Neuro exam notable for slight +R pronator, +straight leg raise. CTH/CTA neg  NIHSS4  premrs2-3 walks with cane/walker   MRI brain neg for infarct   CRP 22   CT pelvis: Redemonstration of open reduction internal fixation of the right hip.   Interval development of loosening throughout the right femoral neck   compression screw with migration of the tip into the joint space.   Interval development of lateral deviation of the right femoral   intramedullary simón with the tip now abutting and markedly   remodeling/thinning the lateral cortex at the mid diaphysis.         Impression: acute RLE weakness and pain likely 2/2 R hip pathology vs. lumbosacral pathology, less likely stroke; Stroke ruled out     Recommendations:     - r/o infection ; ID recs appreciated. monitoring off abx   -  f/u orthopedic  , possible OR   - continue home xarelto for stroke prevention  - monitor on telemetry  - stroke risk factor modification and counseling   - check HA1c, lipid panel, Utox  - NPO until bedside dysphagia screen   - High dose statin therapy - atorvastatin 40mg PO daily. LDL goal <70mg/dL.   - PT/OT/SS/SLP, OOBC  - check FS, glucose control <180  - GI/DVT ppx  - Thank you for allowing me to participate in the care of this patient. Call with questions.       Carloz Del Castillo MD  Vascular Neurology  Office: [phone] .

## 2022-12-09 NOTE — PROGRESS NOTE ADULT - SUBJECTIVE AND OBJECTIVE BOX
Pt seen/examined. Doing well. Pain controlled. No acute overnight complaints or events.    T(C): 36.9 (12-08-22 @ 20:18), Max: 37 (12-08-22 @ 04:19)  HR: 69 (12-08-22 @ 20:18) (55 - 73)  BP: 134/77 (12-08-22 @ 20:18) (121/68 - 137/82)  RR: 18 (12-08-22 @ 20:18) (18 - 18)  SpO2: 91% (12-08-22 @ 20:18) (91% - 96%)  Wt(kg): --  - Gen: NAD    PHYSICAL EXAM  General: NAD, Awake and Alert    RIGHT Lower Extremity:   Skin intact, well healed surgical incision, no erythema, ecchymosis, edema, or gross deformity   Mild TTP over the GT o/w NTTP over the bony prominences of the hip/knee/ankle/foot/toes  Painless passive/active ROM of the hip/knee/ankle/foot  L2-S1 SILT  Motor grossly intact throughout hip flexors/quads/hams/TA/EHL/FHL/GSC  + DP/PT pulses  No pain with log roll, No pain on axial loading  Compartments soft and compressible  Calf nontender     82y Male with possible R hip IM nail subsidence and screw out    -Pain control as needed  -DVT ppx per primary team  -WBAT RLE  - Please optimize and document medical clearance for OR if patient stays!!!  - patient is also cleared from orthopaedic standpoint for discharge home and f/u outpatient with Dr. Godinez

## 2022-12-09 NOTE — DIETITIAN INITIAL EVALUATION ADULT - OTHER CALCULATIONS
Defer fluid needs to team  Estimations based on dosing wt (12-05) 210.1lb/95.3kg Defer fluid needs to team  Estimations based on IBW 178lb/80.7kg with consideration for DTI.

## 2022-12-09 NOTE — PROGRESS NOTE ADULT - ASSESSMENT
Patient is a 82 year old male with PMH of Afib on xarelto, s/p L hip IMN (most recently s/p R hip IMN (Dr. Godinez 10/31/19) presents with acute onset RLE weakness and was unable to move from sink for at least 2-3 hours until daughter came home and called EMS. Patient noted with significant erythematous legs with swelling, daughter has picture on phone- now has resolved.    Acute onset RLE weakness, unclear etiology, no stroke on MRI  Leukocytosis - suspect likely reactive in setting of above events  No infectious source found to date    afebrile, WBC trended down and normalized    UA/Ucx negative, CXR negative   Blood cultures NGTD  x2   abd benign, nontender   s/p clindamycin in the ER, now off abx    has loose tooth with no pain, was planning for outpt extraction    b/l LE erythema resolved, 2 small b/l 2nd toe wounds with eschar, does not appear infected    R groin/inguinal wound with no s/o infection   Imaging reviewed, no infectious source found   Orthopedics and Neurosurgery following for possible R hip IM nail subsidence and screw out     Recommendations:  Continue off antibiotics  PT following    Stable from ID standpoint at this time  ID will sign off at this time but remains available for any further questions/concerns.    Slim Jackson M.D.  [facility], Division of Infectious Diseases  [phone]  After 5pm on weekdays and all day on weekends - please call [phone]

## 2022-12-09 NOTE — PROGRESS NOTE ADULT - SUBJECTIVE AND OBJECTIVE BOX
Patient seen and examined at bedside.    --Anticoagulation--  rivaroxaban 20 milliGRAM(s) Oral with dinner    T(C): 36.9 (12-08-22 @ 20:18), Max: 36.9 (12-08-22 @ 20:18)  HR: 69 (12-08-22 @ 20:18) (55 - 73)  BP: 134/77 (12-08-22 @ 20:18) (121/68 - 137/82)  RR: 18 (12-08-22 @ 20:18) (18 - 18)  SpO2: 91% (12-08-22 @ 20:18) (91% - 96%)  Wt(kg): --    Exam: BUE 5/5, BLE 5/5, SILT, neg gray, neg clonus

## 2022-12-09 NOTE — DIETITIAN INITIAL EVALUATION ADULT - NSFNSPHYEXAMSKINFT_GEN_A_CORE
Right heel DTI per flowsheets Right heel DTI per flowsheets, per podiatry 12-09: "Right posterior heel/Achilles DTI/bulla: Present on admission noninfected".

## 2022-12-09 NOTE — DIETITIAN INITIAL EVALUATION ADULT - REASON FOR ADMISSION
Weakness    Per chart "82M w/ pmhx of afib on xarelto, s/p L hip IMN (most recently s/p R hip IMN (Dr. Godinez 10/31/19) presents with acute onset R sided weakness."

## 2022-12-09 NOTE — DIETITIAN INITIAL EVALUATION ADULT - ADD RECOMMEND
1) Continue current diet as ordered: regular, defer texture/consistency to SLP  2) Recommend adding Ensure Plus High Protein x 1/day (provides 350 kcal, 20 g protein) to assist in meeting nutritional needs.  3) Recommend adding multivitamin pending no medical contraindications.  4) Will continue to monitor PO intake, diet, weight, labs, skin, GI symptoms, and BM regularity. 1) Continue current diet as ordered: regular, adjust texture as tolerated/per patient preference per MD/SLP discretion.  2) Recommend adding Ensure Plus High Protein x 1/day (provides 350 kcal, 20 g protein) to assist in meeting nutritional needs.  3) Recommend adding multivitamin and vitamin C pending no medical contraindications to promote wound healing.  4) Will continue to monitor PO intake, diet, weight, labs, skin, GI symptoms, and BM regularity.

## 2022-12-09 NOTE — PROGRESS NOTE ADULT - ASSESSMENT
ECHO 12/6/22:  Ef 64%;  mild MR, nl LV sys fx  , mod to sev TR     a/p  82M w/ pmhx of afib on xarelto, s/p L hip IMN (most recently s/p R hip IMN (Dr. Godinez 10/31/19) presents with acute onset RLE weakness.    # WCT  -cont to monitor   -no further sig WCT, unable to give bb due to freda   -echo with nl EF    #Right Sided HF  -ECHO revealed normal LVEF, w R sided enlargement and mod-severe TR  -Venous dopplers negative for DVT  -likely related to r sided CHF  -cont lasix 20mg PO Q48hrs- volume status stable   -pt in slow afib off meds which preclude BB use  -if he develops further arrhythmia may need to consider PPM eval    #Afib  -cont AC  -rate controlled/freda off meds  -no sig freda, pauses    #Weakness  -neuro w/u neg for CVA  -w/u per primary team  -neuro eval noted- pending MR cervical spine to eval cervical stenosis- ?possible OR , if or planned- pt can proceed from a cv standpoint   -per ortho  possible R hip IM nail subsidence and screw out-- if OR planned- pt can proceed from a cv standpoint   -can hold ORAL AC and start Hep gtt if planning OR

## 2022-12-09 NOTE — PROGRESS NOTE ADULT - SUBJECTIVE AND OBJECTIVE BOX
Patient is a 82y old  Male who presents with a chief complaint of R sided weakness (09 Dec 2022 12:37)      SUBJECTIVE / OVERNIGHT EVENTS:    Patient seen and examined.       Vital Signs Last 24 Hrs  T(C): 36.5 (09 Dec 2022 11:01), Max: 36.9 (08 Dec 2022 20:18)  T(F): 97.7 (09 Dec 2022 11:01), Max: 98.4 (08 Dec 2022 20:18)  HR: 60 (09 Dec 2022 11:01) (60 - 73)  BP: 124/77 (09 Dec 2022 11:01) (124/77 - 137/82)  BP(mean): --  RR: 18 (09 Dec 2022 11:01) (18 - 18)  SpO2: 96% (09 Dec 2022 11:01) (91% - 96%)    Parameters below as of 09 Dec 2022 11:01  Patient On (Oxygen Delivery Method): room air      I&O's Summary    08 Dec 2022 07:01  -  09 Dec 2022 07:00  --------------------------------------------------------  IN: 720 mL / OUT: 900 mL / NET: -180 mL    09 Dec 2022 07:01  -  09 Dec 2022 12:38  --------------------------------------------------------  IN: 400 mL / OUT: 800 mL / NET: -400 mL        PE:  GENERAL: NAD, AAOx2  CHEST/LUNG: CTABL, No wheeze  HEART: irregular irregular; + murmur  ABDOMEN: Soft, Nontender, Nondistended; Bowel sounds present  EXTREMITIES: chronic venous stasis, + 1 le edema  NEURO: No focal deficits, LLE weaker subjectively    LABS:                        13.9   9.10  )-----------( 142      ( 09 Dec 2022 06:57 )             41.8     12-09    139  |  104  |  19  ----------------------------<  99  4.1   |  23  |  0.90    Ca    9.1      09 Dec 2022 06:54      PT/INR - ( 09 Dec 2022 11:09 )   PT: 19.4 sec;   INR: 1.66 ratio         PTT - ( 09 Dec 2022 11:09 )  PTT:36.5 sec  CAPILLARY BLOOD GLUCOSE                RADIOLOGY & ADDITIONAL TESTS:    Imaging Personally Reviewed:  [x] YES  [ ] NO    Consultant(s) Notes Reviewed:  [x] YES  [ ] NO    MEDICATIONS  (STANDING):  chlorhexidine 2% Cloths 1 Application(s) Topical <User Schedule>  furosemide    Tablet 20 milliGRAM(s) Oral <User Schedule>  lidocaine   4% Patch 1 Patch Transdermal every 24 hours  nystatin Powder 1 Application(s) Topical two times a day  rivaroxaban 20 milliGRAM(s) Oral with dinner    MEDICATIONS  (PRN):  acetaminophen     Tablet .. 650 milliGRAM(s) Oral every 6 hours PRN Temp greater or equal to 38C (100.4F), Mild Pain (1 - 3)  artificial tears (preservative free) Ophthalmic Solution 2 Drop(s) Both EYES daily PRN Dry Eyes  melatonin 3 milliGRAM(s) Oral at bedtime PRN Insomnia  ondansetron Injectable 4 milliGRAM(s) IV Push every 8 hours PRN Nausea and/or Vomiting      Care Discussed with Consultants/Other Providers [x] YES  [ ] NO    HEALTH ISSUES - PROBLEM Dx:  Suspected pulmonary embolism    Suspected deep vein thrombosis (DVT)    Weakness of lower extremity, unspecified laterality    Chronic atrial fibrillation    Leukocytosis    At risk for depression    DVT prophylaxis

## 2022-12-09 NOTE — PROGRESS NOTE ADULT - SUBJECTIVE AND OBJECTIVE BOX
CARDIOLOGY FOLLOW UP - Dr. Marrufo  DATE OF SERVICE: 12/9/22     CC no cp or sob   co right sided rib pain       REVIEW OF SYSTEMS:  CONSTITUTIONAL: No fever, weight loss, or fatigue  RESPIRATORY: No cough, wheezing, chills or hemoptysis; No Shortness of Breath  CARDIOVASCULAR: No chest pain, palpitations, passing out, dizziness, or leg swelling  GASTROINTESTINAL: No abdominal or epigastric pain. No nausea, vomiting, or hematemesis; No diarrhea or constipation. No melena or hematochezia.  VASCULAR: No edema     PHYSICAL EXAM:  T(C): 36.9 (12-08-22 @ 20:18), Max: 36.9 (12-08-22 @ 20:18)  HR: 69 (12-08-22 @ 20:18) (55 - 73)  BP: 134/77 (12-08-22 @ 20:18) (121/68 - 137/82)  RR: 18 (12-08-22 @ 20:18) (18 - 18)  SpO2: 91% (12-08-22 @ 20:18) (91% - 96%)  Wt(kg): --  I&O's Summary    08 Dec 2022 07:01  -  09 Dec 2022 07:00  --------------------------------------------------------  IN: 720 mL / OUT: 900 mL / NET: -180 mL    09 Dec 2022 07:01  -  09 Dec 2022 10:56  --------------------------------------------------------  IN: 0 mL / OUT: 800 mL / NET: -800 mL        Appearance: Normal	  Cardiovascular: Normal S1 S2,RRR, No JVD, No murmurs  Respiratory: Lungs clear to auscultation	  Gastrointestinal:  Soft, Non-tender, + BS	  Extremities: Normal range of motion, No clubbing, cyanosis or edema      Home Medications:  escitalopram:  (05 Dec 2022 21:58)  rivaroxaban 20 mg oral tablet: 1 tab(s) orally once a day (before a meal) (05 Dec 2022 21:58)      MEDICATIONS  (STANDING):  chlorhexidine 2% Cloths 1 Application(s) Topical <User Schedule>  furosemide    Tablet 20 milliGRAM(s) Oral <User Schedule>  lidocaine   4% Patch 1 Patch Transdermal every 24 hours  nystatin Powder 1 Application(s) Topical two times a day  rivaroxaban 20 milliGRAM(s) Oral with dinner      TELEMETRY: afib hr 50-60s 	    ECG:  	  RADIOLOGY:   DIAGNOSTIC TESTING:  [ ] Echocardiogram:  [ ]  Catheterization:  [ ] Stress Test:    OTHER: 	    LABS:	 	    Troponin T, High Sensitivity Result: 45 ng/L [0 - 51] (12-06 @ 07:43)  Troponin T, High Sensitivity Result: 26 ng/L [0 - 51] (12-05 @ 15:00)  Creatine Kinase, Serum: 192 U/L [30 - 200] (12-05 @ 15:00)                          13.9   9.10  )-----------( 142      ( 09 Dec 2022 06:57 )             41.8     12-09    139  |  104  |  19  ----------------------------<  99  4.1   |  23  |  0.90    Ca    9.1      09 Dec 2022 06:54

## 2022-12-09 NOTE — DIETITIAN INITIAL EVALUATION ADULT - OTHER INFO
Patient reports UBW ~200lbs; states weight fluctuates between 200 and 210 normally; reports intentional weight loss from 260lb over past few years.  Dosing wt: 210.1lb (12-05)  Weight history per chart and [facility] HIE: 200lb (04-02-20), 195.1lb (12-11-19), 235lb (03-14-19), 242lb (06-28-18).  Weight appears stable, RD to continue to monitor weight trends as available/able.    Patient currently ordered for rivaroxaban, furosemide, ondansetron Patient reports UBW ~200lb; states weight fluctuates between 200lb and 210lb normally; reports intentional weight loss from 260lb over past few years.  Dosing wt: 210.1lb (12-05)  Weight history per chart and [facility] HIE: 200lb (04-02-20), 195.1lb (12-11-19), 235lb (03-14-19), 242lb (06-28-18).  Weight appears stable, RD to continue to monitor weight trends as available/able.    Patient currently ordered for rivaroxaban, furosemide, ondansetron

## 2022-12-09 NOTE — PROGRESS NOTE ADULT - SUBJECTIVE AND OBJECTIVE BOX
[facility] DIVISION OF INFECTIOUS DISEASES  GIOVANY Ferguson Y. Patel, S. Shah, G. Delmer  [phone]  ([phone] - weekdays after 5pm and weekends)    Name: MARY BO  Age/Gender: 82y Male  MRN: [id number]    Interval History:  Patient seen and examined this morning.   Patient smiling, states he feels better.  No new complaints noted.  Notes reviewed  No concerning overnight events  Afebrile   Allergies: No Known Allergies      Objective:  Vitals:   T(F): 98.4 (12-08-22 @ 20:18), Max: 98.4 (12-08-22 @ 20:18)  HR: 69 (12-08-22 @ 20:18) (55 - 73)  BP: 134/77 (12-08-22 @ 20:18) (121/68 - 137/82)  RR: 18 (12-08-22 @ 20:18) (18 - 18)  SpO2: 91% (12-08-22 @ 20:18) (91% - 96%)  Physical Examination:  General: no acute distress, nontoxic  HEENT: NC/AT, anicteric, neck supple  Respiratory: no acc muscle use, breathing comfortably  Cardiovascular: S1 and S2 present  Gastrointestinal: normal appearing, nondistended  Extremities: no edema, no cyanosis  Skin: no visible rash    Laboratory Studies:  CBC:                       13.9   9.10  )-----------( 142      ( 09 Dec 2022 06:57 )             41.8     WBC Trend:  9.10 12-09-22 @ 06:57  10.71 12-08-22 @ 06:42  10.23 12-07-22 @ 13:33  12.32 12-06-22 @ 07:43  17.22 12-05-22 @ 15:00    CMP: 12-09    139  |  104  |  19  ----------------------------<  99  4.1   |  23  |  0.90    Ca    9.1      09 Dec 2022 06:54      Creatinine, Serum: 0.90 mg/dL (12-09-22 @ 06:54)  Creatinine, Serum: 0.96 mg/dL (12-06-22 @ 07:43)  Creatinine, Serum: 1.08 mg/dL (12-05-22 @ 15:00)    Microbiology: reviewed   Culture - Blood (collected 12-06-22 @ 05:05)  Source: .Blood Blood-Venous  Preliminary Report (12-07-22 @ 18:02):    No growth to date.    Culture - Blood (collected 12-06-22 @ 05:00)  Source: .Blood Blood-Venous  Preliminary Report (12-07-22 @ 18:02):    No growth to date.    Culture - Urine (collected 12-05-22 @ 18:24)  Source: Clean Catch Clean Catch (Midstream)  Final Report (12-07-22 @ 07:48):    <10,000 CFU/mL Normal Urogenital Marla    SARS-CoV-2 Result: NotDetec (05 Dec 2022 18:25)    Radiology: reviewed     Medications:  acetaminophen     Tablet .. 650 milliGRAM(s) Oral every 6 hours PRN  artificial tears (preservative free) Ophthalmic Solution 2 Drop(s) Both EYES daily PRN  chlorhexidine 2% Cloths 1 Application(s) Topical <User Schedule>  furosemide    Tablet 20 milliGRAM(s) Oral <User Schedule>  lidocaine   4% Patch 1 Patch Transdermal every 24 hours  melatonin 3 milliGRAM(s) Oral at bedtime PRN  nystatin Powder 1 Application(s) Topical two times a day  ondansetron Injectable 4 milliGRAM(s) IV Push every 8 hours PRN  rivaroxaban 20 milliGRAM(s) Oral with dinner    Current Antimicrobials: none  Prior/Completed Antimicrobials:  clindamycin IVPB

## 2022-12-09 NOTE — PROGRESS NOTE ADULT - SUBJECTIVE AND OBJECTIVE BOX
Podiatry pager #: [phone]/ [phone]    Patient is a 82y old  Male who presents with a chief complaint of R sided weakness (09 Dec 2022 09:04) Podiatry consult for evaluation of right lower extremity wound      HPI:  82M w/ pmhx of afib on xarelto, s/p L hip IMN (most recently s/p R hip IMN (Dr. Godinez 10/31/19) presents with acute onset RLE weakness. LKN 12/5/22 at 7am. Pt last took xarelto at 6:30am. Per pt and pt's daughter at bedside, pt woke up normally this morning and went to shower. While showering, he felt RLE sudden onset weakness, no associated pain or back pain at that time. He denies any numbness/tingling, vision changes, or UE weakness. He went to shave and while at sink felt too weak to stand and had to hold on to sink. Daughter came to bathroom ~2 hours later to find him holding sink leaning with R side. He aslo reports increased R hip area pain and numbness/tingling with associated leg shaking due to pain. Pt was hanging onto the sink for at least 2-3 hours and reports not being able to get up due to RLE weakness. At baseline, pt ambulates with a walker, lives with his daughter, does most ADLs independently. Denies any recent symptoms such as fevers, chills, cough, dysuria, change in urination, incontinence. States he had mechanical fall 3 months ago which he did not tell daughter in living room.     In ER: Given NS 1L, IV Clindamycin (05 Dec 2022 21:53)      PAST MEDICAL & SURGICAL HISTORY:  AF (atrial fibrillation)      Hernia, inguinal, right          MEDICATIONS  (STANDING):  chlorhexidine 2% Cloths 1 Application(s) Topical <User Schedule>  furosemide    Tablet 20 milliGRAM(s) Oral <User Schedule>  lidocaine   4% Patch 1 Patch Transdermal every 24 hours  nystatin Powder 1 Application(s) Topical two times a day  rivaroxaban 20 milliGRAM(s) Oral with dinner    MEDICATIONS  (PRN):  acetaminophen     Tablet .. 650 milliGRAM(s) Oral every 6 hours PRN Temp greater or equal to 38C (100.4F), Mild Pain (1 - 3)  artificial tears (preservative free) Ophthalmic Solution 2 Drop(s) Both EYES daily PRN Dry Eyes  melatonin 3 milliGRAM(s) Oral at bedtime PRN Insomnia  ondansetron Injectable 4 milliGRAM(s) IV Push every 8 hours PRN Nausea and/or Vomiting      Allergies    No Known Allergies    Intolerances        VITALS:    Vital Signs Last 24 Hrs  T(C): 36.9 (08 Dec 2022 20:18), Max: 36.9 (08 Dec 2022 20:18)  T(F): 98.4 (08 Dec 2022 20:18), Max: 98.4 (08 Dec 2022 20:18)  HR: 69 (08 Dec 2022 20:18) (55 - 73)  BP: 134/77 (08 Dec 2022 20:18) (121/68 - 137/82)  BP(mean): --  RR: 18 (08 Dec 2022 20:18) (18 - 18)  SpO2: 91% (08 Dec 2022 20:18) (91% - 96%)    Parameters below as of 08 Dec 2022 20:18  Patient On (Oxygen Delivery Method): room air        LABS:                          13.9   9.10  )-----------( 142      ( 09 Dec 2022 06:57 )             41.8       12-09    139  |  104  |  19  ----------------------------<  99  4.1   |  23  |  0.90    Ca    9.1      09 Dec 2022 06:54        CAPILLARY BLOOD GLUCOSE              LOWER EXTREMITY PHYSICAL EXAM:    Vasular: DP/PT _1/4, B/L, CFT <_2 seconds B/L, Temperature gradient WNL_, B/L.   Neuro: Epicritic sensation Intact to the level of _Toes, B/L.  Skin:  Wound #1:   Location: Right posterior heel/Achilles  Size: 4 cm diameter  Depth: Superficial  Wound bed: Mild bulla  Drainage: serous  Odor: None  Periwound: No clinical signs of infection  Etiology: Present on admission    RADIOLOGY & ADDITIONAL STUDIES:

## 2022-12-09 NOTE — PROGRESS NOTE ADULT - SUBJECTIVE AND OBJECTIVE BOX
Neurology Progress Note    S: Patient seen and examined. No new events overnight MR neg for stroke     Medication:    MEDICATIONS  (STANDING):  chlorhexidine 2% Cloths 1 Application(s) Topical <User Schedule>  furosemide    Tablet 20 milliGRAM(s) Oral <User Schedule>  lidocaine   4% Patch 1 Patch Transdermal every 24 hours  nystatin Powder 1 Application(s) Topical two times a day  rivaroxaban 20 milliGRAM(s) Oral with dinner    MEDICATIONS  (PRN):  acetaminophen     Tablet .. 650 milliGRAM(s) Oral every 6 hours PRN Temp greater or equal to 38C (100.4F), Mild Pain (1 - 3)  artificial tears (preservative free) Ophthalmic Solution 2 Drop(s) Both EYES daily PRN Dry Eyes  melatonin 3 milliGRAM(s) Oral at bedtime PRN Insomnia  ondansetron Injectable 4 milliGRAM(s) IV Push every 8 hours PRN Nausea and/or Vomiting      Vitals:    Vital Signs Last 24 Hrs  T(C): 36.5 (22 @ 11:01), Max: 36.9 (22 @ 20:18)  T(F): 97.7 (22 @ 11:01), Max: 98.4 (22 @ 20:18)  HR: 60 (22 @ 11:01) (60 - 73)  BP: 124/77 (22 @ 11:01) (124/77 - 137/82)  BP(mean): --  RR: 18 (22 @ 11:01) (18 - 18)  SpO2: 96% (22 @ 11:01) (91% - 96%)            General Exam:   Constitutional: Male, appears stated age, in no apparent distress including pain  Head: Normocephalic & atraumatic.  Respiratory: No increased work of breathing  Extremities: b/l LE pitting edema  Skin: +b/l overlying chronic skin changes over b/l LE    Cardiovascular (>2): atrial fibrillation on monitor, HR 80s.    Neurological (>12):  MS: Awake, alert, oriented to person, place, situation, time. Normal affect. Follows all commands.    Language: Speech is clear, fluent with good repetition & comprehension (able to name objects thumb)    CNs: VFF to counting fingers. EOMI no nystagmus, no diplopia. V1-3 intact to LT, well developed masseter muscles b/l. No facial asymmetry b/l, full eye closure strength b/l. Hearing grossly normal (rubbing fingers) b/l. Symmetric palate elevation in midline. Gag reflex deferred. Head turning & shoulder shrug intact b/l. Tongue midline, normal movements, no atrophy.    Motor: Normal muscle bulk. b/l UE postural and action tremor.  Slight R pronator drift.              Deltoid	Biceps	Triceps	Wrist	Finger ABd	   R	4+	5	5	5	4+		4+ 	  L	5	5	5	5	5		5    	H-Flex	K-Flex	K-Ext	D-Flex	P-Flex  R	4+	5	5	4+	5	RLE with +straight raise	   L	5	5	5	5	5	     Sensation: Intact to LT b/l throughout.     Cortical: Extinction on DSS (neglect): none    Reflexes: with significant pain when checking for ankle clonus, deferred              Biceps(C5)       BR(C6)     Triceps(C7)               Patellar(L4)    Achilles(S1)    Plantar Resp  R	2	          2	             2		        1		    		mute  L	2	          2	             2		        1		    		mute    Coordination: intact rapid-alt movements. No dysmetria to FTN     Gait: unable to assess due to pain      I personally reviewed the below data/images/labs:  CBC Full  -  ( 09 Dec 2022 06:57 )  WBC Count : 9.10 K/uL  RBC Count : 4.25 M/uL  Hemoglobin : 13.9 g/dL  Hematocrit : 41.8 %  Platelet Count - Automated : 142 K/uL  Mean Cell Volume : 98.4 fl  Mean Cell Hemoglobin : 32.7 pg  Mean Cell Hemoglobin Concentration : 33.3 gm/dL  Auto Neutrophil # : x  Auto Lymphocyte # : x  Auto Monocyte # : x  Auto Eosinophil # : x  Auto Basophil # : x  Auto Neutrophil % : x  Auto Lymphocyte % : x  Auto Monocyte % : x  Auto Eosinophil % : x  Auto Basophil % : x    12-09    139  |  104  |  19  ----------------------------<  99  4.1   |  23  |  0.90    Ca    9.1      09 Dec 2022 06:54      Urinalysis Basic - ( 05 Dec 2022 18:24 )    Color: Yellow / Appearance: Clear / S.049 / pH: x  Gluc: x / Ketone: Small  / Bili: Negative / Urobili: Negative   Blood: x / Protein: 30 mg/dL / Nitrite: Negative   Leuk Esterase: Negative / RBC: 11 /hpf / WBC 3 /HPF   Sq Epi: x / Non Sq Epi: 2 /hpf / Bacteria: Negative      < from: CT Angio Neck Stroke Protocol w/ IV Cont (22 @ 15:25) >  IMPRESSION:    HEAD CT: No acute intracranial hemorrhage or acute territorial infarction.    If symptoms persist consider follow up head CT or MRI, MRA  if no   contraindication.    CTA COW:  Patent intracranial circulation without flow limiting stenosis   or large vessel occlusion.    CTA NECK: Patent, ECAs, ICAs, no  hemodynamically significant stenosis at    ICA origins by NASCET criteria.  Bilateral vertebral arteries are patent without flow limiting stenosis.    < end of copied text >    < from: MR Head No Cont (22 @ 19:43) >    ACC: [id number] EXAM:  MR BRAIN                          PROCEDURE DATE:  2022          INTERPRETATION:  CLINICAL STATEMENT: Pain.    TECHNIQUE: MRI of the brain was performed without gadolinium.    COMPARISON: CT head 2022    FINDINGS:  There is mild diffuse parenchymal volume loss. There are T2 prolongation   signal abnormalities in the periventricular subcortical white matter   likely related to mild chronic microvascular ischemic changes.    There is no acute parenchymal hemorrhage, parenchymal mass, mass effect   or midline shift. There is no extra-axial fluid collection.  There is no   hydrocephalus.  There is no acute infarct.    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    IMPRESSION:  No acute intracranial hemorrhage or acute infarct.    --- End of Report ---

## 2022-12-09 NOTE — DIETITIAN INITIAL EVALUATION ADULT - PERSON TAUGHT/METHOD
Discussed importance of protein-energy intake to aid in DTI healing; encouraged PO intake; patient preferences taken and to be honored by RD as able; patient made aware RD to remain available./verbal instruction/patient instructed Discussed importance of protein-energy intake to aid in DTI healing; encouraged PO intake of meals and oral nutrition supplements; patient preferences taken and to be honored by RD as able; patient made aware RD to remain available./verbal instruction/patient instructed

## 2022-12-09 NOTE — DIETITIAN INITIAL EVALUATION ADULT - NSFNSGIIOFT_GEN_A_CORE
Patient reports no nausea/vomiting; no diarrhea or constipation; last BM yesterday (12-08). Bowel regimen: none.

## 2022-12-09 NOTE — DIETITIAN INITIAL EVALUATION ADULT - ORAL INTAKE PTA/DIET HISTORY
Patient reports good, normal appetite and PO intake PTA; tries to eat healthy at home, eats 3 meals/day regularly, eats a lot of vegetables, avoids added sugar, staying away from bread in order to lose weight, walks regularly at home, drinks a lot of water, eats prunes and dates for bowel movements; daughter does grocery shopping for patient; daughter encourages PO intake; patient confirms NKFA.

## 2022-12-09 NOTE — DIETITIAN INITIAL EVALUATION ADULT - PERTINENT LABORATORY DATA
12-09  Na: 139 mmol/L (normal)  K+: 4.1 mmol/L (normal)  BUN: 19 mg/dL (normal)  Cr: 0.90 mg/dL (normal)  Glucose, Serum: 99 mg/dL (normal)  eGFR: 85 (normal)  Mg (12-06): 1.8 mg/dL (normal)  POCT blood glucose (12-06): 102 mg/dL (high)

## 2022-12-10 LAB
ANION GAP SERPL CALC-SCNC: 12 MMOL/L — SIGNIFICANT CHANGE UP (ref 5–17)
APTT BLD: 35.3 SEC — SIGNIFICANT CHANGE UP (ref 27.5–35.5)
BUN SERPL-MCNC: 21 MG/DL — SIGNIFICANT CHANGE UP (ref 7–23)
CALCIUM SERPL-MCNC: 9.1 MG/DL — SIGNIFICANT CHANGE UP (ref 8.4–10.5)
CHLORIDE SERPL-SCNC: 103 MMOL/L — SIGNIFICANT CHANGE UP (ref 96–108)
CO2 SERPL-SCNC: 23 MMOL/L — SIGNIFICANT CHANGE UP (ref 22–31)
CREAT SERPL-MCNC: 0.91 MG/DL — SIGNIFICANT CHANGE UP (ref 0.5–1.3)
EGFR: 84 ML/MIN/1.73M2 — SIGNIFICANT CHANGE UP
GLUCOSE SERPL-MCNC: 88 MG/DL — SIGNIFICANT CHANGE UP (ref 70–99)
GRAM STN FLD: SIGNIFICANT CHANGE UP
HCT VFR BLD CALC: 42.7 % — SIGNIFICANT CHANGE UP (ref 39–50)
HGB BLD-MCNC: 13.9 G/DL — SIGNIFICANT CHANGE UP (ref 13–17)
MCHC RBC-ENTMCNC: 32.4 PG — SIGNIFICANT CHANGE UP (ref 27–34)
MCHC RBC-ENTMCNC: 32.6 GM/DL — SIGNIFICANT CHANGE UP (ref 32–36)
MCV RBC AUTO: 99.5 FL — SIGNIFICANT CHANGE UP (ref 80–100)
NRBC # BLD: 0 /100 WBCS — SIGNIFICANT CHANGE UP (ref 0–0)
PLATELET # BLD AUTO: 152 K/UL — SIGNIFICANT CHANGE UP (ref 150–400)
POTASSIUM SERPL-MCNC: 4 MMOL/L — SIGNIFICANT CHANGE UP (ref 3.5–5.3)
POTASSIUM SERPL-SCNC: 4 MMOL/L — SIGNIFICANT CHANGE UP (ref 3.5–5.3)
RBC # BLD: 4.29 M/UL — SIGNIFICANT CHANGE UP (ref 4.2–5.8)
RBC # FLD: 13.1 % — SIGNIFICANT CHANGE UP (ref 10.3–14.5)
SODIUM SERPL-SCNC: 138 MMOL/L — SIGNIFICANT CHANGE UP (ref 135–145)
SPECIMEN SOURCE: SIGNIFICANT CHANGE UP
WBC # BLD: 8.96 K/UL — SIGNIFICANT CHANGE UP (ref 3.8–10.5)
WBC # FLD AUTO: 8.96 K/UL — SIGNIFICANT CHANGE UP (ref 3.8–10.5)

## 2022-12-10 RX ORDER — RIVAROXABAN 15 MG-20MG
20 KIT ORAL
Refills: 0 | Status: DISCONTINUED | OUTPATIENT
Start: 2022-12-10 | End: 2022-12-10

## 2022-12-10 RX ORDER — HEPARIN SODIUM 5000 [USP'U]/ML
INJECTION INTRAVENOUS; SUBCUTANEOUS
Qty: 25000 | Refills: 0 | Status: DISCONTINUED | OUTPATIENT
Start: 2022-12-10 | End: 2022-12-11

## 2022-12-10 RX ORDER — HEPARIN SODIUM 5000 [USP'U]/ML
3500 INJECTION INTRAVENOUS; SUBCUTANEOUS EVERY 6 HOURS
Refills: 0 | Status: DISCONTINUED | OUTPATIENT
Start: 2022-12-10 | End: 2022-12-11

## 2022-12-10 RX ORDER — HEPARIN SODIUM 5000 [USP'U]/ML
7500 INJECTION INTRAVENOUS; SUBCUTANEOUS EVERY 6 HOURS
Refills: 0 | Status: DISCONTINUED | OUTPATIENT
Start: 2022-12-10 | End: 2022-12-11

## 2022-12-10 RX ADMIN — Medication 10 MILLIGRAM(S): at 18:13

## 2022-12-10 RX ADMIN — NYSTATIN CREAM 1 APPLICATION(S): 100000 CREAM TOPICAL at 05:14

## 2022-12-10 RX ADMIN — LIDOCAINE 1 PATCH: 4 CREAM TOPICAL at 12:36

## 2022-12-10 RX ADMIN — Medication 650 MILLIGRAM(S): at 22:32

## 2022-12-10 RX ADMIN — LIDOCAINE 1 PATCH: 4 CREAM TOPICAL at 18:25

## 2022-12-10 RX ADMIN — HEPARIN SODIUM 1700 UNIT(S)/HR: 5000 INJECTION INTRAVENOUS; SUBCUTANEOUS at 19:40

## 2022-12-10 RX ADMIN — Medication 650 MILLIGRAM(S): at 12:34

## 2022-12-10 RX ADMIN — Medication 650 MILLIGRAM(S): at 23:32

## 2022-12-10 RX ADMIN — Medication 650 MILLIGRAM(S): at 13:10

## 2022-12-10 RX ADMIN — NYSTATIN CREAM 1 APPLICATION(S): 100000 CREAM TOPICAL at 18:14

## 2022-12-10 RX ADMIN — Medication 2 DROP(S): at 05:14

## 2022-12-10 RX ADMIN — CHLORHEXIDINE GLUCONATE 1 APPLICATION(S): 213 SOLUTION TOPICAL at 05:09

## 2022-12-10 RX ADMIN — HEPARIN SODIUM 1700 UNIT(S)/HR: 5000 INJECTION INTRAVENOUS; SUBCUTANEOUS at 19:54

## 2022-12-10 NOTE — PROGRESS NOTE ADULT - SUBJECTIVE AND OBJECTIVE BOX
Neurology Progress Note    S: Patient seen and examined. No new events overnight MR neg for stroke; s/p IR procedure ; c/o pain     Medication:      MEDICATIONS  (STANDING):  chlorhexidine 2% Cloths 1 Application(s) Topical <User Schedule>  furosemide    Tablet 20 milliGRAM(s) Oral <User Schedule>  lidocaine   4% Patch 1 Patch Transdermal every 24 hours  nystatin Powder 1 Application(s) Topical two times a day  rivaroxaban 20 milliGRAM(s) Oral with dinner    MEDICATIONS  (PRN):  acetaminophen     Tablet .. 650 milliGRAM(s) Oral every 6 hours PRN Temp greater or equal to 38C (100.4F), Mild Pain (1 - 3)  artificial tears (preservative free) Ophthalmic Solution 2 Drop(s) Both EYES daily PRN Dry Eyes  melatonin 3 milliGRAM(s) Oral at bedtime PRN Insomnia  ondansetron Injectable 4 milliGRAM(s) IV Push every 8 hours PRN Nausea and/or Vomiting    Vitals:    Vital Signs Last 24 Hrs  T(C): 36.1 (12-10-22 @ 04:40), Max: 36.6 (22 @ 20:17)  T(F): 97 (12-10-22 @ 04:40), Max: 97.9 (22 @ 20:17)  HR: 58 (12-10-22 @ 04:40) (58 - 72)  BP: 118/79 (12-10-22 @ 04:40) (114/73 - 124/77)  BP(mean): --  RR: 18 (12-10-22 @ 04:40) (18 - 18)  SpO2: 92% (12-10-22 @ 04:40) (92% - 96%)          General Exam:   Constitutional: Male, appears stated age, in no apparent distress including pain  Head: Normocephalic & atraumatic.  Respiratory: No increased work of breathing  Extremities: b/l LE pitting edema  Skin: +b/l overlying chronic skin changes over b/l LE    Cardiovascular (>2): atrial fibrillation on monitor, HR 80s.    Neurological (>12):  MS: Awake, alert, oriented to person, place, situation, time. Normal affect. Follows all commands.    Language: Speech is clear, fluent with good repetition & comprehension (able to name objects thumb)    CNs: VFF to counting fingers. EOMI no nystagmus, no diplopia. V1-3 intact to LT, well developed masseter muscles b/l. No facial asymmetry b/l, full eye closure strength b/l. Hearing grossly normal (rubbing fingers) b/l. Symmetric palate elevation in midline. Gag reflex deferred. Head turning & shoulder shrug intact b/l. Tongue midline, normal movements, no atrophy.    Motor: Normal muscle bulk. b/l UE postural and action tremor.  Slight R pronator drift.              Deltoid	Biceps	Triceps	Wrist	Finger ABd	   R	4+	5	5	5	4+		4+ 	  L	5	5	5	5	5		5    	H-Flex	K-Flex	K-Ext	D-Flex	P-Flex  R	4+	5	5	4+	5	RLE with +straight raise	   L	5	5	5	5	5	     Sensation: Intact to LT b/l throughout.     Cortical: Extinction on DSS (neglect): none    Reflexes: with significant pain when checking for ankle clonus, deferred              Biceps(C5)       BR(C6)     Triceps(C7)               Patellar(L4)    Achilles(S1)    Plantar Resp  R	2	          2	             2		        1		    		mute  L	2	          2	             2		        1		    		mute    Coordination: intact rapid-alt movements. No dysmetria to FTN     Gait: unable to assess due to pain      I personally reviewed the below data/images/labs:  CBC Full  -  ( 10 Dec 2022 06:39 )  WBC Count : 8.96 K/uL  RBC Count : 4.29 M/uL  Hemoglobin : 13.9 g/dL  Hematocrit : 42.7 %  Platelet Count - Automated : 152 K/uL  Mean Cell Volume : 99.5 fl  Mean Cell Hemoglobin : 32.4 pg  Mean Cell Hemoglobin Concentration : 32.6 gm/dL  Auto Neutrophil # : x  Auto Lymphocyte # : x  Auto Monocyte # : x  Auto Eosinophil # : x  Auto Basophil # : x  Auto Neutrophil % : x  Auto Lymphocyte % : x  Auto Monocyte % : x  Auto Eosinophil % : x  Auto Basophil % : x    12-10    138  |  103  |  21  ----------------------------<  88  4.0   |  23  |  0.91    Ca    9.1      10 Dec 2022 06:39          Urinalysis Basic - ( 05 Dec 2022 18:24 )    Color: Yellow / Appearance: Clear / S.049 / pH: x  Gluc: x / Ketone: Small  / Bili: Negative / Urobili: Negative   Blood: x / Protein: 30 mg/dL / Nitrite: Negative   Leuk Esterase: Negative / RBC: 11 /hpf / WBC 3 /HPF   Sq Epi: x / Non Sq Epi: 2 /hpf / Bacteria: Negative      < from: CT Angio Neck Stroke Protocol w/ IV Cont (22 @ 15:25) >  IMPRESSION:    HEAD CT: No acute intracranial hemorrhage or acute territorial infarction.    If symptoms persist consider follow up head CT or MRI, MRA  if no   contraindication.    CTA COW:  Patent intracranial circulation without flow limiting stenosis   or large vessel occlusion.    CTA NECK: Patent, ECAs, ICAs, no  hemodynamically significant stenosis at    ICA origins by NASCET criteria.  Bilateral vertebral arteries are patent without flow limiting stenosis.    < end of copied text >    < from: MR Head No Cont (22 @ 19:43) >    ACC: [id number] EXAM:  MR BRAIN                          PROCEDURE DATE:  2022          INTERPRETATION:  CLINICAL STATEMENT: Pain.    TECHNIQUE: MRI of the brain was performed without gadolinium.    COMPARISON: CT head 2022    FINDINGS:  There is mild diffuse parenchymal volume loss. There are T2 prolongation   signal abnormalities in the periventricular subcortical white matter   likely related to mild chronic microvascular ischemic changes.    There is no acute parenchymal hemorrhage, parenchymal mass, mass effect   or midline shift. There is no extra-axial fluid collection.  There is no   hydrocephalus.  There is no acute infarct.    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    IMPRESSION:  No acute intracranial hemorrhage or acute infarct.    --- End of Report ---    < from: MR Cervical Spine No Cont (22 @ 20:23) >    ACC: [id number] EXAM:  MR SPINE CERVICAL                          PROCEDURE DATE:  2022          INTERPRETATION:  CLINICAL INDICATION: Cervical stenosis with myelopathy    Magnetic resonance imaging of the cervical spine was carried out with   sagittal surface coil imaging from C1 to T4 using T1 and fast spin echo   T2 weighted images with and without fat saturation technique with axial   T1 and fast spin echo T2 weighted imaging on a 1.5 Alexia magnet.    Comparison is made with the prior cervical spine CT of 10/30/2019.    The cervical vertebrae are normal in height and signal intensity. There   are disc osteophyte complexes present at C3-4 and C4-5 which with dorsal   ligamentous hypertrophy causes severe spinal stenosis and mild cord  compression. There is a small amount of cord signal abnormality at the   C4-5 level.    There is a small disc osteophyte complex present at C6-7 without cord   compression or significant spinal stenosis. The remainder of the cervical   spine and the upper thoracic spine are unremarkable.        IMPRESSION: Disc osteophyte complexes C3-4 and C4-5 along with dorsal   ligamentous causes severe spinal stenosis and mild cord compression.   Small amount of abnormal cord signal C4-5.                    --- End of Report ---            ABA MARIE MD; Attending Radiologist  This document has been electronically signed. Dec  9 2022  8:48PM    < end of copied text >

## 2022-12-10 NOTE — PROGRESS NOTE ADULT - ASSESSMENT
82M w/ pmhx of afib on xarelto, s/p L hip IMN (most recently s/p R hip IMN (Dr. Godinez 10/31/19) presents with acute onset R sided weakness.    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 Godinez for revision IM nail vs arthroplast  medicine pre op eval: pt without active cardiac conditions, moderate risk, medically optimized for OR    # LE wound  BC NTD  podiatry following for L heel wound  monitor off abx  leukocytosis improved    # Chronic atrial fibrillation  switch to hep gtt for OR  TTE Severe right atrial enlargement. borderline pulmonary hypertension. Moderate-severe tricuspid regurgitation. EF64%  cardio following  lasix MWF    # depression  On Lexapro    DVT prophylaxis. hold xarelto start hep gtt for OR    dispo pending OR with ortho    Please call [email or web address] with questions [phone].     82M w/ pmhx of afib on xarelto, s/p L hip IMN (most recently s/p R hip IMN (Dr. Godinez 10/31/19) presents with acute onset R sided weakness.    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 Godinez for revision IM nail vs arthroplast  medicine pre op eval: pt without active cardiac conditions, moderate risk, medically optimized for OR    # LE wound  BC NTD  podiatry following for L heel wound  monitor off abx  leukocytosis improved    # Chronic atrial fibrillation  switch to hep gtt for OR  TTE Severe right atrial enlargement. borderline pulmonary hypertension. Moderate-severe tricuspid regurgitation. EF64%  cardio following  lasix MWF    # depression  On Lexapro    DVT prophylaxis. hold xarelto start hep gtt for OR    dispo pending OR with ortho    Please call [facility] with questions [phone].

## 2022-12-10 NOTE — PROGRESS NOTE ADULT - SUBJECTIVE AND OBJECTIVE BOX
Pt seen/examined. Doing well. Pain controlled. IR aspirated R hip joint yesterday 12/9. No acute overnight complaints or events.      VITAL SIGNS:  T(C): 36.1 (12-10-22 @ 04:40), Max: 36.6 (12-09-22 @ 20:17)  HR: 58 (12-10-22 @ 04:40) (58 - 72)  BP: 118/79 (12-10-22 @ 04:40) (114/73 - 124/77)  RR: 18 (12-10-22 @ 04:40) (18 - 18)  SpO2: 92% (12-10-22 @ 04:40) (92% - 96%)      LABS:                        13.9   8.96  )-----------( 152      ( 10 Dec 2022 06:39 )             42.7     12-10    138  |  103  |  21  ----------------------------<  88  4.0   |  23  |  0.91    Ca    9.1      10 Dec 2022 06:39      PT/INR - ( 09 Dec 2022 11:09 )   PT: 19.4 sec;   INR: 1.66 ratio         PTT - ( 09 Dec 2022 11:09 )  PTT:36.5 sec      Culture - Joint Fluid (collected 12-09-22 @ 20:30)  Source: Joint Fl Joint Fluid  Gram Stain (12-10-22 @ 04:47):    No polymorphonuclear cells seen    No organisms seen    by cytocentrifuge          PHYSICAL EXAM  General: NAD, Awake and Alert    RIGHT Lower Extremity:   Skin intact, well healed surgical incision, no erythema, ecchymosis, edema, or gross deformity   Mild TTP over the GT o/w NTTP over the bony prominences of the hip/knee/ankle/foot/toes  Painless passive/active ROM of the hip/knee/ankle/foot  L2-S1 SILT  Motor grossly intact throughout hip flexors/quads/hams/TA/EHL/FHL/GSC  + DP/PT pulses  No pain with log roll, No pain on axial loading  Compartments soft and compressible  Calf nontender     82y Male with possible R hip IM nail subsidence and screw out    -Pain control as needed  -Sp IR aspiration of R hip 12/9, FU cultures  -DVT ppx per primary team  -WBAT RLE  -Plan for OR on tuesday 12/3 with Dr. Godinez for revision IM nail vs arthroplasty   -Discussed with Family who is aware and agrees with plan  - Please optimize and document medical clearance for OR if patient stays!!!  - patient is also cleared from orthopaedic standpoint for discharge home and f/u outpatient with Dr. Godinez if they no longer wish to stay Pt seen/examined. Doing well. Pain controlled. IR aspirated R hip joint yesterday 12/9. No acute overnight complaints or events.      VITAL SIGNS:  T(C): 36.1 (12-10-22 @ 04:40), Max: 36.6 (12-09-22 @ 20:17)  HR: 58 (12-10-22 @ 04:40) (58 - 72)  BP: 118/79 (12-10-22 @ 04:40) (114/73 - 124/77)  RR: 18 (12-10-22 @ 04:40) (18 - 18)  SpO2: 92% (12-10-22 @ 04:40) (92% - 96%)      LABS:                        13.9   8.96  )-----------( 152      ( 10 Dec 2022 06:39 )             42.7     12-10    138  |  103  |  21  ----------------------------<  88  4.0   |  23  |  0.91    Ca    9.1      10 Dec 2022 06:39      PT/INR - ( 09 Dec 2022 11:09 )   PT: 19.4 sec;   INR: 1.66 ratio         PTT - ( 09 Dec 2022 11:09 )  PTT:36.5 sec      Culture - Joint Fluid (collected 12-09-22 @ 20:30)  Source: Joint Fl Joint Fluid  Gram Stain (12-10-22 @ 04:47):    No polymorphonuclear cells seen    No organisms seen    by cytocentrifuge          PHYSICAL EXAM  General: NAD, Awake and Alert    RIGHT Lower Extremity:   Skin intact, well healed surgical incision, no erythema, ecchymosis, edema, or gross deformity   Mild TTP over the GT o/w NTTP over the bony prominences of the hip/knee/ankle/foot/toes  Painless passive/active ROM of the hip/knee/ankle/foot  L2-S1 SILT  Motor grossly intact throughout hip flexors/quads/hams/TA/EHL/FHL/GSC  + DP/PT pulses  No pain with log roll, No pain on axial loading  Compartments soft and compressible  Calf nontender     82y Male with possible R hip IM nail subsidence and screw out    -Pain control as needed  -Sp IR aspiration of R hip 12/9, FU cultures  -DVT ppx per primary team  -WBAT RLE  -Plan for OR on tuesday 12/13 with Dr. Godinez for revision IM nail vs arthroplasty   -Discussed with Family who is aware and agrees with plan  - Please optimize and document medical clearance for OR if patient stays!!!  - patient is also cleared from orthopaedic standpoint for discharge home and f/u outpatient with Dr. Godinez if they no longer wish to stay

## 2022-12-10 NOTE — PROGRESS NOTE ADULT - SUBJECTIVE AND OBJECTIVE BOX
CARDIOLOGY FOLLOW UP NOTE - DR. ALVES    Patient Name: MARY BO  Date of Service: 12-10-22 @ 09:03    Patient seen and examined  s/p IR hip aspiration 12/9    Subjective:    cv: denies chest pain, dyspnea, palpitations, dizziness  pulmonary: denies cough  GI: denies abdominal pain, nausea, vomiting  vascular/legs: no edema   skin: no rash  ROS: otherwise negative   overnight events:      PHYSICAL EXAM:  T(C): 36.1 (12-10-22 @ 04:40), Max: 36.6 (12-09-22 @ 20:17)  HR: 58 (12-10-22 @ 04:40) (58 - 72)  BP: 118/79 (12-10-22 @ 04:40) (114/73 - 124/77)  RR: 18 (12-10-22 @ 04:40) (18 - 18)  SpO2: 92% (12-10-22 @ 04:40) (92% - 96%)  Wt(kg): --  I&O's Summary    09 Dec 2022 07:01  -  10 Dec 2022 07:00  --------------------------------------------------------  IN: 720 mL / OUT: 2320 mL / NET: -1600 mL      Daily     Daily     Appearance: Normal	  Cardiovascular: Normal S1 S2,RRR, No JVD, No murmurs  Respiratory: Lungs clear to auscultation	  Gastrointestinal:  Soft, Non-tender, + BS	  Extremities: Normal range of motion, No clubbing, cyanosis or edema      Home Medications:  escitalopram:  (05 Dec 2022 21:58)  rivaroxaban 20 mg oral tablet: 1 tab(s) orally once a day (before a meal) (05 Dec 2022 21:58)      MEDICATIONS  (STANDING):  chlorhexidine 2% Cloths 1 Application(s) Topical <User Schedule>  furosemide    Tablet 20 milliGRAM(s) Oral <User Schedule>  lidocaine   4% Patch 1 Patch Transdermal every 24 hours  nystatin Powder 1 Application(s) Topical two times a day  rivaroxaban 20 milliGRAM(s) Oral with dinner      TELEMETRY: 	    ECG:  	  RADIOLOGY:   DIAGNOSTIC TESTING:  [ ] Echocardiogram:  [ ] Catheterization:  [ ] Stress Test:    OTHER: 	    LABS:	 	    CARDIAC MARKERS:        Troponin T, High Sensitivity Result: 45 ng/L (12-06 @ 07:43)  Troponin T, High Sensitivity Result: 26 ng/L (12-05 @ 15:00)                                13.9   8.96  )-----------( 152      ( 10 Dec 2022 06:39 )             42.7     12-10    138  |  103  |  21  ----------------------------<  88  4.0   |  23  |  0.91    Ca    9.1      10 Dec 2022 06:39      proBNP:   PT/INR - ( 09 Dec 2022 11:09 )   PT: 19.4 sec;   INR: 1.66 ratio         PTT - ( 09 Dec 2022 11:09 )  PTT:36.5 sec  Lipid Profile:   HgA1c:     Creatinine, Serum: 0.91 mg/dL (12-10-22 @ 06:39)  Creatinine, Serum: 0.90 mg/dL (12-09-22 @ 06:54)

## 2022-12-10 NOTE — PROGRESS NOTE ADULT - ASSESSMENT
ECHO 12/6/22:  Ef 64%;  mild MR, nl LV sys fx  , mod to sev TR     a/p  82M w/ pmhx of afib on xarelto, s/p L hip IMN (most recently s/p R hip IMN (Dr. Godinez 10/31/19) presents with acute onset RLE weakness.    # WCT  -cont to monitor   -no further sig WCT, unable to give bb due to freda   -echo with nl EF    #Right Sided HF  -ECHO revealed normal LVEF, w R sided enlargement and mod-severe TR  -Venous dopplers negative for DVT  -likely related to r sided CHF  -cont lasix 20mg PO Q48hrs- volume status stable   -pt in slow afib off meds which preclude BB use  -if he develops further arrhythmia may need to consider PPM eval    #Afib  -cont AC  -rate controlled/freda off meds  -no sig freda, pauses  -if holding xarelto for OR next week, start iv heparin    #Weakness  -neuro w/u neg for CVA  -w/u per primary team  -neuro eval noted  -s/p IR hip aspiration 12/9  -await possible ortho OR on tuesday for revision IM nail vs arthroplasty  -remains cv stable and optimized to proceed with acceptable cardiac risk  -can hold ORAL AC and start Hep gtt if planning OR        35 minutes spent on total encounter; more than 50% of the visit was spent counseling and/or coordinating care by the attending physician.

## 2022-12-10 NOTE — PROGRESS NOTE ADULT - ASSESSMENT
82M RH w/ afib on xarelto, s/p L hip IMN (2013), s/p R hip IMN (2019) presents with acute onset RLE weakness. LKN 12/5/22 at 7am. Pt last took xarelto at 6:30am. While showering, he felt RLE sudden onset weakness, no associated pain or back pain at that time. He reports increased R hip area pain and numbness/tingling with associated leg shaking due to pain. Pt was hanging onto the sink for at least 3-4 hours and reports not being able to get up due to RLE weakness. NIHSS: 0, preMRS: 2 (ambulates with a walker). Neuro exam notable for slight +R pronator, +straight leg raise. CTH/CTA neg  NIHSS4  premrs2-3 walks with cane/walker   MRI brain neg for infarct   CRP 22   CT pelvis: Redemonstration of open reduction internal fixation of the right hip.   Interval development of loosening throughout the right femoral neck   compression screw with migration of the tip into the joint space.   Interval development of lateral deviation of the right femoral   intramedullary simón with the tip now abutting and markedly   remodeling/thinning the lateral cortex at the mid diaphysis.  MRI C spien 12/9: mild cord compression C3/4/5. abnormal signal C4/5   s/p IR aspiration 12/9    Impression: acute RLE weakness and pain likely 2/2 R hip pathology vs. lumbosacral pathology, less likely stroke; Stroke ruled out     Recommendations:  - f/u spine regarding C spine results    - s/p IR aspiration 12/9   - r/o infection ; ID recs appreciated. monitoring off abx   -  f/u orthopedic  , possible OR   - continue home xarelto for stroke prevention  - monitor on telemetry  - stroke risk factor modification and counseling   - check HA1c, lipid panel, Utox  - NPO until bedside dysphagia screen   - High dose statin therapy - atorvastatin 40mg PO daily. LDL goal <70mg/dL.   - PT/OT/SS/SLP, OOBC  - check FS, glucose control <180  - GI/DVT ppx  - Thank you for allowing me to participate in the care of this patient. Call with questions.       Carloz Del Castillo MD  Vascular Neurology  Office: [phone] .

## 2022-12-10 NOTE — PROGRESS NOTE ADULT - SUBJECTIVE AND OBJECTIVE BOX
Patient is a 82y old  Male who presents with a chief complaint of R sided weakness (10 Dec 2022 09:03)      SUBJECTIVE / OVERNIGHT EVENTS:    Patient seen and examined. feels fine. no complaints except hip pain.      Vital Signs Last 24 Hrs  T(C): 36.7 (10 Dec 2022 11:18), Max: 36.7 (10 Dec 2022 11:18)  T(F): 98.1 (10 Dec 2022 11:18), Max: 98.1 (10 Dec 2022 11:18)  HR: 68 (10 Dec 2022 11:18) (58 - 72)  BP: 130/76 (10 Dec 2022 11:18) (114/73 - 130/76)  BP(mean): --  RR: 18 (10 Dec 2022 11:18) (18 - 18)  SpO2: 94% (10 Dec 2022 11:18) (92% - 96%)    Parameters below as of 10 Dec 2022 11:18  Patient On (Oxygen Delivery Method): room air      I&O's Summary    09 Dec 2022 07:01  -  10 Dec 2022 07:00  --------------------------------------------------------  IN: 720 mL / OUT: 2320 mL / NET: -1600 mL    10 Dec 2022 07:01  -  10 Dec 2022 12:49  --------------------------------------------------------  IN: 520 mL / OUT: 0 mL / NET: 520 mL        PE:  GENERAL: NAD, AAOx2  CHEST/LUNG: CTABL, No wheeze  HEART: irregular irregular; + murmur  ABDOMEN: Soft, Nontender, Nondistended; Bowel sounds present  EXTREMITIES: chronic venous stasis, + 1 le edema  NEURO: No focal deficits    LABS:                        13.9   8.96  )-----------( 152      ( 10 Dec 2022 06:39 )             42.7     12-10    138  |  103  |  21  ----------------------------<  88  4.0   |  23  |  0.91    Ca    9.1      10 Dec 2022 06:39      PT/INR - ( 09 Dec 2022 11:09 )   PT: 19.4 sec;   INR: 1.66 ratio         PTT - ( 09 Dec 2022 11:09 )  PTT:36.5 sec  CAPILLARY BLOOD GLUCOSE                RADIOLOGY & ADDITIONAL TESTS:    Imaging Personally Reviewed:  [x] YES  [ ] NO    Consultant(s) Notes Reviewed:  [x] YES  [ ] NO    MEDICATIONS  (STANDING):  bisacodyl Suppository 10 milliGRAM(s) Rectal once  chlorhexidine 2% Cloths 1 Application(s) Topical <User Schedule>  furosemide    Tablet 20 milliGRAM(s) Oral <User Schedule>  lidocaine   4% Patch 1 Patch Transdermal every 24 hours  nystatin Powder 1 Application(s) Topical two times a day  rivaroxaban 20 milliGRAM(s) Oral with dinner    MEDICATIONS  (PRN):  acetaminophen     Tablet .. 650 milliGRAM(s) Oral every 6 hours PRN Temp greater or equal to 38C (100.4F), Mild Pain (1 - 3)  artificial tears (preservative free) Ophthalmic Solution 2 Drop(s) Both EYES daily PRN Dry Eyes  melatonin 3 milliGRAM(s) Oral at bedtime PRN Insomnia  ondansetron Injectable 4 milliGRAM(s) IV Push every 8 hours PRN Nausea and/or Vomiting      Care Discussed with Consultants/Other Providers [x] YES  [ ] NO    HEALTH ISSUES - PROBLEM Dx:  Suspected pulmonary embolism    Suspected deep vein thrombosis (DVT)    Weakness of lower extremity, unspecified laterality    Chronic atrial fibrillation    Leukocytosis    At risk for depression    DVT prophylaxis

## 2022-12-11 LAB
APTT BLD: 102.8 SEC — HIGH (ref 27.5–35.5)
APTT BLD: 71.9 SEC — HIGH (ref 27.5–35.5)
APTT BLD: 81.6 SEC — HIGH (ref 27.5–35.5)
APTT BLD: >200 SEC — CRITICAL HIGH (ref 27.5–35.5)
CULTURE RESULTS: SIGNIFICANT CHANGE UP
CULTURE RESULTS: SIGNIFICANT CHANGE UP
HCT VFR BLD CALC: 41.4 % — SIGNIFICANT CHANGE UP (ref 39–50)
HGB BLD-MCNC: 13.7 G/DL — SIGNIFICANT CHANGE UP (ref 13–17)
INR BLD: 1.41 RATIO — HIGH (ref 0.88–1.16)
MCHC RBC-ENTMCNC: 32 PG — SIGNIFICANT CHANGE UP (ref 27–34)
MCHC RBC-ENTMCNC: 33.1 GM/DL — SIGNIFICANT CHANGE UP (ref 32–36)
MCV RBC AUTO: 96.7 FL — SIGNIFICANT CHANGE UP (ref 80–100)
NRBC # BLD: 0 /100 WBCS — SIGNIFICANT CHANGE UP (ref 0–0)
PLATELET # BLD AUTO: 162 K/UL — SIGNIFICANT CHANGE UP (ref 150–400)
PROTHROM AB SERPL-ACNC: 16.4 SEC — HIGH (ref 10.5–13.4)
RBC # BLD: 4.28 M/UL — SIGNIFICANT CHANGE UP (ref 4.2–5.8)
RBC # FLD: 13 % — SIGNIFICANT CHANGE UP (ref 10.3–14.5)
SPECIMEN SOURCE: SIGNIFICANT CHANGE UP
SPECIMEN SOURCE: SIGNIFICANT CHANGE UP
WBC # BLD: 11.01 K/UL — HIGH (ref 3.8–10.5)
WBC # FLD AUTO: 11.01 K/UL — HIGH (ref 3.8–10.5)

## 2022-12-11 RX ORDER — ENOXAPARIN SODIUM 100 MG/ML
90 INJECTION SUBCUTANEOUS EVERY 12 HOURS
Refills: 0 | Status: COMPLETED | OUTPATIENT
Start: 2022-12-11 | End: 2022-12-12

## 2022-12-11 RX ORDER — ESCITALOPRAM OXALATE 10 MG/1
10 TABLET, FILM COATED ORAL DAILY
Refills: 0 | Status: DISCONTINUED | OUTPATIENT
Start: 2022-12-11 | End: 2022-12-12

## 2022-12-11 RX ADMIN — Medication 650 MILLIGRAM(S): at 06:54

## 2022-12-11 RX ADMIN — Medication 650 MILLIGRAM(S): at 18:00

## 2022-12-11 RX ADMIN — ENOXAPARIN SODIUM 90 MILLIGRAM(S): 100 INJECTION SUBCUTANEOUS at 21:21

## 2022-12-11 RX ADMIN — Medication 650 MILLIGRAM(S): at 05:54

## 2022-12-11 RX ADMIN — Medication 650 MILLIGRAM(S): at 17:20

## 2022-12-11 RX ADMIN — HEPARIN SODIUM 1500 UNIT(S)/HR: 5000 INJECTION INTRAVENOUS; SUBCUTANEOUS at 10:07

## 2022-12-11 RX ADMIN — NYSTATIN CREAM 1 APPLICATION(S): 100000 CREAM TOPICAL at 05:53

## 2022-12-11 RX ADMIN — LIDOCAINE 1 PATCH: 4 CREAM TOPICAL at 05:00

## 2022-12-11 RX ADMIN — HEPARIN SODIUM 1700 UNIT(S)/HR: 5000 INJECTION INTRAVENOUS; SUBCUTANEOUS at 07:45

## 2022-12-11 RX ADMIN — HEPARIN SODIUM 1700 UNIT(S)/HR: 5000 INJECTION INTRAVENOUS; SUBCUTANEOUS at 02:26

## 2022-12-11 RX ADMIN — LIDOCAINE 1 PATCH: 4 CREAM TOPICAL at 17:19

## 2022-12-11 RX ADMIN — CHLORHEXIDINE GLUCONATE 1 APPLICATION(S): 213 SOLUTION TOPICAL at 05:54

## 2022-12-11 RX ADMIN — ESCITALOPRAM OXALATE 10 MILLIGRAM(S): 10 TABLET, FILM COATED ORAL at 12:36

## 2022-12-11 RX ADMIN — LIDOCAINE 1 PATCH: 4 CREAM TOPICAL at 12:37

## 2022-12-11 RX ADMIN — NYSTATIN CREAM 1 APPLICATION(S): 100000 CREAM TOPICAL at 17:20

## 2022-12-11 NOTE — PROGRESS NOTE ADULT - SUBJECTIVE AND OBJECTIVE BOX
CARDIOLOGY FOLLOW UP NOTE - DR. ALVES    Patient Name: MARY BO  Date of Service: 12-11-22 @ 14:04    Patient seen and examined    Subjective:    cv: denies chest pain, dyspnea, palpitations, dizziness  pulmonary: denies cough  GI: denies abdominal pain, nausea, vomiting  vascular/legs: no edema   skin: no rash  ROS: otherwise negative   overnight events:      PHYSICAL EXAM:  T(C): 36.4 (12-11-22 @ 11:38), Max: 36.8 (12-10-22 @ 23:31)  HR: 59 (12-11-22 @ 11:38) (59 - 66)  BP: 112/76 (12-11-22 @ 11:38) (111/68 - 132/81)  RR: 18 (12-11-22 @ 11:38) (18 - 18)  SpO2: 95% (12-11-22 @ 11:38) (93% - 95%)  Wt(kg): --  I&O's Summary    10 Dec 2022 07:01  -  11 Dec 2022 07:00  --------------------------------------------------------  IN: 760 mL / OUT: 651 mL / NET: 109 mL    11 Dec 2022 07:01  -  11 Dec 2022 14:04  --------------------------------------------------------  IN: 560 mL / OUT: 0 mL / NET: 560 mL      Daily     Daily     Appearance: Normal	  Cardiovascular: Normal S1 S2,RRR, No JVD, No murmurs  Respiratory: Lungs clear to auscultation	  Gastrointestinal:  Soft, Non-tender, + BS	  Extremities: Normal range of motion, No clubbing, cyanosis or edema      Home Medications:  escitalopram:  (05 Dec 2022 21:58)  rivaroxaban 20 mg oral tablet: 1 tab(s) orally once a day (before a meal) (05 Dec 2022 21:58)      MEDICATIONS  (STANDING):  chlorhexidine 2% Cloths 1 Application(s) Topical <User Schedule>  escitalopram 10 milliGRAM(s) Oral daily  furosemide    Tablet 20 milliGRAM(s) Oral <User Schedule>  heparin  Infusion.  Unit(s)/Hr (17 mL/Hr) IV Continuous <Continuous>  lidocaine   4% Patch 1 Patch Transdermal every 24 hours  nystatin Powder 1 Application(s) Topical two times a day      TELEMETRY: 	    ECG:  	  RADIOLOGY:   DIAGNOSTIC TESTING:  [ ] Echocardiogram:  [ ] Catheterization:  [ ] Stress Test:    OTHER: 	    LABS:	 	    CARDIAC MARKERS:                                      13.7   11.01 )-----------( 162      ( 11 Dec 2022 01:35 )             41.4     12-10    138  |  103  |  21  ----------------------------<  88  4.0   |  23  |  0.91    Ca    9.1      10 Dec 2022 06:39      proBNP:   PT/INR - ( 11 Dec 2022 09:44 )   PT: 16.4 sec;   INR: 1.41 ratio         PTT - ( 11 Dec 2022 09:44 )  PTT:102.8 sec  Lipid Profile:   HgA1c:     Creatinine, Serum: 0.91 mg/dL (12-10-22 @ 06:39)  Creatinine, Serum: 0.90 mg/dL (12-09-22 @ 06:54)

## 2022-12-11 NOTE — PROGRESS NOTE ADULT - ASSESSMENT
82M RH w/ afib on xarelto, s/p L hip IMN (2013), s/p R hip IMN (2019) presents with acute onset RLE weakness. LKN 12/5/22 at 7am. Pt last took xarelto at 6:30am. While showering, he felt RLE sudden onset weakness, no associated pain or back pain at that time. He reports increased R hip area pain and numbness/tingling with associated leg shaking due to pain. Pt was hanging onto the sink for at least 3-4 hours and reports not being able to get up due to RLE weakness. NIHSS: 0, preMRS: 2 (ambulates with a walker). Neuro exam notable for slight +R pronator, +straight leg raise. CTH/CTA neg  NIHSS4  premrs2-3 walks with cane/walker   MRI brain neg for infarct   CRP 22   CT pelvis: Redemonstration of open reduction internal fixation of the right hip.   Interval development of loosening throughout the right femoral neck   compression screw with migration of the tip into the joint space.   Interval development of lateral deviation of the right femoral   intramedullary simón with the tip now abutting and markedly   remodeling/thinning the lateral cortex at the mid diaphysis.  MRI C spien 12/9: mild cord compression C3/4/5. abnormal signal C4/5   s/p IR aspiration 12/9    Impression: acute RLE weakness and pain likely 2/2 R hip pathology vs. lumbosacral pathology, less likely stroke; Stroke ruled out     Recommendations:  - plan for OR 12/13 for R hip hardware revision by ortho   - f/u spine regarding C spine results    - s/p IR aspiration 12/9   - r/o infection ; ID recs appreciated. monitoring off abx    - continue home xarelto for stroke prevention  - monitor on telemetry  - stroke risk factor modification and counseling   - check HA1c, lipid panel, Utox  - NPO until bedside dysphagia screen   - High dose statin therapy - atorvastatin 40mg PO daily. LDL goal <70mg/dL.   - PT/OT/SS/SLP, OOBC  - check FS, glucose control <180  - GI/DVT ppx  - Thank you for allowing me to participate in the care of this patient. Call with questions.       Carloz Del Castillo MD  Vascular Neurology  Office: [phone] .

## 2022-12-11 NOTE — PROGRESS NOTE ADULT - ASSESSMENT
82y Male with possible R hip IM nail subsidence and screw out    -Pain control as needed  -Sp IR aspiration of R hip 12/9, culture NGTD  -DVT ppx per primary team  -WBAT RLE  -Plan for OR on Tuesday 12/13 with Dr. Godinez for revision IM nail vs arthroplasty   -Discussed with Family who is aware and agrees with plan  - Please optimize and document medical clearance for OR if patient stays!!!  - patient is also cleared from orthopaedic standpoint for discharge home and f/u outpatient with Dr. Godinez if they no longer wish to stay 82y Male with possible R hip IM nail subsidence and screw cut-out    -Pain control as needed  -Sp IR aspiration of R hip 12/9, culture NGTD  -DVT ppx per primary team  -WBAT RLE  -Plan for OR on Tuesday 12/13 with Dr. Godinez for revision IM nail vs arthroplasty   -Discussed with Family who is aware and agrees with plan  - Please optimize and document medical clearance for OR if patient stays!!!  - patient is also cleared from orthopaedic standpoint for discharge home and f/u outpatient with Dr. Godinez if they no longer wish to stay

## 2022-12-11 NOTE — PROGRESS NOTE ADULT - SUBJECTIVE AND OBJECTIVE BOX
Patient is a 82y old  Male who presents with a chief complaint of R sided weakness (11 Dec 2022 07:24)      SUBJECTIVE / OVERNIGHT EVENTS:    Patient seen and examined. no cp sob. no complaints.      Vital Signs Last 24 Hrs  T(C): 36.4 (11 Dec 2022 11:38), Max: 36.8 (10 Dec 2022 23:31)  T(F): 97.5 (11 Dec 2022 11:38), Max: 98.2 (10 Dec 2022 23:31)  HR: 59 (11 Dec 2022 11:38) (59 - 66)  BP: 112/76 (11 Dec 2022 11:38) (111/68 - 132/81)  BP(mean): --  RR: 18 (11 Dec 2022 11:38) (18 - 18)  SpO2: 95% (11 Dec 2022 11:38) (93% - 95%)    Parameters below as of 11 Dec 2022 11:38  Patient On (Oxygen Delivery Method): room air      I&O's Summary    10 Dec 2022 07:01  -  11 Dec 2022 07:00  --------------------------------------------------------  IN: 760 mL / OUT: 651 mL / NET: 109 mL    11 Dec 2022 07:01  -  11 Dec 2022 12:56  --------------------------------------------------------  IN: 560 mL / OUT: 0 mL / NET: 560 mL        PE:  GENERAL: NAD, AAOx2  CHEST/LUNG: CTABL, No wheeze  HEART: irregular irregular + murmur  ABDOMEN: Soft, Nontender, Nondistended; Bowel sounds present  EXTREMITIES: chronic venous stasis, + 1 le edema  NEURO: No focal deficits    LABS:                        13.7   11.01 )-----------( 162      ( 11 Dec 2022 01:35 )             41.4     12-10    138  |  103  |  21  ----------------------------<  88  4.0   |  23  |  0.91    Ca    9.1      10 Dec 2022 06:39      PT/INR - ( 11 Dec 2022 09:44 )   PT: 16.4 sec;   INR: 1.41 ratio         PTT - ( 11 Dec 2022 09:44 )  PTT:102.8 sec  CAPILLARY BLOOD GLUCOSE                RADIOLOGY & ADDITIONAL TESTS:    Imaging Personally Reviewed:  [x] YES  [ ] NO    Consultant(s) Notes Reviewed:  [x] YES  [ ] NO    MEDICATIONS  (STANDING):  chlorhexidine 2% Cloths 1 Application(s) Topical <User Schedule>  escitalopram 10 milliGRAM(s) Oral daily  furosemide    Tablet 20 milliGRAM(s) Oral <User Schedule>  heparin  Infusion.  Unit(s)/Hr (17 mL/Hr) IV Continuous <Continuous>  lidocaine   4% Patch 1 Patch Transdermal every 24 hours  nystatin Powder 1 Application(s) Topical two times a day    MEDICATIONS  (PRN):  acetaminophen     Tablet .. 650 milliGRAM(s) Oral every 6 hours PRN Temp greater or equal to 38C (100.4F), Mild Pain (1 - 3)  artificial tears (preservative free) Ophthalmic Solution 2 Drop(s) Both EYES daily PRN Dry Eyes  heparin   Injectable 7500 Unit(s) IV Push every 6 hours PRN For aPTT less than 40  heparin   Injectable 3500 Unit(s) IV Push every 6 hours PRN For aPTT between 40 - 57  melatonin 3 milliGRAM(s) Oral at bedtime PRN Insomnia  ondansetron Injectable 4 milliGRAM(s) IV Push every 8 hours PRN Nausea and/or Vomiting      Care Discussed with Consultants/Other Providers [x] YES  [ ] NO    HEALTH ISSUES - PROBLEM Dx:  Suspected pulmonary embolism    Suspected deep vein thrombosis (DVT)    Weakness of lower extremity, unspecified laterality    Chronic atrial fibrillation    Leukocytosis    At risk for depression    DVT prophylaxis

## 2022-12-11 NOTE — PROVIDER CONTACT NOTE (CRITICAL VALUE NOTIFICATION) - BACKGROUND
83 yo male admitted for LE weakness, CTH and MRI neg. Leukocytosis,  poss right hip IM subsidence. Needs surgical intervention

## 2022-12-11 NOTE — PROGRESS NOTE ADULT - ASSESSMENT
82M w/ pmhx of afib on xarelto, s/p L hip IMN (most recently s/p R hip IMN (Dr. Godinez 10/31/19) presents with acute onset R sided weakness.    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 Godinez for revision IM nail vs arthroplast  medicine pre op eval: pt without active cardiac conditions, moderate risk, medically optimized for OR    # LE wound  BC NTD  podiatry following for L heel wound  monitor off abx  leukocytosis improved    # Chronic atrial fibrillation  hep gtt for OR  TTE Severe right atrial enlargement. borderline pulmonary hypertension. Moderate-severe tricuspid regurgitation. EF64%  cardio following  lasix MWF    # depression  On Lexapro    DVT prophylaxis.hep gtt for OR    dispo pending OR with ortho    Please call [facility] with questions [phone].

## 2022-12-11 NOTE — PROGRESS NOTE ADULT - SUBJECTIVE AND OBJECTIVE BOX
Neurology Progress Note    S: Patient seen and examined. No new events overnight MR neg for stroke; s/p IR procedure ; c/o pain     Medication:      MEDICATIONS  (STANDING):  chlorhexidine 2% Cloths 1 Application(s) Topical <User Schedule>  furosemide    Tablet 20 milliGRAM(s) Oral <User Schedule>  heparin  Infusion.  Unit(s)/Hr (17 mL/Hr) IV Continuous <Continuous>  lidocaine   4% Patch 1 Patch Transdermal every 24 hours  nystatin Powder 1 Application(s) Topical two times a day    MEDICATIONS  (PRN):  acetaminophen     Tablet .. 650 milliGRAM(s) Oral every 6 hours PRN Temp greater or equal to 38C (100.4F), Mild Pain (1 - 3)  artificial tears (preservative free) Ophthalmic Solution 2 Drop(s) Both EYES daily PRN Dry Eyes  heparin   Injectable 7500 Unit(s) IV Push every 6 hours PRN For aPTT less than 40  heparin   Injectable 3500 Unit(s) IV Push every 6 hours PRN For aPTT between 40 - 57  melatonin 3 milliGRAM(s) Oral at bedtime PRN Insomnia  ondansetron Injectable 4 milliGRAM(s) IV Push every 8 hours PRN Nausea and/or Vomiting    Vitals:    Vital Signs Last 24 Hrs  T(C): 36.5 (22 @ 04:17), Max: 36.8 (12-10-22 @ 23:31)  T(F): 97.7 (22 @ 04:17), Max: 98.2 (12-10-22 @ 23:31)  HR: 60 (22 @ 04:17) (59 - 68)  BP: 111/70 (22 @ 04:17) (111/68 - 132/81)  BP(mean): --  RR: 18 (22 @ 04:17) (18 - 18)  SpO2: 93% (22 @ 04:17) (93% - 95%)            General Exam:   Constitutional: Male, appears stated age, in no apparent distress including pain  Head: Normocephalic & atraumatic.  Respiratory: No increased work of breathing  Extremities: b/l LE pitting edema  Skin: +b/l overlying chronic skin changes over b/l LE    Cardiovascular (>2): atrial fibrillation on monitor, HR 80s.    Neurological (>12):  MS: Awake, alert, oriented to person, place, situation, time. Normal affect. Follows all commands.    Language: Speech is clear, fluent with good repetition & comprehension (able to name objects thumb)    CNs: VFF to counting fingers. EOMI no nystagmus, no diplopia. V1-3 intact to LT, well developed masseter muscles b/l. No facial asymmetry b/l, full eye closure strength b/l. Hearing grossly normal (rubbing fingers) b/l. Symmetric palate elevation in midline. Gag reflex deferred. Head turning & shoulder shrug intact b/l. Tongue midline, normal movements, no atrophy.    Motor: Normal muscle bulk. b/l UE postural and action tremor.  Slight R pronator drift.              Deltoid	Biceps	Triceps	Wrist	Finger ABd	   R	4+	5	5	5	4+		4+ 	  L	5	5	5	5	5		5    	H-Flex	K-Flex	K-Ext	D-Flex	P-Flex  R	4+	5	5	4+	5	RLE with +straight raise	   L	5	5	5	5	5	     Sensation: Intact to LT b/l throughout.     Cortical: Extinction on DSS (neglect): none    Reflexes: with significant pain when checking for ankle clonus, deferred              Biceps(C5)       BR(C6)     Triceps(C7)               Patellar(L4)    Achilles(S1)    Plantar Resp  R	2	          2	             2		        1		    		mute  L	2	          2	             2		        1		    		mute    Coordination: intact rapid-alt movements. No dysmetria to FTN     Gait: unable to assess due to pain      I personally reviewed the below data/images/labs:  CBC Full  -  ( 11 Dec 2022 01:35 )  WBC Count : 11.01 K/uL  RBC Count : 4.28 M/uL  Hemoglobin : 13.7 g/dL  Hematocrit : 41.4 %  Platelet Count - Automated : 162 K/uL  Mean Cell Volume : 96.7 fl  Mean Cell Hemoglobin : 32.0 pg  Mean Cell Hemoglobin Concentration : 33.1 gm/dL  Auto Neutrophil # : x  Auto Lymphocyte # : x  Auto Monocyte # : x  Auto Eosinophil # : x  Auto Basophil # : x  Auto Neutrophil % : x  Auto Lymphocyte % : x  Auto Monocyte % : x  Auto Eosinophil % : x  Auto Basophil % : x    12-10    138  |  103  |  21  ----------------------------<  88  4.0   |  23  |  0.91    Ca    9.1      10 Dec 2022 06:39        Urinalysis Basic - ( 05 Dec 2022 18:24 )    Color: Yellow / Appearance: Clear / S.049 / pH: x  Gluc: x / Ketone: Small  / Bili: Negative / Urobili: Negative   Blood: x / Protein: 30 mg/dL / Nitrite: Negative   Leuk Esterase: Negative / RBC: 11 /hpf / WBC 3 /HPF   Sq Epi: x / Non Sq Epi: 2 /hpf / Bacteria: Negative      < from: CT Angio Neck Stroke Protocol w/ IV Cont (22 @ 15:25) >  IMPRESSION:    HEAD CT: No acute intracranial hemorrhage or acute territorial infarction.    If symptoms persist consider follow up head CT or MRI, MRA  if no   contraindication.    CTA COW:  Patent intracranial circulation without flow limiting stenosis   or large vessel occlusion.    CTA NECK: Patent, ECAs, ICAs, no  hemodynamically significant stenosis at    ICA origins by NASCET criteria.  Bilateral vertebral arteries are patent without flow limiting stenosis.    < end of copied text >    < from: MR Head No Cont (22 @ 19:43) >    ACC: [id number] EXAM:  MR BRAIN                          PROCEDURE DATE:  2022          INTERPRETATION:  CLINICAL STATEMENT: Pain.    TECHNIQUE: MRI of the brain was performed without gadolinium.    COMPARISON: CT head 2022    FINDINGS:  There is mild diffuse parenchymal volume loss. There are T2 prolongation   signal abnormalities in the periventricular subcortical white matter   likely related to mild chronic microvascular ischemic changes.    There is no acute parenchymal hemorrhage, parenchymal mass, mass effect   or midline shift. There is no extra-axial fluid collection.  There is no   hydrocephalus.  There is no acute infarct.    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    IMPRESSION:  No acute intracranial hemorrhage or acute infarct.    --- End of Report ---    < from: MR Cervical Spine No Cont (22 @ 20:23) >    ACC: [id number] EXAM:  MR SPINE CERVICAL                          PROCEDURE DATE:  2022          INTERPRETATION:  CLINICAL INDICATION: Cervical stenosis with myelopathy    Magnetic resonance imaging of the cervical spine was carried out with   sagittal surface coil imaging from C1 to T4 using T1 and fast spin echo   T2 weighted images with and without fat saturation technique with axial   T1 and fast spin echo T2 weighted imaging on a 1.5 Alexia magnet.    Comparison is made with the prior cervical spine CT of 10/30/2019.    The cervical vertebrae are normal in height and signal intensity. There   are disc osteophyte complexes present at C3-4 and C4-5 which with dorsal   ligamentous hypertrophy causes severe spinal stenosis and mild cord  compression. There is a small amount of cord signal abnormality at the   C4-5 level.    There is a small disc osteophyte complex present at C6-7 without cord   compression or significant spinal stenosis. The remainder of the cervical   spine and the upper thoracic spine are unremarkable.        IMPRESSION: Disc osteophyte complexes C3-4 and C4-5 along with dorsal   ligamentous causes severe spinal stenosis and mild cord compression.   Small amount of abnormal cord signal C4-5.                    --- End of Report ---            ABA MARIE MD; Attending Radiologist  This document has been electronically signed. Dec  9 2022  8:48PM    < end of copied text >

## 2022-12-11 NOTE — PROGRESS NOTE ADULT - SUBJECTIVE AND OBJECTIVE BOX
Orthopaedic Surgery Progress Note    Subjective:   Patient seen and examined. No acute events overnight. States pain is controlled. Denies fever/chills/chest pain/shortness of breath/numbness/tingling.    Objective:  T(C): 36.5 (12-11-22 @ 04:17), Max: 36.8 (12-10-22 @ 23:31)  HR: 60 (12-11-22 @ 04:17) (59 - 68)  BP: 111/70 (12-11-22 @ 04:17) (111/68 - 132/81)  RR: 18 (12-11-22 @ 04:17) (18 - 18)  SpO2: 93% (12-11-22 @ 04:17) (93% - 95%)  Wt(kg): --    12-09 @ 07:01  -  12-10 @ 07:00  --------------------------------------------------------  IN: 720 mL / OUT: 2320 mL / NET: -1600 mL    12-10 @ 07:01  -  12-11 @ 06:02  --------------------------------------------------------  IN: 760 mL / OUT: 1 mL / NET: 759 mL        PHYSICAL EXAM  General: NAD, Awake and Alert    RIGHT Lower Extremity:   Skin intact, well healed surgical incision, no erythema, ecchymosis, edema, or gross deformity   Mild TTP over the GT o/w NTTP over the bony prominences of the hip/knee/ankle/foot/toes  Painless passive/active ROM of the hip/knee/ankle/foot  L2-S1 SILT  Motor grossly intact throughout hip flexors/quads/hams/TA/EHL/FHL/GSC  + DP/PT pulses  No pain with log roll, No pain on axial loading  Compartments soft and compressible  Calf nontender                           13.7   11.01 )-----------( 162      ( 11 Dec 2022 01:35 )             41.4     12-10    138  |  103  |  21  ----------------------------<  88  4.0   |  23  |  0.91    Ca    9.1      10 Dec 2022 06:39      PT/INR - ( 09 Dec 2022 11:09 )   PT: 19.4 sec;   INR: 1.66 ratio         PTT - ( 11 Dec 2022 01:35 )  PTT:81.6 sec

## 2022-12-11 NOTE — PROGRESS NOTE ADULT - ASSESSMENT
ECHO 12/6/22:  Ef 64%;  mild MR, nl LV sys fx  , mod to sev TR     a/p  82M w/ pmhx of afib on xarelto, s/p L hip IMN (most recently s/p R hip IMN (Dr. Godinez 10/31/19) presents with acute onset RLE weakness.    # WCT  -cont to monitor   -no further sig WCT, unable to give bb due to freda   -echo with nl EF    #Right Sided HF  -ECHO revealed normal LVEF, w R sided enlargement and mod-severe TR  -Venous dopplers negative for DVT  -likely related to r sided CHF  -cont lasix 20mg PO Q48hrs- volume status stable   -pt in slow afib off meds which preclude BB use    #Afib  -cont AC  -rate controlled/freda off meds  -no sig freda, pauses  -off xarelto for OR, cont iv heparin    #Weakness  -neuro w/u neg for CVA  -w/u per primary team  -neuro eval noted      #s/p IR hip aspiration 12/9  -await ortho OR on tuesday for revision IM nail vs arthroplasty  -remains cv stable and optimized to proceed with acceptable cardiac risk  -cont iv hep          35 minutes spent on total encounter; more than 50% of the visit was spent counseling and/or coordinating care by the attending physician.

## 2022-12-12 LAB
APTT BLD: 37.3 SEC — HIGH (ref 27.5–35.5)
HCT VFR BLD CALC: 42.4 % — SIGNIFICANT CHANGE UP (ref 39–50)
HGB BLD-MCNC: 14.1 G/DL — SIGNIFICANT CHANGE UP (ref 13–17)
INR BLD: 1.34 RATIO — HIGH (ref 0.88–1.16)
MCHC RBC-ENTMCNC: 32.7 PG — SIGNIFICANT CHANGE UP (ref 27–34)
MCHC RBC-ENTMCNC: 33.3 GM/DL — SIGNIFICANT CHANGE UP (ref 32–36)
MCV RBC AUTO: 98.4 FL — SIGNIFICANT CHANGE UP (ref 80–100)
NRBC # BLD: 0 /100 WBCS — SIGNIFICANT CHANGE UP (ref 0–0)
PLATELET # BLD AUTO: 172 K/UL — SIGNIFICANT CHANGE UP (ref 150–400)
PROTHROM AB SERPL-ACNC: 15.4 SEC — HIGH (ref 10.5–13.4)
RBC # BLD: 4.31 M/UL — SIGNIFICANT CHANGE UP (ref 4.2–5.8)
RBC # FLD: 12.8 % — SIGNIFICANT CHANGE UP (ref 10.3–14.5)
SARS-COV-2 RNA SPEC QL NAA+PROBE: SIGNIFICANT CHANGE UP
WBC # BLD: 8.28 K/UL — SIGNIFICANT CHANGE UP (ref 3.8–10.5)
WBC # FLD AUTO: 8.28 K/UL — SIGNIFICANT CHANGE UP (ref 3.8–10.5)

## 2022-12-12 RX ORDER — ESCITALOPRAM OXALATE 10 MG/1
10 TABLET, FILM COATED ORAL
Refills: 0 | Status: DISCONTINUED | OUTPATIENT
Start: 2022-12-13 | End: 2022-12-16

## 2022-12-12 RX ORDER — SODIUM CHLORIDE 9 MG/ML
1000 INJECTION, SOLUTION INTRAVENOUS
Refills: 0 | Status: DISCONTINUED | OUTPATIENT
Start: 2022-12-12 | End: 2022-12-16

## 2022-12-12 RX ADMIN — Medication 650 MILLIGRAM(S): at 00:00

## 2022-12-12 RX ADMIN — ESCITALOPRAM OXALATE 10 MILLIGRAM(S): 10 TABLET, FILM COATED ORAL at 11:59

## 2022-12-12 RX ADMIN — ENOXAPARIN SODIUM 90 MILLIGRAM(S): 100 INJECTION SUBCUTANEOUS at 21:51

## 2022-12-12 RX ADMIN — ENOXAPARIN SODIUM 90 MILLIGRAM(S): 100 INJECTION SUBCUTANEOUS at 08:50

## 2022-12-12 RX ADMIN — Medication 650 MILLIGRAM(S): at 00:49

## 2022-12-12 RX ADMIN — Medication 650 MILLIGRAM(S): at 08:51

## 2022-12-12 RX ADMIN — CHLORHEXIDINE GLUCONATE 1 APPLICATION(S): 213 SOLUTION TOPICAL at 04:47

## 2022-12-12 RX ADMIN — LIDOCAINE 1 PATCH: 4 CREAM TOPICAL at 23:59

## 2022-12-12 RX ADMIN — NYSTATIN CREAM 1 APPLICATION(S): 100000 CREAM TOPICAL at 17:17

## 2022-12-12 RX ADMIN — Medication 650 MILLIGRAM(S): at 09:30

## 2022-12-12 RX ADMIN — Medication 2 DROP(S): at 00:49

## 2022-12-12 RX ADMIN — LIDOCAINE 1 PATCH: 4 CREAM TOPICAL at 11:59

## 2022-12-12 RX ADMIN — Medication 650 MILLIGRAM(S): at 21:52

## 2022-12-12 RX ADMIN — LIDOCAINE 1 PATCH: 4 CREAM TOPICAL at 04:51

## 2022-12-12 RX ADMIN — LIDOCAINE 1 PATCH: 4 CREAM TOPICAL at 17:17

## 2022-12-12 RX ADMIN — Medication 20 MILLIGRAM(S): at 08:51

## 2022-12-12 RX ADMIN — NYSTATIN CREAM 1 APPLICATION(S): 100000 CREAM TOPICAL at 04:47

## 2022-12-12 RX ADMIN — Medication 650 MILLIGRAM(S): at 22:20

## 2022-12-12 NOTE — PROGRESS NOTE ADULT - SUBJECTIVE AND OBJECTIVE BOX
Neurology Progress Note    S: Patient seen and examined. No new events overnight MR neg for stroke; s/p IR procedure ; c/o pain     Medication:    MEDICATIONS  (STANDING):  chlorhexidine 2% Cloths 1 Application(s) Topical <User Schedule>  enoxaparin Injectable 90 milliGRAM(s) SubCutaneous every 12 hours  escitalopram 10 milliGRAM(s) Oral daily  furosemide    Tablet 20 milliGRAM(s) Oral <User Schedule>  lactated ringers. 1000 milliLiter(s) (75 mL/Hr) IV Continuous <Continuous>  lidocaine   4% Patch 1 Patch Transdermal every 24 hours  nystatin Powder 1 Application(s) Topical two times a day    MEDICATIONS  (PRN):  acetaminophen     Tablet .. 650 milliGRAM(s) Oral every 6 hours PRN Temp greater or equal to 38C (100.4F), Mild Pain (1 - 3)  artificial tears (preservative free) Ophthalmic Solution 2 Drop(s) Both EYES daily PRN Dry Eyes  melatonin 3 milliGRAM(s) Oral at bedtime PRN Insomnia  ondansetron Injectable 4 milliGRAM(s) IV Push every 8 hours PRN Nausea and/or Vomiting      Vitals:    Vital Signs Last 24 Hrs  T(C): 36.4 (22 @ 04:57), Max: 36.8 (22 @ 20:11)  T(F): 97.6 (22 @ 04:57), Max: 98.3 (22 @ 20:11)  HR: 60 (22 @ 04:57) (59 - 82)  BP: 108/67 (22 @ 04:57) (105/66 - 113/76)  BP(mean): --  RR: 18 (22 @ 04:57) (18 - 18)  SpO2: 95% (22 @ 04:57) (95% - 95%)                General Exam:   Constitutional: Male, appears stated age, in no apparent distress including pain  Head: Normocephalic & atraumatic.  Respiratory: No increased work of breathing  Extremities: b/l LE pitting edema  Skin: +b/l overlying chronic skin changes over b/l LE    Cardiovascular (>2): atrial fibrillation on monitor, HR 80s.    Neurological (>12):  MS: Awake, alert, oriented to person, place, situation, time. Normal affect. Follows all commands.    Language: Speech is clear, fluent with good repetition & comprehension (able to name objects thumb)    CNs: VFF to counting fingers. EOMI no nystagmus, no diplopia. V1-3 intact to LT, well developed masseter muscles b/l. No facial asymmetry b/l, full eye closure strength b/l. Hearing grossly normal (rubbing fingers) b/l. Symmetric palate elevation in midline. Gag reflex deferred. Head turning & shoulder shrug intact b/l. Tongue midline, normal movements, no atrophy.    Motor: Normal muscle bulk. b/l UE postural and action tremor.  Slight R pronator drift.              Deltoid	Biceps	Triceps	Wrist	Finger ABd	   R	4+	5	5	5	4+		4+ 	  L	5	5	5	5	5		5    	H-Flex	K-Flex	K-Ext	D-Flex	P-Flex  R	4+	5	5	4+	5	RLE with +straight raise	   L	5	5	5	5	5	     Sensation: Intact to LT b/l throughout.     Cortical: Extinction on DSS (neglect): none    Reflexes: with significant pain when checking for ankle clonus, deferred              Biceps(C5)       BR(C6)     Triceps(C7)               Patellar(L4)    Achilles(S1)    Plantar Resp  R	2	          2	             2		        1		    		mute  L	2	          2	             2		        1		    		mute    Coordination: intact rapid-alt movements. No dysmetria to FTN     Gait: unable to assess due to pain      I personally reviewed the below data/images/labs:  CBC Full  -  ( 12 Dec 2022 06:52 )  WBC Count : 8.28 K/uL  RBC Count : 4.31 M/uL  Hemoglobin : 14.1 g/dL  Hematocrit : 42.4 %  Platelet Count - Automated : 172 K/uL  Mean Cell Volume : 98.4 fl  Mean Cell Hemoglobin : 32.7 pg  Mean Cell Hemoglobin Concentration : 33.3 gm/dL  Auto Neutrophil # : x  Auto Lymphocyte # : x  Auto Monocyte # : x  Auto Eosinophil # : x  Auto Basophil # : x  Auto Neutrophil % : x  Auto Lymphocyte % : x  Auto Monocyte % : x  Auto Eosinophil % : x  Auto Basophil % : x      Urinalysis Basic - ( 05 Dec 2022 18:24 )    Color: Yellow / Appearance: Clear / S.049 / pH: x  Gluc: x / Ketone: Small  / Bili: Negative / Urobili: Negative   Blood: x / Protein: 30 mg/dL / Nitrite: Negative   Leuk Esterase: Negative / RBC: 11 /hpf / WBC 3 /HPF   Sq Epi: x / Non Sq Epi: 2 /hpf / Bacteria: Negative      < from: CT Angio Neck Stroke Protocol w/ IV Cont (22 @ 15:25) >  IMPRESSION:    HEAD CT: No acute intracranial hemorrhage or acute territorial infarction.    If symptoms persist consider follow up head CT or MRI, MRA  if no   contraindication.    CTA COW:  Patent intracranial circulation without flow limiting stenosis   or large vessel occlusion.    CTA NECK: Patent, ECAs, ICAs, no  hemodynamically significant stenosis at    ICA origins by NASCET criteria.  Bilateral vertebral arteries are patent without flow limiting stenosis.    < end of copied text >    < from: MR Head No Cont (22 @ 19:43) >    ACC: [id number] EXAM:  MR BRAIN                          PROCEDURE DATE:  2022          INTERPRETATION:  CLINICAL STATEMENT: Pain.    TECHNIQUE: MRI of the brain was performed without gadolinium.    COMPARISON: CT head 2022    FINDINGS:  There is mild diffuse parenchymal volume loss. There are T2 prolongation   signal abnormalities in the periventricular subcortical white matter   likely related to mild chronic microvascular ischemic changes.    There is no acute parenchymal hemorrhage, parenchymal mass, mass effect   or midline shift. There is no extra-axial fluid collection.  There is no   hydrocephalus.  There is no acute infarct.    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    IMPRESSION:  No acute intracranial hemorrhage or acute infarct.    --- End of Report ---    < from: MR Cervical Spine No Cont (22 @ 20:23) >    ACC: [id number] EXAM:  MR SPINE CERVICAL                          PROCEDURE DATE:  2022          INTERPRETATION:  CLINICAL INDICATION: Cervical stenosis with myelopathy    Magnetic resonance imaging of the cervical spine was carried out with   sagittal surface coil imaging from C1 to T4 using T1 and fast spin echo   T2 weighted images with and without fat saturation technique with axial   T1 and fast spin echo T2 weighted imaging on a 1.5 Alexia magnet.    Comparison is made with the prior cervical spine CT of 10/30/2019.    The cervical vertebrae are normal in height and signal intensity. There   are disc osteophyte complexes present at C3-4 and C4-5 which with dorsal   ligamentous hypertrophy causes severe spinal stenosis and mild cord  compression. There is a small amount of cord signal abnormality at the   C4-5 level.    There is a small disc osteophyte complex present at C6-7 without cord   compression or significant spinal stenosis. The remainder of the cervical   spine and the upper thoracic spine are unremarkable.        IMPRESSION: Disc osteophyte complexes C3-4 and C4-5 along with dorsal   ligamentous causes severe spinal stenosis and mild cord compression.   Small amount of abnormal cord signal C4-5.                    --- End of Report ---            ABA MARIE MD; Attending Radiologist  This document has been electronically signed. Dec  9 2022  8:48PM    < end of copied text >

## 2022-12-12 NOTE — PROGRESS NOTE ADULT - ASSESSMENT
82M RH w/ afib on xarelto, s/p L hip IMN (2013), s/p R hip IMN (2019) presents with acute onset RLE weakness. LKN 12/5/22 at 7am. Pt last took xarelto at 6:30am. While showering, he felt RLE sudden onset weakness, no associated pain or back pain at that time. He reports increased R hip area pain and numbness/tingling with associated leg shaking due to pain. Pt was hanging onto the sink for at least 3-4 hours and reports not being able to get up due to RLE weakness. NIHSS: 0, preMRS: 2 (ambulates with a walker). Neuro exam notable for slight +R pronator, +straight leg raise. CTH/CTA neg  NIHSS4  premrs2-3 walks with cane/walker   MRI brain neg for infarct   CRP 22   CT pelvis: Redemonstration of open reduction internal fixation of the right hip.   Interval development of loosening throughout the right femoral neck   compression screw with migration of the tip into the joint space.   Interval development of lateral deviation of the right femoral   intramedullary simón with the tip now abutting and markedly   remodeling/thinning the lateral cortex at the mid diaphysis.  MRI C spien 12/9: mild cord compression C3/4/5. abnormal signal C4/5   s/p IR aspiration 12/9    Impression: acute RLE weakness and pain likely 2/2 R hip pathology vs. lumbosacral pathology, less likely stroke; Stroke ruled out     Recommendations:  - plan for OR 12/13 for R hip hardware revision by ortho   - f/u spine regarding C spine results    - s/p IR aspiration 12/9   - r/o infection ; ID recs appreciated. monitoring off abx    - continue home xarelto for stroke prevention  - monitor on telemetry  - stroke risk factor modification and counseling   - check HA1c, lipid panel, Utox  - NPO until bedside dysphagia screen   - High dose statin therapy - atorvastatin 40mg PO daily. LDL goal <70mg/dL.   - PT/OT/SS/SLP, OOBC  - check FS, glucose control <180  - GI/DVT ppx  - Thank you for allowing me to participate in the care of this patient. Call with questions.       Carloz Del Castillo MD  Vascular Neurology  Office: [phone] .

## 2022-12-12 NOTE — PROGRESS NOTE ADULT - ASSESSMENT
82M w/ pmhx of afib on xarelto, s/p L hip IMN (most recently s/p R hip IMN (Dr. Godinez 10/31/19) presents with acute onset R sided weakness.    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, cervical stenosis  # right IM nail loose  MRI brain no acute infarct  CT L spine L4-L5 there is severe right subarticular zone stenosis and severe right neural foraminal stenosis  MRI cervical spine severe canal stenosis c3/4 c4/c5, mild cord compression, NSGY aware and recommend outpt fu no acute intervention  appreciate ortho recs, sp IR right hip aspiration, fu cultures  Plan for OR on Tuesday 12/13 with Dr. Godinez for revision IM nail vs arthroplast  medicine pre op eval: pt without active cardiac conditions, moderate risk, medically optimized for OR    # LE wound  BC NTD  podiatry following for L heel wound  monitor off abx    # Chronic atrial fibrillation  lovenox for OR  TTE Severe right atrial enlargement. borderline pulmonary hypertension. Moderate-severe tricuspid regurgitation. EF64%  cardio following  lasix MWF    # depression  On Lexapro    DVT prophylaxis. lovenox per ortho    Please call [email or web address] with questions [phone].

## 2022-12-12 NOTE — PROGRESS NOTE ADULT - ASSESSMENT
ECHO 12/6/22:  Ef 64%;  mild MR, nl LV sys fx  , mod to sev TR     a/p  82M w/ pmhx of afib on xarelto, s/p L hip IMN (most recently s/p R hip IMN (Dr. Godinez 10/31/19) presents with acute onset RLE weakness.    #WCT  -cont to monitor   -no further sig WCT, unable to give bb due to freda   -echo with nl EF    #Right Sided HF  -ECHO revealed normal LVEF, w R sided enlargement and mod-severe TR  -Venous dopplers negative for DVT  -likely related to r sided CHF  -cont lasix 20mg PO Q48hrs- volume status stable   -pt in slow afib off meds which preclude BB use    #Afib  -cont AC  -rate controlled/freda off meds  -no sig freda, pauses  -off xarelto for OR, cont iv heparin    #Weakness  -neuro w/u neg for CVA  -w/u per primary team  -neuro eval noted    #s/p IR hip aspiration 12/9  -await ortho OR on tuesday for revision IM nail vs arthroplasty  -remains cv stable and optimized to proceed with acceptable cardiac risk  -cont lovenox

## 2022-12-12 NOTE — PROGRESS NOTE ADULT - SUBJECTIVE AND OBJECTIVE BOX
Patient is a 82y old  Male who presents with a chief complaint of R sided weakness (12 Dec 2022 11:56)      SUBJECTIVE / OVERNIGHT EVENTS:    Patient seen and examined.   no cp sob no complaints.    Vital Signs Last 24 Hrs  T(C): 36.4 (12 Dec 2022 11:17), Max: 36.8 (11 Dec 2022 20:11)  T(F): 97.6 (12 Dec 2022 11:17), Max: 98.3 (11 Dec 2022 20:11)  HR: 60 (12 Dec 2022 11:17) (60 - 82)  BP: 108/69 (12 Dec 2022 11:17) (105/66 - 113/76)  BP(mean): --  RR: 18 (12 Dec 2022 11:17) (18 - 18)  SpO2: 92% (12 Dec 2022 11:17) (92% - 95%)    Parameters below as of 12 Dec 2022 11:17  Patient On (Oxygen Delivery Method): room air      I&O's Summary    11 Dec 2022 07:01  -  12 Dec 2022 07:00  --------------------------------------------------------  IN: 1029 mL / OUT: 2281 mL / NET: -1252 mL    12 Dec 2022 07:01  -  12 Dec 2022 12:24  --------------------------------------------------------  IN: 560 mL / OUT: 600 mL / NET: -40 mL        PE:  GENERAL: NAD, AAOx2  CHEST/LUNG: CTABL, No wheeze  HEART: irregular irregular + murmur  ABDOMEN: Soft, Nontender, Nondistended; Bowel sounds present  EXTREMITIES: chronic venous stasis, + 1 le edema  NEURO: No focal deficits      LABS:                        14.1   8.28  )-----------( 172      ( 12 Dec 2022 06:52 )             42.4           PT/INR - ( 12 Dec 2022 06:52 )   PT: 15.4 sec;   INR: 1.34 ratio         PTT - ( 12 Dec 2022 06:52 )  PTT:37.3 sec  CAPILLARY BLOOD GLUCOSE                RADIOLOGY & ADDITIONAL TESTS:    Imaging Personally Reviewed:  [x] YES  [ ] NO    Consultant(s) Notes Reviewed:  [x] YES  [ ] NO    MEDICATIONS  (STANDING):  chlorhexidine 2% Cloths 1 Application(s) Topical <User Schedule>  enoxaparin Injectable 90 milliGRAM(s) SubCutaneous every 12 hours  escitalopram 10 milliGRAM(s) Oral daily  furosemide    Tablet 20 milliGRAM(s) Oral <User Schedule>  lactated ringers. 1000 milliLiter(s) (75 mL/Hr) IV Continuous <Continuous>  lidocaine   4% Patch 1 Patch Transdermal every 24 hours  nystatin Powder 1 Application(s) Topical two times a day    MEDICATIONS  (PRN):  acetaminophen     Tablet .. 650 milliGRAM(s) Oral every 6 hours PRN Temp greater or equal to 38C (100.4F), Mild Pain (1 - 3)  artificial tears (preservative free) Ophthalmic Solution 2 Drop(s) Both EYES daily PRN Dry Eyes  melatonin 3 milliGRAM(s) Oral at bedtime PRN Insomnia  ondansetron Injectable 4 milliGRAM(s) IV Push every 8 hours PRN Nausea and/or Vomiting      Care Discussed with Consultants/Other Providers [x] YES  [ ] NO    HEALTH ISSUES - PROBLEM Dx:  Suspected pulmonary embolism    Suspected deep vein thrombosis (DVT)    Weakness of lower extremity, unspecified laterality    Chronic atrial fibrillation    Leukocytosis    At risk for depression    DVT prophylaxis

## 2022-12-12 NOTE — PROGRESS NOTE ADULT - SUBJECTIVE AND OBJECTIVE BOX
Orthopaedic Surgery     Patient seen and examined. No acute events overnight. States pain is controlled. Denies fever/chills/chest pain/shortness of breath/numbness/tingling.    Vital Signs Last 24 Hrs  T(C): 36.4 (12 Dec 2022 04:57), Max: 36.8 (11 Dec 2022 20:11)  T(F): 97.6 (12 Dec 2022 04:57), Max: 98.3 (11 Dec 2022 20:11)  HR: 60 (12 Dec 2022 04:57) (59 - 82)  BP: 108/67 (12 Dec 2022 04:57) (105/66 - 113/76)  BP(mean): --  RR: 18 (12 Dec 2022 04:57) (18 - 18)  SpO2: 95% (12 Dec 2022 04:57) (95% - 95%)    Parameters below as of 12 Dec 2022 04:57  Patient On (Oxygen Delivery Method): room air                          13.7   11.01 )-----------( 162      ( 11 Dec 2022 01:35 )             41.4           PHYSICAL EXAM  General: NAD, Awake and Alert    RIGHT Lower Extremity:   Skin intact, well healed surgical incision, no erythema, ecchymosis, edema, or gross deformity   Mild TTP over the GT o/w NTTP over the bony prominences of the hip/knee/ankle/foot/toes  Painless passive/active ROM of the hip/knee/ankle/foot  L2-S1 SILT  Motor grossly intact throughout hip flexors/quads/hams/TA/EHL/FHL/GSC  + DP/PT pulses  No pain with log roll, No pain on axial loading  Compartments soft and compressible  Calf nontender bilaterally

## 2022-12-12 NOTE — PROGRESS NOTE ADULT - SUBJECTIVE AND OBJECTIVE BOX
Patient is a 82y old  Male who presents with a chief complaint of R sided weakness (12 Dec 2022 12:24)       INTERVAL HPI/OVERNIGHT EVENTS:  Patient seen and evaluated at bedside.  Pt is resting comfortable in NAD.    Allergies    No Known Allergies    Intolerances        Vital Signs Last 24 Hrs  T(C): 36.4 (12 Dec 2022 11:17), Max: 36.8 (11 Dec 2022 20:11)  T(F): 97.6 (12 Dec 2022 11:17), Max: 98.3 (11 Dec 2022 20:11)  HR: 73 (12 Dec 2022 17:00) (60 - 73)  BP: 100/64 (12 Dec 2022 17:00) (100/64 - 108/69)  BP(mean): --  RR: 18 (12 Dec 2022 11:17) (18 - 18)  SpO2: 92% (12 Dec 2022 11:17) (92% - 95%)    Parameters below as of 12 Dec 2022 11:17  Patient On (Oxygen Delivery Method): room air        LABS:                        14.1   8.28  )-----------( 172      ( 12 Dec 2022 06:52 )             42.4           PT/INR - ( 12 Dec 2022 06:52 )   PT: 15.4 sec;   INR: 1.34 ratio         PTT - ( 12 Dec 2022 06:52 )  PTT:37.3 sec    CAPILLARY BLOOD GLUCOSE          Lower Extremity Physical Exam:  Vasular: DP/PT _1/4, B/L, CFT <_2 seconds B/L, Temperature gradient WNL_, B/L.   Neuro: Epicritic sensation Intact to the level of _Toes, B/L.  Skin:  Wound #1:   Location: Right posterior heel/Achilles  Size: 4 cm diameter  Depth: Superficial  Wound bed: Mild bulla  Drainage: serous  Odor: None  Periwound: No clinical signs of infection  Etiology: Present on admission

## 2022-12-12 NOTE — PROGRESS NOTE ADULT - SUBJECTIVE AND OBJECTIVE BOX
CARDIOLOGY FOLLOW UP - Dr. Marrufo  DATE OF SERVICE: 12/12/22    CC  No CV complaints. Still w/ R hip pain    REVIEW OF SYSTEMS:  CONSTITUTIONAL: No fever, weight loss, or fatigue  RESPIRATORY: No cough, wheezing, chills or hemoptysis; No Shortness of Breath  CARDIOVASCULAR: No chest pain, palpitations, passing out, dizziness, or leg swelling  GASTROINTESTINAL: No abdominal or epigastric pain. No nausea, vomiting, or hematemesis; No diarrhea or constipation. No melena or hematochezia.  VASCULAR: No edema   MSK: R hip pain    PHYSICAL EXAM:  T(C): 36.4 (12-12-22 @ 11:17), Max: 36.8 (12-11-22 @ 20:11)  HR: 60 (12-12-22 @ 11:17) (60 - 82)  BP: 108/69 (12-12-22 @ 11:17) (105/66 - 113/76)  RR: 18 (12-12-22 @ 11:17) (18 - 18)  SpO2: 92% (12-12-22 @ 11:17) (92% - 95%)  Wt(kg): --  I&O's Summary    11 Dec 2022 07:01  -  12 Dec 2022 07:00  --------------------------------------------------------  IN: 1029 mL / OUT: 2281 mL / NET: -1252 mL    12 Dec 2022 07:01  -  12 Dec 2022 11:56  --------------------------------------------------------  IN: 560 mL / OUT: 600 mL / NET: -40 mL        Appearance: Normal	  Cardiovascular: Normal S1 S2,Irregular rhythm, No JVD, No murmurs  Respiratory: Lungs clear to auscultation b/l  Gastrointestinal:  Soft, Non-tender, + BS	  Extremities: Normal range of motion, No clubbing, cyanosis or edema      Home Medications:  escitalopram:  (05 Dec 2022 21:58)  rivaroxaban 20 mg oral tablet: 1 tab(s) orally once a day (before a meal) (05 Dec 2022 21:58)      MEDICATIONS  (STANDING):  chlorhexidine 2% Cloths 1 Application(s) Topical <User Schedule>  enoxaparin Injectable 90 milliGRAM(s) SubCutaneous every 12 hours  escitalopram 10 milliGRAM(s) Oral daily  furosemide    Tablet 20 milliGRAM(s) Oral <User Schedule>  lactated ringers. 1000 milliLiter(s) (75 mL/Hr) IV Continuous <Continuous>  lidocaine   4% Patch 1 Patch Transdermal every 24 hours  nystatin Powder 1 Application(s) Topical two times a day      TELEMETRY: Afib 50-70    ECG:  	  RADIOLOGY:   DIAGNOSTIC TESTING:  [ ] Echocardiogram:  [ ]  Catheterization:  [ ] Stress Test:    OTHER: 	    LABS:	 	    Troponin T, High Sensitivity Result: 45 ng/L [0 - 51] (12-06 @ 07:43)  Troponin T, High Sensitivity Result: 26 ng/L [0 - 51] (12-05 @ 15:00)  Creatine Kinase, Serum: 192 U/L [30 - 200] (12-05 @ 15:00)                          14.1   8.28  )-----------( 172      ( 12 Dec 2022 06:52 )             42.4           PT/INR - ( 12 Dec 2022 06:52 )   PT: 15.4 sec;   INR: 1.34 ratio         PTT - ( 12 Dec 2022 06:52 )  PTT:37.3 sec

## 2022-12-12 NOTE — PROGRESS NOTE ADULT - ASSESSMENT
Assessment/plan:    Right posterior heel/Achilles DTI/bulla: Present on admission noninfected - mostly resolved  Site mostly healed at this time  Recommend Continued decubitus precautions and use of Z flow boots.  Podiatry signing off at this time, reconsult as needed

## 2022-12-12 NOTE — PROGRESS NOTE ADULT - ASSESSMENT
82y Male with possible R hip IM nail subsidence and screw cut-out    Medical comanagement appreciated - please continue to document optimization for OR tomorrow  -Pain control PRN  -Sp IR aspiration of R hip 12/9, culture NGTD  -DVT ppx per primary team; Hold DVT ppx after midnight for OR tomororw; SCDs bilaterally okay  -NPO except medications after midnight for OR tomorrow; IVF while NPO  -WBAT RLE  -FU Preop labs/T&S/COVID/EKG/CXR  - Plan for OR tomorrow with Dr. Godinez for revision IM nail vs arthroplasty   - Discussed with attending Dr. Godinez who agrees with plan

## 2022-12-13 LAB
ANION GAP SERPL CALC-SCNC: 12 MMOL/L — SIGNIFICANT CHANGE UP (ref 5–17)
APTT BLD: 40.9 SEC — HIGH (ref 27.5–35.5)
BLD GP AB SCN SERPL QL: NEGATIVE — SIGNIFICANT CHANGE UP
BUN SERPL-MCNC: 26 MG/DL — HIGH (ref 7–23)
CALCIUM SERPL-MCNC: 9.3 MG/DL — SIGNIFICANT CHANGE UP (ref 8.4–10.5)
CHLORIDE SERPL-SCNC: 102 MMOL/L — SIGNIFICANT CHANGE UP (ref 96–108)
CO2 SERPL-SCNC: 25 MMOL/L — SIGNIFICANT CHANGE UP (ref 22–31)
CREAT SERPL-MCNC: 1.02 MG/DL — SIGNIFICANT CHANGE UP (ref 0.5–1.3)
EGFR: 73 ML/MIN/1.73M2 — SIGNIFICANT CHANGE UP
GLUCOSE SERPL-MCNC: 92 MG/DL — SIGNIFICANT CHANGE UP (ref 70–99)
HCT VFR BLD CALC: 41.4 % — SIGNIFICANT CHANGE UP (ref 39–50)
HGB BLD-MCNC: 13.6 G/DL — SIGNIFICANT CHANGE UP (ref 13–17)
INR BLD: 1.28 RATIO — HIGH (ref 0.88–1.16)
MCHC RBC-ENTMCNC: 32.6 PG — SIGNIFICANT CHANGE UP (ref 27–34)
MCHC RBC-ENTMCNC: 32.9 GM/DL — SIGNIFICANT CHANGE UP (ref 32–36)
MCV RBC AUTO: 99.3 FL — SIGNIFICANT CHANGE UP (ref 80–100)
NRBC # BLD: 0 /100 WBCS — SIGNIFICANT CHANGE UP (ref 0–0)
PLATELET # BLD AUTO: 181 K/UL — SIGNIFICANT CHANGE UP (ref 150–400)
POTASSIUM SERPL-MCNC: 4 MMOL/L — SIGNIFICANT CHANGE UP (ref 3.5–5.3)
POTASSIUM SERPL-SCNC: 4 MMOL/L — SIGNIFICANT CHANGE UP (ref 3.5–5.3)
PROTHROM AB SERPL-ACNC: 14.8 SEC — HIGH (ref 10.5–13.4)
RBC # BLD: 4.17 M/UL — LOW (ref 4.2–5.8)
RBC # FLD: 12.8 % — SIGNIFICANT CHANGE UP (ref 10.3–14.5)
RH IG SCN BLD-IMP: POSITIVE — SIGNIFICANT CHANGE UP
SODIUM SERPL-SCNC: 139 MMOL/L — SIGNIFICANT CHANGE UP (ref 135–145)
WBC # BLD: 8.59 K/UL — SIGNIFICANT CHANGE UP (ref 3.8–10.5)
WBC # FLD AUTO: 8.59 K/UL — SIGNIFICANT CHANGE UP (ref 3.8–10.5)

## 2022-12-13 DEVICE — CEMENT SIMPLEX P 40GM: Type: IMPLANTABLE DEVICE | Status: FUNCTIONAL

## 2022-12-13 RX ADMIN — NYSTATIN CREAM 1 APPLICATION(S): 100000 CREAM TOPICAL at 05:01

## 2022-12-13 RX ADMIN — CHLORHEXIDINE GLUCONATE 1 APPLICATION(S): 213 SOLUTION TOPICAL at 05:01

## 2022-12-13 RX ADMIN — SODIUM CHLORIDE 75 MILLILITER(S): 9 INJECTION, SOLUTION INTRAVENOUS at 10:41

## 2022-12-13 RX ADMIN — Medication 2 DROP(S): at 21:34

## 2022-12-13 RX ADMIN — LIDOCAINE 1 PATCH: 4 CREAM TOPICAL at 12:05

## 2022-12-13 RX ADMIN — Medication 650 MILLIGRAM(S): at 17:07

## 2022-12-13 RX ADMIN — LIDOCAINE 1 PATCH: 4 CREAM TOPICAL at 19:36

## 2022-12-13 RX ADMIN — NYSTATIN CREAM 1 APPLICATION(S): 100000 CREAM TOPICAL at 17:03

## 2022-12-13 RX ADMIN — Medication 650 MILLIGRAM(S): at 15:36

## 2022-12-13 RX ADMIN — ESCITALOPRAM OXALATE 10 MILLIGRAM(S): 10 TABLET, FILM COATED ORAL at 21:32

## 2022-12-13 NOTE — PROGRESS NOTE ADULT - ASSESSMENT
82M w/ pmhx of afib on xarelto, s/p L hip IMN (most recently s/p R hip IMN (Dr. Godinez 10/31/19) presents with acute onset R sided weakness.    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, cervical stenosis  # right IM nail loose  MRI brain no acute infarct  CT L spine L4-L5 there is severe right subarticular zone stenosis and severe right neural foraminal stenosis  MRI cervical spine severe canal stenosis c3/4 c4/c5, mild cord compression, NSGY aware and recommend outpt fu no acute intervention  appreciate ortho recs, sp IR right hip aspiratio  OR postponed as lovenox administered, fu ortho Dr. Godinez for revision IM nail vs arthroplast  medicine pre op eval: pt without active cardiac conditions, moderate risk, medically optimized for OR  dental eval for loose tooth    # LE wound  BC NTD  podiatry following for L heel wound  monitor off abx    # Chronic atrial fibrillation  lovenox for OR  TTE Severe right atrial enlargement. borderline pulmonary hypertension. Moderate-severe tricuspid regurgitation. EF64%  cardio following  lasix MWF    # depression  On Lexapro    DVT prophylaxis. lovenox on hold for OR    Dr. Branden Valencia will be covering me starting tomorrow.  Please contact with any questions or concerns [phone]. 82M w/ pmhx of afib on xarelto, s/p L hip IMN (most recently s/p R hip IMN (Dr. Godinez 10/31/19) presents with acute onset R sided weakness.    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, cervical stenosis  # right IM nail loose  MRI brain no acute infarct  CT L spine L4-L5 there is severe right subarticular zone stenosis and severe right neural foraminal stenosis  MRI cervical spine severe canal stenosis c3/4 c4/c5, mild cord compression, NSGY aware and recommend outpt fu no acute intervention  appreciate ortho recs, sp IR right hip aspiratio  OR postponed as lovenox administered, fu ortho Dr. Godinez for revision IM nail vs arthroplast  medicine pre op eval: pt without active cardiac conditions, moderate risk, medically optimized for tooth extraction  dental eval for loose tooth    # LE wound  BC NTD  podiatry following for L heel wound  monitor off abx    # Chronic atrial fibrillation  lovenox for OR  TTE Severe right atrial enlargement. borderline pulmonary hypertension. Moderate-severe tricuspid regurgitation. EF64%  cardio following  lasix MWF    # depression  On Lexapro    DVT prophylaxis. lovenox on hold for OR    Dr. Branden Valencia will be covering me starting tomorrow.  Please contact with any questions or concerns [phone].

## 2022-12-13 NOTE — PROGRESS NOTE ADULT - SUBJECTIVE AND OBJECTIVE BOX
Patient is a 82y old  Male who presents with a chief complaint of R sided weakness (13 Dec 2022 09:49)      SUBJECTIVE / OVERNIGHT EVENTS:  Patient seen and examined. OR cancelled as lovenox was administered. pt notes loose cap on tooth for 3 weeks.      Vital Signs Last 24 Hrs  T(C): 36.8 (13 Dec 2022 11:26), Max: 36.8 (13 Dec 2022 10:38)  T(F): 97.5 (13 Dec 2022 10:38), Max: 98.2 (13 Dec 2022 10:38)  HR: 62 (13 Dec 2022 11:26) (53 - 73)  BP: 131/84 (13 Dec 2022 11:26) (100/64 - 145/86)  BP(mean): 87 (13 Dec 2022 11:26) (87 - 87)  RR: 18 (13 Dec 2022 11:26) (18 - 19)  SpO2: 97% (13 Dec 2022 11:26) (93% - 97%)    Parameters below as of 13 Dec 2022 10:38  Patient On (Oxygen Delivery Method): room air      I&O's Summary    12 Dec 2022 07:01  -  13 Dec 2022 07:00  --------------------------------------------------------  IN: 1160 mL / OUT: 1850 mL / NET: -690 mL    13 Dec 2022 07:01  -  13 Dec 2022 11:29  --------------------------------------------------------  IN: 225 mL / OUT: 0 mL / NET: 225 mL        PE:  GENERAL: NAD, AAOx2  CHEST/LUNG: CTABL, No wheeze  HEART: irregular irregular + murmur  ABDOMEN: Soft, Nontender, Nondistended; Bowel sounds present  EXTREMITIES: chronic venous stasis, + 1 le edema  NEURO: No focal deficits    LABS:                        13.6   8.59  )-----------( 181      ( 13 Dec 2022 06:30 )             41.4     12-13    139  |  102  |  26<H>  ----------------------------<  92  4.0   |  25  |  1.02    Ca    9.3      13 Dec 2022 06:30      PT/INR - ( 13 Dec 2022 06:30 )   PT: 14.8 sec;   INR: 1.28 ratio         PTT - ( 13 Dec 2022 06:30 )  PTT:40.9 sec  CAPILLARY BLOOD GLUCOSE                RADIOLOGY & ADDITIONAL TESTS:    Imaging Personally Reviewed:  [x] YES  [ ] NO    Consultant(s) Notes Reviewed:  [x] YES  [ ] NO    MEDICATIONS  (STANDING):  chlorhexidine 2% Cloths 1 Application(s) Topical <User Schedule>  escitalopram 10 milliGRAM(s) Oral <User Schedule>  furosemide    Tablet 20 milliGRAM(s) Oral <User Schedule>  lactated ringers. 1000 milliLiter(s) (75 mL/Hr) IV Continuous <Continuous>  lidocaine   4% Patch 1 Patch Transdermal every 24 hours  nystatin Powder 1 Application(s) Topical two times a day    MEDICATIONS  (PRN):  acetaminophen     Tablet .. 650 milliGRAM(s) Oral every 6 hours PRN Temp greater or equal to 38C (100.4F), Mild Pain (1 - 3)  artificial tears (preservative free) Ophthalmic Solution 2 Drop(s) Both EYES daily PRN Dry Eyes  melatonin 3 milliGRAM(s) Oral at bedtime PRN Insomnia  ondansetron Injectable 4 milliGRAM(s) IV Push every 8 hours PRN Nausea and/or Vomiting      Care Discussed with Consultants/Other Providers [x] YES  [ ] NO    HEALTH ISSUES - PROBLEM Dx:  Suspected pulmonary embolism    Suspected deep vein thrombosis (DVT)    Weakness of lower extremity, unspecified laterality    Chronic atrial fibrillation    Leukocytosis    At risk for depression    DVT prophylaxis

## 2022-12-13 NOTE — PROGRESS NOTE ADULT - SUBJECTIVE AND OBJECTIVE BOX
Orthopaedic Surgery     Patient seen and examined. No acute events overnight. States pain is controlled. Denies fever/chills/chest pain/shortness of breath/numbness/tingling. Plan for OR today with Dr. Godinez for revision IMN versus JESS.    Vital Signs Last 24 Hrs  T(C): 36.4 (13 Dec 2022 04:12), Max: 36.4 (12 Dec 2022 11:17)  T(F): 97.5 (13 Dec 2022 04:12), Max: 97.6 (12 Dec 2022 11:17)  HR: 56 (13 Dec 2022 04:12) (56 - 73)  BP: 105/63 (13 Dec 2022 04:12) (100/64 - 114/70)  BP(mean): --  RR: 18 (13 Dec 2022 04:12) (18 - 18)  SpO2: 94% (13 Dec 2022 04:12) (92% - 94%)    Parameters below as of 13 Dec 2022 04:12  Patient On (Oxygen Delivery Method): room air                          13.6   8.59  )-----------( 181      ( 13 Dec 2022 06:30 )             41.4         PHYSICAL EXAM  General: NAD, Awake and Alert    RIGHT Lower Extremity:   Skin intact, well healed surgical incision, no erythema, ecchymosis, edema, or gross deformity   Mild TTP over the GT o/w NTTP over the bony prominences of the hip/knee/ankle/foot/toes  Painless passive/active ROM of the hip/knee/ankle/foot  L2-S1 SILT  Motor grossly intact throughout hip flexors/quads/hams/TA/EHL/FHL/GSC  + DP/PT pulses  No pain with log roll, No pain on axial loading  Compartments soft and compressible  Calf nontender bilaterally

## 2022-12-13 NOTE — PROGRESS NOTE ADULT - SUBJECTIVE AND OBJECTIVE BOX
CARDIOLOGY FOLLOW UP - Dr. Marrufo  Date of Service: 12/13/22  CC: no events    Review of Systems:  Constitutional: No fever, weight loss, or fatigue  Respiratory: No cough, wheezing, or hemoptysis, no shortness of breath  Cardiovascular: No chest pain, palpitations, passing out, dizziness, or leg swelling  Gastrointestinal: No abd or epigastric pain. No nausea, vomiting, or hematemesis; no diarrhea or consiptaiton, no melena or hematochezia  Vascular: No edema     TELEMETRY:    PHYSICAL EXAM:  T(C): 36.4 (12-13-22 @ 13:49), Max: 36.8 (12-13-22 @ 10:38)  HR: 60 (12-13-22 @ 16:54) (53 - 73)  BP: 128/75 (12-13-22 @ 16:54) (105/63 - 145/86)  RR: 19 (12-13-22 @ 13:49) (18 - 19)  SpO2: 99% (12-13-22 @ 13:49) (93% - 99%)  Wt(kg): --  I&O's Summary    12 Dec 2022 07:01  -  13 Dec 2022 07:00  --------------------------------------------------------  IN: 1235 mL / OUT: 1850 mL / NET: -615 mL    13 Dec 2022 07:01  -  13 Dec 2022 17:50  --------------------------------------------------------  IN: 675 mL / OUT: 0 mL / NET: 675 mL        Appearance: Normal	  Cardiovascular: Normal S1 S2,RRR, No JVD, No murmurs  Respiratory: Lungs clear to auscultation	  Gastrointestinal:  Soft, Non-tender, + BS	  Extremities: Normal range of motion, No clubbing, cyanosis or edema  Vascular: Peripheral pulses palpable 2+ bilaterally       Home Medications:  escitalopram:  (05 Dec 2022 21:58)  rivaroxaban 20 mg oral tablet: 1 tab(s) orally once a day (before a meal) (05 Dec 2022 21:58)        MEDICATIONS  (STANDING):  chlorhexidine 2% Cloths 1 Application(s) Topical <User Schedule>  escitalopram 10 milliGRAM(s) Oral <User Schedule>  furosemide    Tablet 20 milliGRAM(s) Oral <User Schedule>  lactated ringers. 1000 milliLiter(s) (75 mL/Hr) IV Continuous <Continuous>  lidocaine   4% Patch 1 Patch Transdermal every 24 hours  nystatin Powder 1 Application(s) Topical two times a day        EKG:  RADIOLOGY:  DIAGNOSTIC TESTING:  [ ] Echocardiogram:  [ ] Catherterization:  [ ] Stress Test:  OTHER:     LABS:	 	                          13.6   8.59  )-----------( 181      ( 13 Dec 2022 06:30 )             41.4     12-13    139  |  102  |  26<H>  ----------------------------<  92  4.0   |  25  |  1.02    Ca    9.3      13 Dec 2022 06:30        PT/INR - ( 13 Dec 2022 06:30 )   PT: 14.8 sec;   INR: 1.28 ratio         PTT - ( 13 Dec 2022 06:30 )  PTT:40.9 sec    CARDIAC MARKERS:

## 2022-12-13 NOTE — PROGRESS NOTE ADULT - SUBJECTIVE AND OBJECTIVE BOX
PLan for conversion right hip to total hip arthroplasty today. However, patient received 90mg of lovenox yesterday at 9pm and is on standing therapeutic anticoagulation. Cannot have surgery today due to bleeding risk.     Will need to be rescheduled and must be off all anticoagulation for minimum 24 hours and must have dental consult prior to surgery for loose tooth as per anesthesia.

## 2022-12-13 NOTE — CONSULT NOTE ADULT - SUBJECTIVE AND OBJECTIVE BOX
Patient is a 82y old  Male who presents with a chief complaint of R sided weakness (13 Dec 2022 11:29)      HPI:  82M w/ pmhx of afib on xarelto, s/p L hip IMN (most recently s/p R hip IMN (Dr. Godinez 10/31/19) presents with acute onset RLE weakness. LKN 12/5/22 at 7am. Pt last took xarelto at 6:30am. Per pt and pt's daughter at bedside, pt woke up normally this morning and went to shower. While showering, he felt RLE sudden onset weakness, no associated pain or back pain at that time. He denies any numbness/tingling, vision changes, or UE weakness. He went to shave and while at sink felt too weak to stand and had to hold on to sink. Daughter came to bathroom ~2 hours later to find him holding sink leaning with R side. He aslo reports increased R hip area pain and numbness/tingling with associated leg shaking due to pain. Pt was hanging onto the sink for at least 2-3 hours and reports not being able to get up due to RLE weakness. At baseline, pt ambulates with a walker, lives with his daughter, does most ADLs independently. Denies any recent symptoms such as fevers, chills, cough, dysuria, change in urination, incontinence. States he had mechanical fall 3 months ago which he did not tell daughter in living room.     In ER: Given NS 1L, IV Clindamycin (05 Dec 2022 21:53)      PAST MEDICAL & SURGICAL HISTORY:  AF (atrial fibrillation)      Hernia, inguinal, right    MEDICATIONS  (STANDING):  chlorhexidine 2% Cloths 1 Application(s) Topical <User Schedule>  escitalopram 10 milliGRAM(s) Oral <User Schedule>  furosemide    Tablet 20 milliGRAM(s) Oral <User Schedule>  lactated ringers. 1000 milliLiter(s) (75 mL/Hr) IV Continuous <Continuous>  lidocaine   4% Patch 1 Patch Transdermal every 24 hours  nystatin Powder 1 Application(s) Topical two times a day    MEDICATIONS  (PRN):  acetaminophen     Tablet .. 650 milliGRAM(s) Oral every 6 hours PRN Temp greater or equal to 38C (100.4F), Mild Pain (1 - 3)  artificial tears (preservative free) Ophthalmic Solution 2 Drop(s) Both EYES daily PRN Dry Eyes  melatonin 3 milliGRAM(s) Oral at bedtime PRN Insomnia  ondansetron Injectable 4 milliGRAM(s) IV Push every 8 hours PRN Nausea and/or Vomiting      Allergies    No Known Allergies    Intolerances        FAMILY HISTORY:  No pertinent family history in first degree relatives        *SOCIAL HISTORY: (guardian or who pt came with), (smoking hx)    *Last Dental Visit:    Vital Signs Last 24 Hrs  T(C): 36.4 (13 Dec 2022 13:49), Max: 36.8 (13 Dec 2022 10:38)  T(F): 97.6 (13 Dec 2022 13:49), Max: 98.2 (13 Dec 2022 10:38)  HR: 56 (13 Dec 2022 13:49) (53 - 73)  BP: 122/78 (13 Dec 2022 13:49) (100/64 - 145/86)  BP(mean): 87 (13 Dec 2022 11:26) (87 - 87)  RR: 19 (13 Dec 2022 13:49) (18 - 19)  SpO2: 99% (13 Dec 2022 13:49) (93% - 99%)    Parameters below as of 13 Dec 2022 13:49  Patient On (Oxygen Delivery Method): room air        EOE:  TMJ ( - ) clicks                      ( - ) pops                    ( -  ) crepitus             Mandible FROM             Facial bones and MOM grossly intact             ( - ) trismus             ( - ) LAD             ( - ) swelling             ( - ) asymmetry             ( - ) palpation             ( - ) SOB             ( - ) dysphagia             ( - ) LOC    IOE:  permanent dentition: grossly intact / multiple missing teeth           tongue/FOM WNL           labial/buccal mucosa WNL           ( - ) percussion           ( - ) palpation           ( - ) swelling     Mobility: #4 grade 3 mobility. Physiologic mobility #8 and #9    Radiographs: PAN taken and interpreted.  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 No other suspicious pathology noted.     LABS:                        13.6   8.59  )-----------( 181      ( 13 Dec 2022 06:30 )             41.4     12-13    139  |  102  |  26<H>  ----------------------------<  92  4.0   |  25  |  1.02    Ca    9.3      13 Dec 2022 06:30      WBC Count: 8.59 K/uL [3.80 - 10.50] (12-13 @ 06:30)    Platelet Count - Automated: 181 K/uL [150 - 400] (12-13 @ 06:30)  Platelet Count - Automated: 172 K/uL [150 - 400] (12-12 @ 06:52)    INR: 1.28 ratio *H* [0.88 - 1.16] (12-13 @ 06:30)  INR: 1.34 ratio *H* [0.88 - 1.16] (12-12 @ 06:52)    ASSESSMENT: Grade 3 mobility #4 due to periodontitis. No other pertinent pathology noted. Requires extraction of #4 prior to being dentally optimized for hip surgery.     PROCEDURE: Extraction #4 (upper right second bicuspid) under local anesthesia without epi without replacement.   Verbal and written consent given.     Patient anesthetized with 1 carpule of 3% carbocaine via local infiltration on the buccal and palatal. Patient profoundly anesthetized, confirmed using #9 periosteal elevator. #4 elevated using straight elevator. Full roots visualized. Curetted and irrigated socket with saline. Collaplug placed and sutured with chromic gut 3-0 resorbable sutures. Hemostasis achieved. POIG.    After extraction of #4, patient is now dentally optimized for hip surgery. Intubate with caution.     RECOMMENDATIONS:   1) Intubate with caution  2) pain meds as per med team  3) Dental F/U 1 day (12/14/22) and 1 week (12/20/22) after tooth extraction  4) Follow-up with outpatient dentist for comprehensive dental care     Dain Horton DDS #[id number]

## 2022-12-13 NOTE — PROGRESS NOTE ADULT - ASSESSMENT
82M RH w/ afib on xarelto, s/p L hip IMN (2013), s/p R hip IMN (2019) presents with acute onset RLE weakness. LKN 12/5/22 at 7am. Pt last took xarelto at 6:30am. While showering, he felt RLE sudden onset weakness, no associated pain or back pain at that time. He reports increased R hip area pain and numbness/tingling with associated leg shaking due to pain. Pt was hanging onto the sink for at least 3-4 hours and reports not being able to get up due to RLE weakness. NIHSS: 0, preMRS: 2 (ambulates with a walker). Neuro exam notable for slight +R pronator, +straight leg raise. CTH/CTA neg  NIHSS4  premrs2-3 walks with cane/walker   MRI brain neg for infarct   CRP 22   CT pelvis: Redemonstration of open reduction internal fixation of the right hip.   Interval development of loosening throughout the right femoral neck   compression screw with migration of the tip into the joint space.   Interval development of lateral deviation of the right femoral   intramedullary simón with the tip now abutting and markedly   remodeling/thinning the lateral cortex at the mid diaphysis.  MRI C spien 12/9: mild cord compression C3/4/5. abnormal signal C4/5   s/p IR aspiration 12/9    Impression: acute RLE weakness and pain likely 2/2 R hip pathology vs. lumbosacral pathology, less likely stroke; Stroke ruled out     Recommendations:  - plan for OR 12/13 for R hip hardware revision by ortho   - f/u spine regarding C spine results    - s/p IR aspiration 12/9   - r/o infection ; ID recs appreciated. monitoring off abx    - continue home xarelto for stroke prevention  - monitor on telemetry  - stroke risk factor modification and counseling   - check HA1c, lipid panel, Utox  - NPO until bedside dysphagia screen   - High dose statin therapy - atorvastatin 40mg PO daily. LDL goal <70mg/dL.   - PT/OT/SS/SLP, OOBC  - check FS, glucose control <180  - GI/DVT ppx  - Thank you for allowing me to participate in the care of this patient. Call with questions.       Carloz Del Castillo MD  Vascular Neurology  Office: [phone] .

## 2022-12-13 NOTE — PROGRESS NOTE ADULT - SUBJECTIVE AND OBJECTIVE BOX
Neurology Progress Note    S: Patient seen and examined. No new events overnight ; plan for OR today     Medication:    MEDICATIONS  (STANDING):  chlorhexidine 2% Cloths 1 Application(s) Topical <User Schedule>  escitalopram 10 milliGRAM(s) Oral <User Schedule>  furosemide    Tablet 20 milliGRAM(s) Oral <User Schedule>  lactated ringers. 1000 milliLiter(s) (75 mL/Hr) IV Continuous <Continuous>  lidocaine   4% Patch 1 Patch Transdermal every 24 hours  nystatin Powder 1 Application(s) Topical two times a day    MEDICATIONS  (PRN):  acetaminophen     Tablet .. 650 milliGRAM(s) Oral every 6 hours PRN Temp greater or equal to 38C (100.4F), Mild Pain (1 - 3)  artificial tears (preservative free) Ophthalmic Solution 2 Drop(s) Both EYES daily PRN Dry Eyes  melatonin 3 milliGRAM(s) Oral at bedtime PRN Insomnia  ondansetron Injectable 4 milliGRAM(s) IV Push every 8 hours PRN Nausea and/or Vomiting        Vitals:      Vital Signs Last 24 Hrs  T(C): 36.4 (12-13-22 @ 10:38), Max: 36.4 (12-12-22 @ 11:17)  T(F): 97.5 (12-13-22 @ 10:38), Max: 97.6 (12-12-22 @ 11:17)  HR: 53 (12-13-22 @ 10:38) (53 - 73)  BP: 145/86 (12-13-22 @ 10:38) (100/64 - 145/86)  BP(mean): --  RR: 19 (12-13-22 @ 10:38) (18 - 19)  SpO2: 97% (12-13-22 @ 10:38) (92% - 97%)            General Exam:   Constitutional: Male, appears stated age, in no apparent distress including pain  Head: Normocephalic & atraumatic.  Respiratory: No increased work of breathing  Extremities: b/l LE pitting edema  Skin: +b/l overlying chronic skin changes over b/l LE    Cardiovascular (>2): atrial fibrillation on monitor, HR 80s.    Neurological (>12):  MS: Awake, alert, oriented to person, place, situation, time. Normal affect. Follows all commands.    Language: Speech is clear, fluent with good repetition & comprehension (able to name objects thumb)    CNs: VFF to counting fingers. EOMI no nystagmus, no diplopia. V1-3 intact to LT, well developed masseter muscles b/l. No facial asymmetry b/l, full eye closure strength b/l. Hearing grossly normal (rubbing fingers) b/l. Symmetric palate elevation in midline. Gag reflex deferred. Head turning & shoulder shrug intact b/l. Tongue midline, normal movements, no atrophy.    Motor: Normal muscle bulk. b/l UE postural and action tremor.  Slight R pronator drift.              Deltoid	Biceps	Triceps	Wrist	Finger ABd	   R	4+	5	5	5	4+		4+ 	  L	5	5	5	5	5		5    	H-Flex	K-Flex	K-Ext	D-Flex	P-Flex  R	4+	5	5	4+	5	RLE with +straight raise	   L	5	5	5	5	5	     Sensation: Intact to LT b/l throughout.     Cortical: Extinction on DSS (neglect): none    Reflexes: with significant pain when checking for ankle clonus, deferred              Biceps(C5)       BR(C6)     Triceps(C7)               Patellar(L4)    Achilles(S1)    Plantar Resp  R	2	          2	             2		        1		    		mute  L	2	          2	             2		        1		    		mute    Coordination: intact rapid-alt movements. No dysmetria to FTN     Gait: unable to assess due to pain      I personally reviewed the below data/images/labs:  CBC Full  -  ( 13 Dec 2022 06:30 )  WBC Count : 8.59 K/uL  RBC Count : 4.17 M/uL  Hemoglobin : 13.6 g/dL  Hematocrit : 41.4 %  Platelet Count - Automated : 181 K/uL  Mean Cell Volume : 99.3 fl  Mean Cell Hemoglobin : 32.6 pg  Mean Cell Hemoglobin Concentration : 32.9 gm/dL  Auto Neutrophil # : x  Auto Lymphocyte # : x  Auto Monocyte # : x  Auto Eosinophil # : x  Auto Basophil # : x  Auto Neutrophil % : x  Auto Lymphocyte % : x  Auto Monocyte % : x  Auto Eosinophil % : x  Auto Basophil % : x    12-13    139  |  102  |  26<H>  ----------------------------<  92  4.0   |  25  |  1.02    Ca    9.3      13 Dec 2022 06:30          < from: CT Angio Neck Stroke Protocol w/ IV Cont (12.05.22 @ 15:25) >  IMPRESSION:    HEAD CT: No acute intracranial hemorrhage or acute territorial infarction.    If symptoms persist consider follow up head CT or MRI, MRA  if no   contraindication.    CTA COW:  Patent intracranial circulation without flow limiting stenosis   or large vessel occlusion.    CTA NECK: Patent, ECAs, ICAs, no  hemodynamically significant stenosis at    ICA origins by NASCET criteria.  Bilateral vertebral arteries are patent without flow limiting stenosis.    < end of copied text >    < from: MR Head No Cont (12.06.22 @ 19:43) >    ACC: [id number] EXAM:  MR BRAIN                          PROCEDURE DATE:  12/06/2022          INTERPRETATION:  CLINICAL STATEMENT: Pain.    TECHNIQUE: MRI of the brain was performed without gadolinium.    COMPARISON: CT head 12/5/2022    FINDINGS:  There is mild diffuse parenchymal volume loss. There are T2 prolongation   signal abnormalities in the periventricular subcortical white matter   likely related to mild chronic microvascular ischemic changes.    There is no acute parenchymal hemorrhage, parenchymal mass, mass effect   or midline shift. There is no extra-axial fluid collection.  There is no   hydrocephalus.  There is no acute infarct.    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    IMPRESSION:  No acute intracranial hemorrhage or acute infarct.    --- End of Report ---    < from: MR Cervical Spine No Cont (12.09.22 @ 20:23) >    ACC: [id number] EXAM:  MR SPINE CERVICAL                          PROCEDURE DATE:  12/09/2022          INTERPRETATION:  CLINICAL INDICATION: Cervical stenosis with myelopathy    Magnetic resonance imaging of the cervical spine was carried out with   sagittal surface coil imaging from C1 to T4 using T1 and fast spin echo   T2 weighted images with and without fat saturation technique with axial   T1 and fast spin echo T2 weighted imaging on a 1.5 Alexia magnet.    Comparison is made with the prior cervical spine CT of 10/30/2019.    The cervical vertebrae are normal in height and signal intensity. There   are disc osteophyte complexes present at C3-4 and C4-5 which with dorsal   ligamentous hypertrophy causes severe spinal stenosis and mild cord  compression. There is a small amount of cord signal abnormality at the   C4-5 level.    There is a small disc osteophyte complex present at C6-7 without cord   compression or significant spinal stenosis. The remainder of the cervical   spine and the upper thoracic spine are unremarkable.        IMPRESSION: Disc osteophyte complexes C3-4 and C4-5 along with dorsal   ligamentous causes severe spinal stenosis and mild cord compression.   Small amount of abnormal cord signal C4-5.                    --- End of Report ---            ABA MARIE MD; Attending Radiologist  This document has been electronically signed. Dec  9 2022  8:48PM    < end of copied text >

## 2022-12-13 NOTE — PROGRESS NOTE ADULT - ASSESSMENT
ECHO 12/6/22:  Ef 64%;  mild MR, nl LV sys fx  , mod to sev TR     a/p  82M w/ pmhx of afib on xarelto, s/p L hip IMN (most recently s/p R hip IMN (Dr. Godinez 10/31/19) presents with acute onset RLE weakness.    #WCT  -cont to monitor   -no further sig WCT, unable to give bb due to freda   -echo with nl EF    #Right Sided HF  -ECHO revealed normal LVEF, w R sided enlargement and mod-severe TR  -Venous dopplers negative for DVT  -likely related to r sided CHF  -cont lasix 20mg PO Q48hrs- volume status stable   -pt in slow afib off meds which preclude BB use    #Afib  -cont AC  -rate controlled/freda off meds  -no sig freda, pauses  -off xarelto for OR, cont iv heparin    #Weakness  -neuro w/u neg for CVA  -w/u per primary team  -neuro eval noted    #s/p IR hip aspiration 12/9  -await ortho OR for revision IM nail vs arthroplasty  -remains cv stable and optimized to proceed with acceptable cardiac risk  -hold AC for OR    35 minutes spent on total encounter; more than 50% of the visit was spent counseling and/or coordinating care by the attending physician.

## 2022-12-13 NOTE — PROGRESS NOTE ADULT - ASSESSMENT
82y Male with possible R hip IM nail subsidence and screw cut-out    Medical comanagement appreciated - please continue to document optimization for OR today  -Pain control PRN  -S/p IR aspiration of R hip 12/9, culture NGTD  -Hold DVT ppx  for OR today; SCDs bilaterally okay  -NPO except medications for OR today; IVF while NPO  -WBAT RLE  -FU AM Labs  - Plan for OR today with Dr. Godinez for revision IM nail vs arthroplasty   - Discussed with attending Dr. Godinez who agrees with plan

## 2022-12-14 LAB
ANION GAP SERPL CALC-SCNC: 10 MMOL/L — SIGNIFICANT CHANGE UP (ref 5–17)
APTT BLD: 33.4 SEC — SIGNIFICANT CHANGE UP (ref 27.5–35.5)
BUN SERPL-MCNC: 25 MG/DL — HIGH (ref 7–23)
CALCIUM SERPL-MCNC: 9.6 MG/DL — SIGNIFICANT CHANGE UP (ref 8.4–10.5)
CHLORIDE SERPL-SCNC: 101 MMOL/L — SIGNIFICANT CHANGE UP (ref 96–108)
CO2 SERPL-SCNC: 25 MMOL/L — SIGNIFICANT CHANGE UP (ref 22–31)
CREAT SERPL-MCNC: 1.08 MG/DL — SIGNIFICANT CHANGE UP (ref 0.5–1.3)
EGFR: 69 ML/MIN/1.73M2 — SIGNIFICANT CHANGE UP
GLUCOSE SERPL-MCNC: 103 MG/DL — HIGH (ref 70–99)
HCT VFR BLD CALC: 41.4 % — SIGNIFICANT CHANGE UP (ref 39–50)
HGB BLD-MCNC: 13.5 G/DL — SIGNIFICANT CHANGE UP (ref 13–17)
INR BLD: 1.25 RATIO — HIGH (ref 0.88–1.16)
MCHC RBC-ENTMCNC: 32.3 PG — SIGNIFICANT CHANGE UP (ref 27–34)
MCHC RBC-ENTMCNC: 32.6 GM/DL — SIGNIFICANT CHANGE UP (ref 32–36)
MCV RBC AUTO: 99 FL — SIGNIFICANT CHANGE UP (ref 80–100)
NRBC # BLD: 0 /100 WBCS — SIGNIFICANT CHANGE UP (ref 0–0)
PLATELET # BLD AUTO: 178 K/UL — SIGNIFICANT CHANGE UP (ref 150–400)
POTASSIUM SERPL-MCNC: 4.2 MMOL/L — SIGNIFICANT CHANGE UP (ref 3.5–5.3)
POTASSIUM SERPL-SCNC: 4.2 MMOL/L — SIGNIFICANT CHANGE UP (ref 3.5–5.3)
PROTHROM AB SERPL-ACNC: 14.4 SEC — HIGH (ref 10.5–13.4)
RBC # BLD: 4.18 M/UL — LOW (ref 4.2–5.8)
RBC # FLD: 12.7 % — SIGNIFICANT CHANGE UP (ref 10.3–14.5)
SODIUM SERPL-SCNC: 136 MMOL/L — SIGNIFICANT CHANGE UP (ref 135–145)
WBC # BLD: 8.57 K/UL — SIGNIFICANT CHANGE UP (ref 3.8–10.5)
WBC # FLD AUTO: 8.57 K/UL — SIGNIFICANT CHANGE UP (ref 3.8–10.5)

## 2022-12-14 RX ADMIN — LIDOCAINE 1 PATCH: 4 CREAM TOPICAL at 12:12

## 2022-12-14 RX ADMIN — NYSTATIN CREAM 1 APPLICATION(S): 100000 CREAM TOPICAL at 05:27

## 2022-12-14 RX ADMIN — LIDOCAINE 1 PATCH: 4 CREAM TOPICAL at 01:29

## 2022-12-14 RX ADMIN — ESCITALOPRAM OXALATE 10 MILLIGRAM(S): 10 TABLET, FILM COATED ORAL at 21:11

## 2022-12-14 RX ADMIN — NYSTATIN CREAM 1 APPLICATION(S): 100000 CREAM TOPICAL at 17:46

## 2022-12-14 RX ADMIN — LIDOCAINE 1 PATCH: 4 CREAM TOPICAL at 23:28

## 2022-12-14 RX ADMIN — Medication 20 MILLIGRAM(S): at 08:20

## 2022-12-14 RX ADMIN — CHLORHEXIDINE GLUCONATE 1 APPLICATION(S): 213 SOLUTION TOPICAL at 05:27

## 2022-12-14 RX ADMIN — LIDOCAINE 1 PATCH: 4 CREAM TOPICAL at 20:11

## 2022-12-14 NOTE — PROGRESS NOTE ADULT - SUBJECTIVE AND OBJECTIVE BOX
Orthopaedic Surgery Progress Note    Subjective:   Patient seen and examined. No acute events overnight. States pain is controlled. Denies fever/chills/chest pain/shortness of breath/numbness/tingling.     Objective:  T(C): 37 (12-14-22 @ 06:02), Max: 37 (12-14-22 @ 06:02)  HR: 58 (12-14-22 @ 06:02) (50 - 62)  BP: 103/66 (12-14-22 @ 06:02) (101/62 - 145/86)  RR: 18 (12-14-22 @ 06:02) (17 - 19)  SpO2: 95% (12-14-22 @ 06:02) (95% - 99%)  Wt(kg): --    12-12 @ 07:01  -  12-13 @ 07:00  --------------------------------------------------------  IN: 1235 mL / OUT: 1850 mL / NET: -615 mL    12-13 @ 07:01  -  12-14 @ 06:38  --------------------------------------------------------  IN: 1350 mL / OUT: 400 mL / NET: 950 mL        PHYSICAL EXAM  General: NAD, Awake and Alert    RIGHT Lower Extremity:   Skin intact, well healed surgical incision, no erythema, ecchymosis, edema, or gross deformity   Mild TTP over the GT o/w NTTP over the bony prominences of the hip/knee/ankle/foot/toes  Painless passive/active ROM of the hip/knee/ankle/foot  L2-S1 SILT  Motor grossly intact throughout hip flexors/quads/hams/TA/EHL/FHL/GSC  + DP/PT pulses  No pain with log roll, No pain on axial loading  Compartments soft and compressible  Calf nontender bilaterally                          13.6   8.59  )-----------( 181      ( 13 Dec 2022 06:30 )             41.4     12-13    139  |  102  |  26<H>  ----------------------------<  92  4.0   |  25  |  1.02    Ca    9.3      13 Dec 2022 06:30      PT/INR - ( 13 Dec 2022 06:30 )   PT: 14.8 sec;   INR: 1.28 ratio         PTT - ( 13 Dec 2022 06:30 )  PTT:40.9 sec

## 2022-12-14 NOTE — PROGRESS NOTE ADULT - ASSESSMENT
ECHO 12/6/22:  Ef 64%;  mild MR, nl LV sys fx  , mod to sev TR     a/p  82M w/ pmhx of afib on xarelto, s/p L hip IMN (most recently s/p R hip IMN (Dr. Godinez 10/31/19) presents with acute onset RLE weakness.    #WCT  -cont to monitor   -no further sig WCT, unable to give bb due to freda   -echo with nl EF    #Right Sided HF  -ECHO revealed normal LVEF, w R sided enlargement and mod-severe TR  -Venous dopplers negative for DVT  -likely related to r sided CHF  -cont lasix 20mg PO Q48hrs- volume status stable   -pt in slow afib off meds which preclude BB use    #Afib  -cont AC  -rate controlled/freda off meds  -no sig freda, pauses  -off xarelto for OR    #Weakness  -neuro w/u neg for CVA  -w/u per primary team  -neuro eval noted    #s/p IR hip aspiration 12/9  -Planned for OR 12/15 for right hip removal of hardware, hemiarthroplasty vs. total hip arthroplasty  -remains cv stable and optimized to proceed with acceptable cardiac risk  -hold AC for OR

## 2022-12-14 NOTE — PROGRESS NOTE ADULT - ASSESSMENT
82M RH w/ afib on xarelto, s/p L hip IMN (2013), s/p R hip IMN (2019) presents with acute onset RLE weakness. LKN 12/5/22 at 7am. Pt last took xarelto at 6:30am. While showering, he felt RLE sudden onset weakness, no associated pain or back pain at that time. He reports increased R hip area pain and numbness/tingling with associated leg shaking due to pain. Pt was hanging onto the sink for at least 3-4 hours and reports not being able to get up due to RLE weakness. NIHSS: 0, preMRS: 2 (ambulates with a walker). Neuro exam notable for slight +R pronator, +straight leg raise. CTH/CTA neg  NIHSS4  premrs2-3 walks with cane/walker   MRI brain neg for infarct   CRP 22   CT pelvis: Redemonstration of open reduction internal fixation of the right hip.   Interval development of loosening throughout the right femoral neck   compression screw with migration of the tip into the joint space.   Interval development of lateral deviation of the right femoral   intramedullary simón with the tip now abutting and markedly   remodeling/thinning the lateral cortex at the mid diaphysis.  MRI C spien 12/9: mild cord compression C3/4/5. abnormal signal C4/5   s/p IR aspiration 12/9    Impression: acute RLE weakness and pain likely 2/2 R hip pathology vs. lumbosacral pathology, less likely stroke; Stroke ruled out     Recommendations:  - plan for OR 12/15 for R hip hardware revision by ortho   - f/u spine regarding C spine results    - s/p IR aspiration 12/9   - r/o infection ; ID recs appreciated. monitoring off abx    - continue home xarelto for stroke prevention  - monitor on telemetry  - stroke risk factor modification and counseling   - check HA1c, lipid panel, Utox  - NPO until bedside dysphagia screen   - High dose statin therapy - atorvastatin 40mg PO daily. LDL goal <70mg/dL.   - PT/OT/SS/SLP, OOBC  - check FS, glucose control <180  - GI/DVT ppx  - Thank you for allowing me to participate in the care of this patient. Call with questions.       Carloz Del Castillo MD  Vascular Neurology  Office: [phone] .

## 2022-12-14 NOTE — PROGRESS NOTE ADULT - SUBJECTIVE AND OBJECTIVE BOX
CARDIOLOGY FOLLOW UP - Dr. Marrufo  DATE OF SERVICE: 12/14/22    CC  No CV complaints. Had tooth pulled yesterday    REVIEW OF SYSTEMS:  CONSTITUTIONAL: No fever, weight loss, or fatigue  RESPIRATORY: No cough, wheezing, chills or hemoptysis; No Shortness of Breath  CARDIOVASCULAR: No chest pain, palpitations, passing out, dizziness, or leg swelling  GASTROINTESTINAL: No abdominal or epigastric pain. No nausea, vomiting, or hematemesis; No diarrhea or constipation. No melena or hematochezia.  VASCULAR: No edema     PHYSICAL EXAM:  T(C): 36.7 (12-14-22 @ 08:14), Max: 37 (12-14-22 @ 06:02)  HR: 76 (12-14-22 @ 08:14) (50 - 76)  BP: 108/72 (12-14-22 @ 08:14) (101/62 - 145/84)  RR: 18 (12-14-22 @ 08:14) (17 - 19)  SpO2: 94% (12-14-22 @ 08:14) (94% - 99%)  Wt(kg): --  I&O's Summary    13 Dec 2022 07:01  -  14 Dec 2022 07:00  --------------------------------------------------------  IN: 1350 mL / OUT: 400 mL / NET: 950 mL    14 Dec 2022 07:01  -  14 Dec 2022 10:15  --------------------------------------------------------  IN: 300 mL / OUT: 0 mL / NET: 300 mL        Appearance: Normal	  Cardiovascular: Normal S1 S2,RRR, No JVD, No murmurs  Respiratory: Lungs clear to auscultation	  Gastrointestinal:  Soft, Non-tender, + BS	  Extremities: Normal range of motion, No clubbing, cyanosis or edema      Home Medications:  escitalopram:  (05 Dec 2022 21:58)  rivaroxaban 20 mg oral tablet: 1 tab(s) orally once a day (before a meal) (05 Dec 2022 21:58)      MEDICATIONS  (STANDING):  chlorhexidine 2% Cloths 1 Application(s) Topical <User Schedule>  escitalopram 10 milliGRAM(s) Oral <User Schedule>  furosemide    Tablet 20 milliGRAM(s) Oral <User Schedule>  lactated ringers. 1000 milliLiter(s) (75 mL/Hr) IV Continuous <Continuous>  lidocaine   4% Patch 1 Patch Transdermal every 24 hours  nystatin Powder 1 Application(s) Topical two times a day      TELEMETRY: Afib 60s	    ECG:  	  RADIOLOGY:   DIAGNOSTIC TESTING:  [ ] Echocardiogram:  [ ]  Catheterization:  [ ] Stress Test:    OTHER: 	    LABS:	 	                            13.5   8.57  )-----------( 178      ( 14 Dec 2022 07:13 )             41.4     12-13    139  |  102  |  26<H>  ----------------------------<  92  4.0   |  25  |  1.02    Ca    9.3      13 Dec 2022 06:30      PT/INR - ( 13 Dec 2022 06:30 )   PT: 14.8 sec;   INR: 1.28 ratio         PTT - ( 13 Dec 2022 06:30 )  PTT:40.9 sec

## 2022-12-14 NOTE — PROGRESS NOTE ADULT - SUBJECTIVE AND OBJECTIVE BOX
Neurology Progress Note    S: Patient seen and examined. No new events overnight ; plan for OR 12/15     Medication:    MEDICATIONS  (STANDING):  chlorhexidine 2% Cloths 1 Application(s) Topical <User Schedule>  escitalopram 10 milliGRAM(s) Oral <User Schedule>  furosemide    Tablet 20 milliGRAM(s) Oral <User Schedule>  lactated ringers. 1000 milliLiter(s) (75 mL/Hr) IV Continuous <Continuous>  lidocaine   4% Patch 1 Patch Transdermal every 24 hours  nystatin Powder 1 Application(s) Topical two times a day    MEDICATIONS  (PRN):  acetaminophen     Tablet .. 650 milliGRAM(s) Oral every 6 hours PRN Temp greater or equal to 38C (100.4F), Mild Pain (1 - 3)  artificial tears (preservative free) Ophthalmic Solution 2 Drop(s) Both EYES daily PRN Dry Eyes  melatonin 3 milliGRAM(s) Oral at bedtime PRN Insomnia  ondansetron Injectable 4 milliGRAM(s) IV Push every 8 hours PRN Nausea and/or Vomiting    Vitals:      Vital Signs Last 24 Hrs  T(C): 36.9 (12-14-22 @ 16:04), Max: 37 (12-14-22 @ 06:02)  T(F): 98.4 (12-14-22 @ 16:04), Max: 98.6 (12-14-22 @ 06:02)  HR: 70 (12-14-22 @ 16:04) (50 - 76)  BP: 110/67 (12-14-22 @ 16:04) (101/62 - 128/75)  BP(mean): --  RR: 18 (12-14-22 @ 16:04) (17 - 18)  SpO2: 96% (12-14-22 @ 16:04) (94% - 97%)           General Exam:   Constitutional: Male, appears stated age, in no apparent distress including pain  Head: Normocephalic & atraumatic.  Respiratory: No increased work of breathing  Extremities: b/l LE pitting edema  Skin: +b/l overlying chronic skin changes over b/l LE    Cardiovascular (>2): atrial fibrillation on monitor, HR 80s.    Neurological (>12):  MS: Awake, alert, oriented to person, place, situation, time. Normal affect. Follows all commands.    Language: Speech is clear, fluent with good repetition & comprehension (able to name objects thumb)    CNs: VFF to counting fingers. EOMI no nystagmus, no diplopia. V1-3 intact to LT, well developed masseter muscles b/l. No facial asymmetry b/l, full eye closure strength b/l. Hearing grossly normal (rubbing fingers) b/l. Symmetric palate elevation in midline. Gag reflex deferred. Head turning & shoulder shrug intact b/l. Tongue midline, normal movements, no atrophy.    Motor: Normal muscle bulk. b/l UE postural and action tremor.  Slight R pronator drift.              Deltoid	Biceps	Triceps	Wrist	Finger ABd	   R	4+	5	5	5	4+		4+ 	  L	5	5	5	5	5		5    	H-Flex	K-Flex	K-Ext	D-Flex	P-Flex  R	4+	5	5	4+	5	RLE with +straight raise	   L	5	5	5	5	5	     Sensation: Intact to LT b/l throughout.     Cortical: Extinction on DSS (neglect): none    Reflexes: with significant pain when checking for ankle clonus, deferred              Biceps(C5)       BR(C6)     Triceps(C7)               Patellar(L4)    Achilles(S1)    Plantar Resp  R	2	          2	             2		        1		    		mute  L	2	          2	             2		        1		    		mute    Coordination: intact rapid-alt movements. No dysmetria to FTN     Gait: unable to assess due to pain      I personally reviewed the below data/images/labs:  CBC Full  -  ( 14 Dec 2022 07:13 )  WBC Count : 8.57 K/uL  RBC Count : 4.18 M/uL  Hemoglobin : 13.5 g/dL  Hematocrit : 41.4 %  Platelet Count - Automated : 178 K/uL  Mean Cell Volume : 99.0 fl  Mean Cell Hemoglobin : 32.3 pg  Mean Cell Hemoglobin Concentration : 32.6 gm/dL  Auto Neutrophil # : x  Auto Lymphocyte # : x  Auto Monocyte # : x  Auto Eosinophil # : x  Auto Basophil # : x  Auto Neutrophil % : x  Auto Lymphocyte % : x  Auto Monocyte % : x  Auto Eosinophil % : x  Auto Basophil % : x    12-14    136  |  101  |  25<H>  ----------------------------<  103<H>  4.2   |  25  |  1.08    Ca    9.6      14 Dec 2022 11:15      < from: CT Angio Neck Stroke Protocol w/ IV Cont (12.05.22 @ 15:25) >  IMPRESSION:    HEAD CT: No acute intracranial hemorrhage or acute territorial infarction.    If symptoms persist consider follow up head CT or MRI, MRA  if no   contraindication.    CTA COW:  Patent intracranial circulation without flow limiting stenosis   or large vessel occlusion.    CTA NECK: Patent, ECAs, ICAs, no  hemodynamically significant stenosis at    ICA origins by NASCET criteria.  Bilateral vertebral arteries are patent without flow limiting stenosis.    < end of copied text >    < from: MR Head No Cont (12.06.22 @ 19:43) >    ACC: [id number] EXAM:  MR BRAIN                          PROCEDURE DATE:  12/06/2022          INTERPRETATION:  CLINICAL STATEMENT: Pain.    TECHNIQUE: MRI of the brain was performed without gadolinium.    COMPARISON: CT head 12/5/2022    FINDINGS:  There is mild diffuse parenchymal volume loss. There are T2 prolongation   signal abnormalities in the periventricular subcortical white matter   likely related to mild chronic microvascular ischemic changes.    There is no acute parenchymal hemorrhage, parenchymal mass, mass effect   or midline shift. There is no extra-axial fluid collection.  There is no   hydrocephalus.  There is no acute infarct.    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    IMPRESSION:  No acute intracranial hemorrhage or acute infarct.    --- End of Report ---    < from: MR Cervical Spine No Cont (12.09.22 @ 20:23) >    ACC: [id number] EXAM:  MR SPINE CERVICAL                          PROCEDURE DATE:  12/09/2022          INTERPRETATION:  CLINICAL INDICATION: Cervical stenosis with myelopathy    Magnetic resonance imaging of the cervical spine was carried out with   sagittal surface coil imaging from C1 to T4 using T1 and fast spin echo   T2 weighted images with and without fat saturation technique with axial   T1 and fast spin echo T2 weighted imaging on a 1.5 Alexia magnet.    Comparison is made with the prior cervical spine CT of 10/30/2019.    The cervical vertebrae are normal in height and signal intensity. There   are disc osteophyte complexes present at C3-4 and C4-5 which with dorsal   ligamentous hypertrophy causes severe spinal stenosis and mild cord  compression. There is a small amount of cord signal abnormality at the   C4-5 level.    There is a small disc osteophyte complex present at C6-7 without cord   compression or significant spinal stenosis. The remainder of the cervical   spine and the upper thoracic spine are unremarkable.        IMPRESSION: Disc osteophyte complexes C3-4 and C4-5 along with dorsal   ligamentous causes severe spinal stenosis and mild cord compression.   Small amount of abnormal cord signal C4-5.                    --- End of Report ---            ABA MARIE MD; Attending Radiologist  This document has been electronically signed. Dec  9 2022  8:48PM    < end of copied text >

## 2022-12-14 NOTE — PROGRESS NOTE ADULT - ASSESSMENT
82y Male with possible R hip IM nail subsidence and screw cut-out    Medical comanagement appreciated - please continue to document optimization for OR today  - Pain control PRN  - S/p IR aspiration of R hip 12/9, culture NGTD  - Hold DVT ppx (24 hours) for OR 12/15; SCDs bilaterally okay  - NPO except medications at midnight; IVF while NPO  - WBAT RLE  - FU AM Labs  - Please document medical clearance for OR  - Plan for OR 12/15 with Dr. Mcfarlane for right hip removal of hardware, hemiarthroplasty vs. total hip arthroplasty  - Discussed with attendings Dr. Godinez and Dr. Mcfarlane who agree with plan

## 2022-12-14 NOTE — PROGRESS NOTE ADULT - SUBJECTIVE AND OBJECTIVE BOX
Please refer to previous dental consult note for additional information.     *INTERVAL HPI/OVERNIGHT EVENTS:    MEDICATIONS  (STANDING):  chlorhexidine 2% Cloths 1 Application(s) Topical <User Schedule>  escitalopram 10 milliGRAM(s) Oral <User Schedule>  furosemide    Tablet 20 milliGRAM(s) Oral <User Schedule>  lactated ringers. 1000 milliLiter(s) (75 mL/Hr) IV Continuous <Continuous>  lidocaine   4% Patch 1 Patch Transdermal every 24 hours  nystatin Powder 1 Application(s) Topical two times a day    MEDICATIONS  (PRN):  acetaminophen     Tablet .. 650 milliGRAM(s) Oral every 6 hours PRN Temp greater or equal to 38C (100.4F), Mild Pain (1 - 3)  artificial tears (preservative free) Ophthalmic Solution 2 Drop(s) Both EYES daily PRN Dry Eyes  melatonin 3 milliGRAM(s) Oral at bedtime PRN Insomnia  ondansetron Injectable 4 milliGRAM(s) IV Push every 8 hours PRN Nausea and/or Vomiting      Allergies    No Known Allergies    Intolerances        Vital Signs Last 24 Hrs  T(C): 36.9 (14 Dec 2022 16:04), Max: 37 (14 Dec 2022 06:02)  T(F): 98.4 (14 Dec 2022 16:04), Max: 98.6 (14 Dec 2022 06:02)  HR: 70 (14 Dec 2022 16:04) (50 - 76)  BP: 110/67 (14 Dec 2022 16:04) (101/62 - 113/61)  BP(mean): --  RR: 18 (14 Dec 2022 16:04) (17 - 18)  SpO2: 96% (14 Dec 2022 16:04) (94% - 97%)    Parameters below as of 14 Dec 2022 16:04  Patient On (Oxygen Delivery Method): room air        LABS:                        13.5   8.57  )-----------( 178      ( 14 Dec 2022 07:13 )             41.4     12-14    136  |  101  |  25<H>  ----------------------------<  103<H>  4.2   |  25  |  1.08    Ca    9.6      14 Dec 2022 11:15      INR: 1.25 ratio *H* [0.88 - 1.16] (12-14 @ 11:15)  WBC Count: 8.57 K/uL [3.80 - 10.50] (12-14 @ 07:13)  Platelet Count - Automated: 178 K/uL [150 - 400] (12-14 @ 07:13)        Limited bedside exam performed. Exo site #4 healing WNL. No purulence or swelling present.  Pt denies pain/discomfort.    Recommendations:  1) Dental to follow up in 1 week (12/20).  2) Follow up with outside dentist for comprehensive dental care.    Liss Earl DDS #[id number]

## 2022-12-14 NOTE — PROGRESS NOTE ADULT - ASSESSMENT
82M w/ pmhx of afib on xarelto, s/p L hip IMN (most recently s/p R hip IMN (Dr. Godinez 10/31/19) presents with acute onset R sided weakness.    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, cervical stenosis  # right IM nail loose  MRI brain no acute infarct  CT L spine L4-L5 there is severe right subarticular zone stenosis and severe right neural foraminal stenosis  MRI cervical spine severe canal stenosis c3/4 c4/c5, mild cord compression, NSGY aware and recommend outpt fu no acute intervention  appreciate ortho recs, sp IR right hip aspiratio  medicine pre op eval: pt without active cardiac conditions, moderate risk, medically optimized. appreciated cardio eval   NPO after MN. Surgical procedure per ortho.    # Grade 3 mobility #4 due to periodontitis, loose tooth  s/p Extraction #4 (upper right second bicuspid) on 12/13  dentally cleared for surgery    # LE wound  BC NTD  podiatry following for L heel wound  monitor off abx    # Chronic atrial fibrillation  lovenox for OR  TTE Severe right atrial enlargement. borderline pulmonary hypertension. Moderate-severe tricuspid regurgitation. EF64%  cardio following  lasix MWF    # depression  On Lexapro    DVT prophylaxis. lovenox on hold for OR    Please contact with any questions or concerns [phone].

## 2022-12-14 NOTE — PROGRESS NOTE ADULT - SUBJECTIVE AND OBJECTIVE BOX
Patient is a 82y old  Male who presents with a chief complaint of R sided weakness (14 Dec 2022 10:14)      SUBJECTIVE / OVERNIGHT EVENTS:  Patient seen and examined.   No overnight events.   No complaints.      Vital Signs Last 24 Hrs  T(C): 36.9 (14 Dec 2022 16:04), Max: 37 (14 Dec 2022 06:02)  T(F): 98.4 (14 Dec 2022 16:04), Max: 98.6 (14 Dec 2022 06:02)  HR: 70 (14 Dec 2022 16:04) (50 - 76)  BP: 110/67 (14 Dec 2022 16:04) (101/62 - 113/61)  BP(mean): --  RR: 18 (14 Dec 2022 16:04) (17 - 18)  SpO2: 96% (14 Dec 2022 16:04) (94% - 97%)    Parameters below as of 14 Dec 2022 16:04  Patient On (Oxygen Delivery Method): room air      I&O's Summary    13 Dec 2022 07:01  -  14 Dec 2022 07:00  --------------------------------------------------------  IN: 1350 mL / OUT: 400 mL / NET: 950 mL    14 Dec 2022 07:01  -  14 Dec 2022 18:12  --------------------------------------------------------  IN: 590 mL / OUT: 700 mL / NET: -110 mL        PE:  GENERAL: NAD, AAOx2  CHEST/LUNG: CTABL, No wheeze  HEART: irregular irregular + murmur  ABDOMEN: Soft, Nontender, Nondistended; Bowel sounds present  EXTREMITIES: chronic venous stasis, + 1 le edema  NEURO: No focal deficits    LABS:                        13.5   8.57  )-----------( 178      ( 14 Dec 2022 07:13 )             41.4     12-14    136  |  101  |  25<H>  ----------------------------<  103<H>  4.2   |  25  |  1.08    Ca    9.6      14 Dec 2022 11:15      PT/INR - ( 14 Dec 2022 11:15 )   PT: 14.4 sec;   INR: 1.25 ratio         PTT - ( 14 Dec 2022 11:15 )  PTT:33.4 sec  CAPILLARY BLOOD GLUCOSE                RADIOLOGY & ADDITIONAL TESTS:    Imaging Personally Reviewed:  [x] YES  [ ] NO    Consultant(s) Notes Reviewed:  [x] YES  [ ] NO      MEDICATIONS  (STANDING):  chlorhexidine 2% Cloths 1 Application(s) Topical <User Schedule>  escitalopram 10 milliGRAM(s) Oral <User Schedule>  furosemide    Tablet 20 milliGRAM(s) Oral <User Schedule>  lactated ringers. 1000 milliLiter(s) (75 mL/Hr) IV Continuous <Continuous>  lidocaine   4% Patch 1 Patch Transdermal every 24 hours  nystatin Powder 1 Application(s) Topical two times a day    MEDICATIONS  (PRN):  acetaminophen     Tablet .. 650 milliGRAM(s) Oral every 6 hours PRN Temp greater or equal to 38C (100.4F), Mild Pain (1 - 3)  artificial tears (preservative free) Ophthalmic Solution 2 Drop(s) Both EYES daily PRN Dry Eyes  melatonin 3 milliGRAM(s) Oral at bedtime PRN Insomnia  ondansetron Injectable 4 milliGRAM(s) IV Push every 8 hours PRN Nausea and/or Vomiting      Care Discussed with Consultants/Other Providers [x] YES  [ ] NO    HEALTH ISSUES - PROBLEM Dx:  Suspected pulmonary embolism    Suspected deep vein thrombosis (DVT)    Weakness of lower extremity, unspecified laterality    Chronic atrial fibrillation    Leukocytosis    At risk for depression    DVT prophylaxis

## 2022-12-15 ENCOUNTER — TRANSCRIPTION ENCOUNTER (OUTPATIENT)
Age: 82
End: 2022-12-15

## 2022-12-15 LAB
ANION GAP SERPL CALC-SCNC: 14 MMOL/L — SIGNIFICANT CHANGE UP (ref 5–17)
APTT BLD: 31.9 SEC — SIGNIFICANT CHANGE UP (ref 27.5–35.5)
BUN SERPL-MCNC: 28 MG/DL — HIGH (ref 7–23)
CALCIUM SERPL-MCNC: 9.5 MG/DL — SIGNIFICANT CHANGE UP (ref 8.4–10.5)
CHLORIDE SERPL-SCNC: 103 MMOL/L — SIGNIFICANT CHANGE UP (ref 96–108)
CO2 SERPL-SCNC: 25 MMOL/L — SIGNIFICANT CHANGE UP (ref 22–31)
CREAT SERPL-MCNC: 1.05 MG/DL — SIGNIFICANT CHANGE UP (ref 0.5–1.3)
EGFR: 71 ML/MIN/1.73M2 — SIGNIFICANT CHANGE UP
GLUCOSE SERPL-MCNC: 97 MG/DL — SIGNIFICANT CHANGE UP (ref 70–99)
HCT VFR BLD CALC: 43.1 % — SIGNIFICANT CHANGE UP (ref 39–50)
HGB BLD-MCNC: 14.1 G/DL — SIGNIFICANT CHANGE UP (ref 13–17)
INR BLD: 1.24 RATIO — HIGH (ref 0.88–1.16)
MCHC RBC-ENTMCNC: 32.1 PG — SIGNIFICANT CHANGE UP (ref 27–34)
MCHC RBC-ENTMCNC: 32.7 GM/DL — SIGNIFICANT CHANGE UP (ref 32–36)
MCV RBC AUTO: 98.2 FL — SIGNIFICANT CHANGE UP (ref 80–100)
NRBC # BLD: 0 /100 WBCS — SIGNIFICANT CHANGE UP (ref 0–0)
PLATELET # BLD AUTO: 191 K/UL — SIGNIFICANT CHANGE UP (ref 150–400)
POTASSIUM SERPL-MCNC: 4 MMOL/L — SIGNIFICANT CHANGE UP (ref 3.5–5.3)
POTASSIUM SERPL-SCNC: 4 MMOL/L — SIGNIFICANT CHANGE UP (ref 3.5–5.3)
PROTHROM AB SERPL-ACNC: 14.4 SEC — HIGH (ref 10.5–13.4)
RBC # BLD: 4.39 M/UL — SIGNIFICANT CHANGE UP (ref 4.2–5.8)
RBC # FLD: 12.8 % — SIGNIFICANT CHANGE UP (ref 10.3–14.5)
SARS-COV-2 RNA SPEC QL NAA+PROBE: SIGNIFICANT CHANGE UP
SODIUM SERPL-SCNC: 142 MMOL/L — SIGNIFICANT CHANGE UP (ref 135–145)
WBC # BLD: 10.02 K/UL — SIGNIFICANT CHANGE UP (ref 3.8–10.5)
WBC # FLD AUTO: 10.02 K/UL — SIGNIFICANT CHANGE UP (ref 3.8–10.5)

## 2022-12-15 RX ADMIN — LIDOCAINE 1 PATCH: 4 CREAM TOPICAL at 22:14

## 2022-12-15 RX ADMIN — CHLORHEXIDINE GLUCONATE 1 APPLICATION(S): 213 SOLUTION TOPICAL at 05:22

## 2022-12-15 RX ADMIN — LIDOCAINE 1 PATCH: 4 CREAM TOPICAL at 13:10

## 2022-12-15 RX ADMIN — NYSTATIN CREAM 1 APPLICATION(S): 100000 CREAM TOPICAL at 05:22

## 2022-12-15 RX ADMIN — NYSTATIN CREAM 1 APPLICATION(S): 100000 CREAM TOPICAL at 17:20

## 2022-12-15 NOTE — PROGRESS NOTE ADULT - ASSESSMENT
82M RH w/ afib on xarelto, s/p L hip IMN (2013), s/p R hip IMN (2019) presents with acute onset RLE weakness. LKN 12/5/22 at 7am. Pt last took xarelto at 6:30am. While showering, he felt RLE sudden onset weakness, no associated pain or back pain at that time. He reports increased R hip area pain and numbness/tingling with associated leg shaking due to pain. Pt was hanging onto the sink for at least 3-4 hours and reports not being able to get up due to RLE weakness. NIHSS: 0, preMRS: 2 (ambulates with a walker). Neuro exam notable for slight +R pronator, +straight leg raise. CTH/CTA neg  NIHSS4  premrs2-3 walks with cane/walker   MRI brain neg for infarct   CRP 22   CT pelvis: Redemonstration of open reduction internal fixation of the right hip.   Interval development of loosening throughout the right femoral neck   compression screw with migration of the tip into the joint space.   Interval development of lateral deviation of the right femoral   intramedullary simón with the tip now abutting and markedly   remodeling/thinning the lateral cortex at the mid diaphysis.  MRI C spien 12/9: mild cord compression C3/4/5. abnormal signal C4/5   s/p IR aspiration 12/9    Impression: acute RLE weakness and pain likely 2/2 R hip pathology vs. lumbosacral pathology, less likely stroke; Stroke ruled out     Recommendations:  - plan for OR 12/16 for R hip hardware revision by ortho   - f/u spine regarding C spine results    - s/p IR aspiration 12/9   - r/o infection ; ID recs appreciated. monitoring off abx    - continue home xarelto for stroke prevention  - monitor on telemetry  - stroke risk factor modification and counseling   - check HA1c, lipid panel, Utox  - NPO until bedside dysphagia screen   - High dose statin therapy - atorvastatin 40mg PO daily. LDL goal <70mg/dL.   - PT/OT/SS/SLP, OOBC  - check FS, glucose control <180  - GI/DVT ppx  - Thank you for allowing me to participate in the care of this patient. Call with questions.       Carloz Del Castillo MD  Vascular Neurology  Office: [phone] .

## 2022-12-15 NOTE — PROGRESS NOTE ADULT - ASSESSMENT
ECHO 12/6/22:  Ef 64%;  mild MR, nl LV sys fx  , mod to sev TR     a/p  82M w/ pmhx of afib on xarelto, s/p L hip IMN (most recently s/p R hip IMN (Dr. Godinez 10/31/19) presents with acute onset RLE weakness.    #WCT  -cont to monitor   -no further sig WCT, unable to give bb due to freda   -echo with nl EF    #Right Sided HF  -ECHO revealed normal LVEF, w R sided enlargement and mod-severe TR  -Venous dopplers negative for DVT  -likely related to r sided CHF  -cont lasix 20mg PO Q48hrs- volume status stable   -pt in slow afib off meds which preclude BB use    #Afib  -cont AC  -rate controlled/freda off meds  -no sig freda, pauses  -off xarelto for OR    #Weakness  -neuro w/u neg for CVA  -w/u per primary team  -neuro eval noted    #s/p IR hip aspiration 12/9  -Planned for OR tomorrow 12/16 for right hip removal of hardware, hemiarthroplasty vs. total hip arthroplasty  -remains cv stable and optimized to proceed with acceptable cardiac risk  -hold AC for OR

## 2022-12-15 NOTE — PROGRESS NOTE ADULT - ASSESSMENT
82y Male with possible R hip IM nail subsidence and screw cut-out    Medical comanagement appreciated - please continue to document optimization for OR   - Pain control PRN  - S/p IR aspiration of R hip 12/9, culture NGTD 12/15  - s/p R tooth extraction 12/13 -> dental clearance for OR provided  - Hold DVT ppx for OR 12/16 (Friday); SCDs bilaterally okay  - NPO except medications after midnight for OR tomorrow 12/16 (Friday); IVF while NPO  - WBAT RLE  - FU AM Labs  - Plan for OR 12/16 (Friday) with Dr. Godinez for right hip removal of hardware, hemiarthroplasty vs. total hip arthroplasty  Plan for OR on Friday with Dr. Godinez was discussed with patient's daughter Inez, who expressed clear understanding and agrees to plan. All questions regarding procedure answered to daughter's satisfaction  -Will discuss with attending Dr. Godinez and advise if any changes to plan

## 2022-12-15 NOTE — PROGRESS NOTE ADULT - SUBJECTIVE AND OBJECTIVE BOX
Patient is a 82y old  Male who presents with a chief complaint of R sided weakness (15 Dec 2022 11:45)      SUBJECTIVE / OVERNIGHT EVENTS:  Patient seen and examined.   Sleeping comfortably.      Vital Signs Last 24 Hrs  T(C): 36.7 (15 Dec 2022 11:15), Max: 36.9 (14 Dec 2022 16:04)  T(F): 98 (15 Dec 2022 11:15), Max: 98.4 (14 Dec 2022 16:04)  HR: 77 (15 Dec 2022 11:15) (56 - 77)  BP: 111/68 (15 Dec 2022 11:15) (110/67 - 125/83)  BP(mean): --  RR: 18 (15 Dec 2022 11:15) (17 - 18)  SpO2: 97% (15 Dec 2022 11:15) (94% - 97%)    Parameters below as of 15 Dec 2022 11:15  Patient On (Oxygen Delivery Method): room air      I&O's Summary    14 Dec 2022 07:01  -  15 Dec 2022 07:00  --------------------------------------------------------  IN: 590 mL / OUT: 1100 mL / NET: -510 mL    15 Dec 2022 07:01  -  15 Dec 2022 14:37  --------------------------------------------------------  IN: 610 mL / OUT: 0 mL / NET: 610 mL          PE:  GENERAL: NAD, AAOx2  CHEST/LUNG: CTABL, No wheeze  HEART: irregular irregular + murmur  ABDOMEN: Soft, Nontender, Nondistended; Bowel sounds present  EXTREMITIES: chronic venous stasis, + 1 le edema  NEURO: No focal deficits      LABS:                        14.1   10.02 )-----------( 191      ( 15 Dec 2022 07:18 )             43.1     12-15    142  |  103  |  28<H>  ----------------------------<  97  4.0   |  25  |  1.05    Ca    9.5      15 Dec 2022 07:25      PT/INR - ( 15 Dec 2022 07:28 )   PT: 14.4 sec;   INR: 1.24 ratio         PTT - ( 15 Dec 2022 07:28 )  PTT:31.9 sec  CAPILLARY BLOOD GLUCOSE                RADIOLOGY & ADDITIONAL TESTS:    Imaging Personally Reviewed:  [x] YES  [ ] NO    Consultant(s) Notes Reviewed:  [x] YES  [ ] NO      MEDICATIONS  (STANDING):  chlorhexidine 2% Cloths 1 Application(s) Topical <User Schedule>  escitalopram 10 milliGRAM(s) Oral <User Schedule>  furosemide    Tablet 20 milliGRAM(s) Oral <User Schedule>  lactated ringers. 1000 milliLiter(s) (75 mL/Hr) IV Continuous <Continuous>  lidocaine   4% Patch 1 Patch Transdermal every 24 hours  nystatin Powder 1 Application(s) Topical two times a day    MEDICATIONS  (PRN):  acetaminophen     Tablet .. 650 milliGRAM(s) Oral every 6 hours PRN Temp greater or equal to 38C (100.4F), Mild Pain (1 - 3)  artificial tears (preservative free) Ophthalmic Solution 2 Drop(s) Both EYES daily PRN Dry Eyes  bisacodyl 5 milliGRAM(s) Oral every 12 hours PRN Constipation  melatonin 3 milliGRAM(s) Oral at bedtime PRN Insomnia  ondansetron Injectable 4 milliGRAM(s) IV Push every 8 hours PRN Nausea and/or Vomiting      Care Discussed with Consultants/Other Providers [x] YES  [ ] NO    HEALTH ISSUES - PROBLEM Dx:  Suspected pulmonary embolism    Suspected deep vein thrombosis (DVT)    Weakness of lower extremity, unspecified laterality    Chronic atrial fibrillation    Leukocytosis    At risk for depression    DVT prophylaxis

## 2022-12-15 NOTE — PROGRESS NOTE ADULT - SUBJECTIVE AND OBJECTIVE BOX
CARDIOLOGY FOLLOW UP - Dr. Marrufo  DATE OF SERVICE: 12/15/22    CC  No CV complaints    REVIEW OF SYSTEMS:  CONSTITUTIONAL: No fever, weight loss, or fatigue  RESPIRATORY: No cough, wheezing, chills or hemoptysis; No Shortness of Breath  CARDIOVASCULAR: No chest pain, palpitations, passing out, dizziness, or leg swelling  GASTROINTESTINAL: No abdominal or epigastric pain. No nausea, vomiting, or hematemesis; No diarrhea or constipation. No melena or hematochezia.  VASCULAR: No edema     PHYSICAL EXAM:  T(C): 36.7 (12-15-22 @ 11:15), Max: 36.9 (12-14-22 @ 16:04)  HR: 77 (12-15-22 @ 11:15) (56 - 77)  BP: 111/68 (12-15-22 @ 11:15) (108/74 - 125/83)  RR: 18 (12-15-22 @ 11:15) (17 - 18)  SpO2: 97% (12-15-22 @ 11:15) (94% - 97%)  Wt(kg): --  I&O's Summary    14 Dec 2022 07:01  -  15 Dec 2022 07:00  --------------------------------------------------------  IN: 590 mL / OUT: 1100 mL / NET: -510 mL        Appearance: Normal	  Cardiovascular: Normal S1 S2,RRR, No JVD, No murmurs  Respiratory: Lungs clear to auscultation b/l  Gastrointestinal:  Soft, Non-tender, + BS	  Extremities: Normal range of motion, No clubbing, cyanosis or edema      Home Medications:  escitalopram:  (05 Dec 2022 21:58)  rivaroxaban 20 mg oral tablet: 1 tab(s) orally once a day (before a meal) (05 Dec 2022 21:58)      MEDICATIONS  (STANDING):  chlorhexidine 2% Cloths 1 Application(s) Topical <User Schedule>  escitalopram 10 milliGRAM(s) Oral <User Schedule>  furosemide    Tablet 20 milliGRAM(s) Oral <User Schedule>  lactated ringers. 1000 milliLiter(s) (75 mL/Hr) IV Continuous <Continuous>  lidocaine   4% Patch 1 Patch Transdermal every 24 hours  nystatin Powder 1 Application(s) Topical two times a day      TELEMETRY: 	    ECG:  	  RADIOLOGY:   DIAGNOSTIC TESTING:  [ ] Echocardiogram:  [ ]  Catheterization:  [ ] Stress Test:    OTHER: 	    LABS:	 	                            14.1   10.02 )-----------( 191      ( 15 Dec 2022 07:18 )             43.1     12-15    142  |  103  |  28<H>  ----------------------------<  97  4.0   |  25  |  1.05    Ca    9.5      15 Dec 2022 07:25      PT/INR - ( 15 Dec 2022 07:28 )   PT: 14.4 sec;   INR: 1.24 ratio         PTT - ( 15 Dec 2022 07:28 )  PTT:31.9 sec

## 2022-12-15 NOTE — PROGRESS NOTE ADULT - ASSESSMENT
82M w/ pmhx of afib on xarelto, s/p L hip IMN (most recently s/p R hip IMN (Dr. Godinez 10/31/19) presents with acute onset R sided weakness.    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 appreciate cardio eval   NPO after MN. Surgical procedure per ortho.    # Grade 3 mobility #4 tooth due to periodontitis, loose tooth  s/p Extraction #4 (upper right second bicuspid) on 12/13  dentally cleared for surgery    # LE wound  BC NTD  podiatry following for L heel wound  monitor off abx    # Chronic atrial fibrillation  hold lovenox for OR; timing of when to restart AC is up to ortho.  TTE Severe right atrial enlargement. borderline pulmonary hypertension. Moderate-severe tricuspid regurgitation. EF64%  cardio following  lasix MWF    # depression  On Lexapro    DVT prophylaxis. lovenox on hold for OR    Please contact with any questions or concerns [phone].

## 2022-12-15 NOTE — PROGRESS NOTE ADULT - SUBJECTIVE AND OBJECTIVE BOX
Neurology Progress Note    S: Patient seen and examined. No new events overnight ; plan for OR 12/16 now      Medication:    MEDICATIONS  (STANDING):  chlorhexidine 2% Cloths 1 Application(s) Topical <User Schedule>  escitalopram 10 milliGRAM(s) Oral <User Schedule>  furosemide    Tablet 20 milliGRAM(s) Oral <User Schedule>  lactated ringers. 1000 milliLiter(s) (75 mL/Hr) IV Continuous <Continuous>  lidocaine   4% Patch 1 Patch Transdermal every 24 hours  nystatin Powder 1 Application(s) Topical two times a day    MEDICATIONS  (PRN):  acetaminophen     Tablet .. 650 milliGRAM(s) Oral every 6 hours PRN Temp greater or equal to 38C (100.4F), Mild Pain (1 - 3)  artificial tears (preservative free) Ophthalmic Solution 2 Drop(s) Both EYES daily PRN Dry Eyes  melatonin 3 milliGRAM(s) Oral at bedtime PRN Insomnia  ondansetron Injectable 4 milliGRAM(s) IV Push every 8 hours PRN Nausea and/or Vomiting    Vitals:        Vital Signs Last 24 Hrs  T(C): 36.7 (12-15-22 @ 11:15), Max: 36.9 (12-14-22 @ 16:04)  T(F): 98 (12-15-22 @ 11:15), Max: 98.4 (12-14-22 @ 16:04)  HR: 77 (12-15-22 @ 11:15) (56 - 77)  BP: 111/68 (12-15-22 @ 11:15) (108/74 - 125/83)  BP(mean): --  RR: 18 (12-15-22 @ 11:15) (17 - 18)  SpO2: 97% (12-15-22 @ 11:15) (94% - 97%)             General Exam:   Constitutional: Male, appears stated age, in no apparent distress including pain  Head: Normocephalic & atraumatic.  Respiratory: No increased work of breathing  Extremities: b/l LE pitting edema  Skin: +b/l overlying chronic skin changes over b/l LE    Cardiovascular (>2): atrial fibrillation on monitor, HR 80s.    Neurological (>12):  MS: Awake, alert, oriented to person, place, situation, time. Normal affect. Follows all commands.    Language: Speech is clear, fluent with good repetition & comprehension (able to name objects thumb)    CNs: VFF to counting fingers. EOMI no nystagmus, no diplopia. V1-3 intact to LT, well developed masseter muscles b/l. No facial asymmetry b/l, full eye closure strength b/l. Hearing grossly normal (rubbing fingers) b/l. Symmetric palate elevation in midline. Gag reflex deferred. Head turning & shoulder shrug intact b/l. Tongue midline, normal movements, no atrophy.    Motor: Normal muscle bulk. b/l UE postural and action tremor.  Slight R pronator drift.              Deltoid	Biceps	Triceps	Wrist	Finger ABd	   R	4+	5	5	5	4+		4+ 	  L	5	5	5	5	5		5    	H-Flex	K-Flex	K-Ext	D-Flex	P-Flex  R	4+	5	5	4+	5	RLE with +straight raise	   L	5	5	5	5	5	     Sensation: Intact to LT b/l throughout.     Cortical: Extinction on DSS (neglect): none    Reflexes: with significant pain when checking for ankle clonus, deferred              Biceps(C5)       BR(C6)     Triceps(C7)               Patellar(L4)    Achilles(S1)    Plantar Resp  R	2	          2	             2		        1		    		mute  L	2	          2	             2		        1		    		mute    Coordination: intact rapid-alt movements. No dysmetria to FTN     Gait: unable to assess due to pain      I personally reviewed the below data/images/labs:  CBC Full  -  ( 15 Dec 2022 07:18 )  WBC Count : 10.02 K/uL  RBC Count : 4.39 M/uL  Hemoglobin : 14.1 g/dL  Hematocrit : 43.1 %  Platelet Count - Automated : 191 K/uL  Mean Cell Volume : 98.2 fl  Mean Cell Hemoglobin : 32.1 pg  Mean Cell Hemoglobin Concentration : 32.7 gm/dL  Auto Neutrophil # : x  Auto Lymphocyte # : x  Auto Monocyte # : x  Auto Eosinophil # : x  Auto Basophil # : x  Auto Neutrophil % : x  Auto Lymphocyte % : x  Auto Monocyte % : x  Auto Eosinophil % : x  Auto Basophil % : x    12-15    142  |  103  |  28<H>  ----------------------------<  97  4.0   |  25  |  1.05    Ca    9.5      15 Dec 2022 07:25      < from: CT Angio Neck Stroke Protocol w/ IV Cont (12.05.22 @ 15:25) >  IMPRESSION:    HEAD CT: No acute intracranial hemorrhage or acute territorial infarction.    If symptoms persist consider follow up head CT or MRI, MRA  if no   contraindication.    CTA COW:  Patent intracranial circulation without flow limiting stenosis   or large vessel occlusion.    CTA NECK: Patent, ECAs, ICAs, no  hemodynamically significant stenosis at    ICA origins by NASCET criteria.  Bilateral vertebral arteries are patent without flow limiting stenosis.    < end of copied text >    < from: MR Head No Cont (12.06.22 @ 19:43) >    ACC: [id number] EXAM:  MR BRAIN                          PROCEDURE DATE:  12/06/2022          INTERPRETATION:  CLINICAL STATEMENT: Pain.    TECHNIQUE: MRI of the brain was performed without gadolinium.    COMPARISON: CT head 12/5/2022    FINDINGS:  There is mild diffuse parenchymal volume loss. There are T2 prolongation   signal abnormalities in the periventricular subcortical white matter   likely related to mild chronic microvascular ischemic changes.    There is no acute parenchymal hemorrhage, parenchymal mass, mass effect   or midline shift. There is no extra-axial fluid collection.  There is no   hydrocephalus.  There is no acute infarct.    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    IMPRESSION:  No acute intracranial hemorrhage or acute infarct.    --- End of Report ---    < from: MR Cervical Spine No Cont (12.09.22 @ 20:23) >    ACC: [id number] EXAM:  MR SPINE CERVICAL                          PROCEDURE DATE:  12/09/2022          INTERPRETATION:  CLINICAL INDICATION: Cervical stenosis with myelopathy    Magnetic resonance imaging of the cervical spine was carried out with   sagittal surface coil imaging from C1 to T4 using T1 and fast spin echo   T2 weighted images with and without fat saturation technique with axial   T1 and fast spin echo T2 weighted imaging on a 1.5 Alexia magnet.    Comparison is made with the prior cervical spine CT of 10/30/2019.    The cervical vertebrae are normal in height and signal intensity. There   are disc osteophyte complexes present at C3-4 and C4-5 which with dorsal   ligamentous hypertrophy causes severe spinal stenosis and mild cord  compression. There is a small amount of cord signal abnormality at the   C4-5 level.    There is a small disc osteophyte complex present at C6-7 without cord   compression or significant spinal stenosis. The remainder of the cervical   spine and the upper thoracic spine are unremarkable.        IMPRESSION: Disc osteophyte complexes C3-4 and C4-5 along with dorsal   ligamentous causes severe spinal stenosis and mild cord compression.   Small amount of abnormal cord signal C4-5.                    --- End of Report ---            ABA MARIE MD; Attending Radiologist  This document has been electronically signed. Dec  9 2022  8:48PM    < end of copied text >

## 2022-12-15 NOTE — PROGRESS NOTE ADULT - SUBJECTIVE AND OBJECTIVE BOX
Orthopaedic Surgery Progress Note    Patient seen and examined. No acute events overnight. States pain is controlled. Denies fever/chills/chest pain/shortness of breath/numbness/tingling. Plan for OR tomorrow (Friday) with Dr. Godinez.     Vital Signs Last 24 Hrs  T(C): 36.4 (15 Dec 2022 00:22), Max: 37 (14 Dec 2022 06:02)  T(F): 97.6 (15 Dec 2022 00:22), Max: 98.6 (14 Dec 2022 06:02)  HR: 62 (15 Dec 2022 00:22) (58 - 76)  BP: 110/67 (15 Dec 2022 00:22) (103/66 - 111/63)  BP(mean): --  RR: 18 (15 Dec 2022 00:22) (18 - 18)  SpO2: 96% (15 Dec 2022 00:22) (94% - 97%)    Parameters below as of 15 Dec 2022 00:22  Patient On (Oxygen Delivery Method): room air                          13.5   8.57  )-----------( 178      ( 14 Dec 2022 07:13 )             41.4         PHYSICAL EXAM  General: NAD, Awake and Alert  RIGHT Lower Extremity:   Skin intact, well healed surgical incision, no erythema, ecchymosis, edema, or gross deformity   Mild TTP over the GT; no other bony TTP appreciated  +Q/H/Gsc/TA/EHL/FHL  SILT  + DP/PT pulses  Compartments soft and compressible  Calf nontender bilaterally

## 2022-12-16 ENCOUNTER — TRANSCRIPTION ENCOUNTER (OUTPATIENT)
Age: 82
End: 2022-12-16

## 2022-12-16 LAB
ANION GAP SERPL CALC-SCNC: 10 MMOL/L — SIGNIFICANT CHANGE UP (ref 5–17)
ANION GAP SERPL CALC-SCNC: 14 MMOL/L — SIGNIFICANT CHANGE UP (ref 5–17)
APTT BLD: 35.7 SEC — HIGH (ref 27.5–35.5)
BLD GP AB SCN SERPL QL: NEGATIVE — SIGNIFICANT CHANGE UP
BUN SERPL-MCNC: 24 MG/DL — HIGH (ref 7–23)
BUN SERPL-MCNC: 27 MG/DL — HIGH (ref 7–23)
CALCIUM SERPL-MCNC: 7.9 MG/DL — LOW (ref 8.4–10.5)
CALCIUM SERPL-MCNC: 9.6 MG/DL — SIGNIFICANT CHANGE UP (ref 8.4–10.5)
CHLORIDE SERPL-SCNC: 102 MMOL/L — SIGNIFICANT CHANGE UP (ref 96–108)
CHLORIDE SERPL-SCNC: 104 MMOL/L — SIGNIFICANT CHANGE UP (ref 96–108)
CO2 SERPL-SCNC: 21 MMOL/L — LOW (ref 22–31)
CO2 SERPL-SCNC: 27 MMOL/L — SIGNIFICANT CHANGE UP (ref 22–31)
CREAT SERPL-MCNC: 0.88 MG/DL — SIGNIFICANT CHANGE UP (ref 0.5–1.3)
CREAT SERPL-MCNC: 1.22 MG/DL — SIGNIFICANT CHANGE UP (ref 0.5–1.3)
EGFR: 59 ML/MIN/1.73M2 — LOW
EGFR: 86 ML/MIN/1.73M2 — SIGNIFICANT CHANGE UP
GAS PNL BLDA: SIGNIFICANT CHANGE UP
GLUCOSE SERPL-MCNC: 105 MG/DL — HIGH (ref 70–99)
GLUCOSE SERPL-MCNC: 171 MG/DL — HIGH (ref 70–99)
HCT VFR BLD CALC: 34.1 % — LOW (ref 39–50)
HCT VFR BLD CALC: 43.9 % — SIGNIFICANT CHANGE UP (ref 39–50)
HGB BLD-MCNC: 11.1 G/DL — LOW (ref 13–17)
HGB BLD-MCNC: 14.2 G/DL — SIGNIFICANT CHANGE UP (ref 13–17)
INR BLD: 1.27 RATIO — HIGH (ref 0.88–1.16)
MCHC RBC-ENTMCNC: 31.6 PG — SIGNIFICANT CHANGE UP (ref 27–34)
MCHC RBC-ENTMCNC: 32.3 GM/DL — SIGNIFICANT CHANGE UP (ref 32–36)
MCHC RBC-ENTMCNC: 32.4 PG — SIGNIFICANT CHANGE UP (ref 27–34)
MCHC RBC-ENTMCNC: 32.6 GM/DL — SIGNIFICANT CHANGE UP (ref 32–36)
MCV RBC AUTO: 100.2 FL — HIGH (ref 80–100)
MCV RBC AUTO: 97.2 FL — SIGNIFICANT CHANGE UP (ref 80–100)
NRBC # BLD: 0 /100 WBCS — SIGNIFICANT CHANGE UP (ref 0–0)
NRBC # BLD: 0 /100 WBCS — SIGNIFICANT CHANGE UP (ref 0–0)
PLATELET # BLD AUTO: 198 K/UL — SIGNIFICANT CHANGE UP (ref 150–400)
PLATELET # BLD AUTO: 207 K/UL — SIGNIFICANT CHANGE UP (ref 150–400)
POTASSIUM SERPL-MCNC: 4.6 MMOL/L — SIGNIFICANT CHANGE UP (ref 3.5–5.3)
POTASSIUM SERPL-MCNC: 4.7 MMOL/L — SIGNIFICANT CHANGE UP (ref 3.5–5.3)
POTASSIUM SERPL-SCNC: 4.6 MMOL/L — SIGNIFICANT CHANGE UP (ref 3.5–5.3)
POTASSIUM SERPL-SCNC: 4.7 MMOL/L — SIGNIFICANT CHANGE UP (ref 3.5–5.3)
PROTHROM AB SERPL-ACNC: 14.8 SEC — HIGH (ref 10.5–13.4)
RBC # BLD: 3.51 M/UL — LOW (ref 4.2–5.8)
RBC # BLD: 4.38 M/UL — SIGNIFICANT CHANGE UP (ref 4.2–5.8)
RBC # FLD: 12.8 % — SIGNIFICANT CHANGE UP (ref 10.3–14.5)
RBC # FLD: 15.2 % — HIGH (ref 10.3–14.5)
RH IG SCN BLD-IMP: POSITIVE — SIGNIFICANT CHANGE UP
SODIUM SERPL-SCNC: 139 MMOL/L — SIGNIFICANT CHANGE UP (ref 135–145)
SODIUM SERPL-SCNC: 139 MMOL/L — SIGNIFICANT CHANGE UP (ref 135–145)
WBC # BLD: 28.6 K/UL — HIGH (ref 3.8–10.5)
WBC # BLD: 9.65 K/UL — SIGNIFICANT CHANGE UP (ref 3.8–10.5)
WBC # FLD AUTO: 28.6 K/UL — HIGH (ref 3.8–10.5)
WBC # FLD AUTO: 9.65 K/UL — SIGNIFICANT CHANGE UP (ref 3.8–10.5)

## 2022-12-16 PROCEDURE — 88311 DECALCIFY TISSUE: CPT | Mod: 26

## 2022-12-16 PROCEDURE — 73501 X-RAY EXAM HIP UNI 1 VIEW: CPT | Mod: 26,RT

## 2022-12-16 PROCEDURE — 88300 SURGICAL PATH GROSS: CPT | Mod: 26

## 2022-12-16 PROCEDURE — 88305 TISSUE EXAM BY PATHOLOGIST: CPT | Mod: 26

## 2022-12-16 DEVICE — IMPLANTABLE DEVICE: Type: IMPLANTABLE DEVICE | Site: RIGHT | Status: FUNCTIONAL

## 2022-12-16 DEVICE — CABLE CRMP 1.7X750MM: Type: IMPLANTABLE DEVICE | Site: RIGHT | Status: FUNCTIONAL

## 2022-12-16 DEVICE — SCREW CORT S-T 4.5X42MM: Type: IMPLANTABLE DEVICE | Site: RIGHT | Status: FUNCTIONAL

## 2022-12-16 DEVICE — SCREW CORT S-T 4.5X44MM: Type: IMPLANTABLE DEVICE | Site: RIGHT | Status: FUNCTIONAL

## 2022-12-16 DEVICE — CERCLAGE 4.5MM THREADED: Type: IMPLANTABLE DEVICE | Site: RIGHT | Status: FUNCTIONAL

## 2022-12-16 DEVICE — KIT A-LINE 1LUM 20G X 12CM SAFE KIT: Type: IMPLANTABLE DEVICE | Site: RIGHT | Status: FUNCTIONAL

## 2022-12-16 DEVICE — SCREW BONE SLF TAP 6.5X40MM: Type: IMPLANTABLE DEVICE | Site: RIGHT | Status: FUNCTIONAL

## 2022-12-16 DEVICE — SCREW CORT S-T 4.5X48MM: Type: IMPLANTABLE DEVICE | Site: RIGHT | Status: FUNCTIONAL

## 2022-12-16 DEVICE — HEAD FEM BIOLOX DELTA TYPE 1 MOD 36/0MM: Type: IMPLANTABLE DEVICE | Site: RIGHT | Status: FUNCTIONAL

## 2022-12-16 DEVICE — GWIRE DRILL TIP 2.5X200MM: Type: IMPLANTABLE DEVICE | Site: RIGHT | Status: FUNCTIONAL

## 2022-12-16 RX ORDER — SODIUM CHLORIDE 9 MG/ML
500 INJECTION INTRAMUSCULAR; INTRAVENOUS; SUBCUTANEOUS ONCE
Refills: 0 | Status: DISCONTINUED | OUTPATIENT
Start: 2022-12-16 | End: 2022-12-17

## 2022-12-16 RX ORDER — ACETAMINOPHEN 500 MG
1000 TABLET ORAL ONCE
Refills: 0 | Status: COMPLETED | OUTPATIENT
Start: 2022-12-16 | End: 2022-12-16

## 2022-12-16 RX ORDER — OXYCODONE HYDROCHLORIDE 5 MG/1
10 TABLET ORAL EVERY 4 HOURS
Refills: 0 | Status: DISCONTINUED | OUTPATIENT
Start: 2022-12-16 | End: 2022-12-21

## 2022-12-16 RX ORDER — TRAMADOL HYDROCHLORIDE 50 MG/1
50 TABLET ORAL EVERY 6 HOURS
Refills: 0 | Status: DISCONTINUED | OUTPATIENT
Start: 2022-12-16 | End: 2022-12-21

## 2022-12-16 RX ORDER — ESCITALOPRAM OXALATE 10 MG/1
10 TABLET, FILM COATED ORAL DAILY
Refills: 0 | Status: DISCONTINUED | OUTPATIENT
Start: 2022-12-16 | End: 2022-12-21

## 2022-12-16 RX ORDER — ONDANSETRON 8 MG/1
4 TABLET, FILM COATED ORAL EVERY 6 HOURS
Refills: 0 | Status: DISCONTINUED | OUTPATIENT
Start: 2022-12-16 | End: 2022-12-21

## 2022-12-16 RX ORDER — RIVAROXABAN 15 MG-20MG
10 KIT ORAL DAILY
Refills: 0 | Status: DISCONTINUED | OUTPATIENT
Start: 2022-12-17 | End: 2022-12-17

## 2022-12-16 RX ORDER — ACETAMINOPHEN 500 MG
1000 TABLET ORAL ONCE
Refills: 0 | Status: COMPLETED | OUTPATIENT
Start: 2022-12-17 | End: 2022-12-17

## 2022-12-16 RX ORDER — ONDANSETRON 8 MG/1
4 TABLET, FILM COATED ORAL ONCE
Refills: 0 | Status: DISCONTINUED | OUTPATIENT
Start: 2022-12-16 | End: 2022-12-16

## 2022-12-16 RX ORDER — HYDROMORPHONE HYDROCHLORIDE 2 MG/ML
0.5 INJECTION INTRAMUSCULAR; INTRAVENOUS; SUBCUTANEOUS ONCE
Refills: 0 | Status: DISCONTINUED | OUTPATIENT
Start: 2022-12-16 | End: 2022-12-21

## 2022-12-16 RX ORDER — HYDROMORPHONE HYDROCHLORIDE 2 MG/ML
0.25 INJECTION INTRAMUSCULAR; INTRAVENOUS; SUBCUTANEOUS
Refills: 0 | Status: DISCONTINUED | OUTPATIENT
Start: 2022-12-16 | End: 2022-12-16

## 2022-12-16 RX ORDER — SENNA PLUS 8.6 MG/1
2 TABLET ORAL AT BEDTIME
Refills: 0 | Status: DISCONTINUED | OUTPATIENT
Start: 2022-12-16 | End: 2022-12-21

## 2022-12-16 RX ORDER — POLYETHYLENE GLYCOL 3350 17 G/17G
17 POWDER, FOR SOLUTION ORAL AT BEDTIME
Refills: 0 | Status: DISCONTINUED | OUTPATIENT
Start: 2022-12-16 | End: 2022-12-17

## 2022-12-16 RX ORDER — OXYCODONE HYDROCHLORIDE 5 MG/1
5 TABLET ORAL EVERY 4 HOURS
Refills: 0 | Status: DISCONTINUED | OUTPATIENT
Start: 2022-12-16 | End: 2022-12-21

## 2022-12-16 RX ORDER — SODIUM CHLORIDE 9 MG/ML
500 INJECTION INTRAMUSCULAR; INTRAVENOUS; SUBCUTANEOUS ONCE
Refills: 0 | Status: COMPLETED | OUTPATIENT
Start: 2022-12-16 | End: 2022-12-16

## 2022-12-16 RX ORDER — SODIUM CHLORIDE 9 MG/ML
1000 INJECTION INTRAMUSCULAR; INTRAVENOUS; SUBCUTANEOUS
Refills: 0 | Status: DISCONTINUED | OUTPATIENT
Start: 2022-12-16 | End: 2022-12-17

## 2022-12-16 RX ORDER — ACETAMINOPHEN 500 MG
975 TABLET ORAL EVERY 8 HOURS
Refills: 0 | Status: DISCONTINUED | OUTPATIENT
Start: 2022-12-16 | End: 2022-12-16

## 2022-12-16 RX ORDER — HYDROMORPHONE HYDROCHLORIDE 2 MG/ML
0.5 INJECTION INTRAMUSCULAR; INTRAVENOUS; SUBCUTANEOUS
Refills: 0 | Status: DISCONTINUED | OUTPATIENT
Start: 2022-12-16 | End: 2022-12-16

## 2022-12-16 RX ORDER — ACETAMINOPHEN 500 MG
650 TABLET ORAL EVERY 6 HOURS
Refills: 0 | Status: COMPLETED | OUTPATIENT
Start: 2022-12-17 | End: 2022-12-20

## 2022-12-16 RX ORDER — MAGNESIUM HYDROXIDE 400 MG/1
30 TABLET, CHEWABLE ORAL DAILY
Refills: 0 | Status: DISCONTINUED | OUTPATIENT
Start: 2022-12-16 | End: 2022-12-21

## 2022-12-16 RX ORDER — SODIUM CHLORIDE 9 MG/ML
1000 INJECTION, SOLUTION INTRAVENOUS
Refills: 0 | Status: DISCONTINUED | OUTPATIENT
Start: 2022-12-16 | End: 2022-12-16

## 2022-12-16 RX ORDER — CEFAZOLIN SODIUM 1 G
2000 VIAL (EA) INJECTION EVERY 8 HOURS
Refills: 0 | Status: COMPLETED | OUTPATIENT
Start: 2022-12-16 | End: 2022-12-17

## 2022-12-16 RX ORDER — PANTOPRAZOLE SODIUM 20 MG/1
40 TABLET, DELAYED RELEASE ORAL
Refills: 0 | Status: DISCONTINUED | OUTPATIENT
Start: 2022-12-16 | End: 2022-12-21

## 2022-12-16 RX ADMIN — ESCITALOPRAM OXALATE 10 MILLIGRAM(S): 10 TABLET, FILM COATED ORAL at 23:25

## 2022-12-16 RX ADMIN — NYSTATIN CREAM 1 APPLICATION(S): 100000 CREAM TOPICAL at 05:34

## 2022-12-16 RX ADMIN — LIDOCAINE 1 PATCH: 4 CREAM TOPICAL at 06:55

## 2022-12-16 RX ADMIN — CHLORHEXIDINE GLUCONATE 1 APPLICATION(S): 213 SOLUTION TOPICAL at 05:31

## 2022-12-16 RX ADMIN — SODIUM CHLORIDE 500 MILLILITER(S): 9 INJECTION INTRAMUSCULAR; INTRAVENOUS; SUBCUTANEOUS at 17:14

## 2022-12-16 RX ADMIN — SODIUM CHLORIDE 150 MILLILITER(S): 9 INJECTION INTRAMUSCULAR; INTRAVENOUS; SUBCUTANEOUS at 17:13

## 2022-12-16 RX ADMIN — Medication 400 MILLIGRAM(S): at 23:57

## 2022-12-16 RX ADMIN — Medication 100 MILLIGRAM(S): at 23:26

## 2022-12-16 NOTE — BRIEF OPERATIVE NOTE - NSICDXBRIEFPROCEDURE_GEN_ALL_CORE_FT
PROCEDURES:  Right total hip replacement 16-Dec-2022 16:32:49  Sushant Matias  ORIF, fracture, femoral shaft, with plate and screws 16-Dec-2022 16:34:21  Sushant Matias  Removal of intramedullary nail 16-Dec-2022 16:34:37  Sushant Matias

## 2022-12-16 NOTE — PROGRESS NOTE ADULT - SUBJECTIVE AND OBJECTIVE BOX
Patient is a 82y old  Male who presents with a chief complaint of R sided weakness (16 Dec 2022 10:24)      SUBJECTIVE / OVERNIGHT EVENTS:  Off floor for procedure.      Vital Signs Last 24 Hrs  T(C): 36.7 (16 Dec 2022 10:15), Max: 36.7 (15 Dec 2022 19:55)  T(F): 98.1 (16 Dec 2022 10:00), Max: 98.1 (15 Dec 2022 19:55)  HR: 57 (16 Dec 2022 10:15) (57 - 71)  BP: 117/81 (16 Dec 2022 10:15) (114/72 - 138/76)  BP(mean): --  RR: 18 (16 Dec 2022 10:15) (16 - 18)  SpO2: 97% (16 Dec 2022 10:15) (96% - 98%)    Parameters below as of 16 Dec 2022 10:00  Patient On (Oxygen Delivery Method): room air      I&O's Summary    15 Dec 2022 07:01  -  16 Dec 2022 07:00  --------------------------------------------------------  IN: 810 mL / OUT: 900 mL / NET: -90 mL            LABS:                        14.2   9.65  )-----------( 198      ( 16 Dec 2022 01:32 )             43.9     12-16    139  |  102  |  27<H>  ----------------------------<  105<H>  4.6   |  27  |  1.22    Ca    9.6      16 Dec 2022 01:32      PT/INR - ( 16 Dec 2022 01:32 )   PT: 14.8 sec;   INR: 1.27 ratio         PTT - ( 16 Dec 2022 01:32 )  PTT:35.7 sec  CAPILLARY BLOOD GLUCOSE                RADIOLOGY & ADDITIONAL TESTS:    Imaging Personally Reviewed:  [x] YES  [ ] NO    Consultant(s) Notes Reviewed:  [x] YES  [ ] NO      MEDICATIONS  (STANDING):    MEDICATIONS  (PRN):      Care Discussed with Consultants/Other Providers [x] YES  [ ] NO    HEALTH ISSUES - PROBLEM Dx:  Suspected pulmonary embolism    Suspected deep vein thrombosis (DVT)    Weakness of lower extremity, unspecified laterality    Chronic atrial fibrillation    Leukocytosis    At risk for depression    DVT prophylaxis

## 2022-12-16 NOTE — BRIEF OPERATIVE NOTE - NSICDXBRIEFPOSTOP_GEN_ALL_CORE_FT
POST-OP DIAGNOSIS:  Loosening of intramedullary nail 16-Dec-2022 16:34:57  Sushant Matias  Right femoral shaft fracture 16-Dec-2022 16:35:14  Sushant Matias

## 2022-12-16 NOTE — PROGRESS NOTE ADULT - ASSESSMENT
82M w/ pmhx of afib on xarelto, s/p L hip IMN (most recently s/p R hip IMN (Dr. Godinez 10/31/19) presents with acute onset R sided weakness.    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, cervical stenosis  # right IM loose nail  MRI brain no acute infarct  CT L spine L4-L5 there is severe right subarticular zone stenosis and severe right neural foraminal stenosis  MRI cervical spine severe canal stenosis c3/4 c4/c5, mild cord compression, NSGY aware and recommend outpt fu no acute intervention  appreciate ortho recs, sp IR right hip aspiration  medicine pre op eval: pt without active cardiac conditions, moderate risk, medically optimized. appreciate cardio eval   NPO after MN. Surgical procedure per ortho.    # Grade 3 mobility #4 tooth due to periodontitis, loose tooth  s/p Extraction #4 (upper right second bicuspid) on 12/13  dentally cleared for surgery    # LE wound  BC NTD  podiatry following for L heel wound  monitor off abx    # Chronic atrial fibrillation  hold lovenox for OR; timing of when to restart AC is up to ortho.  TTE Severe right atrial enlargement. borderline pulmonary hypertension. Moderate-severe tricuspid regurgitation. EF64%  cardio following  lasix MWF    # depression  On Lexapro    DVT prophylaxis. lovenox on hold for OR;     Please contact with any questions or concerns [phone].

## 2022-12-16 NOTE — PROGRESS NOTE ADULT - SUBJECTIVE AND OBJECTIVE BOX
Pt seen/examined. Doing well. Pain controlled. No acute overnight complaints or events.    T(C): 36.4 (12-16-22 @ 04:54), Max: 36.7 (12-15-22 @ 11:15)  HR: 64 (12-16-22 @ 04:54) (58 - 77)  BP: 138/76 (12-16-22 @ 04:54) (111/68 - 138/76)  RR: 18 (12-16-22 @ 04:54) (18 - 18)  SpO2: 97% (12-16-22 @ 04:54) (97% - 98%)  Wt(kg): --  - Gen: NAD    PHYSICAL EXAM  General: NAD, Awake and Alert  RIGHT Lower Extremity:   Skin intact, well healed surgical incision, no erythema, ecchymosis, edema, or gross deformity   Mild TTP over the GT; no other bony TTP appreciated  +Q/H/Gsc/TA/EHL/FHL  SILT  + DP/PT pulses  Compartments soft and compressible  Calf nontender bilaterally    82y Male with possible R hip IM nail subsidence and screw cut-out    - Pain control  - NPO for OR today  - hold AC!!  - meds/cards/dental cleared

## 2022-12-16 NOTE — PROGRESS NOTE ADULT - SUBJECTIVE AND OBJECTIVE BOX
Plan for removal of hardware and conversion to arthroplasty.  RIsks of infection, leg length inequality and instability explained to patient and daughters.

## 2022-12-17 LAB
ANION GAP SERPL CALC-SCNC: 12 MMOL/L — SIGNIFICANT CHANGE UP (ref 5–17)
APTT BLD: 30.6 SEC — SIGNIFICANT CHANGE UP (ref 27.5–35.5)
BUN SERPL-MCNC: 28 MG/DL — HIGH (ref 7–23)
CALCIUM SERPL-MCNC: 8.8 MG/DL — SIGNIFICANT CHANGE UP (ref 8.4–10.5)
CHLORIDE SERPL-SCNC: 99 MMOL/L — SIGNIFICANT CHANGE UP (ref 96–108)
CO2 SERPL-SCNC: 23 MMOL/L — SIGNIFICANT CHANGE UP (ref 22–31)
CREAT SERPL-MCNC: 0.96 MG/DL — SIGNIFICANT CHANGE UP (ref 0.5–1.3)
EGFR: 79 ML/MIN/1.73M2 — SIGNIFICANT CHANGE UP
GLUCOSE SERPL-MCNC: 150 MG/DL — HIGH (ref 70–99)
HCT VFR BLD CALC: 33.1 % — LOW (ref 39–50)
HGB BLD-MCNC: 10.9 G/DL — LOW (ref 13–17)
INR BLD: 1.34 RATIO — HIGH (ref 0.88–1.16)
MCHC RBC-ENTMCNC: 31.6 PG — SIGNIFICANT CHANGE UP (ref 27–34)
MCHC RBC-ENTMCNC: 32.9 GM/DL — SIGNIFICANT CHANGE UP (ref 32–36)
MCV RBC AUTO: 95.9 FL — SIGNIFICANT CHANGE UP (ref 80–100)
NRBC # BLD: 0 /100 WBCS — SIGNIFICANT CHANGE UP (ref 0–0)
PLATELET # BLD AUTO: 181 K/UL — SIGNIFICANT CHANGE UP (ref 150–400)
POTASSIUM SERPL-MCNC: 5.1 MMOL/L — SIGNIFICANT CHANGE UP (ref 3.5–5.3)
POTASSIUM SERPL-SCNC: 5.1 MMOL/L — SIGNIFICANT CHANGE UP (ref 3.5–5.3)
PROTHROM AB SERPL-ACNC: 15.5 SEC — HIGH (ref 10.5–13.4)
RBC # BLD: 3.45 M/UL — LOW (ref 4.2–5.8)
RBC # FLD: 14.9 % — HIGH (ref 10.3–14.5)
SODIUM SERPL-SCNC: 134 MMOL/L — LOW (ref 135–145)
WBC # BLD: 24.16 K/UL — HIGH (ref 3.8–10.5)
WBC # FLD AUTO: 24.16 K/UL — HIGH (ref 3.8–10.5)

## 2022-12-17 RX ORDER — RIVAROXABAN 15 MG-20MG
20 KIT ORAL
Refills: 0 | Status: DISCONTINUED | OUTPATIENT
Start: 2022-12-17 | End: 2022-12-21

## 2022-12-17 RX ORDER — POLYETHYLENE GLYCOL 3350 17 G/17G
17 POWDER, FOR SOLUTION ORAL
Refills: 0 | Status: DISCONTINUED | OUTPATIENT
Start: 2022-12-17 | End: 2022-12-21

## 2022-12-17 RX ADMIN — Medication 1000 MILLIGRAM(S): at 15:59

## 2022-12-17 RX ADMIN — Medication 400 MILLIGRAM(S): at 07:58

## 2022-12-17 RX ADMIN — SENNA PLUS 2 TABLET(S): 8.6 TABLET ORAL at 21:46

## 2022-12-17 RX ADMIN — POLYETHYLENE GLYCOL 3350 17 GRAM(S): 17 POWDER, FOR SOLUTION ORAL at 17:32

## 2022-12-17 RX ADMIN — Medication 1 TABLET(S): at 12:04

## 2022-12-17 RX ADMIN — PANTOPRAZOLE SODIUM 40 MILLIGRAM(S): 20 TABLET, DELAYED RELEASE ORAL at 05:20

## 2022-12-17 RX ADMIN — OXYCODONE HYDROCHLORIDE 10 MILLIGRAM(S): 5 TABLET ORAL at 10:09

## 2022-12-17 RX ADMIN — Medication 1000 MILLIGRAM(S): at 00:12

## 2022-12-17 RX ADMIN — POLYETHYLENE GLYCOL 3350 17 GRAM(S): 17 POWDER, FOR SOLUTION ORAL at 12:04

## 2022-12-17 RX ADMIN — RIVAROXABAN 10 MILLIGRAM(S): KIT at 12:05

## 2022-12-17 RX ADMIN — ESCITALOPRAM OXALATE 10 MILLIGRAM(S): 10 TABLET, FILM COATED ORAL at 21:45

## 2022-12-17 RX ADMIN — SODIUM CHLORIDE 150 MILLILITER(S): 9 INJECTION INTRAMUSCULAR; INTRAVENOUS; SUBCUTANEOUS at 03:09

## 2022-12-17 RX ADMIN — Medication 400 MILLIGRAM(S): at 15:29

## 2022-12-17 RX ADMIN — OXYCODONE HYDROCHLORIDE 10 MILLIGRAM(S): 5 TABLET ORAL at 10:50

## 2022-12-17 RX ADMIN — Medication 650 MILLIGRAM(S): at 23:59

## 2022-12-17 RX ADMIN — Medication 650 MILLIGRAM(S): at 23:19

## 2022-12-17 RX ADMIN — Medication 100 MILLIGRAM(S): at 05:20

## 2022-12-17 NOTE — PROGRESS NOTE ADULT - SUBJECTIVE AND OBJECTIVE BOX
Patient is a 82y old  Male who presents with a chief complaint of R sided weakness (17 Dec 2022 11:41)      SUBJECTIVE / OVERNIGHT EVENTS:  Patient seen and examined.   Doing better.       Vital Signs Last 24 Hrs  T(C): 37 (17 Dec 2022 11:16), Max: 37.1 (17 Dec 2022 08:02)  T(F): 98.6 (17 Dec 2022 11:16), Max: 98.7 (17 Dec 2022 08:02)  HR: 76 (17 Dec 2022 11:16) (65 - 95)  BP: 152/76 (17 Dec 2022 11:16) (95/64 - 152/76)  BP(mean): 76 (16 Dec 2022 18:33) (74 - 81)  RR: 18 (17 Dec 2022 11:16) (18 - 18)  SpO2: 98% (17 Dec 2022 11:16) (94% - 100%)    Parameters below as of 17 Dec 2022 11:16  Patient On (Oxygen Delivery Method): room air      I&O's Summary    16 Dec 2022 07:01  -  17 Dec 2022 07:00  --------------------------------------------------------  IN: 2850 mL / OUT: 460 mL / NET: 2390 mL    17 Dec 2022 07:01  -  17 Dec 2022 13:06  --------------------------------------------------------  IN: 240 mL / OUT: 0 mL / NET: 240 mL        PE:  GENERAL: NAD, AAOx2  CHEST/LUNG: CTABL, No wheeze  HEART: irregular irregular + murmur  ABDOMEN: Soft, Nontender, Nondistended; Bowel sounds present  EXTREMITIES: chronic venous stasis, + 1 le edema  NEURO: No focal deficits    LABS:                        10.9   24.16 )-----------( 181      ( 17 Dec 2022 06:43 )             33.1     12-17    134<L>  |  99  |  28<H>  ----------------------------<  150<H>  5.1   |  23  |  0.96    Ca    8.8      17 Dec 2022 06:44      PT/INR - ( 17 Dec 2022 06:44 )   PT: 15.5 sec;   INR: 1.34 ratio         PTT - ( 17 Dec 2022 06:44 )  PTT:30.6 sec  CAPILLARY BLOOD GLUCOSE                RADIOLOGY & ADDITIONAL TESTS:    Imaging Personally Reviewed:  [x] YES  [ ] NO    Consultant(s) Notes Reviewed:  [x] YES  [ ] NO      MEDICATIONS  (STANDING):  acetaminophen     Tablet .. 650 milliGRAM(s) Oral every 6 hours  acetaminophen   IVPB .. 1000 milliGRAM(s) IV Intermittent once  escitalopram 10 milliGRAM(s) Oral daily  HYDROmorphone  Injectable 0.5 milliGRAM(s) IV Push once  multivitamin 1 Tablet(s) Oral daily  pantoprazole    Tablet 40 milliGRAM(s) Oral before breakfast  polyethylene glycol 3350 17 Gram(s) Oral two times a day  rivaroxaban 20 milliGRAM(s) Oral with dinner  senna 2 Tablet(s) Oral at bedtime    MEDICATIONS  (PRN):  magnesium hydroxide Suspension 30 milliLiter(s) Oral daily PRN Constipation  ondansetron Injectable 4 milliGRAM(s) IV Push every 6 hours PRN Nausea and/or Vomiting  oxyCODONE    IR 5 milliGRAM(s) Oral every 4 hours PRN Moderate Pain (4 - 6)  oxyCODONE    IR 10 milliGRAM(s) Oral every 4 hours PRN Severe Pain (7 - 10)  traMADol 50 milliGRAM(s) Oral every 6 hours PRN Mild Pain (1 - 3)      Care Discussed with Consultants/Other Providers [x] YES  [ ] NO    HEALTH ISSUES - PROBLEM Dx:  Suspected pulmonary embolism    Suspected deep vein thrombosis (DVT)    Weakness of lower extremity, unspecified laterality    Chronic atrial fibrillation    Leukocytosis    At risk for depression    DVT prophylaxis

## 2022-12-17 NOTE — PROGRESS NOTE ADULT - SUBJECTIVE AND OBJECTIVE BOX
Orthopaedic Surgery Progress Note    Subjective:   Patient seen and examined. No acute events overnight. States pain is controlled. Denies fever/chills/chest pain/shortness of breath/numbness/tingling.    Objective:  T(C): 37 (12-17-22 @ 11:16), Max: 37.1 (12-17-22 @ 08:02)  HR: 76 (12-17-22 @ 11:16) (65 - 95)  BP: 152/76 (12-17-22 @ 11:16) (95/64 - 152/76)  RR: 18 (12-17-22 @ 11:16) (18 - 18)  SpO2: 98% (12-17-22 @ 11:16) (94% - 100%)  Wt(kg): --    12-16 @ 07:01  -  12-17 @ 07:00  --------------------------------------------------------  IN: 2850 mL / OUT: 460 mL / NET: 2390 mL    12-17 @ 07:01  -  12-17 @ 14:32  --------------------------------------------------------  IN: 240 mL / OUT: 450 mL / NET: -210 mL        Exam:  Alert and Oriented, No Acute Distress. VSS.   Laterality: RLE     Calves soft, non-tender     Abduction pillow in place      Middle Aquacel with moderate drainage otherwise intact     Proximal and distal Aquacels are clean, dry, and intact     (+) PF/DF/EHL/FHL 5/5     Sensation intact to light touch      2+ DP/PT pulse  : Micki in place                            10.9   24.16 )-----------( 181      ( 17 Dec 2022 06:43 )             33.1     12-17    134<L>  |  99  |  28<H>  ----------------------------<  150<H>  5.1   |  23  |  0.96    Ca    8.8      17 Dec 2022 06:44      PT/INR - ( 17 Dec 2022 06:44 )   PT: 15.5 sec;   INR: 1.34 ratio         PTT - ( 17 Dec 2022 06:44 )  PTT:30.6 sec

## 2022-12-17 NOTE — PHYSICAL THERAPY INITIAL EVALUATION ADULT - GENERAL OBSERVATIONS, REHAB EVAL
pt received seated in chair in NAD +IV lock, family at bedside, allevyn to R heel
pt received seated in chair in NAD +IV lock, family at bedside, allevyn to R heel

## 2022-12-17 NOTE — OCCUPATIONAL THERAPY INITIAL EVALUATION ADULT - NSOTDMEREC_GEN_A_CORE
Acute Care - Occupational Therapy Discharge   [facility]     Patient Name: Audra Montejo  : 1962  MRN: [id number]  Today's Date: 2022               Admit Date: 7/10/2022       ICD-10-CM ICD-9-CM   1. 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 Dizziness  R42 780.4   3. Dysphagia, oropharyngeal  R13.12 787.22   4. Difficulty walking  R26.2 719.7   5. Decreased activities of daily living (ADL)  Z78.9 V49.89   6. Cerebrovascular accident (CVA) due to embolism of right posterior cerebral artery ([facility])  I63.431 434.11     Patient Active Problem List   Diagnosis   • Essential hypertension   • Mild intermittent asthma without complication   • Chronic left shoulder pain   • Anxiety   • Back pain   • Bilateral carpal tunnel syndrome   • Bipolar disorder ([facility])   • Eczema   • Gall stones   • Heart murmur   • Left shoulder pain   • Loss of appetite   • Migraine   • Nontraumatic tear of rotator cuff   • Palpitation   • Ringing in ears   • Seasonal allergic rhinitis   • Shortness of breath   • Stroke ([facility])   • Subacromial impingement, left   • Superior glenoid labrum lesion of left shoulder   • Trouble swallowing   • Chest pain   • 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[facility])     Past Medical History:   Diagnosis Date   • 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   • Acid reflux disease    • Allergy     unspecified   • Anxiety    • Back pain    • Bilateral carpal tunnel syndrome 2017   • Bipolar disorder ([facility])    • Bursitis of left shoulder 2016   • Depression    • Eczema    • Gall stones    • H/O psychiatric care    • Heart attack ([facility])    • Heart murmur    • Hypertension    • Incomplete tear of left rotator cuff 2017   • Left shoulder pain    • Loss of appetite    • Lumbago 2015    low back pain    • Memory loss     forgetfulness   • Migraine headache    • Need for hepatitis C screening test    • Palpitation    • Ringing in ears    • Seasonal allergies    • Shortness of breath    • Sinus trouble     unspecified    • Stroke  (cerebrum) ([facility])    • Subacromial impingement, left 10/13/2016   • Superior glenoid labrum lesion of left shoulder 10/13/2016    subsequent encounter   • Trouble swallowing      Past Surgical History:   Procedure Laterality Date   • APPENDECTOMY     • CARPAL TUNNEL RELEASE     • CHOLECYSTECTOMY     • COLONOSCOPY  2014   • ENDOSCOPY  2014   • HYSTERECTOMY  1999   • SHOULDER SURGERY Left 10/03/2016    arthroscopic, left shoulder scope, RTC repair- Dr. Flores       OT ASSESSMENT FLOWSHEET (last 12 hours)     OT Evaluation and Treatment    No documentation.                 Occupational Therapy Education                 Title: PT OT SLP Therapies (Resolved)     Topic: Occupational Therapy (Resolved)     Point: ADL training (Resolved)     Description:   Instruct learner(s) on proper safety adaptation and remediation techniques during self care or transfers.   Instruct in proper use of assistive devices.              Learning Progress Summary           Patient Acceptance, E,TB, VU by KW at 7/12/2022 2301    Acceptance, E,TB, VU by ES at 7/11/2022 1535                   Point: Home exercise program (Resolved)     Description:   Instruct learner(s) on appropriate technique for monitoring, assisting and/or progressing therapeutic exercises/activities.              Learning Progress Summary           Patient Acceptance, E,TB, VU by KW at 7/12/2022 2301    Acceptance, E,TB, VU by ES at 7/11/2022 1535                   Point: Precautions (Resolved)     Description:   Instruct learner(s) on prescribed precautions during self-care and functional transfers.              Learning Progress Summary           Patient Acceptance, E,TB, VU by KW at 7/12/2022 2301    Acceptance, E,TB, VU by ES at 7/11/2022 1535                   Point: Body mechanics (Resolved)     Description:   Instruct learner(s) on proper positioning and spine alignment during self-care, functional mobility activities and/or exercises.              Learning Progress  Summary           Patient Acceptance, E,TB, VU by BRUCE at 7/12/2022 2301    Acceptance, E,TB, VU by MATILDE at 7/11/2022 1535                               User Key     Initials Effective Dates Name Provider Type Discipline    KW 01/25/22 -  Gabby Rose, RN Registered Nurse Nurse    MATILDE 09/13/21 -  Kalie Card OT Occupational Therapist OT                OT Recommendation and Plan  Planned Therapy Interventions (OT): activity tolerance training, BADL retraining, functional balance retraining, occupation/activity based interventions, patient/caregiver education/training, strengthening exercise, transfer/mobility retraining  Therapy Frequency (OT): 5 times/wk  Plan of Care Review  Plan of Care Reviewed With: patient  Progress: improving  Outcome Evaluation: Patient with significant improvement noted with coordination and balance this therapy session. Patient performs functional mobility CGA without use of assistive device, decreased LOBs noted from evaluation. Patient with symmetrical UE strength, completing UE therex with yellow, light resistance band: see note for specifics. Patient continues to demonstrate improvements and would benefit from continued skilled OT intervention to promote return to independent baseline. Patient would significantly benefit from outpatient therapy at time of discharge.  Plan of Care Reviewed With: patient  Outcome Evaluation: Patient with significant improvement noted with coordination and balance this therapy session. Patient performs functional mobility CGA without use of assistive device, decreased LOBs noted from evaluation. Patient with symmetrical UE strength, completing UE therex with yellow, light resistance band: see note for specifics. Patient continues to demonstrate improvements and would benefit from continued skilled OT intervention to promote return to independent baseline. Patient would significantly benefit from outpatient therapy at time of discharge.             Time  Calculation:     Timed Therapy Charges  Total Units: 1    Charges  Total Units: 1    Procedure Name Documented Minutes Units Code    HC OT THER PROC EA 15 MIN 15  1    74430 (CPT®)               Documented Minutes  Total Minutes: 15    Therapy Provided Minutes    33408 - OT Therapeutic Exercise Minutes 15              Therapy Charges for Today     Code Description Service Date Service Provider Modifiers Qty    [id number] HC OT THER PROC EA 15 MIN 7/13/2022 Kalie Card OT GO 1               OT Discharge Summary  Anticipated Discharge Disposition (OT): inpatient rehabilitation facility, home with outpatient therapy services  Reason for Discharge: Discharge from facility, Per MD order  Outcomes Achieved: Patient able to partially acheive established goals  Discharge Destination: Home with outpatient services    Kalie Card OT  7/14/2022   TBD

## 2022-12-17 NOTE — PHYSICAL THERAPY INITIAL EVALUATION ADULT - ADDITIONAL COMMENTS
Pt lives with family in private home, 3 steps to enter, first floor set-up. Prior to admission pt was independent with all functional mobility with use of RW. Pt is home alone for most of the day.

## 2022-12-17 NOTE — PROGRESS NOTE ADULT - SUBJECTIVE AND OBJECTIVE BOX
Neurology Progress Note    S: Patient seen and examined. No new events overnight s/p sx doing okay     Medication:  MEDICATIONS  (STANDING):  acetaminophen     Tablet .. 650 milliGRAM(s) Oral every 6 hours  acetaminophen   IVPB .. 1000 milliGRAM(s) IV Intermittent once  escitalopram 10 milliGRAM(s) Oral daily  HYDROmorphone  Injectable 0.5 milliGRAM(s) IV Push once  multivitamin 1 Tablet(s) Oral daily  pantoprazole    Tablet 40 milliGRAM(s) Oral before breakfast  polyethylene glycol 3350 17 Gram(s) Oral at bedtime  rivaroxaban 10 milliGRAM(s) Oral daily  senna 2 Tablet(s) Oral at bedtime  sodium chloride 0.9% Bolus 500 milliLiter(s) IV Bolus once  sodium chloride 0.9% Bolus 500 milliLiter(s) IV Bolus once  sodium chloride 0.9%. 1000 milliLiter(s) (150 mL/Hr) IV Continuous <Continuous>    MEDICATIONS  (PRN):  magnesium hydroxide Suspension 30 milliLiter(s) Oral daily PRN Constipation  ondansetron Injectable 4 milliGRAM(s) IV Push every 6 hours PRN Nausea and/or Vomiting  oxyCODONE    IR 5 milliGRAM(s) Oral every 4 hours PRN Moderate Pain (4 - 6)  oxyCODONE    IR 10 milliGRAM(s) Oral every 4 hours PRN Severe Pain (7 - 10)  traMADol 50 milliGRAM(s) Oral every 6 hours PRN Mild Pain (1 - 3)    Vitals:      Vital Signs Last 24 Hrs  T(C): 37.1 (12-17-22 @ 08:02), Max: 37.1 (12-17-22 @ 08:02)  T(F): 98.7 (12-17-22 @ 08:02), Max: 98.7 (12-17-22 @ 08:02)  HR: 95 (12-17-22 @ 08:02) (57 - 95)  BP: 127/75 (12-17-22 @ 08:02) (95/64 - 127/75)  BP(mean): 76 (12-16-22 @ 18:33) (74 - 81)  RR: 18 (12-17-22 @ 08:02) (16 - 18)  SpO2: 95% (12-17-22 @ 08:02) (94% - 100%)      General Exam:   Constitutional: Male, appears stated age, in no apparent distress including pain  Head: Normocephalic & atraumatic.  Respiratory: No increased work of breathing  Extremities: b/l LE pitting edema  Skin: +b/l overlying chronic skin changes over b/l LE    Cardiovascular (>2): atrial fibrillation on monitor, HR 80s.    Neurological (>12):  MS: Awake, alert, oriented to person, place, situation, time. Normal affect. Follows all commands.    Language: Speech is clear, fluent with good repetition & comprehension (able to name objects thumb)    CNs: VFF to counting fingers. EOMI no nystagmus, no diplopia. V1-3 intact to LT, well developed masseter muscles b/l. No facial asymmetry b/l, full eye closure strength b/l. Hearing grossly normal (rubbing fingers) b/l. Symmetric palate elevation in midline. Gag reflex deferred. Head turning & shoulder shrug intact b/l. Tongue midline, normal movements, no atrophy.    Motor: Normal muscle bulk. b/l UE postural and action tremor.  Slight R pronator drift.              Deltoid	Biceps	Triceps	Wrist	Finger ABd	   R	4+	5	5	5	4+		4+ 	  L	5	5	5	5	5		5    	H-Flex	K-Flex	K-Ext	D-Flex	P-Flex  R	4+	5	5	4+	5	RLE with +straight raise	   L	5	5	5	5	5	     Sensation: Intact to LT b/l throughout.     Cortical: Extinction on DSS (neglect): none    Reflexes: with significant pain when checking for ankle clonus, deferred              Biceps(C5)       BR(C6)     Triceps(C7)               Patellar(L4)    Achilles(S1)    Plantar Resp  R	2	          2	             2		        1		    		mute  L	2	          2	             2		        1		    		mute    Coordination: intact rapid-alt movements. No dysmetria to FTN     Gait: unable to assess due to pain      I personally reviewed the below data/images/labs:  CBC Full  -  ( 17 Dec 2022 06:43 )  WBC Count : 24.16 K/uL  RBC Count : 3.45 M/uL  Hemoglobin : 10.9 g/dL  Hematocrit : 33.1 %  Platelet Count - Automated : 181 K/uL  Mean Cell Volume : 95.9 fl  Mean Cell Hemoglobin : 31.6 pg  Mean Cell Hemoglobin Concentration : 32.9 gm/dL  Auto Neutrophil # : x  Auto Lymphocyte # : x  Auto Monocyte # : x  Auto Eosinophil # : x  Auto Basophil # : x  Auto Neutrophil % : x  Auto Lymphocyte % : x  Auto Monocyte % : x  Auto Eosinophil % : x  Auto Basophil % : x        12-17    134<L>  |  99  |  28<H>  ----------------------------<  150<H>  5.1   |  23  |  0.96    Ca    8.8      17 Dec 2022 06:44        < from: CT Angio Neck Stroke Protocol w/ IV Cont (12.05.22 @ 15:25) >  IMPRESSION:    HEAD CT: No acute intracranial hemorrhage or acute territorial infarction.    If symptoms persist consider follow up head CT or MRI, MRA  if no   contraindication.    CTA COW:  Patent intracranial circulation without flow limiting stenosis   or large vessel occlusion.    CTA NECK: Patent, ECAs, ICAs, no  hemodynamically significant stenosis at    ICA origins by NASCET criteria.  Bilateral vertebral arteries are patent without flow limiting stenosis.    < end of copied text >    < from: MR Head No Cont (12.06.22 @ 19:43) >    ACC: [id number] EXAM:  MR BRAIN                          PROCEDURE DATE:  12/06/2022          INTERPRETATION:  CLINICAL STATEMENT: Pain.    TECHNIQUE: MRI of the brain was performed without gadolinium.    COMPARISON: CT head 12/5/2022    FINDINGS:  There is mild diffuse parenchymal volume loss. There are T2 prolongation   signal abnormalities in the periventricular subcortical white matter   likely related to mild chronic microvascular ischemic changes.    There is no acute parenchymal hemorrhage, parenchymal mass, mass effect   or midline shift. There is no extra-axial fluid collection.  There is no   hydrocephalus.  There is no acute infarct.    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    IMPRESSION:  No acute intracranial hemorrhage or acute infarct.    --- End of Report ---    < from: MR Cervical Spine No Cont (12.09.22 @ 20:23) >    ACC: [id number] EXAM:  MR SPINE CERVICAL                          PROCEDURE DATE:  12/09/2022          INTERPRETATION:  CLINICAL INDICATION: Cervical stenosis with myelopathy    Magnetic resonance imaging of the cervical spine was carried out with   sagittal surface coil imaging from C1 to T4 using T1 and fast spin echo   T2 weighted images with and without fat saturation technique with axial   T1 and fast spin echo T2 weighted imaging on a 1.5 Alexia magnet.    Comparison is made with the prior cervical spine CT of 10/30/2019.    The cervical vertebrae are normal in height and signal intensity. There   are disc osteophyte complexes present at C3-4 and C4-5 which with dorsal   ligamentous hypertrophy causes severe spinal stenosis and mild cord  compression. There is a small amount of cord signal abnormality at the   C4-5 level.    There is a small disc osteophyte complex present at C6-7 without cord   compression or significant spinal stenosis. The remainder of the cervical   spine and the upper thoracic spine are unremarkable.        IMPRESSION: Disc osteophyte complexes C3-4 and C4-5 along with dorsal   ligamentous causes severe spinal stenosis and mild cord compression.   Small amount of abnormal cord signal C4-5.                    --- End of Report ---            ABA MARIE MD; Attending Radiologist  This document has been electronically signed. Dec  9 2022  8:48PM    < end of copied text >

## 2022-12-17 NOTE — PROGRESS NOTE ADULT - ASSESSMENT
A/P: 82y Male s/p AJ right hip IMN conversion to JESS and ORIF femoral shaft. VSS. NAD  -PT/OT - NWB RLE, WBAT LLE for transfers ONLY, NO gait training  -Continue abduction pillow while in bed  -IS bedside   -DVT PPx: Start Xarelto 10 mg QD on POD#1, SCDs, Early OOB and Amb  -GI PPx: Protonix 40 mg  -Continue post-op abx x 24hrs   -Pain Control  -f/u AM labs.   -Continue Current Tx per primary team  -Dispo planning per primary team

## 2022-12-17 NOTE — OCCUPATIONAL THERAPY INITIAL EVALUATION ADULT - PERTINENT HX OF CURRENT PROBLEM, REHAB EVAL
82M w/ pmhx of afib on xarelto, s/p L hip IMN (most recently s/p R hip IMN (Dr. Godinez 10/31/19) presents with acute onset RLE weakness. LKN 12/5/22 at 7am. Pt last took xarelto at 6:30am. Per pt and pt's daughter at bedside, pt woke up normally this morning and went to shower. While showering, he felt RLE sudden onset weakness, no associated pain or back pain at that time. He denies any numbness/tingling, vision changes, or UE weakness. He went to shave and while at sink felt too weak to stand and had to hold on to sink. Daughter came to bathroom ~2 hours later to find him holding sink leaning with R side. He aslo reports increased R hip area pain and numbness/tingling with associated leg shaking due to pain. Pt was hanging onto the sink for at least 2-3 hours and reports not being able to get up due to RLE weakness. At baseline, pt ambulates with a walker, lives with his daughter, does most ADLs independently. Denies any recent symptoms such as fevers, chills, cough, dysuria, change in urination, incontinence. States he had mechanical fall 3 months ago which he did not tell daughter in living room.    PROCEDURES:  Right total hip replacement 16-Dec-2022 16:32:49  Sushant Matias  ORIF, fracture, femoral shaft, with plate and screws 16-Dec-2022 16:34:21  Sushant Matias  Removal of intramedullary nail 16-Dec-2022 16:34:37  Sushant Matias.  MR C-Spine: Disc osteophyte complexes C3-4 and C4-5 along with dorsal   ligamentous causes severe spinal stenosis and mild cord compression.  Small amount of abnormal cord signal C4-5. CT Lumbar: Advanced degenerative changes as described which contributes to central   canal, subarticular zone and neural foraminal narrowing throughout the lumbar spine. Notably at L4-L5 there is severe right subarticular zone   stenosis and severe right neural foraminal stenosis.   Impression: acute RLE weakness and pain likely 2/2 R hip pathology vs. lumbosacral pathology, less likely stroke; Stroke ruled out

## 2022-12-17 NOTE — PHYSICAL THERAPY INITIAL EVALUATION ADULT - TRANSFER TRAINING, PT EVAL
GOAL: Pt will perform sit<>stand transfer independently with least restrictive device in 2wks.
GOAL: Pt will perform sit<>stand transfer independently with least restrictive device in 2wks.

## 2022-12-17 NOTE — PHYSICAL THERAPY INITIAL EVALUATION ADULT - GAIT TRAINING, PT EVAL
GOAL:Pt will ambulate 250ft with least restrictive device independently in 2wks.
GOAL:Pt will ambulate 250ft with least restrictive device independently in 2wks.

## 2022-12-17 NOTE — OCCUPATIONAL THERAPY INITIAL EVALUATION ADULT - ADDITIONAL COMMENTS
Pt lives w/ daughter, son in law and grandson in [place] +3 MARGARITA, tub shower, uses a RW. independent with ADLs.

## 2022-12-17 NOTE — PROGRESS NOTE ADULT - ASSESSMENT
ECHO 12/6/22:  Ef 64%;  mild MR, nl LV sys fx  , mod to sev TR     a/p  82M w/ pmhx of afib on xarelto, s/p L hip IMN (most recently s/p R hip IMN (Dr. Godinez 10/31/19) presents with acute onset RLE weakness.    #WCT  -cont to monitor   -no further sig WCT, unable to give bb due to freda   -echo with nl EF    #Right Sided HF  -ECHO revealed normal LVEF, w R sided enlargement and mod-severe TR  -Venous dopplers negative for DVT  -likely related to r sided CHF  -would restart lasix 20mg PO Q48hrs if eating regular diet   -pt in slow afib off meds which preclude BB use    #Afib  -rate controlled/freda off meds  -no sig freda, pauses  -INC XARELTO TO THERAP DOSING IF NO SURGICAL CONTRAINDICATIONS    #Weakness  -neuro w/u neg for CVA  -w/u per primary team  -neuro eval noted    #s/p IR hip aspiration 12/9  -S/P right hip removal of hardware, right hip IMN conversion to JESS, ORIF femoral shaft 12/16  -remains cv stable POST OP      35 minutes spent on total encounter; more than 50% of the visit was spent counseling and/or coordinating care by the attending physician.

## 2022-12-17 NOTE — PHYSICAL THERAPY INITIAL EVALUATION ADULT - MANUAL MUSCLE TESTING RESULTS, REHAB EVAL
strength grossly at least 3/5 throughout
strength grossly at least 3/5 throughout except RLE limited due to pain

## 2022-12-17 NOTE — PROGRESS NOTE ADULT - SUBJECTIVE AND OBJECTIVE BOX
CARDIOLOGY FOLLOW UP NOTE - DR. ALVES    Patient Name: MARY BO  Date of Service: 12-17-22 @ 11:42    Patient seen and examined    Subjective:    cv: denies chest pain, dyspnea, palpitations, dizziness  pulmonary: denies cough  GI: denies abdominal pain, nausea, vomiting  vascular/legs: no edema   skin: no rash  ROS: otherwise negative   overnight events:      PHYSICAL EXAM:  T(C): 37 (12-17-22 @ 11:16), Max: 37.1 (12-17-22 @ 08:02)  HR: 76 (12-17-22 @ 11:16) (65 - 95)  BP: 152/76 (12-17-22 @ 11:16) (95/64 - 152/76)  RR: 18 (12-17-22 @ 11:16) (18 - 18)  SpO2: 98% (12-17-22 @ 11:16) (94% - 100%)  Wt(kg): --  I&O's Summary    16 Dec 2022 07:01  -  17 Dec 2022 07:00  --------------------------------------------------------  IN: 2850 mL / OUT: 460 mL / NET: 2390 mL    17 Dec 2022 07:01  -  17 Dec 2022 11:42  --------------------------------------------------------  IN: 120 mL / OUT: 0 mL / NET: 120 mL      Daily     Daily     Appearance: Normal	  Cardiovascular: Normal S1 S2,RRR, No JVD, No murmurs  Respiratory: Lungs clear to auscultation	  Gastrointestinal:  Soft, Non-tender, + BS	  Extremities: Normal range of motion, No clubbing, cyanosis or edema      Home Medications:  escitalopram:  (05 Dec 2022 21:58)  rivaroxaban 20 mg oral tablet: 1 tab(s) orally once a day (before a meal) (05 Dec 2022 21:58)      MEDICATIONS  (STANDING):  acetaminophen     Tablet .. 650 milliGRAM(s) Oral every 6 hours  acetaminophen   IVPB .. 1000 milliGRAM(s) IV Intermittent once  escitalopram 10 milliGRAM(s) Oral daily  HYDROmorphone  Injectable 0.5 milliGRAM(s) IV Push once  multivitamin 1 Tablet(s) Oral daily  pantoprazole    Tablet 40 milliGRAM(s) Oral before breakfast  polyethylene glycol 3350 17 Gram(s) Oral at bedtime  rivaroxaban 10 milliGRAM(s) Oral daily  senna 2 Tablet(s) Oral at bedtime  sodium chloride 0.9% Bolus 500 milliLiter(s) IV Bolus once  sodium chloride 0.9% Bolus 500 milliLiter(s) IV Bolus once  sodium chloride 0.9%. 1000 milliLiter(s) (150 mL/Hr) IV Continuous <Continuous>      TELEMETRY: 	    ECG:  	  RADIOLOGY:   DIAGNOSTIC TESTING:  [ ] Echocardiogram:  [ ] Catheterization:  [ ] Stress Test:    OTHER: 	    LABS:	 	    CARDIAC MARKERS:                                      10.9   24.16 )-----------( 181      ( 17 Dec 2022 06:43 )             33.1     12-17    134<L>  |  99  |  28<H>  ----------------------------<  150<H>  5.1   |  23  |  0.96    Ca    8.8      17 Dec 2022 06:44      proBNP:   PT/INR - ( 17 Dec 2022 06:44 )   PT: 15.5 sec;   INR: 1.34 ratio         PTT - ( 17 Dec 2022 06:44 )  PTT:30.6 sec  Lipid Profile:   HgA1c:     Creatinine, Serum: 0.96 mg/dL (12-17-22 @ 06:44)  Creatinine, Serum: 0.88 mg/dL (12-16-22 @ 17:11)  Creatinine, Serum: 1.22 mg/dL (12-16-22 @ 01:32)  Creatinine, Serum: 1.05 mg/dL (12-15-22 @ 07:25)

## 2022-12-17 NOTE — PROGRESS NOTE ADULT - ASSESSMENT
82M w/ pmhx of afib on xarelto, s/p L hip IMN (most recently s/p R hip IMN (Dr. Godinez 10/31/19) presents with acute onset R sided weakness.    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, cervical stenosis  # right IM loose nail  MRI brain no acute infarct  CT L spine L4-L5 there is severe right subarticular zone stenosis and severe right neural foraminal stenosis  MRI cervical spine severe canal stenosis c3/4 c4/c5, mild cord compression, NSGY aware and recommend outpt fu no acute intervention  appreciate ortho recs, sp IR right hip aspiration  afvss, pain control, bowel regimen   post op pt, ot , active t & s, currently hgb stable no need for transfusion  dvt ppx -xarelto,, -plan per primary team    # Grade 3 mobility #4 tooth due to periodontitis, loose tooth  s/p Extraction #4 (upper right second bicuspid) on 12/13  dentally cleared for surgery    # LE wound  BC NTD  podiatry following for L heel wound  monitor off abx    # Chronic atrial fibrillation  on xarelto  TTE Severe right atrial enlargement. borderline pulmonary hypertension. Moderate-severe tricuspid regurgitation. EF64%  cardio following  lasix MWF    # depression  On Lexapro    DVT prophylaxis.xarelto    Please contact with any questions or concerns [phone].

## 2022-12-17 NOTE — PROGRESS NOTE ADULT - ASSESSMENT
82M RH w/ afib on xarelto, s/p L hip IMN (2013), s/p R hip IMN (2019) presents with acute onset RLE weakness. LKN 12/5/22 at 7am. Pt last took xarelto at 6:30am. While showering, he felt RLE sudden onset weakness, no associated pain or back pain at that time. He reports increased R hip area pain and numbness/tingling with associated leg shaking due to pain. Pt was hanging onto the sink for at least 3-4 hours and reports not being able to get up due to RLE weakness. NIHSS: 0, preMRS: 2 (ambulates with a walker). Neuro exam notable for slight +R pronator, +straight leg raise. CTH/CTA neg  NIHSS4  premrs2-3 walks with cane/walker   MRI brain neg for infarct   CRP 22   CT pelvis: Redemonstration of open reduction internal fixation of the right hip.   Interval development of loosening throughout the right femoral neck   compression screw with migration of the tip into the joint space.   Interval development of lateral deviation of the right femoral   intramedullary simón with the tip now abutting and markedly   remodeling/thinning the lateral cortex at the mid diaphysis.  MRI C spien 12/9: mild cord compression C3/4/5. abnormal signal C4/5   s/p IR aspiration 12/9  s/p  R hip hardware revision by ortho 12/16  post op R hip pain but otherwise neuro intact     Impression: acute RLE weakness and pain likely 2/2 R hip pathology vs. lumbosacral pathology, less likely stroke; Stroke ruled out     Recommendations:      - pain management, now getting dilauded   - continue home xarelto for stroke prevention  - monitor on telemetry  - High dose statin therapy - atorvastatin 40mg PO daily. LDL goal <70mg/dL.   - PT/OT/SS/SLP, OOBC  - check FS, glucose control <180  - GI/DVT ppx  - Thank you for allowing me to participate in the care of this patient. Call with questions.       Carloz Del Castillo MD  Vascular Neurology  Office: [phone] .

## 2022-12-17 NOTE — OCCUPATIONAL THERAPY INITIAL EVALUATION ADULT - GROOMING, PREVIOUS LEVEL OF FUNCTION, OT EVAL
The patient has been examined and the H&P has been reviewed:    I concur with the findings and no changes have occurred since H&P was written.    Anesthesia/Surgery risks, benefits and alternative options discussed and understood by patient/family.          There are no hospital problems to display for this patient.    
independent

## 2022-12-17 NOTE — PHYSICAL THERAPY INITIAL EVALUATION ADULT - PERTINENT HX OF CURRENT PROBLEM, REHAB EVAL
82M w/ pmhx of afib on xarelto, s/p L hip IMN (most recently s/p R hip IMN (Dr. Godinez 10/31/19) presents with acute onset RLE weakness. LKN 12/5/22 at 7am. Pt last took xarelto at 6:30am. Per pt and pt's daughter at bedside, pt woke up normally this morning and went to shower. While showering, he felt RLE sudden onset weakness, no associated pain or back pain at that time. He denies any numbness/tingling, vision changes, or UE weakness. He also reports increased R hip area pain and numbness/tingling with associated leg shaking due to pain. At baseline, pt ambulates with a walker, lives with his daughter, does most ADLs independently. States he had mechanical fall 3 months ago which he did not tell daughter in living room. Hosp course: 12/5 CT Head, Brain, Neck: HEAD CT: No acute intracranial hemorrhage or acute territorial infarction. CTA COW:  Patent intracranial circulation without flow limiting stenosis or large vessel occlusion. CTA NECK: Patent, ECAs, ICAs, no hemodynamically significant stenosis at ICA origins by NASCET criteria. Bilateral vertebral arteries are patent without flow limiting stenosis. 12/5 Negative CXR; 12/5 Xray R femur/hip/pelvis bilateral hip fixation hardware with intramedullary rods with associated heterotopic ossification. Right hip screw purchase his the femoral head cortex, not present on prior radiograph and may represent some degree of subsidence of the bone at the fracture site. The distal tip of the right intramedullary simón appears to extend into the right lateral cortex with cortical thinning. 12/5 CT L/s Advanced degenerative changes as described which contributes to central canal, subarticular zone and neural foraminal narrowing throughout the lumbar spine. Notably at L4-L5 there is severe right subarticular zone stenosis and severe right neural foraminal stenosis. No definite high-grade central canal stenosis throughout the lumbar spine. 12/6 BLE Duplex negative DVT. Now s/p AJ right hip IMN conversion to JESS on 12/16/22.

## 2022-12-17 NOTE — PHYSICAL THERAPY INITIAL EVALUATION ADULT - BED MOBILITY TRAINING, PT EVAL
GOAL: Pt will be independent with bed mobility in 2wks.
GOAL: Pt will be independent with bed mobility in 2wks.

## 2022-12-18 LAB
ANION GAP SERPL CALC-SCNC: 12 MMOL/L — SIGNIFICANT CHANGE UP (ref 5–17)
APPEARANCE UR: CLEAR — SIGNIFICANT CHANGE UP
BACTERIA # UR AUTO: NEGATIVE — SIGNIFICANT CHANGE UP
BILIRUB UR-MCNC: NEGATIVE — SIGNIFICANT CHANGE UP
BUN SERPL-MCNC: 20 MG/DL — SIGNIFICANT CHANGE UP (ref 7–23)
CALCIUM SERPL-MCNC: 8.5 MG/DL — SIGNIFICANT CHANGE UP (ref 8.4–10.5)
CHLORIDE SERPL-SCNC: 99 MMOL/L — SIGNIFICANT CHANGE UP (ref 96–108)
CO2 SERPL-SCNC: 22 MMOL/L — SIGNIFICANT CHANGE UP (ref 22–31)
COLOR SPEC: YELLOW — SIGNIFICANT CHANGE UP
CREAT SERPL-MCNC: 0.92 MG/DL — SIGNIFICANT CHANGE UP (ref 0.5–1.3)
DIFF PNL FLD: ABNORMAL
EGFR: 83 ML/MIN/1.73M2 — SIGNIFICANT CHANGE UP
EPI CELLS # UR: 1 /HPF — SIGNIFICANT CHANGE UP
GLUCOSE SERPL-MCNC: 101 MG/DL — HIGH (ref 70–99)
GLUCOSE UR QL: NEGATIVE — SIGNIFICANT CHANGE UP
HCT VFR BLD CALC: 28.5 % — LOW (ref 39–50)
HGB BLD-MCNC: 9.3 G/DL — LOW (ref 13–17)
HYALINE CASTS # UR AUTO: 1 /LPF — SIGNIFICANT CHANGE UP (ref 0–2)
KETONES UR-MCNC: NEGATIVE — SIGNIFICANT CHANGE UP
LEUKOCYTE ESTERASE UR-ACNC: NEGATIVE — SIGNIFICANT CHANGE UP
MCHC RBC-ENTMCNC: 31.5 PG — SIGNIFICANT CHANGE UP (ref 27–34)
MCHC RBC-ENTMCNC: 32.6 GM/DL — SIGNIFICANT CHANGE UP (ref 32–36)
MCV RBC AUTO: 96.6 FL — SIGNIFICANT CHANGE UP (ref 80–100)
NITRITE UR-MCNC: NEGATIVE — SIGNIFICANT CHANGE UP
NRBC # BLD: 0 /100 WBCS — SIGNIFICANT CHANGE UP (ref 0–0)
PH UR: 6 — SIGNIFICANT CHANGE UP (ref 5–8)
PLATELET # BLD AUTO: 163 K/UL — SIGNIFICANT CHANGE UP (ref 150–400)
POTASSIUM SERPL-MCNC: 4.2 MMOL/L — SIGNIFICANT CHANGE UP (ref 3.5–5.3)
POTASSIUM SERPL-SCNC: 4.2 MMOL/L — SIGNIFICANT CHANGE UP (ref 3.5–5.3)
PROT UR-MCNC: ABNORMAL
RBC # BLD: 2.95 M/UL — LOW (ref 4.2–5.8)
RBC # FLD: 14.9 % — HIGH (ref 10.3–14.5)
RBC CASTS # UR COMP ASSIST: 5 /HPF — HIGH (ref 0–4)
SODIUM SERPL-SCNC: 133 MMOL/L — LOW (ref 135–145)
SP GR SPEC: 1.02 — SIGNIFICANT CHANGE UP (ref 1.01–1.02)
UROBILINOGEN FLD QL: NEGATIVE — SIGNIFICANT CHANGE UP
WBC # BLD: 28.26 K/UL — HIGH (ref 3.8–10.5)
WBC # FLD AUTO: 28.26 K/UL — HIGH (ref 3.8–10.5)
WBC UR QL: 1 /HPF — SIGNIFICANT CHANGE UP (ref 0–5)

## 2022-12-18 PROCEDURE — 71045 X-RAY EXAM CHEST 1 VIEW: CPT | Mod: 26

## 2022-12-18 RX ADMIN — POLYETHYLENE GLYCOL 3350 17 GRAM(S): 17 POWDER, FOR SOLUTION ORAL at 05:17

## 2022-12-18 RX ADMIN — Medication 1 TABLET(S): at 11:49

## 2022-12-18 RX ADMIN — Medication 650 MILLIGRAM(S): at 17:10

## 2022-12-18 RX ADMIN — Medication 650 MILLIGRAM(S): at 23:17

## 2022-12-18 RX ADMIN — RIVAROXABAN 20 MILLIGRAM(S): KIT at 17:10

## 2022-12-18 RX ADMIN — ESCITALOPRAM OXALATE 10 MILLIGRAM(S): 10 TABLET, FILM COATED ORAL at 21:23

## 2022-12-18 RX ADMIN — Medication 650 MILLIGRAM(S): at 06:10

## 2022-12-18 RX ADMIN — Medication 650 MILLIGRAM(S): at 23:57

## 2022-12-18 RX ADMIN — Medication 650 MILLIGRAM(S): at 05:17

## 2022-12-18 RX ADMIN — Medication 650 MILLIGRAM(S): at 11:49

## 2022-12-18 RX ADMIN — PANTOPRAZOLE SODIUM 40 MILLIGRAM(S): 20 TABLET, DELAYED RELEASE ORAL at 05:17

## 2022-12-18 RX ADMIN — POLYETHYLENE GLYCOL 3350 17 GRAM(S): 17 POWDER, FOR SOLUTION ORAL at 17:10

## 2022-12-18 RX ADMIN — SENNA PLUS 2 TABLET(S): 8.6 TABLET ORAL at 21:23

## 2022-12-18 RX ADMIN — MAGNESIUM HYDROXIDE 30 MILLILITER(S): 400 TABLET, CHEWABLE ORAL at 21:23

## 2022-12-18 NOTE — PROGRESS NOTE ADULT - SUBJECTIVE AND OBJECTIVE BOX
Pt seen/examined. Doing well. Pain controlled. No acute overnight complaints or events.    T(C): 36.8 (12-18-22 @ 05:12), Max: 37.2 (12-17-22 @ 20:04)  HR: 84 (12-18-22 @ 05:12) (76 - 99)  BP: 114/64 (12-18-22 @ 05:12) (108/70 - 152/76)  RR: 19 (12-18-22 @ 05:12) (18 - 19)  SpO2: 98% (12-18-22 @ 05:12) (95% - 98%)  Wt(kg): --  - Gen: NAD    Exam:  Alert and Oriented, No Acute Distress. VSS.   Laterality: RLE     Calves soft, non-tender     Abduction pillow in place      Middle Aquacel with moderate drainage otherwise intact     Proximal and distal Aquacels are clean, dry, and intact     (+) PF/DF/EHL/FHL 5/5     Sensation intact to light touch      2+ DP/PT pulse  : Sweet in place    A/P: 82y Male s/p AJ right hip IMN conversion to JESS and ORIF femoral shaft. VSS. NAD    -PT/OT - NWB RLE, WBAT LLE for transfers ONLY, NO gait training  -Continue abduction pillow while in bed  -IS bedside   -DVT PPx: Xarelto, SCDs, Early OOB and Amb  -GI PPx: Protonix 40 mg  -Continue post-op abx x 24hrs   -Pain Control  -f/u AM labs.   -Continue Current Tx per primary team  -Dispo planning per primary team

## 2022-12-18 NOTE — PROGRESS NOTE ADULT - ASSESSMENT
82M w/ pmhx of afib on xarelto, s/p L hip IMN (most recently s/p R hip IMN (Dr. Godinez 10/31/19) presents with acute onset R sided weakness.    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, cervical stenosis  # right IM loose nail  MRI brain no acute infarct  CT L spine L4-L5 there is severe right subarticular zone stenosis and severe right neural foraminal stenosis  MRI cervical spine severe canal stenosis c3/4 c4/c5, mild cord compression, NSGY aware and recommend outpt fu no acute intervention  appreciate ortho recs, sp IR right hip aspiration  afvss, pain control, bowel regimen   post op pt, ot , active t & s, currently hgb stable no need for transfusion  dvt ppx -xarelto,, -plan per primary team    Leukocytosis   -ua and cxr negative   -appears non toxic   -f/u ID     # Grade 3 mobility #4 tooth due to periodontitis, loose tooth  s/p Extraction #4 (upper right second bicuspid) on 12/13  dentally cleared for surgery    # LE wound  BC NTD  podiatry following for L heel wound  monitor off abx    # Chronic atrial fibrillation  on xarelto  TTE Severe right atrial enlargement. borderline pulmonary hypertension. Moderate-severe tricuspid regurgitation. EF64%  cardio following  lasix MWF    # depression  On Lexapro    DVT prophylaxis.xarelto    Spoke with daughter Samaria.   Branden Valencia MD   [facility]  [phone]      Please contact with any questions or concerns [phone].

## 2022-12-18 NOTE — PROGRESS NOTE ADULT - ASSESSMENT
Patient is a 82 year old male with PMH of Afib on xarelto, s/p L hip IMN (most recently s/p R hip IMN (Dr. Godinez 10/31/19) presents with acute onset RLE weakness and was unable to move from sink for at least 2-3 hours until daughter came home and called EMS. Patient noted with significant erythematous legs with swelling,     Acute onset RLE weakness, unclear etiology, no stroke on MRI  R hip IM nail subsidence and screw cut-out  Leukocytosis -  - S/p IR aspiration of R hip 12/9, culture NGTD   - s/p R tooth extraction 12/13 -    12/16 Right total hip replacement   ORIF, fracture, femoral shaft, with plate and screws   Removal of intramedullary nail 1    12/18 post op leukocytosis- inflammatory v infection   pt nontoxic  afebrile  no s.s of pna  wound site right thigh clean  no diarrhea    for now trend   if persists or hemodynamic instability-- can get blood cx  and ct rle    bowel regimen  strict aspiration precautions  incentive spirometer

## 2022-12-18 NOTE — PROGRESS NOTE ADULT - ASSESSMENT
82M RH w/ afib on xarelto, s/p L hip IMN (2013), s/p R hip IMN (2019) presents with acute onset RLE weakness. LKN 12/5/22 at 7am. Pt last took xarelto at 6:30am. While showering, he felt RLE sudden onset weakness, no associated pain or back pain at that time. He reports increased R hip area pain and numbness/tingling with associated leg shaking due to pain. Pt was hanging onto the sink for at least 3-4 hours and reports not being able to get up due to RLE weakness. NIHSS: 0, preMRS: 2 (ambulates with a walker). Neuro exam notable for slight +R pronator, +straight leg raise. CTH/CTA neg  NIHSS4  premrs2-3 walks with cane/walker   MRI brain neg for infarct   CRP 22   CT pelvis: Redemonstration of open reduction internal fixation of the right hip.   Interval development of loosening throughout the right femoral neck   compression screw with migration of the tip into the joint space.   Interval development of lateral deviation of the right femoral   intramedullary simón with the tip now abutting and markedly   remodeling/thinning the lateral cortex at the mid diaphysis.  MRI C spien 12/9: mild cord compression C3/4/5. abnormal signal C4/5   s/p IR aspiration 12/9  s/p  R hip hardware revision by ortho 12/16  post op R hip pain but otherwise neuro intact     Impression: acute RLE weakness and pain likely 2/2 R hip pathology vs. lumbosacral pathology, less likely stroke; Stroke ruled out     Recommendations:      - pain management, now getting dilauded   - continue home xarelto for stroke prevention, restarted post op.monitor H/H   - monitor on telemetry  - High dose statin therapy - atorvastatin 40mg PO daily. LDL goal <70mg/dL.   - PT/OT/SS/SLP, OOBC  - check FS, glucose control <180  - GI/DVT ppx  - Thank you for allowing me to participate in the care of this patient. Call with questions.       Carloz Del Castillo MD  Vascular Neurology  Office: [phone] .

## 2022-12-18 NOTE — PROGRESS NOTE ADULT - SUBJECTIVE AND OBJECTIVE BOX
[facility], Division of Infectious Diseases  GIOVANY Ferguson Y. Patel, S. Shah, G. Delmer  [phone]  after hours and weekends [phone]    Name: MARY BO  Age: 82y  Gender: Male  MRN: [id number]    Interval History--  Notes reviewed  asked to see with persistent leukocytosis  pt states constipated, no dsyuria  no cough, no shortness of breath  no abd apin     Allergies    No Known Allergies    Intolerances        Medications--  Antibiotics:    Immunologic:    Other:  acetaminophen     Tablet ..  escitalopram  HYDROmorphone  Injectable  magnesium hydroxide Suspension PRN  multivitamin  ondansetron Injectable PRN  oxyCODONE    IR PRN  oxyCODONE    IR PRN  pantoprazole    Tablet  polyethylene glycol 3350  rivaroxaban  senna  traMADol PRN      Review of Systems--  A 10-point review of systems was obtained.     Pertinent positives and negatives--  Constitutional: No fevers. No Chills. No Rigors.   Cardiovascular: No chest pain. No palpitations.  Respiratory: No shortness of breath. No cough.  Gastrointestinal: No nausea or vomiting. No diarrhea or constipation.   Psychiatric: Pleasant. Appropriate affect.    Review of systems otherwise negative except as previously noted.    Physical Examination--  Vital Signs: T(F): 98.9 (12-18-22 @ 11:00), Max: 98.9 (12-17-22 @ 20:04)  HR: 85 (12-18-22 @ 11:00)  BP: 132/73 (12-18-22 @ 11:00)  RR: 18 (12-18-22 @ 11:00)  SpO2: 98% (12-18-22 @ 11:00)  Wt(kg): --  General: Nontoxic-appearing Male in no acute distress.  HEENT: AT/NC.   Neck: Not rigid. No sense of mass.  Nodes: None palpable.  Lungs: Clear bilaterally without rales, wheezing or rhonchi  Heart: Regular rate and rhythm. + sm  Abdomen: Bowel sounds present and normoactive. Soft. Nondistended. Nontender.   Extremities: No cyanosis or clubbing. rle wound no surrounding erythema  + tender no discharge with tegadern  Skin: Warm. Dry. Good turgor. No rash. No vasculitic stigmata.  Psychiatric: Appropriate affect and mood for situation.         Laboratory Studies--  CBC                        9.3    28.26 )-----------( 163      ( 18 Dec 2022 06:53 )             28.5       Chemistries  12-18    133<L>  |  99  |  20  ----------------------------<  101<H>  4.2   |  22  |  0.92    Ca    8.5      18 Dec 2022 06:53        Culture Data        < from: Xray Chest 1 View- PORTABLE-Urgent (Xray Chest 1 View- PORTABLE-Urgent .) (12.18.22 @ 10:27) >  ACC: [id number] EXAM:  XR CHEST PORTABLE URGENT 1V                          PROCEDURE DATE:  12/18/2022          INTERPRETATION:  HISTORY: Leukocytosis    TECHNIQUE: A single AP view of the chest was obtained.    COMPARISON: 12/5/2022    FINDINGS:  The cardiac silhouette is normal in size. There are no focal   consolidations or pleural effusions. The hilar and mediastinal structures   appear unremarkable. The osseous structures are intact.    IMPRESSION: Clear lungs.    < end of copied text >  < from: Xray Femur 1 View, Right (12.17.22 @ 11:25) >    ACC: [id number] EXAM:  XR FEMUR 1 VIEW RT                        ACC: [id number] EXAM:  XR HIP 1V RT INTRAOP                          PROCEDURE DATE:  12/16/2022          INTERPRETATION:  CLINICAL INDICATION: baseline postoperative evaluation   status post right femur periprosthetic fracture.    EXAM:  Intraoperative frontal views of the right femur from 12/16/2022.    IMPRESSION:  Laterally applied long contoured condylar buttress plate with fixation   screws and cerclage wires stabilizing a mid femoral shaft periprosthetic   fracture with anatomic alignment restored and maintained.    Intact aligned longstem right total hip prosthesis with screw fixated   acetabular cup.    Post surgical changes in the adjacent soft tissues otherwise overall   anatomic alignment maintained.    Also correlate with intraoperative findings.    < end of copied text >  < from: Xray Femur 1 View, Right (12.16.22 @ 16:07) >  PROCEDURE DATE:  12/16/2022          INTERPRETATION:  CLINICAL INDICATION: postoperative evaluation status   post right hip replacement and fracture repair    EXAM:  AP views of the pelvis right hip and femur from 12/16/2022.    IMPRESSION:  Long stem cementless right total hip prosthesis present. Additional   laterally applied long contoured condylar buttressplate with fixation   screws and cerclage wires stabilizing an obliquely oriented mid to distal   femoral diaphyseal fracture at femoral stem tip level with anatomic   alignment maintained.    Post surgical changes in the adjacent soft tissues with surgical skin   staples overlying incision sites.    Unremarkable visualized proximal left femoral short IM simón/nail.    Also correlate with intraoperative findings.    < end of copied text >    Urine Microscopic-Add On (NC) (12.18.22 @ 12:23)   Red Blood Cell - Urine: 5 /hpf   White Blood Cell - Urine: 1 /HPF   Hyaline Casts: 1 /lpf   Bacteria: Negative   Epithelial Cells: 1 /hpf

## 2022-12-18 NOTE — PROGRESS NOTE ADULT - SUBJECTIVE AND OBJECTIVE BOX
Neurology Progress Note    S: Patient seen and examined. No new events overnight s/p sx doing okay     Medication:  MEDICATIONS  (STANDING):  acetaminophen     Tablet .. 650 milliGRAM(s) Oral every 6 hours  escitalopram 10 milliGRAM(s) Oral daily  HYDROmorphone  Injectable 0.5 milliGRAM(s) IV Push once  multivitamin 1 Tablet(s) Oral daily  pantoprazole    Tablet 40 milliGRAM(s) Oral before breakfast  polyethylene glycol 3350 17 Gram(s) Oral two times a day  rivaroxaban 20 milliGRAM(s) Oral with dinner  senna 2 Tablet(s) Oral at bedtime    MEDICATIONS  (PRN):  magnesium hydroxide Suspension 30 milliLiter(s) Oral daily PRN Constipation  ondansetron Injectable 4 milliGRAM(s) IV Push every 6 hours PRN Nausea and/or Vomiting  oxyCODONE    IR 5 milliGRAM(s) Oral every 4 hours PRN Moderate Pain (4 - 6)  oxyCODONE    IR 10 milliGRAM(s) Oral every 4 hours PRN Severe Pain (7 - 10)  traMADol 50 milliGRAM(s) Oral every 6 hours PRN Mild Pain (1 - 3)    Vitals:      Vital Signs Last 24 Hrs  T(C): 36.8 (12-18-22 @ 05:12), Max: 37.2 (12-17-22 @ 20:04)  T(F): 98.3 (12-18-22 @ 05:12), Max: 98.9 (12-17-22 @ 20:04)  HR: 84 (12-18-22 @ 05:12) (76 - 99)  BP: 114/64 (12-18-22 @ 05:12) (108/70 - 152/76)  BP(mean): --  RR: 19 (12-18-22 @ 05:12) (18 - 19)  SpO2: 98% (12-18-22 @ 05:12) (95% - 98%)    Orthostatic VS  12-17-22 @ 10:21  Lying BP: 115/70 HR: --  Sitting BP: 98/55 HR: --  Standing BP: --/-- HR: --  Site: --  Mode: --  Orthostatic VS  12-17-22 @ 10:20  Lying BP: 115/70 HR: --  Sitting BP: 98/55 HR: --  Standing BP: 99/66 HR: --  Site: --  Mode: --      General Exam:   Constitutional: Male, appears stated age, in no apparent distress including pain  Head: Normocephalic & atraumatic.  Respiratory: No increased work of breathing  Extremities: b/l LE pitting edema  Skin: +b/l overlying chronic skin changes over b/l LE    Cardiovascular (>2): atrial fibrillation on monitor, HR 80s.    Neurological (>12):  MS: Awake, alert, oriented to person, place, situation, time. Normal affect. Follows all commands.    Language: Speech is clear, fluent with good repetition & comprehension (able to name objects thumb)    CNs: VFF to counting fingers. EOMI no nystagmus, no diplopia. V1-3 intact to LT, well developed masseter muscles b/l. No facial asymmetry b/l, full eye closure strength b/l. Hearing grossly normal (rubbing fingers) b/l. Symmetric palate elevation in midline. Gag reflex deferred. Head turning & shoulder shrug intact b/l. Tongue midline, normal movements, no atrophy.    Motor: Normal muscle bulk. b/l UE postural and action tremor.  Slight R pronator drift.              Deltoid	Biceps	Triceps	Wrist	Finger ABd	   R	4+	5	5	5	4+		4+ 	  L	5	5	5	5	5		5    	H-Flex	K-Flex	K-Ext	D-Flex	P-Flex  R	4+	5	5	4+	5	RLE with +straight raise	   L	5	5	5	5	5	     Sensation: Intact to LT b/l throughout.     Cortical: Extinction on DSS (neglect): none    Reflexes: with significant pain when checking for ankle clonus, deferred              Biceps(C5)       BR(C6)     Triceps(C7)               Patellar(L4)    Achilles(S1)    Plantar Resp  R	2	          2	             2		        1		    		mute  L	2	          2	             2		        1		    		mute    Coordination: intact rapid-alt movements. No dysmetria to FTN     Gait: unable to assess due to pain      I personally reviewed the below data/images/labs:  no new labs today yet   CBC Full  -  ( 17 Dec 2022 06:43 )  WBC Count : 24.16 K/uL  RBC Count : 3.45 M/uL  Hemoglobin : 10.9 g/dL  Hematocrit : 33.1 %  Platelet Count - Automated : 181 K/uL  Mean Cell Volume : 95.9 fl  Mean Cell Hemoglobin : 31.6 pg  Mean Cell Hemoglobin Concentration : 32.9 gm/dL  Auto Neutrophil # : x  Auto Lymphocyte # : x  Auto Monocyte # : x  Auto Eosinophil # : x  Auto Basophil # : x  Auto Neutrophil % : x  Auto Lymphocyte % : x  Auto Monocyte % : x  Auto Eosinophil % : x  Auto Basophil % : x        12-17    134<L>  |  99  |  28<H>  ----------------------------<  150<H>  5.1   |  23  |  0.96    Ca    8.8      17 Dec 2022 06:44        < from: CT Angio Neck Stroke Protocol w/ IV Cont (12.05.22 @ 15:25) >  IMPRESSION:    HEAD CT: No acute intracranial hemorrhage or acute territorial infarction.    If symptoms persist consider follow up head CT or MRI, MRA  if no   contraindication.    CTA COW:  Patent intracranial circulation without flow limiting stenosis   or large vessel occlusion.    CTA NECK: Patent, ECAs, ICAs, no  hemodynamically significant stenosis at    ICA origins by NASCET criteria.  Bilateral vertebral arteries are patent without flow limiting stenosis.    < end of copied text >    < from: MR Head No Cont (12.06.22 @ 19:43) >    ACC: [id number] EXAM:  MR BRAIN                          PROCEDURE DATE:  12/06/2022          INTERPRETATION:  CLINICAL STATEMENT: Pain.    TECHNIQUE: MRI of the brain was performed without gadolinium.    COMPARISON: CT head 12/5/2022    FINDINGS:  There is mild diffuse parenchymal volume loss. There are T2 prolongation   signal abnormalities in the periventricular subcortical white matter   likely related to mild chronic microvascular ischemic changes.    There is no acute parenchymal hemorrhage, parenchymal mass, mass effect   or midline shift. There is no extra-axial fluid collection.  There is no   hydrocephalus.  There is no acute infarct.    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    IMPRESSION:  No acute intracranial hemorrhage or acute infarct.    --- End of Report ---    < from: MR Cervical Spine No Cont (12.09.22 @ 20:23) >    ACC: [id number] EXAM:  MR SPINE CERVICAL                          PROCEDURE DATE:  12/09/2022          INTERPRETATION:  CLINICAL INDICATION: Cervical stenosis with myelopathy    Magnetic resonance imaging of the cervical spine was carried out with   sagittal surface coil imaging from C1 to T4 using T1 and fast spin echo   T2 weighted images with and without fat saturation technique with axial   T1 and fast spin echo T2 weighted imaging on a 1.5 Alexia magnet.    Comparison is made with the prior cervical spine CT of 10/30/2019.    The cervical vertebrae are normal in height and signal intensity. There   are disc osteophyte complexes present at C3-4 and C4-5 which with dorsal   ligamentous hypertrophy causes severe spinal stenosis and mild cord  compression. There is a small amount of cord signal abnormality at the   C4-5 level.    There is a small disc osteophyte complex present at C6-7 without cord   compression or significant spinal stenosis. The remainder of the cervical   spine and the upper thoracic spine are unremarkable.        IMPRESSION: Disc osteophyte complexes C3-4 and C4-5 along with dorsal   ligamentous causes severe spinal stenosis and mild cord compression.   Small amount of abnormal cord signal C4-5.                    --- End of Report ---            ABA MARIE MD; Attending Radiologist  This document has been electronically signed. Dec  9 2022  8:48PM    < end of copied text >

## 2022-12-18 NOTE — PROGRESS NOTE ADULT - SUBJECTIVE AND OBJECTIVE BOX
CARDIOLOGY FOLLOW UP NOTE - DR. ALVES    Patient Name: MARY BO  Date of Service: 12-18-22 @ 11:14    Patient seen and examined    Subjective:    cv: denies chest pain, dyspnea, palpitations, dizziness  pulmonary: denies cough  GI: denies abdominal pain, nausea, vomiting  vascular/legs: no edema   skin: no rash  ROS: otherwise negative   overnight events:      PHYSICAL EXAM:  T(C): 37.2 (12-18-22 @ 11:00), Max: 37.2 (12-17-22 @ 20:04)  HR: 85 (12-18-22 @ 11:00) (76 - 99)  BP: 132/73 (12-18-22 @ 11:00) (108/70 - 152/76)  RR: 18 (12-18-22 @ 11:00) (18 - 19)  SpO2: 98% (12-18-22 @ 11:00) (97% - 98%)  Wt(kg): --  I&O's Summary    17 Dec 2022 07:01  -  18 Dec 2022 07:00  --------------------------------------------------------  IN: 740 mL / OUT: 1490 mL / NET: -750 mL    18 Dec 2022 07:01  -  18 Dec 2022 11:14  --------------------------------------------------------  IN: 240 mL / OUT: 0 mL / NET: 240 mL      Daily     Daily     Appearance: Normal	  Cardiovascular: Normal S1 S2,RRR, No JVD, No murmurs  Respiratory: Lungs clear to auscultation	  Gastrointestinal:  Soft, Non-tender, + BS	  Extremities: Normal range of motion, No clubbing, cyanosis or edema      Home Medications:  escitalopram:  (05 Dec 2022 21:58)  rivaroxaban 20 mg oral tablet: 1 tab(s) orally once a day (before a meal) (05 Dec 2022 21:58)      MEDICATIONS  (STANDING):  acetaminophen     Tablet .. 650 milliGRAM(s) Oral every 6 hours  escitalopram 10 milliGRAM(s) Oral daily  HYDROmorphone  Injectable 0.5 milliGRAM(s) IV Push once  multivitamin 1 Tablet(s) Oral daily  pantoprazole    Tablet 40 milliGRAM(s) Oral before breakfast  polyethylene glycol 3350 17 Gram(s) Oral two times a day  rivaroxaban 20 milliGRAM(s) Oral with dinner  senna 2 Tablet(s) Oral at bedtime      TELEMETRY: 	    ECG:  	  RADIOLOGY:   DIAGNOSTIC TESTING:  [ ] Echocardiogram:  [ ] Catheterization:  [ ] Stress Test:    OTHER: 	    LABS:	 	    CARDIAC MARKERS:                                      9.3    28.26 )-----------( 163      ( 18 Dec 2022 06:53 )             28.5     12-18    133<L>  |  99  |  20  ----------------------------<  101<H>  4.2   |  22  |  0.92    Ca    8.5      18 Dec 2022 06:53      proBNP:   PT/INR - ( 17 Dec 2022 06:44 )   PT: 15.5 sec;   INR: 1.34 ratio         PTT - ( 17 Dec 2022 06:44 )  PTT:30.6 sec  Lipid Profile:   HgA1c:     Creatinine, Serum: 0.92 mg/dL (12-18-22 @ 06:53)  Creatinine, Serum: 0.96 mg/dL (12-17-22 @ 06:44)  Creatinine, Serum: 0.88 mg/dL (12-16-22 @ 17:11)  Creatinine, Serum: 1.22 mg/dL (12-16-22 @ 01:32)

## 2022-12-18 NOTE — PROGRESS NOTE ADULT - SUBJECTIVE AND OBJECTIVE BOX
Patient is a 82y old  Male who presents with a chief complaint of R sided weakness (18 Dec 2022 11:14)      SUBJECTIVE / OVERNIGHT EVENTS:  Patient seen and examined.   Doing well. No complaints.       Vital Signs Last 24 Hrs  T(C): 37.2 (18 Dec 2022 11:00), Max: 37.2 (17 Dec 2022 20:04)  T(F): 98.9 (18 Dec 2022 11:00), Max: 98.9 (17 Dec 2022 20:04)  HR: 85 (18 Dec 2022 11:00) (80 - 99)  BP: 132/73 (18 Dec 2022 11:00) (108/70 - 132/73)  BP(mean): --  RR: 18 (18 Dec 2022 11:00) (18 - 19)  SpO2: 98% (18 Dec 2022 11:00) (97% - 98%)    Parameters below as of 18 Dec 2022 11:00  Patient On (Oxygen Delivery Method): room air      I&O's Summary    17 Dec 2022 07:01  -  18 Dec 2022 07:00  --------------------------------------------------------  IN: 740 mL / OUT: 1490 mL / NET: -750 mL    18 Dec 2022 07:01  -  18 Dec 2022 13:56  --------------------------------------------------------  IN: 240 mL / OUT: 0 mL / NET: 240 mL        PHYSICAL EXAM:  GENERAL: NAD, AAOx3  HEAD:  Atraumatic, Normocephalic  EYES: EOMI; conjunctiva and sclera clear  NECK: Supple, No JVD, No LAD  CHEST/LUNG: B/L air entry; No wheezes, rales or rhonci   HEART: Regular rate and rhythm; No murmurs, rubs, or gallops  ABDOMEN: Soft, Nontender, Nondistended; Bowel sounds present  EXTREMITIES:  2+ Peripheral Pulses, No clubbing, cyanosis, or edema  SKIN: No rashes or lesions    LABS:                        9.3    28.26 )-----------( 163      ( 18 Dec 2022 06:53 )             28.5     12-18    133<L>  |  99  |  20  ----------------------------<  101<H>  4.2   |  22  |  0.92    Ca    8.5      18 Dec 2022 06:53      PT/INR - ( 17 Dec 2022 06:44 )   PT: 15.5 sec;   INR: 1.34 ratio         PTT - ( 17 Dec 2022 06:44 )  PTT:30.6 sec  CAPILLARY BLOOD GLUCOSE            Urinalysis Basic - ( 18 Dec 2022 12:23 )    Color: Yellow / Appearance: Clear / S.017 / pH: x  Gluc: x / Ketone: Negative  / Bili: Negative / Urobili: Negative   Blood: x / Protein: Trace / Nitrite: Negative   Leuk Esterase: Negative / RBC: 5 /hpf / WBC 1 /HPF   Sq Epi: x / Non Sq Epi: 1 /hpf / Bacteria: Negative        RADIOLOGY & ADDITIONAL TESTS:    Imaging Personally Reviewed:  [x] YES  [ ] NO    Consultant(s) Notes Reviewed:  [x] YES  [ ] NO      MEDICATIONS  (STANDING):  acetaminophen     Tablet .. 650 milliGRAM(s) Oral every 6 hours  escitalopram 10 milliGRAM(s) Oral daily  HYDROmorphone  Injectable 0.5 milliGRAM(s) IV Push once  multivitamin 1 Tablet(s) Oral daily  pantoprazole    Tablet 40 milliGRAM(s) Oral before breakfast  polyethylene glycol 3350 17 Gram(s) Oral two times a day  rivaroxaban 20 milliGRAM(s) Oral with dinner  senna 2 Tablet(s) Oral at bedtime    MEDICATIONS  (PRN):  magnesium hydroxide Suspension 30 milliLiter(s) Oral daily PRN Constipation  ondansetron Injectable 4 milliGRAM(s) IV Push every 6 hours PRN Nausea and/or Vomiting  oxyCODONE    IR 5 milliGRAM(s) Oral every 4 hours PRN Moderate Pain (4 - 6)  oxyCODONE    IR 10 milliGRAM(s) Oral every 4 hours PRN Severe Pain (7 - 10)  traMADol 50 milliGRAM(s) Oral every 6 hours PRN Mild Pain (1 - 3)      Care Discussed with Consultants/Other Providers [x] YES  [ ] NO    HEALTH ISSUES - PROBLEM Dx:  Suspected pulmonary embolism    Suspected deep vein thrombosis (DVT)    Weakness of lower extremity, unspecified laterality    Chronic atrial fibrillation    Leukocytosis    At risk for depression    DVT prophylaxis

## 2022-12-19 ENCOUNTER — TRANSCRIPTION ENCOUNTER (OUTPATIENT)
Age: 82
End: 2022-12-19

## 2022-12-19 LAB
ANION GAP SERPL CALC-SCNC: 10 MMOL/L — SIGNIFICANT CHANGE UP (ref 5–17)
BASOPHILS # BLD AUTO: 0.18 K/UL — SIGNIFICANT CHANGE UP (ref 0–0.2)
BASOPHILS NFR BLD AUTO: 0.8 % — SIGNIFICANT CHANGE UP (ref 0–2)
BUN SERPL-MCNC: 22 MG/DL — SIGNIFICANT CHANGE UP (ref 7–23)
CALCIUM SERPL-MCNC: 8.6 MG/DL — SIGNIFICANT CHANGE UP (ref 8.4–10.5)
CHLORIDE SERPL-SCNC: 99 MMOL/L — SIGNIFICANT CHANGE UP (ref 96–108)
CO2 SERPL-SCNC: 25 MMOL/L — SIGNIFICANT CHANGE UP (ref 22–31)
CREAT SERPL-MCNC: 0.93 MG/DL — SIGNIFICANT CHANGE UP (ref 0.5–1.3)
EGFR: 82 ML/MIN/1.73M2 — SIGNIFICANT CHANGE UP
EOSINOPHIL # BLD AUTO: 0 K/UL — SIGNIFICANT CHANGE UP (ref 0–0.5)
EOSINOPHIL NFR BLD AUTO: 0 % — SIGNIFICANT CHANGE UP (ref 0–6)
GLUCOSE SERPL-MCNC: 94 MG/DL — SIGNIFICANT CHANGE UP (ref 70–99)
HCT VFR BLD CALC: 27.7 % — LOW (ref 39–50)
HGB BLD-MCNC: 9.1 G/DL — LOW (ref 13–17)
LYMPHOCYTES # BLD AUTO: 2.09 K/UL — SIGNIFICANT CHANGE UP (ref 1–3.3)
LYMPHOCYTES # BLD AUTO: 9.5 % — LOW (ref 13–44)
MANUAL SMEAR VERIFICATION: SIGNIFICANT CHANGE UP
MCHC RBC-ENTMCNC: 31.7 PG — SIGNIFICANT CHANGE UP (ref 27–34)
MCHC RBC-ENTMCNC: 32.9 GM/DL — SIGNIFICANT CHANGE UP (ref 32–36)
MCV RBC AUTO: 96.5 FL — SIGNIFICANT CHANGE UP (ref 80–100)
MONOCYTES # BLD AUTO: 2.09 K/UL — HIGH (ref 0–0.9)
MONOCYTES NFR BLD AUTO: 9.5 % — SIGNIFICANT CHANGE UP (ref 2–14)
NEUTROPHILS # BLD AUTO: 17.63 K/UL — HIGH (ref 1.8–7.4)
NEUTROPHILS NFR BLD AUTO: 80.2 % — HIGH (ref 43–77)
PLAT MORPH BLD: NORMAL — SIGNIFICANT CHANGE UP
PLATELET # BLD AUTO: 158 K/UL — SIGNIFICANT CHANGE UP (ref 150–400)
POTASSIUM SERPL-MCNC: 4.2 MMOL/L — SIGNIFICANT CHANGE UP (ref 3.5–5.3)
POTASSIUM SERPL-SCNC: 4.2 MMOL/L — SIGNIFICANT CHANGE UP (ref 3.5–5.3)
RBC # BLD: 2.87 M/UL — LOW (ref 4.2–5.8)
RBC # FLD: 15 % — HIGH (ref 10.3–14.5)
RBC BLD AUTO: SIGNIFICANT CHANGE UP
SARS-COV-2 RNA SPEC QL NAA+PROBE: SIGNIFICANT CHANGE UP
SODIUM SERPL-SCNC: 134 MMOL/L — LOW (ref 135–145)
WBC # BLD: 21.98 K/UL — HIGH (ref 3.8–10.5)
WBC # FLD AUTO: 21.98 K/UL — HIGH (ref 3.8–10.5)

## 2022-12-19 RX ORDER — OXYCODONE HYDROCHLORIDE 5 MG/1
1 TABLET ORAL
Qty: 0 | Refills: 0 | DISCHARGE
Start: 2022-12-19

## 2022-12-19 RX ORDER — PANTOPRAZOLE SODIUM 20 MG/1
1 TABLET, DELAYED RELEASE ORAL
Qty: 0 | Refills: 0 | DISCHARGE
Start: 2022-12-19

## 2022-12-19 RX ORDER — POLYETHYLENE GLYCOL 3350 17 G/17G
17 POWDER, FOR SOLUTION ORAL
Qty: 0 | Refills: 0 | DISCHARGE
Start: 2022-12-19

## 2022-12-19 RX ORDER — ESCITALOPRAM OXALATE 10 MG/1
0 TABLET, FILM COATED ORAL
Qty: 0 | Refills: 0 | DISCHARGE

## 2022-12-19 RX ORDER — SENNA PLUS 8.6 MG/1
2 TABLET ORAL
Qty: 0 | Refills: 0 | DISCHARGE
Start: 2022-12-19

## 2022-12-19 RX ORDER — ACETAMINOPHEN 500 MG
2 TABLET ORAL
Qty: 0 | Refills: 0 | DISCHARGE
Start: 2022-12-19

## 2022-12-19 RX ORDER — LACTULOSE 10 G/15ML
10 SOLUTION ORAL ONCE
Refills: 0 | Status: COMPLETED | OUTPATIENT
Start: 2022-12-19 | End: 2022-12-19

## 2022-12-19 RX ORDER — ESCITALOPRAM OXALATE 10 MG/1
1 TABLET, FILM COATED ORAL
Qty: 0 | Refills: 0 | DISCHARGE
Start: 2022-12-19

## 2022-12-19 RX ORDER — MAGNESIUM HYDROXIDE 400 MG/1
30 TABLET, CHEWABLE ORAL
Qty: 0 | Refills: 0 | DISCHARGE
Start: 2022-12-19

## 2022-12-19 RX ORDER — TRAMADOL HYDROCHLORIDE 50 MG/1
1 TABLET ORAL
Qty: 0 | Refills: 0 | DISCHARGE
Start: 2022-12-19

## 2022-12-19 RX ORDER — LACTULOSE 10 G/15ML
20 SOLUTION ORAL ONCE
Refills: 0 | Status: COMPLETED | OUTPATIENT
Start: 2022-12-19 | End: 2022-12-19

## 2022-12-19 RX ADMIN — Medication 650 MILLIGRAM(S): at 17:44

## 2022-12-19 RX ADMIN — ONDANSETRON 4 MILLIGRAM(S): 8 TABLET, FILM COATED ORAL at 12:14

## 2022-12-19 RX ADMIN — PANTOPRAZOLE SODIUM 40 MILLIGRAM(S): 20 TABLET, DELAYED RELEASE ORAL at 05:19

## 2022-12-19 RX ADMIN — Medication 650 MILLIGRAM(S): at 05:59

## 2022-12-19 RX ADMIN — RIVAROXABAN 20 MILLIGRAM(S): KIT at 17:14

## 2022-12-19 RX ADMIN — ESCITALOPRAM OXALATE 10 MILLIGRAM(S): 10 TABLET, FILM COATED ORAL at 12:02

## 2022-12-19 RX ADMIN — LACTULOSE 10 GRAM(S): 10 SOLUTION ORAL at 12:41

## 2022-12-19 RX ADMIN — Medication 650 MILLIGRAM(S): at 23:23

## 2022-12-19 RX ADMIN — Medication 650 MILLIGRAM(S): at 12:30

## 2022-12-19 RX ADMIN — POLYETHYLENE GLYCOL 3350 17 GRAM(S): 17 POWDER, FOR SOLUTION ORAL at 05:19

## 2022-12-19 RX ADMIN — Medication 1 TABLET(S): at 12:01

## 2022-12-19 RX ADMIN — Medication 650 MILLIGRAM(S): at 17:14

## 2022-12-19 RX ADMIN — LACTULOSE 20 GRAM(S): 10 SOLUTION ORAL at 15:39

## 2022-12-19 RX ADMIN — POLYETHYLENE GLYCOL 3350 17 GRAM(S): 17 POWDER, FOR SOLUTION ORAL at 17:13

## 2022-12-19 RX ADMIN — SENNA PLUS 2 TABLET(S): 8.6 TABLET ORAL at 23:23

## 2022-12-19 RX ADMIN — Medication 650 MILLIGRAM(S): at 12:00

## 2022-12-19 RX ADMIN — Medication 650 MILLIGRAM(S): at 05:19

## 2022-12-19 NOTE — DISCHARGE NOTE PROVIDER - ATTENDING DISCHARGE PHYSICAL EXAMINATION:
GENERAL: NAD, AAOx2  CHEST/LUNG: CTABL, No wheeze  HEART: Regular rate and rhythm; No murmur  ABDOMEN: Soft, Nontender, Nondistended; Bowel sounds present  EXTREMITIES:  2+ Peripheral Pulses, No edema

## 2022-12-19 NOTE — PROGRESS NOTE ADULT - SUBJECTIVE AND OBJECTIVE BOX
Pt seen/examined. Doing well. Pain controlled. No acute overnight complaints or events.    T(C): 36.9 (12-18-22 @ 20:12), Max: 37.2 (12-18-22 @ 11:00)  HR: 78 (12-18-22 @ 20:12) (78 - 99)  BP: 108/65 (12-18-22 @ 20:12) (108/65 - 132/73)  RR: 18 (12-18-22 @ 20:12) (18 - 19)  SpO2: 98% (12-18-22 @ 20:12) (97% - 98%)  Wt(kg): --  - Gen: NAD    Exam:  Alert and Oriented, No Acute Distress. VSS.   Laterality: RLE     Calves soft, non-tender     Abduction pillow in place      Middle Aquacel with moderate drainage otherwise intact     Proximal and distal Aquacels are clean, dry, and intact     (+) PF/DF/EHL/FHL 5/5     Sensation intact to light touch      2+ DP/PT pulse  : Sweet in place    A/P: 82y Male s/p AJ right hip IMN conversion to JESS and ORIF femoral shaft. VSS. NAD    -PT/OT - NWB RLE, WBAT LLE for transfers ONLY, NO gait training  -Continue abduction pillow while in bed  -IS bedside   -DVT PPx: Xarelto, SCDs, Early OOB and Amb  -GI PPx: Protonix 40 mg  -Pain Control  -f/u AM labs.   -Continue Current Tx per primary team  -Dispo planning per primary team

## 2022-12-19 NOTE — PROGRESS NOTE ADULT - ASSESSMENT
82M w/ pmhx of afib on xarelto, s/p L hip IMN (most recently s/p R hip IMN (Dr. Godinez 10/31/19) presents with acute onset R sided weakness.    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, cervical stenosis  # right IM loose nail  MRI brain no acute infarct  CT L spine L4-L5 there is severe right subarticular zone stenosis and severe right neural foraminal stenosis  MRI cervical spine severe canal stenosis c3/4 c4/c5, mild cord compression, NSGY aware and recommend outpt fu no acute intervention  appreciate ortho recs, sp IR right hip aspiration  afvss, pain control, bowel regimen , add lactulose   post op pt, ot , active t & s, currently hgb stable no need for transfusion  dvt ppx -xarelto,, -plan per primary team    Leukocytosis   -ua and cxr negative   -appears non toxic   -f/u ID     # Grade 3 mobility #4 tooth due to periodontitis, loose tooth  s/p Extraction #4 (upper right second bicuspid) on 12/13  dentally cleared    # LE wound  BC NTD  podiatry following for L heel wound  monitor off abx    # Chronic atrial fibrillation  on xarelto  TTE Severe right atrial enlargement. borderline pulmonary hypertension. Moderate-severe tricuspid regurgitation. EF64%  cardio following  lasix MWF    # depression  On Lexapro    DVT prophylaxis.xarelto    Spoke with daughter Samaria. on 12/28     Branden Valencia MD   [facility]  [phone]    Dispo: pending rehab; will aim for tomorrow 12/20.      Please contact with any questions or concerns [phone].

## 2022-12-19 NOTE — PROGRESS NOTE ADULT - SUBJECTIVE AND OBJECTIVE BOX
CARDIOLOGY FOLLOW UP - Dr. Marrufo  DATE OF SERVICE: 12/19/22    CC  No CV complaints    REVIEW OF SYSTEMS:  CONSTITUTIONAL: No fever, weight loss, or fatigue  RESPIRATORY: No cough, wheezing, chills or hemoptysis; No Shortness of Breath  CARDIOVASCULAR: No chest pain, palpitations, passing out, dizziness, or leg swelling  GASTROINTESTINAL: No abdominal or epigastric pain. No nausea, vomiting, or hematemesis; No diarrhea or constipation. No melena or hematochezia.  VASCULAR: No edema     PHYSICAL EXAM:  T(C): 36.7 (12-19-22 @ 12:13), Max: 36.9 (12-18-22 @ 20:12)  HR: 90 (12-19-22 @ 12:13) (78 - 91)  BP: 109/72 (12-19-22 @ 12:13) (107/67 - 116/79)  RR: 18 (12-19-22 @ 12:13) (18 - 18)  SpO2: 95% (12-19-22 @ 12:13) (95% - 98%)  Wt(kg): --  I&O's Summary    18 Dec 2022 07:01  -  19 Dec 2022 07:00  --------------------------------------------------------  IN: 740 mL / OUT: 800 mL / NET: -60 mL    19 Dec 2022 07:01  -  19 Dec 2022 13:17  --------------------------------------------------------  IN: 280 mL / OUT: 500 mL / NET: -220 mL        Appearance: Normal	  Cardiovascular: Normal S1 S2,Irregular rhythm, regular rate, No JVD, No murmurs  Respiratory: Lungs clear to auscultation b/l  Gastrointestinal:  Soft, Non-tender, + BS	  Extremities: Normal range of motion, No clubbing, cyanosis or edema      Home Medications:  escitalopram:  (05 Dec 2022 21:58)  rivaroxaban 20 mg oral tablet: 1 tab(s) orally once a day (before a meal) (05 Dec 2022 21:58)      MEDICATIONS  (STANDING):  acetaminophen     Tablet .. 650 milliGRAM(s) Oral every 6 hours  escitalopram 10 milliGRAM(s) Oral daily  HYDROmorphone  Injectable 0.5 milliGRAM(s) IV Push once  multivitamin 1 Tablet(s) Oral daily  pantoprazole    Tablet 40 milliGRAM(s) Oral before breakfast  polyethylene glycol 3350 17 Gram(s) Oral two times a day  rivaroxaban 20 milliGRAM(s) Oral with dinner  senna 2 Tablet(s) Oral at bedtime      TELEMETRY: 	    ECG:  	  RADIOLOGY:   DIAGNOSTIC TESTING:  [ ] Echocardiogram:  [ ]  Catheterization:  [ ] Stress Test:    OTHER: 	    LABS:	 	                            9.1    21.98 )-----------( 158      ( 19 Dec 2022 07:23 )             27.7     12-19    134<L>  |  99  |  22  ----------------------------<  94  4.2   |  25  |  0.93    Ca    8.6      19 Dec 2022 07:20

## 2022-12-19 NOTE — DISCHARGE NOTE PROVIDER - PROVIDER TOKENS
PROVIDER:[TOKEN:[[id number]],FOLLOWUP:[2 weeks]],PROVIDER:[TOKEN:[[id number]],FOLLOWUP:[2 weeks]],PROVIDER:[TOKEN:[[id number]],FOLLOWUP:[1 month]] PROVIDER:[TOKEN:[[id number]],FOLLOWUP:[2 weeks]],PROVIDER:[TOKEN:[[id number]],FOLLOWUP:[2 weeks]],PROVIDER:[TOKEN:[[id number]],FOLLOWUP:[1 month]],PROVIDER:[TOKEN:[[id number]],FOLLOWUP:[2 weeks]]

## 2022-12-19 NOTE — CHART NOTE - NSCHARTNOTEFT_GEN_A_CORE
After discussion with Dr. Godinez, plan is to proceed for OR Thursday 12/15 with Dr. Mcfarlane for right hip removal of hardware and arthroplasty. Patient and family are aware of plan and patient provided new consent for surgery. Please document medical clearance for OR Thursday 12/15. Hold anticoagulation 24 hours before OR in anticipation of spinal anesthesia. NPO at midnight 12/14. Preop labs. Please page with any questions.    Dain Morel MD PGY-3  Orthopaedic Surgery  [phone]
Called by Ortho resident to d/c heparin gtt and start therapeutic Lovenox (plan for OR on 12/13/22). Discussed with Dr. Yuan and Lovenox ordered.     Brittany Salazar, ROBERTO-c  [phone]
Case further discussed with attending Dr. Godinez and Dr. Mcfarlane. Dr. Godinez will continue management and will plan for OR on Friday morning. No DVT ppx until that time. Remainder of plan per AM Note.
Discussed case with IR LANEY Terry. At this time we would still like to proceed with IR aspiration of Right hip joint as this patient has a hx of R Hip IM nail with subsequent infection in 2019. As this patient is currently being worked up for hardware failure with plan for hardware revision next week, we would like to rule out hip joint infection as a possible cause of failure. IR team aware and agreeable to plan. Plan to proceed with R hip aspiration with Dr. Alvares today.
IR consulted for hip aspiration. NO drainable collection noted on CT scan and no drainage recommendation noted per Ortho note or infection disease. D/w Primary team ACP. IR will sign off.
Left 1 message for patient in regards to follow up care with callback information 12/19/22.
MR C-spine and plan reviewed with attending Dr. Lopez. No urgent surgery indicated at this time. Recommend patient follow up with Dr. Lopez in the office 1-2 weeks from discharge.
Post-Operative Check    Pt evaluated in RR resting without complaints. Pt states pain is well tolerated. No Chest Pain, SOB, N/V.  Pt received 2 units PRBC intra-op, EBL - 1000 mL    Vitals:  T(C): 36 (12-16-22 @ 16:30), Max: 36.7 (12-15-22 @ 19:55)  HR: 77 (12-16-22 @ 17:45) (57 - 80)  BP: 109/62 (12-16-22 @ 17:45) (95/64 - 138/76)  RR: 18 (12-16-22 @ 17:45) (16 - 18)  SpO2: 99% (12-16-22 @ 17:45) (94% - 100%)    Exam:  Alert and Oriented, No Acute Distress. VSS.   Laterality: RLE     Calves soft, non-tender     Abduction pillow in place      Middle Aquacel with moderate drainage otherwise intact     Proximal and distal Aquacels are clean, dry, and intact     (+) PF/DF/EHL/FHL 5/5     Sensation intact to light touch      2+ DP/PT pulse  : Sweet in place      Labs:                      11.1   28.60 )-----------( 207      ( 16 Dec 2022 17:12 )             34.1    12-16    139  |  104  |  24<H>  ----------------------------<  171<H>  4.7   |  21<L>  |  0.88    Ca    7.9<L>      16 Dec 2022 17:11      A/P: 82y Male s/p AJ right hip IMN conversion to JESS and ORIF femoral shaft. VSS. NAD  -PT/OT - NWB RLE, WBAT LLE for transfers ONLY, NO gait training  -Continue abduction pillow while in bed  -IS bedside   -DVT PPx: Start Xarelto 10 mg QD on POD#1, SCDs, Early OOB and Amb  -GI PPx: Protonix 40 mg  -Continue post-op abx x 24hrs   -Pain Control  -f/u AM labs.   -Continue Current Tx per primary team  -Dispo planning per primary team    Armen Zavala PA-C  Orthopedic Surgery Team  Team Pager #[phone]
Wound Care Team Note:    Request for wound care consult for foot wounds received. Wound care consult referred to Wound Care Team Podiatrists, Dar Jay/[place]/Darrius. Will defer to podiatry for management. Please refer any questions/concerns to Podiatry. Will not follow.    Laura Vargas, ROBERTO-C, CWOCN [id number]
MEDICINE PA NOTE    Per ortho, patient cleared for discharge and scheduled for OR as outpatient next Wednesday (AC will need to be put on hold a few days prior). Please restart Xarelto if no further procedures planned.     Karen LANYE Valverde  [phone]
Medicine NP note    Call received from ortho   Plan for OR 12/15 with Dr. Mcfarlane for right hip removal of hardware, hemiarthroplasty vs. total hip arthroplasty  Hold AC per Ortho  Medical Ike needed  T&S active  Covid negative as of 12/12  Will sign out to day team    Guille Tuttle P BC  [phone]

## 2022-12-19 NOTE — PROGRESS NOTE ADULT - ASSESSMENT
82M RH w/ afib on xarelto, s/p L hip IMN (2013), s/p R hip IMN (2019) presents with acute onset RLE weakness. LKN 12/5/22 at 7am. Pt last took xarelto at 6:30am. While showering, he felt RLE sudden onset weakness, no associated pain or back pain at that time. He reports increased R hip area pain and numbness/tingling with associated leg shaking due to pain. Pt was hanging onto the sink for at least 3-4 hours and reports not being able to get up due to RLE weakness. NIHSS: 0, preMRS: 2 (ambulates with a walker). Neuro exam notable for slight +R pronator, +straight leg raise. CTH/CTA neg  NIHSS4  premrs2-3 walks with cane/walker   MRI brain neg for infarct   CRP 22   CT pelvis: Redemonstration of open reduction internal fixation of the right hip.   Interval development of loosening throughout the right femoral neck   compression screw with migration of the tip into the joint space.   Interval development of lateral deviation of the right femoral   intramedullary simón with the tip now abutting and markedly   remodeling/thinning the lateral cortex at the mid diaphysis.  MRI C spien 12/9: mild cord compression C3/4/5. abnormal signal C4/5   s/p IR aspiration 12/9  s/p  R hip hardware revision by ortho 12/16  post op R hip pain but otherwise neuro intact     Impression: acute RLE weakness and pain likely 2/2 R hip pathology vs. lumbosacral pathology, less likely stroke; Stroke ruled out     Recommendations:      - pain management, now getting dilauded   - continue home xarelto for stroke prevention, restarted post op.monitor H/H   - monitor on telemetry  - High dose statin therapy - atorvastatin 40mg PO daily. LDL goal <70mg/dL.   - PT/OT/SS/SLP, OOBC  - check FS, glucose control <180  - GI/DVT ppx  - Thank you for allowing me to participate in the care of this patient. Call with questions.   - dc planning     Carloz Del Castillo MD  Vascular Neurology  Office: [phone] .

## 2022-12-19 NOTE — DISCHARGE NOTE PROVIDER - CARE PROVIDER_API CALL
Rojas Godinez)  Orthopaedic Surgery  [address], [address]  [place] [address]  Phone: [phone]  Fax: [phone]  Follow Up Time: 2 weeks    Thuan Marrufo)  Cardiovascular Disease; Interventional Cardiology; Nuclear Cardiology  [address], [address]  [place] [address]  Phone: [phone]  Fax: [phone]  Follow Up Time: 2 weeks    Carloz Del Castillo)  Neurology; Vascular Neurology  [address], [address]  [place] [address]  Phone: [phone]  Fax: [phone]  Follow Up Time: 1 month   Rojas Godinez)  Orthopaedic Surgery  [address], [address]  [place] [address]  Phone: [phone]  Fax: [phone]  Follow Up Time: 2 weeks    Thuan Marrufo)  Cardiovascular Disease; Interventional Cardiology; Nuclear Cardiology  [address], [address]  [place] [address]  Phone: [phone]  Fax: [phone]  Follow Up Time: 2 weeks    Carloz Del Castillo)  Neurology; Vascular Neurology  [address], [address]  [place] [address]  Phone: [phone]  Fax: [phone]  Follow Up Time: 1 month    Shahab Lopez; RONAK; MS)  Neurosurgery  [address], [address]  [place] [address]  Phone: [phone]  Fax: [phone]  Follow Up Time: 2 weeks

## 2022-12-19 NOTE — DISCHARGE NOTE PROVIDER - NSDCCAREPROVSEEN_GEN_ALL_CORE_FT
Tejas Hill, Misty Morel, Dain Marrufo, Thuan Marrufo, Fran Valencia, Branden Ledesma, Faisal Lenz, Shashank Mcfarlane, Kane Del Castillo, Carloz Jackson, Slim Kelley, Bernard

## 2022-12-19 NOTE — CHART NOTE - NSCHARTNOTESELECT_GEN_ALL_CORE
Event Note
IR/Event Note
Neurosurgery/Event Note
Orthopaedic Surgery/Event Note
Orthopedic POC/Event Note
Orthopedics/Event Note
Wound Care Team Note:/Event Note
d/c appt/Event Note
ortho

## 2022-12-19 NOTE — DISCHARGE NOTE PROVIDER - NSDCFUADDAPPT_GEN_ALL_CORE_FT
APPTS ARE READY TO BE MADE: [x ] YES    Best Family or Patient Contact (if needed):    Additional Information about above appointments (if needed):    1:   2:   3:     Other comments or requests:    APPTS ARE READY TO BE MADE: [x ] YES    Best Family or Patient Contact (if needed):    Additional Information about above appointments (if needed):    1:   2:   3:     Other comments or requests:   Patient is being discharged to rehab. Caregiver will arrange follow up.

## 2022-12-19 NOTE — DISCHARGE NOTE PROVIDER - HOSPITAL COURSE
82 year old male PMH afib on xarelto, s/p L hip IMN (most recently s/p R hip IMN (Dr. Godinez 10/31/19) presents with acute onset R sided weakness.    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, cervical stenosis  # right IM loose nail  MRI brain no acute infarct  CT L spine L4-L5 there is severe right subarticular zone stenosis and severe right neural foraminal stenosis  MRI cervical spine severe canal stenosis c3/4 c4/c5, mild cord compression, NSGY aware and recommend outpt fu no acute intervention  appreciate ortho recs, sp IR right hip aspiration  afvss, pain control, bowel regimen  post op pt, ot , active t & s, currently hgb stable no need for transfusion  dvt ppx -xarelto,, -plan per primary team    Post op constipation   -bs +, nt, nd   -miralax bid, senna, lactulose , still no bm   -GI consulted , moviprep given with bm    Acute blood loss anemia  -post op   -active t & s, hgb downtrending, f/u daily cbc   -transfuse if hgb < 8.      Leukocytosis   -improving   -ua and cxr negative   -appears non toxic   -f/u ID     # Grade 3 mobility #4 tooth due to periodontitis, loose tooth  s/p Extraction #4 (upper right second bicuspid) on 12/13  dentally cleared    # LE wound  BC NTD  podiatry following for L heel wound  monitor off abx    # Chronic atrial fibrillation  on xarelto  TTE Severe right atrial enlargement. borderline pulmonary hypertension. Moderate-severe tricuspid regurgitation. EF64%  cardio following  lasix MWF    # depression  On Lexapro    Patient stable for discharge to rehab.

## 2022-12-19 NOTE — DISCHARGE NOTE PROVIDER - NSDCMRMEDTOKEN_GEN_ALL_CORE_FT
acetaminophen 325 mg oral tablet: 2 tab(s) orally every 6 hours  escitalopram 10 mg oral tablet: 1 tab(s) orally once a day  magnesium hydroxide 8% oral suspension: 30 milliliter(s) orally once a day, As needed, Constipation  Multiple Vitamins oral tablet: 1 tab(s) orally once a day  oxyCODONE 10 mg oral tablet: 1 tab(s) orally every 4 hours, As needed, Severe Pain (7 - 10)  oxyCODONE 5 mg oral tablet: 1 tab(s) orally every 4 hours, As needed, Moderate Pain (4 - 6)  pantoprazole 40 mg oral delayed release tablet: 1 tab(s) orally once a day (before a meal)  polyethylene glycol 3350 oral powder for reconstitution: 17 gram(s) orally 2 times a day  rivaroxaban 20 mg oral tablet: 1 tab(s) orally once a day (before a meal)  senna leaf extract oral tablet: 2 tab(s) orally once a day (at bedtime)  traMADol 50 mg oral tablet: 1 tab(s) orally every 6 hours, As needed, Mild Pain (1 - 3)   acetaminophen 325 mg oral tablet: 2 tab(s) orally every 6 hours  escitalopram 10 mg oral tablet: 1 tab(s) orally once a day  furosemide 20 mg oral tablet: 1 tab(s) orally Monday, Wednesday, and Friday  magnesium hydroxide 8% oral suspension: 30 milliliter(s) orally once a day, As needed, Constipation  Multiple Vitamins oral tablet: 1 tab(s) orally once a day  oxyCODONE 10 mg oral tablet: 1 tab(s) orally every 4 hours, As needed, Severe Pain (7 - 10)  oxyCODONE 5 mg oral tablet: 1 tab(s) orally every 4 hours, As needed, Moderate Pain (4 - 6)  pantoprazole 40 mg oral delayed release tablet: 1 tab(s) orally once a day (before a meal)  polyethylene glycol 3350 oral powder for reconstitution: 17 gram(s) orally 2 times a day  rivaroxaban 20 mg oral tablet: 1 tab(s) orally once a day (before a meal)  senna leaf extract oral tablet: 2 tab(s) orally once a day (at bedtime)  traMADol 50 mg oral tablet: 1 tab(s) orally every 6 hours, As needed, Mild Pain (1 - 3)   acetaminophen 325 mg oral tablet: 2 tab(s) orally every 6 hours, As Needed for mild pain  escitalopram 10 mg oral tablet: 1 tab(s) orally once a day  furosemide 20 mg oral tablet: 1 tab(s) orally Monday, Wednesday, and Friday  magnesium hydroxide 8% oral suspension: 30 milliliter(s) orally once a day, As needed, Constipation  Multiple Vitamins oral tablet: 1 tab(s) orally once a day  oxyCODONE 10 mg oral tablet: 1 tab(s) orally every 4 hours, As needed, Severe Pain (7 - 10)  oxyCODONE 5 mg oral tablet: 1 tab(s) orally every 4 hours, As needed, Moderate Pain (4 - 6)  pantoprazole 40 mg oral delayed release tablet: 1 tab(s) orally once a day (before a meal)  polyethylene glycol 3350 oral powder for reconstitution: 17 gram(s) orally 2 times a day  rivaroxaban 20 mg oral tablet: 1 tab(s) orally once a day (before a meal)  senna leaf extract oral tablet: 2 tab(s) orally once a day (at bedtime)

## 2022-12-19 NOTE — PROGRESS NOTE ADULT - ASSESSMENT
ECHO 12/6/22:  Ef 64%;  mild MR, nl LV sys fx  , mod to sev TR     a/p  82M w/ pmhx of afib on xarelto, s/p L hip IMN (most recently s/p R hip IMN (Dr. Godinez 10/31/19) presents with acute onset RLE weakness.    #WCT  -cont to monitor   -no further sig WCT, unable to give bb due to freda   -echo with nl EF    #Right Sided HF  -ECHO revealed normal LVEF, w R sided enlargement and mod-severe TR  -Venous dopplers negative for DVT  -likely related to r sided CHF  -Restart lasix 20mg PO Q48hrs if eating regular diet   -pt in slow afib off meds which preclude BB use    #Afib  -rate controlled/freda off meds  -no sig freda, pauses  -continue xarelto 20mg qd    #Weakness  -neuro w/u neg for CVA  -w/u per primary team  -neuro eval noted    #s/p IR hip aspiration 12/9  -S/P right hip removal of hardware, right hip IMN conversion to JESS, ORIF femoral shaft 12/16  -remains cv stable POST OP    #Leukocytosis  -Downtrending  -Mgmnt per primary, ID

## 2022-12-19 NOTE — DISCHARGE NOTE PROVIDER - NSDCCPCAREPLAN_GEN_ALL_CORE_FT
PRINCIPAL DISCHARGE DIAGNOSIS  Diagnosis: Right leg weakness  Assessment and Plan of Treatment: s/p Right IMN conversion to JESS  WBAT LLE for transfers only  NWB RLE with posterior hip precautions      SECONDARY DISCHARGE DIAGNOSES  Diagnosis: Atrial fibrillation  Assessment and Plan of Treatment: Atrial fibrillation is the most common heart rhythm problem & has the risk of stroke & heart attack  It helps if you control your blood pressure, not drink more than 1-2 alcohol drinks per day, cut down on caffeine, getting treatment for over active thyroid gland, & getting exercise  Call your doctor if you feel your heart racing or beating unusually, chest tightness or pain, lightheaded, faint, shortness of breath especially with exercise  It is important to take your heart medication as prescribed    Diagnosis: Spinal stenosis  Assessment and Plan of Treatment:     Diagnosis: Constipation  Assessment and Plan of Treatment:      PRINCIPAL DISCHARGE DIAGNOSIS  Diagnosis: Right leg weakness  Assessment and Plan of Treatment: s/p Right IMN conversion to JESS  WBAT LLE for transfers only  NWB RLE with posterior hip precautions  No gait training  Follow up with Dr Godinez in 2 weeks.      SECONDARY DISCHARGE DIAGNOSES  Diagnosis: Atrial fibrillation  Assessment and Plan of Treatment: Atrial fibrillation is the most common heart rhythm problem & has the risk of stroke & heart attack  It helps if you control your blood pressure, not drink more than 1-2 alcohol drinks per day, cut down on caffeine, getting treatment for over active thyroid gland, & getting exercise  Call your doctor if you feel your heart racing or beating unusually, chest tightness or pain, lightheaded, faint, shortness of breath especially with exercise  It is important to take your heart medication as prescribed    Diagnosis: Spinal stenosis  Assessment and Plan of Treatment: MRI cervical spine with spinal stenosis at C3-C5  Follow up with Dr Lopez in 1-2 weeks after discharge.    Diagnosis: Constipation  Assessment and Plan of Treatment: Now resolved.  Continue bowel regimen as prescribed.    Diagnosis: Chronic heart failure  Assessment and Plan of Treatment: Right sided heart failure  Continue Lasix as prescribed  Follow up with Dr Marrufo in 2 weeks, call to make an appointment.

## 2022-12-19 NOTE — PROGRESS NOTE ADULT - SUBJECTIVE AND OBJECTIVE BOX
Patient is a 82y old  Male who presents with a chief complaint of R sided weakness (19 Dec 2022 11:34)      SUBJECTIVE / OVERNIGHT EVENTS:  Patient seen and examined.   Doing well, no complaints.  NO bm, passing gas. No nausea or vomiting.      Vital Signs Last 24 Hrs  T(C): 36.9 (19 Dec 2022 08:36), Max: 36.9 (18 Dec 2022 20:12)  T(F): 98.4 (19 Dec 2022 08:36), Max: 98.5 (18 Dec 2022 20:12)  HR: 91 (19 Dec 2022 11:39) (78 - 91)  BP: 116/65 (19 Dec 2022 11:39) (107/67 - 116/79)  BP(mean): --  RR: 18 (19 Dec 2022 08:36) (18 - 18)  SpO2: 97% (19 Dec 2022 11:39) (97% - 98%)    Parameters below as of 19 Dec 2022 11:39  Patient On (Oxygen Delivery Method): room air      I&O's Summary    18 Dec 2022 07:01  -  19 Dec 2022 07:00  --------------------------------------------------------  IN: 740 mL / OUT: 800 mL / NET: -60 mL    19 Dec 2022 07:01  -  19 Dec 2022 12:11  --------------------------------------------------------  IN: 280 mL / OUT: 500 mL / NET: -220 mL        PHYSICAL EXAM:  GENERAL: NAD, AAOx3  HEAD:  Atraumatic, Normocephalic  EYES: EOMI; conjunctiva and sclera clear  NECK: Supple, No JVD, No LAD  CHEST/LUNG: B/L air entry; No wheezes, rales or rhonci   HEART: Regular rate and rhythm; No murmurs, rubs, or gallops  ABDOMEN: Soft, Nontender, Nondistended; Bowel sounds present  EXTREMITIES:  2+ Peripheral Pulses, No clubbing, cyanosis, or edema  SKIN: No rashes or lesions    LABS:                        9.1    21.98 )-----------( 158      ( 19 Dec 2022 07:23 )             27.7     12-    134<L>  |  99  |  22  ----------------------------<  94  4.2   |  25  |  0.93    Ca    8.6      19 Dec 2022 07:20        CAPILLARY BLOOD GLUCOSE            Urinalysis Basic - ( 18 Dec 2022 12:23 )    Color: Yellow / Appearance: Clear / S.017 / pH: x  Gluc: x / Ketone: Negative  / Bili: Negative / Urobili: Negative   Blood: x / Protein: Trace / Nitrite: Negative   Leuk Esterase: Negative / RBC: 5 /hpf / WBC 1 /HPF   Sq Epi: x / Non Sq Epi: 1 /hpf / Bacteria: Negative        RADIOLOGY & ADDITIONAL TESTS:    Imaging Personally Reviewed:  [x] YES  [ ] NO    Consultant(s) Notes Reviewed:  [x] YES  [ ] NO      MEDICATIONS  (STANDING):  acetaminophen     Tablet .. 650 milliGRAM(s) Oral every 6 hours  escitalopram 10 milliGRAM(s) Oral daily  HYDROmorphone  Injectable 0.5 milliGRAM(s) IV Push once  lactulose Syrup 10 Gram(s) Oral once  multivitamin 1 Tablet(s) Oral daily  pantoprazole    Tablet 40 milliGRAM(s) Oral before breakfast  polyethylene glycol 3350 17 Gram(s) Oral two times a day  rivaroxaban 20 milliGRAM(s) Oral with dinner  senna 2 Tablet(s) Oral at bedtime    MEDICATIONS  (PRN):  magnesium hydroxide Suspension 30 milliLiter(s) Oral daily PRN Constipation  ondansetron Injectable 4 milliGRAM(s) IV Push every 6 hours PRN Nausea and/or Vomiting  oxyCODONE    IR 5 milliGRAM(s) Oral every 4 hours PRN Moderate Pain (4 - 6)  oxyCODONE    IR 10 milliGRAM(s) Oral every 4 hours PRN Severe Pain (7 - 10)  traMADol 50 milliGRAM(s) Oral every 6 hours PRN Mild Pain (1 - 3)      Care Discussed with Consultants/Other Providers [x] YES  [ ] NO    HEALTH ISSUES - PROBLEM Dx:  Suspected pulmonary embolism    Suspected deep vein thrombosis (DVT)    Weakness of lower extremity, unspecified laterality    Chronic atrial fibrillation    Leukocytosis    At risk for depression    DVT prophylaxis

## 2022-12-19 NOTE — PROGRESS NOTE ADULT - SUBJECTIVE AND OBJECTIVE BOX
Neurology Progress Note    S: Patient seen and examined. No new events overnight s/p sx doing okay     Medication:  MEDICATIONS  (STANDING):  acetaminophen     Tablet .. 650 milliGRAM(s) Oral every 6 hours  escitalopram 10 milliGRAM(s) Oral daily  HYDROmorphone  Injectable 0.5 milliGRAM(s) IV Push once  multivitamin 1 Tablet(s) Oral daily  pantoprazole    Tablet 40 milliGRAM(s) Oral before breakfast  polyethylene glycol 3350 17 Gram(s) Oral two times a day  rivaroxaban 20 milliGRAM(s) Oral with dinner  senna 2 Tablet(s) Oral at bedtime    MEDICATIONS  (PRN):  magnesium hydroxide Suspension 30 milliLiter(s) Oral daily PRN Constipation  ondansetron Injectable 4 milliGRAM(s) IV Push every 6 hours PRN Nausea and/or Vomiting  oxyCODONE    IR 5 milliGRAM(s) Oral every 4 hours PRN Moderate Pain (4 - 6)  oxyCODONE    IR 10 milliGRAM(s) Oral every 4 hours PRN Severe Pain (7 - 10)  traMADol 50 milliGRAM(s) Oral every 6 hours PRN Mild Pain (1 - 3)      Vitals:    Vital Signs Last 24 Hrs  T(C): 36.9 (19 Dec 2022 08:36), Max: 37.2 (18 Dec 2022 11:00)  T(F): 98.4 (19 Dec 2022 08:36), Max: 98.9 (18 Dec 2022 11:00)  HR: 83 (19 Dec 2022 08:36) (78 - 85)  BP: 107/67 (19 Dec 2022 08:36) (107/67 - 132/73)  BP(mean): --  RR: 18 (19 Dec 2022 08:36) (18 - 18)  SpO2: 97% (19 Dec 2022 08:36) (97% - 98%)    Parameters below as of 19 Dec 2022 08:36  Patient On (Oxygen Delivery Method): room air        Orthostatic VS  12-17-22 @ 10:21  Lying BP: 115/70 HR: --  Sitting BP: 98/55 HR: --  Standing BP: --/-- HR: --  Site: --  Mode: --  Orthostatic VS  12-17-22 @ 10:20  Lying BP: 115/70 HR: --  Sitting BP: 98/55 HR: --  Standing BP: 99/66 HR: --  Site: --  Mode: --      General Exam:   Constitutional: Male, appears stated age, in no apparent distress including pain  Head: Normocephalic & atraumatic.  Respiratory: No increased work of breathing  Extremities: b/l LE pitting edema  Skin: +b/l overlying chronic skin changes over b/l LE    Cardiovascular (>2): atrial fibrillation on monitor, HR 80s.    Neurological (>12):  MS: Awake, alert, oriented to person, place, situation, time. Normal affect. Follows all commands.    Language: Speech is clear, fluent with good repetition & comprehension (able to name objects thumb)    CNs: VFF to counting fingers. EOMI no nystagmus, no diplopia. V1-3 intact to LT, well developed masseter muscles b/l. No facial asymmetry b/l, full eye closure strength b/l. Hearing grossly normal (rubbing fingers) b/l. Symmetric palate elevation in midline. Gag reflex deferred. Head turning & shoulder shrug intact b/l. Tongue midline, normal movements, no atrophy.    Motor: Normal muscle bulk. b/l UE postural and action tremor.  Slight R pronator drift.              Deltoid	Biceps	Triceps	Wrist	Finger ABd	   R	4+	5	5	5	4+		4+ 	  L	5	5	5	5	5		5    	H-Flex	K-Flex	K-Ext	D-Flex	P-Flex  R	4+	5	5	4+	5	RLE with +straight raise	   L	5	5	5	5	5	     Sensation: Intact to LT b/l throughout.     Cortical: Extinction on DSS (neglect): none    Reflexes: with significant pain when checking for ankle clonus, deferred              Biceps(C5)       BR(C6)     Triceps(C7)               Patellar(L4)    Achilles(S1)    Plantar Resp  R	2	          2	             2		        1		    		mute  L	2	          2	             2		        1		    		mute    Coordination: intact rapid-alt movements. No dysmetria to FTN     Gait: unable to assess due to pain      I personally reviewed the below data/images/labs:  CBC Full  -  ( 19 Dec 2022 07:23 )  WBC Count : 21.98 K/uL  RBC Count : 2.87 M/uL  Hemoglobin : 9.1 g/dL  Hematocrit : 27.7 %  Platelet Count - Automated : 158 K/uL  Mean Cell Volume : 96.5 fl  Mean Cell Hemoglobin : 31.7 pg  Mean Cell Hemoglobin Concentration : 32.9 gm/dL  Auto Neutrophil # : 17.63 K/uL  Auto Lymphocyte # : 2.09 K/uL  Auto Monocyte # : 2.09 K/uL  Auto Eosinophil # : 0.00 K/uL  Auto Basophil # : 0.18 K/uL      Auto Neutrophil % : 80.2 %  Auto Lymphocyte % : 9.5 %  Auto Monocyte % : 9.5 %  Auto Eosinophil % : 0.0 %  Auto Basophil % : 0.8 %    12-19    134<L>  |  99  |  22  ----------------------------<  94  4.2   |  25  |  0.93    Ca    8.6      19 Dec 2022 07:20      < from: CT Angio Neck Stroke Protocol w/ IV Cont (12.05.22 @ 15:25) >  IMPRESSION:    HEAD CT: No acute intracranial hemorrhage or acute territorial infarction.    If symptoms persist consider follow up head CT or MRI, MRA  if no   contraindication.    CTA COW:  Patent intracranial circulation without flow limiting stenosis   or large vessel occlusion.    CTA NECK: Patent, ECAs, ICAs, no  hemodynamically significant stenosis at    ICA origins by NASCET criteria.  Bilateral vertebral arteries are patent without flow limiting stenosis.    < end of copied text >    < from: MR Head No Cont (12.06.22 @ 19:43) >    ACC: [id number] EXAM:  MR BRAIN                          PROCEDURE DATE:  12/06/2022          INTERPRETATION:  CLINICAL STATEMENT: Pain.    TECHNIQUE: MRI of the brain was performed without gadolinium.    COMPARISON: CT head 12/5/2022    FINDINGS:  There is mild diffuse parenchymal volume loss. There are T2 prolongation   signal abnormalities in the periventricular subcortical white matter   likely related to mild chronic microvascular ischemic changes.    There is no acute parenchymal hemorrhage, parenchymal mass, mass effect   or midline shift. There is no extra-axial fluid collection.  There is no   hydrocephalus.  There is no acute infarct.    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    IMPRESSION:  No acute intracranial hemorrhage or acute infarct.    --- End of Report ---    < from: MR Cervical Spine No Cont (12.09.22 @ 20:23) >    ACC: [id number] EXAM:  MR SPINE CERVICAL                          PROCEDURE DATE:  12/09/2022          INTERPRETATION:  CLINICAL INDICATION: Cervical stenosis with myelopathy    Magnetic resonance imaging of the cervical spine was carried out with   sagittal surface coil imaging from C1 to T4 using T1 and fast spin echo   T2 weighted images with and without fat saturation technique with axial   T1 and fast spin echo T2 weighted imaging on a 1.5 Alexia magnet.    Comparison is made with the prior cervical spine CT of 10/30/2019.    The cervical vertebrae are normal in height and signal intensity. There   are disc osteophyte complexes present at C3-4 and C4-5 which with dorsal   ligamentous hypertrophy causes severe spinal stenosis and mild cord  compression. There is a small amount of cord signal abnormality at the   C4-5 level.    There is a small disc osteophyte complex present at C6-7 without cord   compression or significant spinal stenosis. The remainder of the cervical   spine and the upper thoracic spine are unremarkable.        IMPRESSION: Disc osteophyte complexes C3-4 and C4-5 along with dorsal   ligamentous causes severe spinal stenosis and mild cord compression.   Small amount of abnormal cord signal C4-5.                    --- End of Report ---            ABA MARIE MD; Attending Radiologist  This document has been electronically signed. Dec  9 2022  8:48PM    < end of copied text >

## 2022-12-19 NOTE — PROGRESS NOTE ADULT - ASSESSMENT
Patient is a 82 year old male with PMH of Afib on xarelto, s/p L hip IMN (most recently s/p R hip IMN (Dr. oGdinez 10/31/19) presents with acute onset RLE weakness and was unable to move from sink for at least 2-3 hours until daughter came home and called EMS. Patient noted with significant erythematous legs with swelling,     Acute onset RLE weakness, unclear etiology, no stroke on MRI  R hip IM nail subsidence and screw cut-out  Leukocytosis post op -- suspect likely reactive/inflammatory  - s/p IR aspiration of R hip 12/9, culture negative   - s/p R tooth extraction 12/13   - s/p Right total hip replacement 12/16   - ORIF, fracture, femoral shaft, with plate and screws    - Removal of intramedullary nail 1    12/18 post op leukocytosis- inflammatory v infection   pt nontoxic, afebrile  no s.s of pna  wound site right thigh clean  no diarrhea  WBC downtrending off abx  continue to trend WBC off abx    if persists or hemodynamic instability-- can get blood cx  and ct rle    bowel regimen  strict aspiration precautions  incentive spirometer     Slim Jackson M.D.  [facility], Division of Infectious Diseases  [phone]  After 5pm on weekdays and all day on weekends - please call [phone]

## 2022-12-19 NOTE — PROGRESS NOTE ADULT - SUBJECTIVE AND OBJECTIVE BOX
[facility] DIVISION OF INFECTIOUS DISEASES  GIOVANY Ferguson Y. Patel, S. Shah, G. Delmer  [phone]  ([phone] - weekdays after 5pm and weekends)    Name: MARY BO  Age/Gender: 82y Male  MRN: [id number]    Interval History:  Patient seen and examined this morning.   No new complaints noted.  Notes reviewed  No concerning overnight events  Afebrile   Allergies: No Known Allergies    Objective:  Vitals:   T(F): 98.4 (22 @ 08:36), Max: 98.5 (22 @ 20:12)  HR: 83 (22 @ 08:36) (78 - 83)  BP: 107/67 (22 @ 08:36) (107/67 - 116/79)  RR: 18 (22 @ 08:36) (18 - 18)  SpO2: 97% (22 @ 08:36) (97% - 98%)  Physical Examination:  General: no acute distress, nontoxic appearing   HEENT: NC/AT, anicteric, neck supple  Respiratory: no acc muscle use, breathing comfortably  Cardiovascular: S1 and S2 present  Gastrointestinal: normal appearing, nondistended  Extremities: no edema, no cyanosis  Skin: no visible rash    Laboratory Studies:  CBC:                       9.1    21.98 )-----------( 158      ( 19 Dec 2022 07:23 )             27.7     WBC Trend:  21.98 22 @ 07:23  28.26 22 @ 06:53  24.16 22 @ 06:43  28.60 22 @ 17:12  9.65 22 @ 01:32  10.02 12-15-22 @ 07:18  8.57 22 @ 07:13  8.59 22 @ 06:30    CMP:     134<L>  |  99  |  22  ----------------------------<  94  4.2   |  25  |  0.93    Ca    8.6      19 Dec 2022 07:20    Creatinine, Serum: 0.93 mg/dL (22 @ 07:20)  Creatinine, Serum: 0.92 mg/dL (22 @ 06:53)  Creatinine, Serum: 0.96 mg/dL (22 @ 06:44)  Creatinine, Serum: 0.88 mg/dL (22 @ 17:11)  Creatinine, Serum: 1.22 mg/dL (22 @ 01:32)  Creatinine, Serum: 1.05 mg/dL (12-15-22 @ 07:25)  Creatinine, Serum: 1.08 mg/dL (22 @ 11:15)  Creatinine, Serum: 1.02 mg/dL (22 @ 06:30)    Urinalysis Basic - ( 18 Dec 2022 12:23 )  Color: Yellow / Appearance: Clear / S.017 / pH: x  Gluc: x / Ketone: Negative  / Bili: Negative / Urobili: Negative   Blood: x / Protein: Trace / Nitrite: Negative   Leuk Esterase: Negative / RBC: 5 /hpf / WBC 1 /HPF   Sq Epi: x / Non Sq Epi: 1 /hpf / Bacteria: Negative    Microbiology: reviewed   Culture - Blood (collected 22 @ 05:05)  Source: .Blood Blood-Venous  Final Report (22 @ 18:00):    No Growth Final    Culture - Blood (collected 22 @ 05:00)  Source: .Blood Blood-Venous  Final Report (22 @ 18:00):    No Growth Final    Culture - Urine (collected 22 @ 18:24)  Source: Clean Catch Clean Catch (Midstream)  Final Report (22 @ 07:48):    <10,000 CFU/mL Normal Urogenital Marla    COVID-19 PCR: NotDetec (15 Dec 2022 07:19)    Radiology: reviewed     Medications:  acetaminophen     Tablet .. 650 milliGRAM(s) Oral every 6 hours  escitalopram 10 milliGRAM(s) Oral daily  HYDROmorphone  Injectable 0.5 milliGRAM(s) IV Push once  magnesium hydroxide Suspension 30 milliLiter(s) Oral daily PRN  multivitamin 1 Tablet(s) Oral daily  ondansetron Injectable 4 milliGRAM(s) IV Push every 6 hours PRN  oxyCODONE    IR 5 milliGRAM(s) Oral every 4 hours PRN  oxyCODONE    IR 10 milliGRAM(s) Oral every 4 hours PRN  pantoprazole    Tablet 40 milliGRAM(s) Oral before breakfast  polyethylene glycol 3350 17 Gram(s) Oral two times a day  rivaroxaban 20 milliGRAM(s) Oral with dinner  senna 2 Tablet(s) Oral at bedtime  traMADol 50 milliGRAM(s) Oral every 6 hours PRN    Current Antimicrobials: none  Prior/Completed Antimicrobials:  ceFAZolin   IVPB  clindamycin IVPB

## 2022-12-20 LAB
ANION GAP SERPL CALC-SCNC: 7 MMOL/L — SIGNIFICANT CHANGE UP (ref 5–17)
APTT BLD: 31.9 SEC — SIGNIFICANT CHANGE UP (ref 27.5–35.5)
BUN SERPL-MCNC: 20 MG/DL — SIGNIFICANT CHANGE UP (ref 7–23)
CALCIUM SERPL-MCNC: 8.9 MG/DL — SIGNIFICANT CHANGE UP (ref 8.4–10.5)
CHLORIDE SERPL-SCNC: 101 MMOL/L — SIGNIFICANT CHANGE UP (ref 96–108)
CO2 SERPL-SCNC: 27 MMOL/L — SIGNIFICANT CHANGE UP (ref 22–31)
CREAT SERPL-MCNC: 0.94 MG/DL — SIGNIFICANT CHANGE UP (ref 0.5–1.3)
EGFR: 81 ML/MIN/1.73M2 — SIGNIFICANT CHANGE UP
GLUCOSE SERPL-MCNC: 104 MG/DL — HIGH (ref 70–99)
HCT VFR BLD CALC: 24.8 % — LOW (ref 39–50)
HGB BLD-MCNC: 8.1 G/DL — LOW (ref 13–17)
INR BLD: 1.68 RATIO — HIGH (ref 0.88–1.16)
MCHC RBC-ENTMCNC: 31.8 PG — SIGNIFICANT CHANGE UP (ref 27–34)
MCHC RBC-ENTMCNC: 32.7 GM/DL — SIGNIFICANT CHANGE UP (ref 32–36)
MCV RBC AUTO: 97.3 FL — SIGNIFICANT CHANGE UP (ref 80–100)
NRBC # BLD: 0 /100 WBCS — SIGNIFICANT CHANGE UP (ref 0–0)
PLATELET # BLD AUTO: 196 K/UL — SIGNIFICANT CHANGE UP (ref 150–400)
POTASSIUM SERPL-MCNC: 4.3 MMOL/L — SIGNIFICANT CHANGE UP (ref 3.5–5.3)
POTASSIUM SERPL-SCNC: 4.3 MMOL/L — SIGNIFICANT CHANGE UP (ref 3.5–5.3)
PROTHROM AB SERPL-ACNC: 19.4 SEC — HIGH (ref 10.5–13.4)
RBC # BLD: 2.55 M/UL — LOW (ref 4.2–5.8)
RBC # FLD: 15 % — HIGH (ref 10.3–14.5)
SODIUM SERPL-SCNC: 135 MMOL/L — SIGNIFICANT CHANGE UP (ref 135–145)
WBC # BLD: 15.73 K/UL — HIGH (ref 3.8–10.5)
WBC # FLD AUTO: 15.73 K/UL — HIGH (ref 3.8–10.5)

## 2022-12-20 RX ORDER — FUROSEMIDE 40 MG
20 TABLET ORAL
Refills: 0 | Status: DISCONTINUED | OUTPATIENT
Start: 2022-12-20 | End: 2022-12-21

## 2022-12-20 RX ORDER — IRON SUCROSE 20 MG/ML
200 INJECTION, SOLUTION INTRAVENOUS ONCE
Refills: 0 | Status: COMPLETED | OUTPATIENT
Start: 2022-12-20 | End: 2022-12-20

## 2022-12-20 RX ORDER — SOD SULF/SODIUM/NAHCO3/KCL/PEG
1 SOLUTION, RECONSTITUTED, ORAL ORAL ONCE
Refills: 0 | Status: COMPLETED | OUTPATIENT
Start: 2022-12-20 | End: 2022-12-20

## 2022-12-20 RX ADMIN — Medication 1 LITER(S): at 17:24

## 2022-12-20 RX ADMIN — Medication 650 MILLIGRAM(S): at 17:23

## 2022-12-20 RX ADMIN — POLYETHYLENE GLYCOL 3350 17 GRAM(S): 17 POWDER, FOR SOLUTION ORAL at 06:48

## 2022-12-20 RX ADMIN — IRON SUCROSE 110 MILLIGRAM(S): 20 INJECTION, SOLUTION INTRAVENOUS at 11:25

## 2022-12-20 RX ADMIN — Medication 650 MILLIGRAM(S): at 00:27

## 2022-12-20 RX ADMIN — ESCITALOPRAM OXALATE 10 MILLIGRAM(S): 10 TABLET, FILM COATED ORAL at 11:26

## 2022-12-20 RX ADMIN — Medication 650 MILLIGRAM(S): at 11:26

## 2022-12-20 RX ADMIN — PANTOPRAZOLE SODIUM 40 MILLIGRAM(S): 20 TABLET, DELAYED RELEASE ORAL at 06:48

## 2022-12-20 RX ADMIN — Medication 1 ENEMA: at 11:25

## 2022-12-20 RX ADMIN — Medication 650 MILLIGRAM(S): at 06:48

## 2022-12-20 RX ADMIN — Medication 1 TABLET(S): at 11:27

## 2022-12-20 RX ADMIN — Medication 650 MILLIGRAM(S): at 06:21

## 2022-12-20 RX ADMIN — POLYETHYLENE GLYCOL 3350 17 GRAM(S): 17 POWDER, FOR SOLUTION ORAL at 17:24

## 2022-12-20 RX ADMIN — RIVAROXABAN 20 MILLIGRAM(S): KIT at 17:23

## 2022-12-20 RX ADMIN — Medication 650 MILLIGRAM(S): at 11:56

## 2022-12-20 NOTE — PROGRESS NOTE ADULT - SUBJECTIVE AND OBJECTIVE BOX
Patient is a 82y old  Male who presents with a chief complaint of R sided weakness (20 Dec 2022 10:55)      SUBJECTIVE / OVERNIGHT EVENTS:  Patient seen and examined.   Still without BM.      Vital Signs Last 24 Hrs  T(C): 36.3 (20 Dec 2022 12:25), Max: 37 (19 Dec 2022 20:42)  T(F): 97.3 (20 Dec 2022 12:25), Max: 98.6 (19 Dec 2022 20:42)  HR: 71 (20 Dec 2022 12:25) (71 - 87)  BP: 112/72 (20 Dec 2022 12:25) (107/69 - 113/71)  BP(mean): --  RR: 18 (20 Dec 2022 12:25) (17 - 18)  SpO2: 98% (20 Dec 2022 12:25) (94% - 98%)    Parameters below as of 20 Dec 2022 12:25  Patient On (Oxygen Delivery Method): room air      I&O's Summary    19 Dec 2022 07:01  -  20 Dec 2022 07:00  --------------------------------------------------------  IN: 560 mL / OUT: 1440 mL / NET: -880 mL    20 Dec 2022 07:01  -  20 Dec 2022 13:53  --------------------------------------------------------  IN: 520 mL / OUT: 500 mL / NET: 20 mL      PHYSICAL EXAM:  GENERAL: NAD, AAOx2  HEAD:  Atraumatic, Normocephalic  EYES: EOMI; conjunctiva and sclera clear  NECK: Supple, No JVD, No LAD  CHEST/LUNG: B/L air entry; No wheezes, rales or rhonci   HEART: Regular rate and rhythm; No murmurs, rubs, or gallops  ABDOMEN: Soft, Nontender, Nondistended; Bowel sounds present  EXTREMITIES:  2+ Peripheral Pulses, No clubbing, cyanosis, or edema  SKIN: No rashes or lesions    LABS:                        8.1    15.73 )-----------( 196      ( 20 Dec 2022 07:00 )             24.8     12-20    135  |  101  |  20  ----------------------------<  104<H>  4.3   |  27  |  0.94    Ca    8.9      20 Dec 2022 07:02      PT/INR - ( 20 Dec 2022 07:00 )   PT: 19.4 sec;   INR: 1.68 ratio         PTT - ( 20 Dec 2022 07:00 )  PTT:31.9 sec  CAPILLARY BLOOD GLUCOSE                RADIOLOGY & ADDITIONAL TESTS:    Imaging Personally Reviewed:  [x] YES  [ ] NO    Consultant(s) Notes Reviewed:  [x] YES  [ ] NO      MEDICATIONS  (STANDING):  acetaminophen     Tablet .. 650 milliGRAM(s) Oral every 6 hours  escitalopram 10 milliGRAM(s) Oral daily  furosemide    Tablet 20 milliGRAM(s) Oral <User Schedule>  HYDROmorphone  Injectable 0.5 milliGRAM(s) IV Push once  multivitamin 1 Tablet(s) Oral daily  pantoprazole    Tablet 40 milliGRAM(s) Oral before breakfast  polyethylene glycol 3350 17 Gram(s) Oral two times a day  rivaroxaban 20 milliGRAM(s) Oral with dinner  senna 2 Tablet(s) Oral at bedtime    MEDICATIONS  (PRN):  magnesium hydroxide Suspension 30 milliLiter(s) Oral daily PRN Constipation  ondansetron Injectable 4 milliGRAM(s) IV Push every 6 hours PRN Nausea and/or Vomiting  oxyCODONE    IR 5 milliGRAM(s) Oral every 4 hours PRN Moderate Pain (4 - 6)  oxyCODONE    IR 10 milliGRAM(s) Oral every 4 hours PRN Severe Pain (7 - 10)  traMADol 50 milliGRAM(s) Oral every 6 hours PRN Mild Pain (1 - 3)      Care Discussed with Consultants/Other Providers [x] YES  [ ] NO    HEALTH ISSUES - PROBLEM Dx:  Suspected pulmonary embolism    Suspected deep vein thrombosis (DVT)    Weakness of lower extremity, unspecified laterality    Chronic atrial fibrillation    Leukocytosis    At risk for depression    DVT prophylaxis

## 2022-12-20 NOTE — CONSULT NOTE ADULT - ASSESSMENT
1. post-op constipation  - receiving Miralax 17 BID, senna 2 tab QHS, lactulose 20 g and enemas PRN with minimal relief   - Moviprep ordered to initiate BMs    2. s/p R JESS and ORIF, POD 4  - recovering well  - pain control per primary team, avoid opioids     3. Afib  - on AC    4. Leukocytosis  - ID recs appreciated; likely reactive / inflammatory   - s/p IR aspiration of R hip 12/9, culture negative  - s/p R tooth extraction 12/13   - s/p Right total hip replacement 12/16    5. Anemia, no overt GI bleeding   - monitor H&H, transfuse PRN    I had a prolonged conversation with the patient regarding the hospital course, differential diagnosis, results of diagnostic tests this far, and therapeutic modalities available. Plan of care discussed with the patient after the evaluation. Patient expresses a clear understanding of the plan of care. Sixty five minutes spent on the total encounter, of which more than fifty percent of the encounter was spent on counseling and/or coordinating care by the attending physician.    Advanced care planning forms were discussed. Code status including forceful chest compressions, defibrillation and intubation were discussed. The risks benefits and alternatives to pertinent gastrointestinal procedures and interventions were discussed in detail and all questions were answered. Duration: 15 Minutes.    Alexia Chung M.D.   Gastroenterology and Hepatology  [address] [place], [place]  Office: [phone]  Cell: [phone] 1.Constipation  - receiving Miralax 17 BID, senna 2 tab QHS, lactulose 20 g and enemas PRN with minimal relief. Not on opioids, only Tylenol for pain control.   - 1 L Moviprep ordered, monitor for BMs. D/c if he develops any nausea, abdominal distention or pain. If still no BM after bowel prep, obtain AXR.     2. s/p R JESS and ORIF, POD 4  - recovering well  - pain control per primary team, avoid opioids     3. Afib  - on AC    4. Leukocytosis  - ID recs appreciated; likely reactive / inflammatory   - s/p IR aspiration of R hip 12/9, culture negative  - s/p R tooth extraction 12/13   - s/p Right total hip replacement 12/16    5. Anemia, no overt GI bleeding   - monitor H&H, transfuse PRN    I had a prolonged conversation with the patient regarding the hospital course, differential diagnosis, results of diagnostic tests this far, and therapeutic modalities available. Plan of care discussed with the patient after the evaluation. Patient expresses a clear understanding of the plan of care. Sixty five minutes spent on the total encounter, of which more than fifty percent of the encounter was spent on counseling and/or coordinating care by the attending physician.    Advanced care planning forms were discussed. Code status including forceful chest compressions, defibrillation and intubation were discussed. The risks benefits and alternatives to pertinent gastrointestinal procedures and interventions were discussed in detail and all questions were answered. Duration: 15 Minutes.    Alexia Chung M.D.   Gastroenterology and Hepatology  [address] [place], [place]  Office: [phone]  Cell: [phone]

## 2022-12-20 NOTE — PROGRESS NOTE ADULT - ASSESSMENT
Patient is a 82 year old male with PMH of Afib on xarelto, s/p L hip IMN (most recently s/p R hip IMN (Dr. Godinez 10/31/19) presents with acute onset RLE weakness and was unable to move from sink for at least 2-3 hours until daughter came home and called EMS. 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  [facility] Division of Infectious Diseases  [phone]  After 5pm on weekdays and all day on weekends - please call [phone]

## 2022-12-20 NOTE — PROGRESS NOTE ADULT - ASSESSMENT
ECHO 12/6/22:  Ef 64%;  mild MR, nl LV sys fx  , mod to sev TR     a/p  82M w/ pmhx of afib on xarelto, s/p L hip IMN (most recently s/p R hip IMN (Dr. Godinez 10/31/19) presents with acute onset RLE weakness.    #WCT  -cont to monitor   -no further sig WCT, unable to give bb due to freda   -echo with nl EF    #Right Sided HF  -ECHO revealed normal LVEF, w R sided enlargement and mod-severe TR  -Venous dopplers negative for DVT  -likely related to r sided CHF  -Lasix 20mg PO Q48hrs  -pt in slow afib off meds which preclude BB use    #Afib  -rate controlled/freda off meds  -no sig freda, pauses  -continue xarelto 20mg qd    #Weakness  -neuro w/u neg for CVA  -w/u per primary team  -neuro eval noted    #s/p IR hip aspiration 12/9  -S/P right hip removal of hardware, right hip IMN conversion to JESS, ORIF femoral shaft 12/16  -remains cv stable POST OP    #Leukocytosis  -Downtrending  -Mgmnt per primary, ID

## 2022-12-20 NOTE — PROGRESS NOTE ADULT - ASSESSMENT
82M RH w/ afib on xarelto, s/p L hip IMN (2013), s/p R hip IMN (2019) presents with acute onset RLE weakness. LKN 12/5/22 at 7am. Pt last took xarelto at 6:30am. While showering, he felt RLE sudden onset weakness, no associated pain or back pain at that time. He reports increased R hip area pain and numbness/tingling with associated leg shaking due to pain. Pt was hanging onto the sink for at least 3-4 hours and reports not being able to get up due to RLE weakness. NIHSS: 0, preMRS: 2 (ambulates with a walker). Neuro exam notable for slight +R pronator, +straight leg raise. CTH/CTA neg  NIHSS4  premrs2-3 walks with cane/walker   MRI brain neg for infarct   CRP 22   CT pelvis: Redemonstration of open reduction internal fixation of the right hip.   Interval development of loosening throughout the right femoral neck   compression screw with migration of the tip into the joint space.   Interval development of lateral deviation of the right femoral   intramedullary simón with the tip now abutting and markedly   remodeling/thinning the lateral cortex at the mid diaphysis.  MRI C spien 12/9: mild cord compression C3/4/5. abnormal signal C4/5   s/p IR aspiration 12/9  s/p  R hip hardware revision by ortho 12/16  post op R hip pain but otherwise neuro intact     Impression: acute RLE weakness and pain likely 2/2 R hip pathology vs. lumbosacral pathology, less likely stroke; Stroke ruled out     Recommendations:      - pain management   - continue home xarelto for stroke prevention, restarted post op.monitor H/H   - monitor on telemetry  - High dose statin therapy - atorvastatin 40mg PO daily. LDL goal <70mg/dL.   - PT/OT/SS/SLP, OOBC  - check FS, glucose control <180  - GI/DVT ppx  - Thank you for allowing me to participate in the care of this patient. Call with questions.   - dc planning in progress     Carloz Del Castillo MD  Vascular Neurology  Office: [phone] .

## 2022-12-20 NOTE — PROGRESS NOTE ADULT - SUBJECTIVE AND OBJECTIVE BOX
[facility]ES  GIOVANY Ferguson Y. Patel, S. Shah, G. Delmer  [phone]  ([phone] - weekdays after 5pm and weekends)    Name: MARY BO  Age/Gender: 82y Male  MRN: [id number]    Interval History:  Patient seen and examined this morning.   No new complaints noted.  Notes reviewed  No concerning overnight events  Afebrile   Allergies: No Known Allergies      Objective:  Vitals:   T(F): 98 (22 @ 08:02), Max: 98.6 (22 @ 20:42)  HR: 72 (22 @ 08:02) (72 - 91)  BP: 110/72 (22 @ 08:02) (107/69 - 116/65)  RR: 18 (22 @ 08:02) (17 - 18)  SpO2: 96% (22 @ 08:02) (94% - 97%)  Physical Examination:  General: no acute distress, nontoxic  HEENT: NC/AT, anicteric, neck supple  Respiratory: no acc muscle use, breathing comfortably  Cardiovascular: S1 and S2 present  Gastrointestinal: normal appearing, nondistended  Extremities: no edema, no cyanosis  Skin: no visible rash    Laboratory Studies:  CBC:                       8.1    15.73 )-----------( 196      ( 20 Dec 2022 07:00 )             24.8     WBC Trend:  15.73 22 @ 07:00  21.98 22 @ 07:23  28.26 22 @ 06:53  24.16 22 @ 06:43  28.60 22 @ 17:12  9.65 22 @ 01:32  10.02 12-15-22 @ 07:18  8.57 22 @ 07:13    CMP: -20    135  |  101  |  20  ----------------------------<  104<H>  4.3   |  27  |  0.94    Ca    8.9      20 Dec 2022 07:02      Creatinine, Serum: 0.94 mg/dL (22 @ 07:02)  Creatinine, Serum: 0.93 mg/dL (22 @ 07:20)  Creatinine, Serum: 0.92 mg/dL (22 @ 06:53)  Creatinine, Serum: 0.96 mg/dL (22 @ 06:44)  Creatinine, Serum: 0.88 mg/dL (22 @ 17:11)  Creatinine, Serum: 1.22 mg/dL (22 @ 01:32)  Creatinine, Serum: 1.05 mg/dL (12-15-22 @ 07:25)  Creatinine, Serum: 1.08 mg/dL (22 @ 11:15)    Urinalysis Basic - ( 18 Dec 2022 12:23 )    Color: Yellow / Appearance: Clear / S.017 / pH: x  Gluc: x / Ketone: Negative  / Bili: Negative / Urobili: Negative   Blood: x / Protein: Trace / Nitrite: Negative   Leuk Esterase: Negative / RBC: 5 /hpf / WBC 1 /HPF   Sq Epi: x / Non Sq Epi: 1 /hpf / Bacteria: Negative    Microbiology: reviewed   COVID-19 PCR: NotDetec (19 Dec 2022 12:13)    Radiology: reviewed     Medications:  acetaminophen     Tablet .. 650 milliGRAM(s) Oral every 6 hours  escitalopram 10 milliGRAM(s) Oral daily  furosemide    Tablet 20 milliGRAM(s) Oral <User Schedule>  HYDROmorphone  Injectable 0.5 milliGRAM(s) IV Push once  iron sucrose IVPB 200 milliGRAM(s) IV Intermittent once  magnesium hydroxide Suspension 30 milliLiter(s) Oral daily PRN  multivitamin 1 Tablet(s) Oral daily  ondansetron Injectable 4 milliGRAM(s) IV Push every 6 hours PRN  oxyCODONE    IR 5 milliGRAM(s) Oral every 4 hours PRN  oxyCODONE    IR 10 milliGRAM(s) Oral every 4 hours PRN  pantoprazole    Tablet 40 milliGRAM(s) Oral before breakfast  polyethylene glycol 3350 17 Gram(s) Oral two times a day  rivaroxaban 20 milliGRAM(s) Oral with dinner  saline laxative (FLEET) Rectal Enema 1 Enema Rectal once  senna 2 Tablet(s) Oral at bedtime  traMADol 50 milliGRAM(s) Oral every 6 hours PRN    Current Antimicrobials:    Prior/Completed Antimicrobials:  ceFAZolin   IVPB  clindamycin IVPB

## 2022-12-20 NOTE — PROGRESS NOTE ADULT - SUBJECTIVE AND OBJECTIVE BOX
Neurology Progress Note    S: Patient seen and examined. No new events overnight s/p sx doing okay dc planning     Medication:  MEDICATIONS  (STANDING):  acetaminophen     Tablet .. 650 milliGRAM(s) Oral every 6 hours  escitalopram 10 milliGRAM(s) Oral daily  furosemide    Tablet 20 milliGRAM(s) Oral <User Schedule>  HYDROmorphone  Injectable 0.5 milliGRAM(s) IV Push once  multivitamin 1 Tablet(s) Oral daily  pantoprazole    Tablet 40 milliGRAM(s) Oral before breakfast  polyethylene glycol 3350 17 Gram(s) Oral two times a day  polyethylene glycol/electrolyte Solution 1 Liter(s) Oral once  rivaroxaban 20 milliGRAM(s) Oral with dinner  senna 2 Tablet(s) Oral at bedtime    MEDICATIONS  (PRN):  magnesium hydroxide Suspension 30 milliLiter(s) Oral daily PRN Constipation  ondansetron Injectable 4 milliGRAM(s) IV Push every 6 hours PRN Nausea and/or Vomiting  oxyCODONE    IR 5 milliGRAM(s) Oral every 4 hours PRN Moderate Pain (4 - 6)  oxyCODONE    IR 10 milliGRAM(s) Oral every 4 hours PRN Severe Pain (7 - 10)  traMADol 50 milliGRAM(s) Oral every 6 hours PRN Mild Pain (1 - 3)      Vitals:    Vital Signs Last 24 Hrs  T(C): 36.3 (20 Dec 2022 12:25), Max: 37 (19 Dec 2022 20:42)  T(F): 97.3 (20 Dec 2022 12:25), Max: 98.6 (19 Dec 2022 20:42)  HR: 71 (20 Dec 2022 12:25) (71 - 87)  BP: 112/72 (20 Dec 2022 12:25) (107/69 - 113/71)  BP(mean): --  RR: 18 (20 Dec 2022 12:25) (17 - 18)  SpO2: 98% (20 Dec 2022 12:25) (94% - 98%)    Parameters below as of 20 Dec 2022 12:25  Patient On (Oxygen Delivery Method): room air         Orthostatic VS  12-17-22 @ 10:21  Lying BP: 115/70 HR: --  Sitting BP: 98/55 HR: --  Standing BP: --/-- HR: --  Site: --  Mode: --  Orthostatic VS  12-17-22 @ 10:20  Lying BP: 115/70 HR: --  Sitting BP: 98/55 HR: --  Standing BP: 99/66 HR: --  Site: --  Mode: --      General Exam:   Constitutional: Male, appears stated age, in no apparent distress including pain  Head: Normocephalic & atraumatic.  Respiratory: No increased work of breathing  Extremities: b/l LE pitting edema  Skin: +b/l overlying chronic skin changes over b/l LE    Cardiovascular (>2): atrial fibrillation on monitor, HR 80s.    Neurological (>12):  MS: Awake, alert, oriented to person, place, situation, time. Normal affect. Follows all commands.    Language: Speech is clear, fluent with good repetition & comprehension (able to name objects thumb)    CNs: VFF to counting fingers. EOMI no nystagmus, no diplopia. V1-3 intact to LT, well developed masseter muscles b/l. No facial asymmetry b/l, full eye closure strength b/l. Hearing grossly normal (rubbing fingers) b/l. Symmetric palate elevation in midline. Gag reflex deferred. Head turning & shoulder shrug intact b/l. Tongue midline, normal movements, no atrophy.    Motor: Normal muscle bulk. b/l UE postural and action tremor.  Slight R pronator drift.              Deltoid	Biceps	Triceps	Wrist	Finger ABd	   R	4+	5	5	5	4+		4+ 	  L	5	5	5	5	5		5    	H-Flex	K-Flex	K-Ext	D-Flex	P-Flex  R	4+	5	5	4+	5	RLE with +straight raise	   L	5	5	5	5	5	     Sensation: Intact to LT b/l throughout.     Cortical: Extinction on DSS (neglect): none    Reflexes: with significant pain when checking for ankle clonus, deferred              Biceps(C5)       BR(C6)     Triceps(C7)               Patellar(L4)    Achilles(S1)    Plantar Resp  R	2	          2	             2		        1		    		mute  L	2	          2	             2		        1		    		mute    Coordination: intact rapid-alt movements. No dysmetria to FTN     Gait: unable to assess due to pain      I personally reviewed the below data/images/labs:  CBC Full  -  ( 20 Dec 2022 07:00 )  WBC Count : 15.73 K/uL  RBC Count : 2.55 M/uL  Hemoglobin : 8.1 g/dL  Hematocrit : 24.8 %  Platelet Count - Automated : 196 K/uL  Mean Cell Volume : 97.3 fl  Mean Cell Hemoglobin : 31.8 pg  Mean Cell Hemoglobin Concentration : 32.7 gm/dL  Auto Neutrophil # : x  Auto Lymphocyte # : x  Auto Monocyte # : x  Auto Eosinophil # : x  Auto Basophil # : x  Auto Neutrophil % : x  Auto Lymphocyte % : x  Auto Monocyte % : x  Auto Eosinophil % : x  Auto Basophil % : x      12-20    135  |  101  |  20  ----------------------------<  104<H>  4.3   |  27  |  0.94    Ca    8.9      20 Dec 2022 07:02        < from: CT Angio Neck Stroke Protocol w/ IV Cont (12.05.22 @ 15:25) >  IMPRESSION:    HEAD CT: No acute intracranial hemorrhage or acute territorial infarction.    If symptoms persist consider follow up head CT or MRI, MRA  if no   contraindication.    CTA COW:  Patent intracranial circulation without flow limiting stenosis   or large vessel occlusion.    CTA NECK: Patent, ECAs, ICAs, no  hemodynamically significant stenosis at    ICA origins by NASCET criteria.  Bilateral vertebral arteries are patent without flow limiting stenosis.    < end of copied text >    < from: MR Head No Cont (12.06.22 @ 19:43) >    ACC: [id number] EXAM:  MR BRAIN                          PROCEDURE DATE:  12/06/2022          INTERPRETATION:  CLINICAL STATEMENT: Pain.    TECHNIQUE: MRI of the brain was performed without gadolinium.    COMPARISON: CT head 12/5/2022    FINDINGS:  There is mild diffuse parenchymal volume loss. There are T2 prolongation   signal abnormalities in the periventricular subcortical white matter   likely related to mild chronic microvascular ischemic changes.    There is no acute parenchymal hemorrhage, parenchymal mass, mass effect   or midline shift. There is no extra-axial fluid collection.  There is no   hydrocephalus.  There is no acute infarct.    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    IMPRESSION:  No acute intracranial hemorrhage or acute infarct.    --- End of Report ---    < from: MR Cervical Spine No Cont (12.09.22 @ 20:23) >    ACC: [id number] EXAM:  MR SPINE CERVICAL                          PROCEDURE DATE:  12/09/2022          INTERPRETATION:  CLINICAL INDICATION: Cervical stenosis with myelopathy    Magnetic resonance imaging of the cervical spine was carried out with   sagittal surface coil imaging from C1 to T4 using T1 and fast spin echo   T2 weighted images with and without fat saturation technique with axial   T1 and fast spin echo T2 weighted imaging on a 1.5 Alexia magnet.    Comparison is made with the prior cervical spine CT of 10/30/2019.    The cervical vertebrae are normal in height and signal intensity. There   are disc osteophyte complexes present at C3-4 and C4-5 which with dorsal   ligamentous hypertrophy causes severe spinal stenosis and mild cord  compression. There is a small amount of cord signal abnormality at the   C4-5 level.    There is a small disc osteophyte complex present at C6-7 without cord   compression or significant spinal stenosis. The remainder of the cervical   spine and the upper thoracic spine are unremarkable.        IMPRESSION: Disc osteophyte complexes C3-4 and C4-5 along with dorsal   ligamentous causes severe spinal stenosis and mild cord compression.   Small amount of abnormal cord signal C4-5.                    --- End of Report ---            ABA MARIE MD; Attending Radiologist  This document has been electronically signed. Dec  9 2022  8:48PM    < end of copied text >

## 2022-12-20 NOTE — PROGRESS NOTE ADULT - SUBJECTIVE AND OBJECTIVE BOX
CARDIOLOGY FOLLOW UP - Dr. Marrufo  DATE OF SERVICE: 12/20/22    CC  No CV complaints. States he has not had BM in over a week      REVIEW OF SYSTEMS:  CONSTITUTIONAL: No fever, weight loss, or fatigue  RESPIRATORY: No cough, wheezing, chills or hemoptysis; No Shortness of Breath  CARDIOVASCULAR: No chest pain, palpitations, passing out, dizziness, or leg swelling  GASTROINTESTINAL: No abdominal or epigastric pain. No nausea, vomiting, or hematemesis; No diarrhea. +Constipation. No melena or hematochezia.  VASCULAR: No edema     PHYSICAL EXAM:  T(C): 36.7 (12-20-22 @ 08:02), Max: 37 (12-19-22 @ 20:42)  HR: 72 (12-20-22 @ 08:02) (72 - 91)  BP: 110/72 (12-20-22 @ 08:02) (107/69 - 116/65)  RR: 18 (12-20-22 @ 08:02) (17 - 18)  SpO2: 96% (12-20-22 @ 08:02) (94% - 97%)  Wt(kg): --  I&O's Summary    19 Dec 2022 07:01  -  20 Dec 2022 07:00  --------------------------------------------------------  IN: 560 mL / OUT: 1440 mL / NET: -880 mL        Appearance: Normal	  Cardiovascular: Normal S1 S2,RRR, No JVD, No murmurs  Respiratory: Lungs clear to auscultation b/l  Gastrointestinal:  Soft, Non-tender, + BS	  Extremities: Normal range of motion, No clubbing, cyanosis or edema      Home Medications:  acetaminophen 325 mg oral tablet: 2 tab(s) orally every 6 hours (19 Dec 2022 16:04)  escitalopram 10 mg oral tablet: 1 tab(s) orally once a day (19 Dec 2022 16:04)  magnesium hydroxide 8% oral suspension: 30 milliliter(s) orally once a day, As needed, Constipation (19 Dec 2022 16:04)  Multiple Vitamins oral tablet: 1 tab(s) orally once a day (19 Dec 2022 16:04)  oxyCODONE 10 mg oral tablet: 1 tab(s) orally every 4 hours, As needed, Severe Pain (7 - 10) (19 Dec 2022 16:04)  oxyCODONE 5 mg oral tablet: 1 tab(s) orally every 4 hours, As needed, Moderate Pain (4 - 6) (19 Dec 2022 16:04)  pantoprazole 40 mg oral delayed release tablet: 1 tab(s) orally once a day (before a meal) (19 Dec 2022 16:04)  polyethylene glycol 3350 oral powder for reconstitution: 17 gram(s) orally 2 times a day (19 Dec 2022 16:04)  rivaroxaban 20 mg oral tablet: 1 tab(s) orally once a day (before a meal) (05 Dec 2022 21:58)  senna leaf extract oral tablet: 2 tab(s) orally once a day (at bedtime) (19 Dec 2022 16:04)  traMADol 50 mg oral tablet: 1 tab(s) orally every 6 hours, As needed, Mild Pain (1 - 3) (19 Dec 2022 16:04)      MEDICATIONS  (STANDING):  acetaminophen     Tablet .. 650 milliGRAM(s) Oral every 6 hours  escitalopram 10 milliGRAM(s) Oral daily  HYDROmorphone  Injectable 0.5 milliGRAM(s) IV Push once  iron sucrose IVPB 200 milliGRAM(s) IV Intermittent once  multivitamin 1 Tablet(s) Oral daily  pantoprazole    Tablet 40 milliGRAM(s) Oral before breakfast  polyethylene glycol 3350 17 Gram(s) Oral two times a day  rivaroxaban 20 milliGRAM(s) Oral with dinner  senna 2 Tablet(s) Oral at bedtime      TELEMETRY: 	    ECG:  	  RADIOLOGY:   DIAGNOSTIC TESTING:  [ ] Echocardiogram:  [ ]  Catheterization:  [ ] Stress Test:    OTHER: 	    LABS:	 	                            8.1    15.73 )-----------( 196      ( 20 Dec 2022 07:00 )             24.8     12-20    135  |  101  |  20  ----------------------------<  104<H>  4.3   |  27  |  0.94    Ca    8.9      20 Dec 2022 07:02      PT/INR - ( 20 Dec 2022 07:00 )   PT: 19.4 sec;   INR: 1.68 ratio         PTT - ( 20 Dec 2022 07:00 )  PTT:31.9 sec

## 2022-12-20 NOTE — PROGRESS NOTE ADULT - ASSESSMENT
82M w/ pmhx of afib on xarelto, s/p L hip IMN (most recently s/p R hip IMN (Dr. Godinez 10/31/19) presents with acute onset R sided weakness.    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, cervical stenosis  # right IM loose nail  MRI brain no acute infarct  CT L spine L4-L5 there is severe right subarticular zone stenosis and severe right neural foraminal stenosis  MRI cervical spine severe canal stenosis c3/4 c4/c5, mild cord compression, NSGY aware and recommend outpt fu no acute intervention  appreciate ortho recs, sp IR right hip aspiration  afvss, pain control, bowel regimen  post op pt, ot , active t & s, currently hgb stable no need for transfusion  dvt ppx -xarelto,, -plan per primary team    Post op constipation   -bs +, nt, nd   -miralax bid, senna, lactulose , still no bm   -GI consulted , enema?    Acute blood loss anemia  -post op   -active t & s, hgb downtrending, f/u daily cbc   -transfuse if hgb < 8.      Leukocytosis   -improving   -ua and cxr negative   -appears non toxic   -f/u ID     # Grade 3 mobility #4 tooth due to periodontitis, loose tooth  s/p Extraction #4 (upper right second bicuspid) on 12/13  dentally cleared    # LE wound  BC NTD  podiatry following for L heel wound  monitor off abx    # Chronic atrial fibrillation  on xarelto  TTE Severe right atrial enlargement. borderline pulmonary hypertension. Moderate-severe tricuspid regurgitation. EF64%  cardio following  lasix MWF    # depression  On Lexapro    DVT prophylaxis.xarelto    Spoke with daughter Samaria. on 12/18     Branden Valencia MD   [facility]  [phone]    Dispo: pending rehab; will aim for tomorrow 12/21, if has bm and hgb stable.      Please contact with any questions or concerns [phone].

## 2022-12-20 NOTE — CONSULT NOTE ADULT - REASON FOR ADMISSION
R sided weakness

## 2022-12-20 NOTE — CONSULT NOTE ADULT - SUBJECTIVE AND OBJECTIVE BOX
Chief Complaint:  Patient is a 82y old  Male who presents with a chief complaint of R sided weakness (20 Dec 2022 13:53)      Date of service: 12-20-22 @ 15:11    HPI:    The patient is a 82M with a PMH of Afib on xarelto and R hip IMN, who presented for wakness and pain in his RLE. Stroke workup unremarkable. 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 Now POD4 from R JESS with ORIF distal femur. GI consulted for constipation.     The patient denies dysphagia, nausea and vomiting, abdominal pain, diarrhea, unintentional weight loss, or NSAID use.      Allergies:  No Known Allergies      Home Medications:    Hospital Medications:  acetaminophen     Tablet .. 650 milliGRAM(s) Oral every 6 hours  escitalopram 10 milliGRAM(s) Oral daily  furosemide    Tablet 20 milliGRAM(s) Oral <User Schedule>  HYDROmorphone  Injectable 0.5 milliGRAM(s) IV Push once  magnesium hydroxide Suspension 30 milliLiter(s) Oral daily PRN  multivitamin 1 Tablet(s) Oral daily  ondansetron Injectable 4 milliGRAM(s) IV Push every 6 hours PRN  oxyCODONE    IR 5 milliGRAM(s) Oral every 4 hours PRN  oxyCODONE    IR 10 milliGRAM(s) Oral every 4 hours PRN  pantoprazole    Tablet 40 milliGRAM(s) Oral before breakfast  polyethylene glycol 3350 17 Gram(s) Oral two times a day  rivaroxaban 20 milliGRAM(s) Oral with dinner  senna 2 Tablet(s) Oral at bedtime  traMADol 50 milliGRAM(s) Oral every 6 hours PRN      PMHX/PSHX:  AF (atrial fibrillation)    Hernia, inguinal, right        Family history:  No pertinent family history in first degree relatives        Social History:   Denies ethanol use.  Denies illicit drug use.    ROS:     General:  No wt loss, fevers, chills, night sweats, fatigue,   Eyes:  Good vision, no reported pain  ENT:  No sore throat, pain, runny nose, dysphagia  CV:  No pain, palpitations, hypo/hypertension  Resp:  No dyspnea, cough, tachypnea, wheezing  GI:  See HPI  :  No pain, bleeding, incontinence, nocturia  Muscle:  No pain, weakness  Neuro:  No weakness, tingling, memory problems  Psych:  No fatigue, insomnia, mood problems, depression  Endocrine:  No polyuria, polydipsia, cold/heat intolerance  Heme:  No petechiae, ecchymosis, easy bruisability  Integumentary:  No rash, edema      PHYSICAL EXAM:     GENERAL:  Appears stated age, well-groomed, well-nourished, no distress  HEENT:  NC/AT,  conjunctivae anicteric, clear and pink,   NECK: supple, trachea midline  CHEST:  Full & symmetric excursion, no increased effort, breath sounds clear  HEART:  Regular rhythm, no JVD  ABDOMEN:  Soft, non-tender, non-distended, normoactive bowel sounds,  no masses , no hepatosplenomegaly  EXTREMITIES:  no cyanosis,clubbing or edema  SKIN:  No rash, erythema, or, ecchymoses, no jaundice  NEURO:  Alert, non-focal, no asterixis  PSYCH: Appropriate affect, oriented to place and time  RECTAL: Deferred      Vital Signs:  Vital Signs Last 24 Hrs  T(C): 36.3 (20 Dec 2022 12:25), Max: 37 (19 Dec 2022 20:42)  T(F): 97.3 (20 Dec 2022 12:25), Max: 98.6 (19 Dec 2022 20:42)  HR: 71 (20 Dec 2022 12:25) (71 - 87)  BP: 112/72 (20 Dec 2022 12:25) (107/69 - 113/71)  BP(mean): --  RR: 18 (20 Dec 2022 12:25) (17 - 18)  SpO2: 98% (20 Dec 2022 12:25) (94% - 98%)    Parameters below as of 20 Dec 2022 12:25  Patient On (Oxygen Delivery Method): room air      Daily     Daily     LABS: Labs personally reviewed by me:                        8.1    15.73 )-----------( 196      ( 20 Dec 2022 07:00 )             24.8     12-20    135  |  101  |  20  ----------------------------<  104<H>  4.3   |  27  |  0.94    Ca    8.9      20 Dec 2022 07:02        PT/INR - ( 20 Dec 2022 07:00 )   PT: 19.4 sec;   INR: 1.68 ratio         PTT - ( 20 Dec 2022 07:00 )  PTT:31.9 sec        Imaging personally reviewed by me:           Chief Complaint:  Patient is a 82y old  Male who presents with a chief complaint of R sided weakness (20 Dec 2022 13:53)      Date of service: 12-20-22 @ 15:11    HPI:    The patient is a 82M with a PMH of Afib on xarelto and R hip IMN, who presented for wakness and pain in his RLE. Stroke workup unremarkable. 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 Now POD4 from R JESS with ORIF distal femur. GI consulted for constipation. At baseline he passes 1-2 BMs daily. Since post-op, unable to have a BM. No improvement with mirilax, senna, lactulose and an enema. He denies any abdominal pain, nausea, vomiting, diarrhea or bleeding symptoms. Abdominal exam benign.       Allergies:  No Known Allergies      Home Medications:    Hospital Medications:  acetaminophen     Tablet .. 650 milliGRAM(s) Oral every 6 hours  escitalopram 10 milliGRAM(s) Oral daily  furosemide    Tablet 20 milliGRAM(s) Oral <User Schedule>  HYDROmorphone  Injectable 0.5 milliGRAM(s) IV Push once  magnesium hydroxide Suspension 30 milliLiter(s) Oral daily PRN  multivitamin 1 Tablet(s) Oral daily  ondansetron Injectable 4 milliGRAM(s) IV Push every 6 hours PRN  oxyCODONE    IR 5 milliGRAM(s) Oral every 4 hours PRN  oxyCODONE    IR 10 milliGRAM(s) Oral every 4 hours PRN  pantoprazole    Tablet 40 milliGRAM(s) Oral before breakfast  polyethylene glycol 3350 17 Gram(s) Oral two times a day  rivaroxaban 20 milliGRAM(s) Oral with dinner  senna 2 Tablet(s) Oral at bedtime  traMADol 50 milliGRAM(s) Oral every 6 hours PRN      PMHX/PSHX:  AF (atrial fibrillation)    Hernia, inguinal, right        Family history:  No pertinent family history in first degree relatives        Social History:   Denies ethanol use.  Denies illicit drug use.    ROS:     General:  No wt loss, fevers, chills, night sweats, fatigue,   Eyes:  Good vision, no reported pain  ENT:  No sore throat, pain, runny nose, dysphagia  CV:  No pain, palpitations, hypo/hypertension  Resp:  No dyspnea, cough, tachypnea, wheezing  GI:  See HPI  :  No pain, bleeding, incontinence, nocturia  Muscle:  No pain, weakness  Neuro:  No weakness, tingling, memory problems  Psych:  No fatigue, insomnia, mood problems, depression  Endocrine:  No polyuria, polydipsia, cold/heat intolerance  Heme:  No petechiae, ecchymosis, easy bruisability  Integumentary:  No rash, edema      PHYSICAL EXAM:     GENERAL:  Appears stated age, well-groomed, well-nourished, no distress  HEENT:  NC/AT,  conjunctivae anicteric, clear and pink,   NECK: supple, trachea midline  CHEST:  Full & symmetric excursion, no increased effort, breath sounds clear  HEART:  Regular rhythm, no JVD  ABDOMEN:  Soft, non-tender, non-distended, normoactive bowel sounds,  no masses , no hepatosplenomegaly  EXTREMITIES:  no cyanosis,clubbing or edema  SKIN:  No rash, erythema, or, ecchymoses, no jaundice  NEURO:  Alert, non-focal, no asterixis  PSYCH: Appropriate affect, oriented to place and time  RECTAL: Deferred      Vital Signs:  Vital Signs Last 24 Hrs  T(C): 36.3 (20 Dec 2022 12:25), Max: 37 (19 Dec 2022 20:42)  T(F): 97.3 (20 Dec 2022 12:25), Max: 98.6 (19 Dec 2022 20:42)  HR: 71 (20 Dec 2022 12:25) (71 - 87)  BP: 112/72 (20 Dec 2022 12:25) (107/69 - 113/71)  BP(mean): --  RR: 18 (20 Dec 2022 12:25) (17 - 18)  SpO2: 98% (20 Dec 2022 12:25) (94% - 98%)    Parameters below as of 20 Dec 2022 12:25  Patient On (Oxygen Delivery Method): room air      Daily     Daily     LABS: Labs personally reviewed by me:                        8.1    15.73 )-----------( 196      ( 20 Dec 2022 07:00 )             24.8     12-20    135  |  101  |  20  ----------------------------<  104<H>  4.3   |  27  |  0.94    Ca    8.9      20 Dec 2022 07:02        PT/INR - ( 20 Dec 2022 07:00 )   PT: 19.4 sec;   INR: 1.68 ratio         PTT - ( 20 Dec 2022 07:00 )  PTT:31.9 sec        Imaging personally reviewed by me:

## 2022-12-20 NOTE — PROGRESS NOTE ADULT - SUBJECTIVE AND OBJECTIVE BOX
POD4 s/p R JESS with ORIF distal femur    Pt seen and examined. Pain controlled. No acute overnight complaints or events. He states that he has not ambulated with physical therapy yet.    Vital Signs Last 24 Hrs  T(C): 36.7 (20 Dec 2022 03:58), Max: 37 (19 Dec 2022 20:42)  T(F): 98.1 (20 Dec 2022 03:58), Max: 98.6 (19 Dec 2022 20:42)  HR: 79 (20 Dec 2022 03:58) (75 - 91)  BP: 111/69 (20 Dec 2022 03:58) (107/67 - 116/65)  BP(mean): --  RR: 17 (20 Dec 2022 03:58) (17 - 18)  SpO2: 95% (20 Dec 2022 03:58) (94% - 97%)    Parameters below as of 20 Dec 2022 03:58  Patient On (Oxygen Delivery Method): room air                          9.1    21.98 )-----------( 158      ( 19 Dec 2022 07:23 )             27.7       Exam:  Alert and Oriented, No Acute Distress. VSS.   RLE:     Abduction pillow in place      Aquacel dressing clean dry intact     (+) PF/DF/EHL/FHL 5/5     Sensation intact to light touch      Calves soft, non-tender     2+ DP/PT pulse  Compartments soft and compressible    A/P: 82y Male s/p AJ right hip IMN conversion to JESS and ORIF femoral shaft. POD4    Medical management appreciated  -PT/OT - NWB RLE, WBAT LLE for transfers ONLY, NO gait training  -Continue abduction pillow while in bed  -Incentive spirometry  -DVT PPx: Xarelto, SCDs, Early OOB and Amb  -Pain Control PRN  -FU AM labs  -Dispo: [facility] per PT recs  -Ortho stable for discharge when medically appropriate. Patient to follow up with Dr. Godinez as outpatient for further evaluation and treatment  Will discuss with attending Dr. Godinez and advise if any changes to plan

## 2022-12-20 NOTE — CONSULT NOTE ADULT - CONSULT REQUESTED DATE/TIME
06-Dec-2022 11:45
08-Dec-2022 05:09
13-Dec-2022 14:18
08-Dec-2022 19:07
05-Dec-2022 15:02
20-Dec-2022 10:00
07-Dec-2022 13:35

## 2022-12-21 ENCOUNTER — TRANSCRIPTION ENCOUNTER (OUTPATIENT)
Age: 82
End: 2022-12-21

## 2022-12-21 VITALS
SYSTOLIC BLOOD PRESSURE: 105 MMHG | DIASTOLIC BLOOD PRESSURE: 67 MMHG | OXYGEN SATURATION: 95 % | RESPIRATION RATE: 18 BRPM | HEART RATE: 74 BPM | TEMPERATURE: 99 F

## 2022-12-21 LAB
ANION GAP SERPL CALC-SCNC: 12 MMOL/L — SIGNIFICANT CHANGE UP (ref 5–17)
APTT BLD: 35.1 SEC — SIGNIFICANT CHANGE UP (ref 27.5–35.5)
BASOPHILS # BLD AUTO: 0.09 K/UL — SIGNIFICANT CHANGE UP (ref 0–0.2)
BASOPHILS NFR BLD AUTO: 0.6 % — SIGNIFICANT CHANGE UP (ref 0–2)
BUN SERPL-MCNC: 24 MG/DL — HIGH (ref 7–23)
CALCIUM SERPL-MCNC: 8.8 MG/DL — SIGNIFICANT CHANGE UP (ref 8.4–10.5)
CHLORIDE SERPL-SCNC: 101 MMOL/L — SIGNIFICANT CHANGE UP (ref 96–108)
CO2 SERPL-SCNC: 26 MMOL/L — SIGNIFICANT CHANGE UP (ref 22–31)
CREAT SERPL-MCNC: 0.86 MG/DL — SIGNIFICANT CHANGE UP (ref 0.5–1.3)
EGFR: 86 ML/MIN/1.73M2 — SIGNIFICANT CHANGE UP
EOSINOPHIL # BLD AUTO: 0.18 K/UL — SIGNIFICANT CHANGE UP (ref 0–0.5)
EOSINOPHIL NFR BLD AUTO: 1.2 % — SIGNIFICANT CHANGE UP (ref 0–6)
GLUCOSE SERPL-MCNC: 104 MG/DL — HIGH (ref 70–99)
HCT VFR BLD CALC: 26.8 % — LOW (ref 39–50)
HGB BLD-MCNC: 8.5 G/DL — LOW (ref 13–17)
IMM GRANULOCYTES NFR BLD AUTO: 3.4 % — HIGH (ref 0–0.9)
INR BLD: 1.9 RATIO — HIGH (ref 0.88–1.16)
LYMPHOCYTES # BLD AUTO: 1.84 K/UL — SIGNIFICANT CHANGE UP (ref 1–3.3)
LYMPHOCYTES # BLD AUTO: 11.9 % — LOW (ref 13–44)
MCHC RBC-ENTMCNC: 31.6 PG — SIGNIFICANT CHANGE UP (ref 27–34)
MCHC RBC-ENTMCNC: 31.7 GM/DL — LOW (ref 32–36)
MCV RBC AUTO: 99.6 FL — SIGNIFICANT CHANGE UP (ref 80–100)
MONOCYTES # BLD AUTO: 1.73 K/UL — HIGH (ref 0–0.9)
MONOCYTES NFR BLD AUTO: 11.1 % — SIGNIFICANT CHANGE UP (ref 2–14)
NEUTROPHILS # BLD AUTO: 11.15 K/UL — HIGH (ref 1.8–7.4)
NEUTROPHILS NFR BLD AUTO: 71.8 % — SIGNIFICANT CHANGE UP (ref 43–77)
NRBC # BLD: 0 /100 WBCS — SIGNIFICANT CHANGE UP (ref 0–0)
PLATELET # BLD AUTO: 257 K/UL — SIGNIFICANT CHANGE UP (ref 150–400)
POTASSIUM SERPL-MCNC: 3.8 MMOL/L — SIGNIFICANT CHANGE UP (ref 3.5–5.3)
POTASSIUM SERPL-SCNC: 3.8 MMOL/L — SIGNIFICANT CHANGE UP (ref 3.5–5.3)
PROTHROM AB SERPL-ACNC: 22 SEC — HIGH (ref 10.5–13.4)
RBC # BLD: 2.69 M/UL — LOW (ref 4.2–5.8)
RBC # FLD: 14.9 % — HIGH (ref 10.3–14.5)
SODIUM SERPL-SCNC: 139 MMOL/L — SIGNIFICANT CHANGE UP (ref 135–145)
WBC # BLD: 15.52 K/UL — HIGH (ref 3.8–10.5)
WBC # FLD AUTO: 15.52 K/UL — HIGH (ref 3.8–10.5)

## 2022-12-21 PROCEDURE — 74018 RADEX ABDOMEN 1 VIEW: CPT | Mod: 26

## 2022-12-21 RX ORDER — FUROSEMIDE 40 MG
1 TABLET ORAL
Qty: 0 | Refills: 0 | DISCHARGE
Start: 2022-12-21

## 2022-12-21 RX ORDER — ACETAMINOPHEN 500 MG
650 TABLET ORAL ONCE
Refills: 0 | Status: COMPLETED | OUTPATIENT
Start: 2022-12-21 | End: 2022-12-21

## 2022-12-21 RX ORDER — CHLORHEXIDINE GLUCONATE 213 G/1000ML
1 SOLUTION TOPICAL
Refills: 0 | Status: DISCONTINUED | OUTPATIENT
Start: 2022-12-21 | End: 2022-12-21

## 2022-12-21 RX ADMIN — Medication 1 TABLET(S): at 11:27

## 2022-12-21 RX ADMIN — Medication 650 MILLIGRAM(S): at 11:33

## 2022-12-21 RX ADMIN — ESCITALOPRAM OXALATE 10 MILLIGRAM(S): 10 TABLET, FILM COATED ORAL at 11:27

## 2022-12-21 RX ADMIN — PANTOPRAZOLE SODIUM 40 MILLIGRAM(S): 20 TABLET, DELAYED RELEASE ORAL at 05:31

## 2022-12-21 RX ADMIN — Medication 20 MILLIGRAM(S): at 05:32

## 2022-12-21 RX ADMIN — Medication 650 MILLIGRAM(S): at 12:33

## 2022-12-21 NOTE — PROGRESS NOTE ADULT - ASSESSMENT
82M RH w/ afib on xarelto, s/p L hip IMN (2013), s/p R hip IMN (2019) presents with acute onset RLE weakness. LKN 12/5/22 at 7am. Pt last took xarelto at 6:30am. While showering, he felt RLE sudden onset weakness, no associated pain or back pain at that time. He reports increased R hip area pain and numbness/tingling with associated leg shaking due to pain. Pt was hanging onto the sink for at least 3-4 hours and reports not being able to get up due to RLE weakness. NIHSS: 0, preMRS: 2 (ambulates with a walker). Neuro exam notable for slight +R pronator, +straight leg raise. CTH/CTA neg  NIHSS4  premrs2-3 walks with cane/walker   MRI brain neg for infarct   CRP 22   CT pelvis: Redemonstration of open reduction internal fixation of the right hip.   Interval development of loosening throughout the right femoral neck   compression screw with migration of the tip into the joint space.   Interval development of lateral deviation of the right femoral   intramedullary simón with the tip now abutting and markedly   remodeling/thinning the lateral cortex at the mid diaphysis.  MRI C spien 12/9: mild cord compression C3/4/5. abnormal signal C4/5   s/p IR aspiration 12/9  s/p  R hip hardware revision by ortho 12/16  post op R hip pain but otherwise neuro intact     Impression: acute RLE weakness and pain likely 2/2 R hip pathology vs. lumbosacral pathology, less likely stroke; Stroke ruled out     Recommendations:      - pain management   - continue home xarelto for stroke prevention, restarted post op.monitor H/H   - monitor on telemetry  - High dose statin therapy - atorvastatin 40mg PO daily. LDL goal <70mg/dL.   - PT/OT/SS/SLP, OOBC  - check FS, glucose control <180  - GI/DVT ppx  - Thank you for allowing me to participate in the care of this patient. Call with questions.   - dc planning in progress     Carloz Del Castillo MD  Vascular Neurology  Office: [phone] .

## 2022-12-21 NOTE — PROGRESS NOTE ADULT - SUBJECTIVE AND OBJECTIVE BOX
CARDIOLOGY FOLLOW UP - Dr. Marrufo  DATE OF SERVICE: 12/21/22    CC  No CV complaints    REVIEW OF SYSTEMS:  CONSTITUTIONAL: No fever, weight loss, or fatigue  RESPIRATORY: No cough, wheezing, chills or hemoptysis; No Shortness of Breath  CARDIOVASCULAR: No chest pain, palpitations, passing out, dizziness, or leg swelling  GASTROINTESTINAL: No abdominal or epigastric pain. No nausea, vomiting, or hematemesis; No diarrhea or constipation. No melena or hematochezia.  VASCULAR: No edema     PHYSICAL EXAM:  T(C): 37.4 (12-21-22 @ 08:36), Max: 37.4 (12-21-22 @ 08:36)  HR: 74 (12-21-22 @ 08:36) (71 - 82)  BP: 105/67 (12-21-22 @ 08:36) (105/67 - 122/77)  RR: 18 (12-21-22 @ 08:36) (18 - 18)  SpO2: 95% (12-21-22 @ 08:36) (95% - 98%)  Wt(kg): --  I&O's Summary    20 Dec 2022 07:01  -  21 Dec 2022 07:00  --------------------------------------------------------  IN: 620 mL / OUT: 1001 mL / NET: -381 mL    21 Dec 2022 07:01  -  21 Dec 2022 11:01  --------------------------------------------------------  IN: 240 mL / OUT: 0 mL / NET: 240 mL        Appearance: Normal	  Cardiovascular: Normal S1 S2,Irreguar rhythm, No JVD, No murmurs  Respiratory: Lungs clear to auscultation b/l  Gastrointestinal:  Soft, Non-tender, + BS	  Extremities: Normal range of motion, No clubbing, cyanosis or edema      Home Medications:  acetaminophen 325 mg oral tablet: 2 tab(s) orally every 6 hours (19 Dec 2022 16:04)  escitalopram 10 mg oral tablet: 1 tab(s) orally once a day (19 Dec 2022 16:04)  furosemide 20 mg oral tablet: 1 tab(s) orally Monday, Wednesday, and Friday (21 Dec 2022 10:50)  magnesium hydroxide 8% oral suspension: 30 milliliter(s) orally once a day, As needed, Constipation (19 Dec 2022 16:04)  Multiple Vitamins oral tablet: 1 tab(s) orally once a day (19 Dec 2022 16:04)  oxyCODONE 10 mg oral tablet: 1 tab(s) orally every 4 hours, As needed, Severe Pain (7 - 10) (19 Dec 2022 16:04)  oxyCODONE 5 mg oral tablet: 1 tab(s) orally every 4 hours, As needed, Moderate Pain (4 - 6) (19 Dec 2022 16:04)  pantoprazole 40 mg oral delayed release tablet: 1 tab(s) orally once a day (before a meal) (19 Dec 2022 16:04)  polyethylene glycol 3350 oral powder for reconstitution: 17 gram(s) orally 2 times a day (19 Dec 2022 16:04)  rivaroxaban 20 mg oral tablet: 1 tab(s) orally once a day (before a meal) (05 Dec 2022 21:58)  senna leaf extract oral tablet: 2 tab(s) orally once a day (at bedtime) (19 Dec 2022 16:04)  traMADol 50 mg oral tablet: 1 tab(s) orally every 6 hours, As needed, Mild Pain (1 - 3) (19 Dec 2022 16:04)      MEDICATIONS  (STANDING):  escitalopram 10 milliGRAM(s) Oral daily  furosemide    Tablet 20 milliGRAM(s) Oral <User Schedule>  HYDROmorphone  Injectable 0.5 milliGRAM(s) IV Push once  multivitamin 1 Tablet(s) Oral daily  pantoprazole    Tablet 40 milliGRAM(s) Oral before breakfast  polyethylene glycol 3350 17 Gram(s) Oral two times a day  rivaroxaban 20 milliGRAM(s) Oral with dinner  senna 2 Tablet(s) Oral at bedtime      TELEMETRY: 	    ECG:  	  RADIOLOGY:   DIAGNOSTIC TESTING:  [ ] Echocardiogram:  [ ]  Catheterization:  [ ] Stress Test:    OTHER: 	    LABS:	 	                            8.5    15.52 )-----------( 257      ( 21 Dec 2022 06:30 )             26.8     12-21    139  |  101  |  24<H>  ----------------------------<  104<H>  3.8   |  26  |  0.86    Ca    8.8      21 Dec 2022 06:30      PT/INR - ( 21 Dec 2022 06:32 )   PT: 22.0 sec;   INR: 1.90 ratio         PTT - ( 21 Dec 2022 06:32 )  PTT:35.1 sec

## 2022-12-21 NOTE — PROGRESS NOTE ADULT - SUBJECTIVE AND OBJECTIVE BOX
Chief Complaint:  Patient is a 82y old  Male who presents with a chief complaint of R sided weakness (21 Dec 2022 11:01)      Date of service 12-21-22 @ 14:29      Interval Events:   Patient seen and examined.   Had multiple BMs overnight after moviprep.  Denies abdominal pain, n/v.     Hospital Medications:  chlorhexidine 2% Cloths 1 Application(s) Topical <User Schedule>  escitalopram 10 milliGRAM(s) Oral daily  furosemide    Tablet 20 milliGRAM(s) Oral <User Schedule>  HYDROmorphone  Injectable 0.5 milliGRAM(s) IV Push once  magnesium hydroxide Suspension 30 milliLiter(s) Oral daily PRN  multivitamin 1 Tablet(s) Oral daily  ondansetron Injectable 4 milliGRAM(s) IV Push every 6 hours PRN  oxyCODONE    IR 5 milliGRAM(s) Oral every 4 hours PRN  oxyCODONE    IR 10 milliGRAM(s) Oral every 4 hours PRN  pantoprazole    Tablet 40 milliGRAM(s) Oral before breakfast  polyethylene glycol 3350 17 Gram(s) Oral two times a day  rivaroxaban 20 milliGRAM(s) Oral with dinner  senna 2 Tablet(s) Oral at bedtime  traMADol 50 milliGRAM(s) Oral every 6 hours PRN        Review of Systems:  General:  No wt loss, fevers, chills, night sweats, fatigue,   Eyes:  Good vision, no reported pain  ENT:  No sore throat, pain, runny nose, dysphagia  CV:  No pain, palpitations, hypo/hypertension  Resp:  No dyspnea, cough, tachypnea, wheezing  GI:  See HPI  :  No pain, bleeding, incontinence, nocturia  Muscle:  No pain, weakness  Neuro:  No weakness, tingling, memory problems  Psych:  No fatigue, insomnia, mood problems, depression  Endocrine:  No polyuria, polydipsia, cold/heat intolerance  Heme:  No petechiae, ecchymosis, easy bruisability  Integumentary:  No rash, edema    PHYSICAL EXAM:   Vital Signs:  Vital Signs Last 24 Hrs  T(C): 37.4 (21 Dec 2022 08:36), Max: 37.4 (21 Dec 2022 08:36)  T(F): 99.3 (21 Dec 2022 08:36), Max: 99.3 (21 Dec 2022 08:36)  HR: 74 (21 Dec 2022 08:36) (74 - 82)  BP: 105/67 (21 Dec 2022 08:36) (105/67 - 122/77)  BP(mean): --  RR: 18 (21 Dec 2022 08:36) (18 - 18)  SpO2: 95% (21 Dec 2022 08:36) (95% - 97%)    Parameters below as of 21 Dec 2022 08:36  Patient On (Oxygen Delivery Method): room air      Daily     Daily       PHYSICAL EXAM:     GENERAL:  Appears stated age, well-groomed, well-nourished, no distress  HEENT:  NC/AT,  conjunctivae anicteric, clear and pink,   NECK: supple, trachea midline  CHEST:  Full & symmetric excursion, no increased effort, breath sounds clear  HEART:  Regular rhythm, no JVD  ABDOMEN:  Soft, non-tender, non-distended, normoactive bowel sounds,  no masses , no hepatosplenomegaly  EXTREMITIES:  no cyanosis,clubbing or edema  SKIN:  No rash, erythema, or, ecchymoses, no jaundice  NEURO:  Alert, non-focal, no asterixis  PSYCH: Appropriate affect, oriented to place and time  RECTAL: Deferred      LABS Personally reviewed by me:                        8.5    15.52 )-----------( 257      ( 21 Dec 2022 06:30 )             26.8     Mean Cell Volume: 99.6 fl (12-21-22 @ 06:30)    12-21    139  |  101  |  24<H>  ----------------------------<  104<H>  3.8   |  26  |  0.86    Ca    8.8      21 Dec 2022 06:30        PT/INR - ( 21 Dec 2022 06:32 )   PT: 22.0 sec;   INR: 1.90 ratio         PTT - ( 21 Dec 2022 06:32 )  PTT:35.1 sec                            8.5    15.52 )-----------( 257      ( 21 Dec 2022 06:30 )             26.8                         8.1    15.73 )-----------( 196      ( 20 Dec 2022 07:00 )             24.8                         9.1    21.98 )-----------( 158      ( 19 Dec 2022 07:23 )             27.7       Imaging personally reviewed by me:

## 2022-12-21 NOTE — PROGRESS NOTE ADULT - SUBJECTIVE AND OBJECTIVE BOX
Neurology Progress Note    S: Patient seen and examined. No new events overnight s/p sx doing okay dc planning     Medication:  MEDICATIONS  (STANDING):  acetaminophen     Tablet .. 650 milliGRAM(s) Oral every 6 hours  escitalopram 10 milliGRAM(s) Oral daily  furosemide    Tablet 20 milliGRAM(s) Oral <User Schedule>  HYDROmorphone  Injectable 0.5 milliGRAM(s) IV Push once  multivitamin 1 Tablet(s) Oral daily  pantoprazole    Tablet 40 milliGRAM(s) Oral before breakfast  polyethylene glycol 3350 17 Gram(s) Oral two times a day  polyethylene glycol/electrolyte Solution 1 Liter(s) Oral once  rivaroxaban 20 milliGRAM(s) Oral with dinner  senna 2 Tablet(s) Oral at bedtime    MEDICATIONS  (PRN):  magnesium hydroxide Suspension 30 milliLiter(s) Oral daily PRN Constipation  ondansetron Injectable 4 milliGRAM(s) IV Push every 6 hours PRN Nausea and/or Vomiting  oxyCODONE    IR 5 milliGRAM(s) Oral every 4 hours PRN Moderate Pain (4 - 6)  oxyCODONE    IR 10 milliGRAM(s) Oral every 4 hours PRN Severe Pain (7 - 10)  traMADol 50 milliGRAM(s) Oral every 6 hours PRN Mild Pain (1 - 3)      Vitals:    Vital Signs Last 24 Hrs  T(C): 37.4 (21 Dec 2022 08:36), Max: 37.4 (21 Dec 2022 08:36)  T(F): 99.3 (21 Dec 2022 08:36), Max: 99.3 (21 Dec 2022 08:36)  HR: 74 (21 Dec 2022 08:36) (74 - 82)  BP: 105/67 (21 Dec 2022 08:36) (105/67 - 122/77)  BP(mean): --  RR: 18 (21 Dec 2022 08:36) (18 - 18)  SpO2: 95% (21 Dec 2022 08:36) (95% - 97%)    Parameters below as of 21 Dec 2022 08:36  Patient On (Oxygen Delivery Method): room air      Orthostatic VS  12-17-22 @ 10:21  Lying BP: 115/70 HR: --  Sitting BP: 98/55 HR: --  Standing BP: --/-- HR: --  Site: --  Mode: --  Orthostatic VS  12-17-22 @ 10:20  Lying BP: 115/70 HR: --  Sitting BP: 98/55 HR: --  Standing BP: 99/66 HR: --  Site: --  Mode: --      General Exam:   Constitutional: Male, appears stated age, in no apparent distress including pain  Head: Normocephalic & atraumatic.  Respiratory: No increased work of breathing  Extremities: b/l LE pitting edema  Skin: +b/l overlying chronic skin changes over b/l LE    Cardiovascular (>2): atrial fibrillation on monitor, HR 80s.    Neurological (>12):  MS: Awake, alert, oriented to person, place, situation, time. Normal affect. Follows all commands.    Language: Speech is clear, fluent with good repetition & comprehension (able to name objects thumb)    CNs: VFF to counting fingers. EOMI no nystagmus, no diplopia. V1-3 intact to LT, well developed masseter muscles b/l. No facial asymmetry b/l, full eye closure strength b/l. Hearing grossly normal (rubbing fingers) b/l. Symmetric palate elevation in midline. Gag reflex deferred. Head turning & shoulder shrug intact b/l. Tongue midline, normal movements, no atrophy.    Motor: Normal muscle bulk. b/l UE postural and action tremor.  Slight R pronator drift.              Deltoid	Biceps	Triceps	Wrist	Finger ABd	   R	4+	5	5	5	4+		4+ 	  L	5	5	5	5	5		5    	H-Flex	K-Flex	K-Ext	D-Flex	P-Flex  R	4+	5	5	4+	5	RLE with +straight raise	   L	5	5	5	5	5	     Sensation: Intact to LT b/l throughout.     Cortical: Extinction on DSS (neglect): none    Reflexes: with significant pain when checking for ankle clonus, deferred              Biceps(C5)       BR(C6)     Triceps(C7)               Patellar(L4)    Achilles(S1)    Plantar Resp  R	2	          2	             2		        1		    		mute  L	2	          2	             2		        1		    		mute    Coordination: intact rapid-alt movements. No dysmetria to FTN     Gait: unable to assess due to pain      I personally reviewed the below data/images/labs:  CBC Full  -  ( 21 Dec 2022 06:30 )  WBC Count : 15.52 K/uL  RBC Count : 2.69 M/uL  Hemoglobin : 8.5 g/dL  Hematocrit : 26.8 %  Platelet Count - Automated : 257 K/uL  Mean Cell Volume : 99.6 fl  Mean Cell Hemoglobin : 31.6 pg  Mean Cell Hemoglobin Concentration : 31.7 gm/dL  Auto Neutrophil # : 11.15 K/uL  Auto Lymphocyte # : 1.84 K/uL  Auto Monocyte # : 1.73 K/uL  Auto Eosinophil # : 0.18 K/uL  Auto Basophil # : 0.09 K/uL  Auto Neutrophil % : 71.8 %  Auto Lymphocyte % : 11.9 %  Auto Monocyte % : 11.1 %  Auto Eosinophil % : 1.2 %  Auto Basophil % : 0.6 %  12-21    139  |  101  |  24<H>  ----------------------------<  104<H>  3.8   |  26  |  0.86    Ca    8.8      21 Dec 2022 06:30        < from: CT Angio Neck Stroke Protocol w/ IV Cont (12.05.22 @ 15:25) >  IMPRESSION:    HEAD CT: No acute intracranial hemorrhage or acute territorial infarction.    If symptoms persist consider follow up head CT or MRI, MRA  if no   contraindication.    CTA COW:  Patent intracranial circulation without flow limiting stenosis   or large vessel occlusion.    CTA NECK: Patent, ECAs, ICAs, no  hemodynamically significant stenosis at    ICA origins by NASCET criteria.  Bilateral vertebral arteries are patent without flow limiting stenosis.    < end of copied text >    < from: MR Head No Cont (12.06.22 @ 19:43) >    ACC: [id number] EXAM:  MR BRAIN                          PROCEDURE DATE:  12/06/2022          INTERPRETATION:  CLINICAL STATEMENT: Pain.    TECHNIQUE: MRI of the brain was performed without gadolinium.    COMPARISON: CT head 12/5/2022    FINDINGS:  There is mild diffuse parenchymal volume loss. There are T2 prolongation   signal abnormalities in the periventricular subcortical white matter   likely related to mild chronic microvascular ischemic changes.    There is no acute parenchymal hemorrhage, parenchymal mass, mass effect   or midline shift. There is no extra-axial fluid collection.  There is no   hydrocephalus.  There is no acute infarct.    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    IMPRESSION:  No acute intracranial hemorrhage or acute infarct.    --- End of Report ---    < from: MR Cervical Spine No Cont (12.09.22 @ 20:23) >    ACC: [id number] EXAM:  MR SPINE CERVICAL                          PROCEDURE DATE:  12/09/2022          INTERPRETATION:  CLINICAL INDICATION: Cervical stenosis with myelopathy    Magnetic resonance imaging of the cervical spine was carried out with   sagittal surface coil imaging from C1 to T4 using T1 and fast spin echo   T2 weighted images with and without fat saturation technique with axial   T1 and fast spin echo T2 weighted imaging on a 1.5 Alexia magnet.    Comparison is made with the prior cervical spine CT of 10/30/2019.    The cervical vertebrae are normal in height and signal intensity. There   are disc osteophyte complexes present at C3-4 and C4-5 which with dorsal   ligamentous hypertrophy causes severe spinal stenosis and mild cord  compression. There is a small amount of cord signal abnormality at the   C4-5 level.    There is a small disc osteophyte complex present at C6-7 without cord   compression or significant spinal stenosis. The remainder of the cervical   spine and the upper thoracic spine are unremarkable.        IMPRESSION: Disc osteophyte complexes C3-4 and C4-5 along with dorsal   ligamentous causes severe spinal stenosis and mild cord compression.   Small amount of abnormal cord signal C4-5.                    --- End of Report ---            ABA MARIE MD; Attending Radiologist  This document has been electronically signed. Dec  9 2022  8:48PM    < end of copied text >

## 2022-12-21 NOTE — DISCHARGE NOTE NURSING/CASE MANAGEMENT/SOCIAL WORK - NSDCVIVACCINE_GEN_ALL_CORE_FT
Tdap; 28-Aug-2018 16:56; Nicole Silva (RN); [employer]; [id number]; IntraMuscular; Deltoid Left.; 0.5 milliLiter(s); VIS (VIS Published: 09-May-2013, VIS Presented: 28-Aug-2018);

## 2022-12-21 NOTE — PROGRESS NOTE ADULT - PROVIDER SPECIALTY LIST ADULT
Cardiology
Dental
Internal Medicine
Neurology
Orthopedics
Cardiology
Cardiology
Gastroenterology
Infectious Disease
Internal Medicine
Neurology
Neurology
Orthopedics
Podiatry
Cardiology
Infectious Disease
Internal Medicine
Neurology
Orthopedics
Podiatry
Cardiology
Infectious Disease
Internal Medicine
Neurology
Neurosurgery
CONSTITUTIONAL: Well-appearing; well-nourished; in no apparent distress.   EYES: PERRLA; EOM intact.   ENT: No hemotympanum  NECK: No c spine ttp. + full rom of cervical spine  CARDIOVASCULAR: Normal S1, S2; no murmurs, rubs, or gallops.   RESPIRATORY: Normal chest excursion with respiration; breath sounds clear and equal bilaterally; no wheezes, rhonchi, or rales.  GI/: Normal bowel sounds; non-distended; non-tender; no palpable organomegaly.   MS: No evidence of trauma or deformity. Normal ROM in all four extremities; non-tender to palpation; distal pulses are normal.   SKIN: + mild L periorbital ecchymosis and ecchymosis over L knee  NEURO/PSYCH: No focal deficits. Pt moving all extremities well. Normal gait.

## 2022-12-21 NOTE — PROGRESS NOTE ADULT - SUBJECTIVE AND OBJECTIVE BOX
[facility] DIVISION OF INFECTIOUS DISEASES  GIOVANY Ferguson Y. Patel, S. Shah, G. Delmer  [phone]  ([phone] - weekdays after 5pm and weekends)    Name: MARY BO  Age/Gender: 82y Male  MRN: [id number]    Interval History:  Patient seen and examined this morning.   Resting comfortably. Notes reviewed  No concerning overnight events  Afebrile   Allergies: No Known Allergies      Objective:  Vitals:   T(F): 99.3 (12-21-22 @ 08:36), Max: 99.3 (12-21-22 @ 08:36)  HR: 74 (12-21-22 @ 08:36) (71 - 82)  BP: 105/67 (12-21-22 @ 08:36) (105/67 - 122/77)  RR: 18 (12-21-22 @ 08:36) (18 - 18)  SpO2: 95% (12-21-22 @ 08:36) (95% - 98%)  Physical Examination:  General: no acute distress, nontoxic  HEENT: NC/AT, anicteric, neck supple  Respiratory: no acc muscle use, breathing comfortably  Cardiovascular: S1 and S2 present  Gastrointestinal: normal appearing, nondistended  Extremities: no edema, no cyanosis  Skin: no visible rash    Laboratory Studies:  CBC:                       8.5    15.52 )-----------( 257      ( 21 Dec 2022 06:30 )             26.8     WBC Trend:  15.52 12-21-22 @ 06:30  15.73 12-20-22 @ 07:00  21.98 12-19-22 @ 07:23  28.26 12-18-22 @ 06:53  24.16 12-17-22 @ 06:43  28.60 12-16-22 @ 17:12  9.65 12-16-22 @ 01:32  10.02 12-15-22 @ 07:18    CMP: 12-21    139  |  101  |  24<H>  ----------------------------<  104<H>  3.8   |  26  |  0.86    Ca    8.8      21 Dec 2022 06:30      Creatinine, Serum: 0.86 mg/dL (12-21-22 @ 06:30)  Creatinine, Serum: 0.94 mg/dL (12-20-22 @ 07:02)  Creatinine, Serum: 0.93 mg/dL (12-19-22 @ 07:20)  Creatinine, Serum: 0.92 mg/dL (12-18-22 @ 06:53)  Creatinine, Serum: 0.96 mg/dL (12-17-22 @ 06:44)  Creatinine, Serum: 0.88 mg/dL (12-16-22 @ 17:11)  Creatinine, Serum: 1.22 mg/dL (12-16-22 @ 01:32)  Creatinine, Serum: 1.05 mg/dL (12-15-22 @ 07:25)    Microbiology: reviewed   COVID-19 PCR: Danyell (19 Dec 2022 12:13)    Radiology: reviewed     Medications:  acetaminophen     Tablet .. 650 milliGRAM(s) Oral once  escitalopram 10 milliGRAM(s) Oral daily  furosemide    Tablet 20 milliGRAM(s) Oral <User Schedule>  HYDROmorphone  Injectable 0.5 milliGRAM(s) IV Push once  magnesium hydroxide Suspension 30 milliLiter(s) Oral daily PRN  multivitamin 1 Tablet(s) Oral daily  ondansetron Injectable 4 milliGRAM(s) IV Push every 6 hours PRN  oxyCODONE    IR 5 milliGRAM(s) Oral every 4 hours PRN  oxyCODONE    IR 10 milliGRAM(s) Oral every 4 hours PRN  pantoprazole    Tablet 40 milliGRAM(s) Oral before breakfast  polyethylene glycol 3350 17 Gram(s) Oral two times a day  rivaroxaban 20 milliGRAM(s) Oral with dinner  senna 2 Tablet(s) Oral at bedtime  traMADol 50 milliGRAM(s) Oral every 6 hours PRN    Prior/Completed Antimicrobials:  ceFAZolin   IVPB  clindamycin IVPB

## 2022-12-21 NOTE — PROGRESS NOTE ADULT - ASSESSMENT
Patient is a 82 year old male with PMH of Afib on xarelto, s/p L hip IMN (most recently s/p R hip IMN (Dr. Godinez 10/31/19) presents with acute onset RLE weakness and was unable to move from sink for at least 2-3 hours until daughter came home and called EMS. Patient noted with significant erythematous legs with swelling,     Acute onset RLE weakness, unclear etiology, no stroke on MRI  R hip IM nail subsidence and screw cut-out  Leukocytosis post op -- suspect likely reactive/inflammatory  - s/p IR aspiration of R hip 12/9, culture negative   - s/p R tooth extraction 12/13   - s/p Right total hip replacement 12/16   - ORIF, fracture, femoral shaft, with plate and screws    - Removal of intramedullary nail 1    pt remains nontoxic, afebrile  wound site right thigh clean  WBC improved/stable off abx  continue to trend WBC    bowel regimen  strict aspiration precautions  incentive spirometer       Slim Jackson M.D.  [facility] Division of Infectious Diseases  [phone]  After 5pm on weekdays and all day on weekends - please call [phone]

## 2022-12-21 NOTE — DISCHARGE NOTE NURSING/CASE MANAGEMENT/SOCIAL WORK - PATIENT PORTAL LINK FT
You can access the FollowMyHealth Patient Portal offered by [facility] by registering at the following website: [email or web address]. By joining [facility]’s FollowMyHealth portal, you will also be able to view your health information using other applications (apps) compatible with our system.

## 2022-12-21 NOTE — PROGRESS NOTE ADULT - NSPROGADDITIONALINFOA_GEN_ALL_CORE
- Counseling on diet, exercise, and medication adherence was done  - Counseling on smoking cessation and alcohol consumption offered when appropriate.  - Pain assessed and judicious use of narcotics when appropriate was discussed.    - Stroke education given when appropriate.  - Importance of fall prevention discussed.   - Differential diagnosis and plan of care discussed with patient and/or family and primary team

## 2022-12-21 NOTE — PROGRESS NOTE ADULT - TIME BILLING
Patient seen and examined.  Agree with above NP note.  cv stable  await ir drain  cont current tx
agree with above  cv stable  cont current meds
Agree with above PA note.  cv stable  await OR yoni   cont currentt x
Agree with above PA note.  cv stable  cont current tx  dcp planning
Agree with above PA note.  cv stable  cont current tx  dcp planning
agree with above  cv stable  cont current meds  remains optimized for OR tomorrow
Agree with above PA note.  cv stable  cont current tx  dcp planning

## 2022-12-21 NOTE — PROGRESS NOTE ADULT - ASSESSMENT
1.Constipation  - improved, had multiple BMs s/p moviprep   - cw miralax 17g bid, senna 2tabs qhs  - abdominal x-ray read     2. s/p R JESS and ORIF, POD 4  - recovering well  - pain control per primary team, avoid opioids     3. Afib  - on AC    4. Leukocytosis  - ID recs appreciated; likely reactive / inflammatory   - s/p IR aspiration of R hip 12/9, culture negative  - s/p R tooth extraction 12/13   - s/p Right total hip replacement 12/16    5. Anemia, no overt GI bleeding   - monitor H&H, transfuse PRN        Attending supervision statement: I have personally seen and examined the patient. I fully participated in the care of this patient. I have made amendments to the documentation where necessary, and agree with the history, physical exam, and plan as outlined by the ACP.    [facility]   Gastroenterology and Hepatology  [address] [place], [place]  Office: [phone]  Cell: [phone] 1.Constipation  - improved, had multiple BMs s/p moviprep   - cw miralax 17g bid, senna 2tabs qhs  - abdominal x-ray with non-obstructive bowel gas pattern      2. s/p R JESS and ORIF, POD 4  - recovering well  - pain control per primary team, avoid opioids     3. Afib  - on AC    4. Leukocytosis  - ID recs appreciated; likely reactive / inflammatory   - s/p IR aspiration of R hip 12/9, culture negative  - s/p R tooth extraction 12/13   - s/p Right total hip replacement 12/16    5. Anemia, no overt GI bleeding   - monitor H&H, transfuse PRN        Attending supervision statement: I have personally seen and examined the patient. I fully participated in the care of this patient. I have made amendments to the documentation where necessary, and agree with the history, physical exam, and plan as outlined by the ACP.    [facility]   Gastroenterology and Hepatology  [address] [place], [place]  Office: [phone]  Cell: [phone]

## 2022-12-21 NOTE — PROGRESS NOTE ADULT - REASON FOR ADMISSION
R sided weakness

## 2022-12-21 NOTE — PROGRESS NOTE ADULT - SUBJECTIVE AND OBJECTIVE BOX
POD5 s/p R JESS with ORIF distal femur    Pt seen and examined. Pain controlled. No acute overnight complaints or events. He states that he gets to edge of bed with PT.      VITAL SIGNS:  T(C): 36.8 (12-21-22 @ 04:25), Max: 36.9 (12-21-22 @ 00:06)  HR: 76 (12-21-22 @ 04:25) (71 - 82)  BP: 106/67 (12-21-22 @ 04:25) (106/67 - 122/77)  RR: 18 (12-21-22 @ 04:25) (18 - 18)  SpO2: 97% (12-21-22 @ 04:25) (96% - 98%)      LABS:                        8.5    15.52 )-----------( 257      ( 21 Dec 2022 06:30 )             26.8     12-21    139  |  101  |  24<H>  ----------------------------<  104<H>  3.8   |  26  |  0.86    Ca    8.8      21 Dec 2022 06:30      PT/INR - ( 21 Dec 2022 06:32 )   PT: 22.0 sec;   INR: 1.90 ratio         PTT - ( 21 Dec 2022 06:32 )  PTT:35.1 sec        Exam:  Alert and Oriented, No Acute Distress. VSS.   RLE:     Abduction pillow in place      Aquacel dressing clean dry intact     (+) PF/DF/EHL/FHL 5/5     Sensation intact to light touch      Calves soft, non-tender     2+ DP/PT pulse  Compartments soft and compressible      A/P: 82y Male s/p AJ right hip IMN conversion to JESS and ORIF femoral shaft. POD5    Medical management appreciated  -PT/OT:  - NWB RLE with POSTERIOR HIP PRECAUTIONS, WBAT LLE for transfers ONLY, NO gait training  -Continue abduction pillow while in bed    -Incentive spirometry  -DVT PPx: Xarelto, SCDs, Early OOB and Amb  -Pain Control PRN  -FU AM labs  -Dispo: [facility] per PT recs  -Ortho stable for discharge when medically appropriate. Patient to follow up with Dr. Godinez as outpatient for further evaluation and treatment  Will discuss with attending Dr. Godinez and advise if any changes to plan

## 2022-12-22 ENCOUNTER — RESULT REVIEW (OUTPATIENT)
Age: 82
End: 2022-12-22

## 2022-12-23 LAB
CULTURE RESULTS: SIGNIFICANT CHANGE UP
SPECIMEN SOURCE: SIGNIFICANT CHANGE UP

## 2022-12-28 LAB — SURGICAL PATHOLOGY STUDY: SIGNIFICANT CHANGE UP

## 2023-01-04 PROCEDURE — 82947 ASSAY GLUCOSE BLOOD QUANT: CPT

## 2023-01-04 PROCEDURE — 83880 ASSAY OF NATRIURETIC PEPTIDE: CPT

## 2023-01-04 PROCEDURE — 88300 SURGICAL PATH GROSS: CPT

## 2023-01-04 PROCEDURE — 72131 CT LUMBAR SPINE W/O DYE: CPT

## 2023-01-04 PROCEDURE — U0005: CPT

## 2023-01-04 PROCEDURE — 86850 RBC ANTIBODY SCREEN: CPT

## 2023-01-04 PROCEDURE — C1889: CPT

## 2023-01-04 PROCEDURE — 82330 ASSAY OF CALCIUM: CPT

## 2023-01-04 PROCEDURE — C1769: CPT

## 2023-01-04 PROCEDURE — 72170 X-RAY EXAM OF PELVIS: CPT

## 2023-01-04 PROCEDURE — 80048 BASIC METABOLIC PNL TOTAL CA: CPT

## 2023-01-04 PROCEDURE — 70498 CT ANGIOGRAPHY NECK: CPT | Mod: MA

## 2023-01-04 PROCEDURE — 82435 ASSAY OF BLOOD CHLORIDE: CPT

## 2023-01-04 PROCEDURE — 87205 SMEAR GRAM STAIN: CPT

## 2023-01-04 PROCEDURE — 82550 ASSAY OF CK (CPK): CPT

## 2023-01-04 PROCEDURE — 82962 GLUCOSE BLOOD TEST: CPT

## 2023-01-04 PROCEDURE — 70450 CT HEAD/BRAIN W/O DYE: CPT | Mod: MA

## 2023-01-04 PROCEDURE — 86923 COMPATIBILITY TEST ELECTRIC: CPT

## 2023-01-04 PROCEDURE — 20611 DRAIN/INJ JOINT/BURSA W/US: CPT

## 2023-01-04 PROCEDURE — 81001 URINALYSIS AUTO W/SCOPE: CPT

## 2023-01-04 PROCEDURE — 93306 TTE W/DOPPLER COMPLETE: CPT

## 2023-01-04 PROCEDURE — 73552 X-RAY EXAM OF FEMUR 2/>: CPT

## 2023-01-04 PROCEDURE — 97165 OT EVAL LOW COMPLEX 30 MIN: CPT

## 2023-01-04 PROCEDURE — 73501 X-RAY EXAM HIP UNI 1 VIEW: CPT

## 2023-01-04 PROCEDURE — 85025 COMPLETE CBC W/AUTO DIFF WBC: CPT

## 2023-01-04 PROCEDURE — C1729: CPT

## 2023-01-04 PROCEDURE — 85027 COMPLETE CBC AUTOMATED: CPT

## 2023-01-04 PROCEDURE — C9399: CPT

## 2023-01-04 PROCEDURE — 86140 C-REACTIVE PROTEIN: CPT

## 2023-01-04 PROCEDURE — 85018 HEMOGLOBIN: CPT

## 2023-01-04 PROCEDURE — 36415 COLL VENOUS BLD VENIPUNCTURE: CPT

## 2023-01-04 PROCEDURE — 96374 THER/PROPH/DIAG INJ IV PUSH: CPT

## 2023-01-04 PROCEDURE — 83735 ASSAY OF MAGNESIUM: CPT

## 2023-01-04 PROCEDURE — 97116 GAIT TRAINING THERAPY: CPT

## 2023-01-04 PROCEDURE — 84295 ASSAY OF SERUM SODIUM: CPT

## 2023-01-04 PROCEDURE — P9045: CPT

## 2023-01-04 PROCEDURE — 87070 CULTURE OTHR SPECIMN AEROBIC: CPT

## 2023-01-04 PROCEDURE — 85610 PROTHROMBIN TIME: CPT

## 2023-01-04 PROCEDURE — 84132 ASSAY OF SERUM POTASSIUM: CPT

## 2023-01-04 PROCEDURE — 87640 STAPH A DNA AMP PROBE: CPT

## 2023-01-04 PROCEDURE — 86901 BLOOD TYPING SEROLOGIC RH(D): CPT

## 2023-01-04 PROCEDURE — 87086 URINE CULTURE/COLONY COUNT: CPT

## 2023-01-04 PROCEDURE — 84484 ASSAY OF TROPONIN QUANT: CPT

## 2023-01-04 PROCEDURE — U0003: CPT

## 2023-01-04 PROCEDURE — 76377 3D RENDER W/INTRP POSTPROCES: CPT

## 2023-01-04 PROCEDURE — 70551 MRI BRAIN STEM W/O DYE: CPT

## 2023-01-04 PROCEDURE — 72141 MRI NECK SPINE W/O DYE: CPT

## 2023-01-04 PROCEDURE — 97110 THERAPEUTIC EXERCISES: CPT

## 2023-01-04 PROCEDURE — 73502 X-RAY EXAM HIP UNI 2-3 VIEWS: CPT

## 2023-01-04 PROCEDURE — 87641 MR-STAPH DNA AMP PROBE: CPT

## 2023-01-04 PROCEDURE — 87637 SARSCOV2&INF A&B&RSV AMP PRB: CPT

## 2023-01-04 PROCEDURE — 86900 BLOOD TYPING SEROLOGIC ABO: CPT

## 2023-01-04 PROCEDURE — 82565 ASSAY OF CREATININE: CPT

## 2023-01-04 PROCEDURE — 72192 CT PELVIS W/O DYE: CPT

## 2023-01-04 PROCEDURE — 74018 RADEX ABDOMEN 1 VIEW: CPT

## 2023-01-04 PROCEDURE — 80053 COMPREHEN METABOLIC PANEL: CPT

## 2023-01-04 PROCEDURE — 82803 BLOOD GASES ANY COMBINATION: CPT

## 2023-01-04 PROCEDURE — 97162 PT EVAL MOD COMPLEX 30 MIN: CPT

## 2023-01-04 PROCEDURE — 87040 BLOOD CULTURE FOR BACTERIA: CPT

## 2023-01-04 PROCEDURE — C1713: CPT

## 2023-01-04 PROCEDURE — 85014 HEMATOCRIT: CPT

## 2023-01-04 PROCEDURE — 88305 TISSUE EXAM BY PATHOLOGIST: CPT

## 2023-01-04 PROCEDURE — 85652 RBC SED RATE AUTOMATED: CPT

## 2023-01-04 PROCEDURE — 83605 ASSAY OF LACTIC ACID: CPT

## 2023-01-04 PROCEDURE — C1776: CPT

## 2023-01-04 PROCEDURE — 71045 X-RAY EXAM CHEST 1 VIEW: CPT

## 2023-01-04 PROCEDURE — 99285 EMERGENCY DEPT VISIT HI MDM: CPT

## 2023-01-04 PROCEDURE — 85730 THROMBOPLASTIN TIME PARTIAL: CPT

## 2023-01-04 PROCEDURE — 97161 PT EVAL LOW COMPLEX 20 MIN: CPT

## 2023-01-04 PROCEDURE — 88311 DECALCIFY TISSUE: CPT

## 2023-01-04 PROCEDURE — P9016: CPT

## 2023-01-04 PROCEDURE — 93970 EXTREMITY STUDY: CPT

## 2023-01-04 PROCEDURE — 97530 THERAPEUTIC ACTIVITIES: CPT

## 2023-01-04 PROCEDURE — 87075 CULTR BACTERIA EXCEPT BLOOD: CPT

## 2023-01-04 PROCEDURE — 70496 CT ANGIOGRAPHY HEAD: CPT | Mod: MA

## 2023-01-05 ENCOUNTER — RESULT REVIEW (OUTPATIENT)
Age: 83
End: 2023-01-05

## 2023-05-12 NOTE — PRE-OP CHECKLIST - PATIENT SENT AT
[FreeTextEntry1] : The patient notes that 1 week ago, he felt the lump with wiping after bowel movement.  He denies any rectal pain, burning, itching, bleeding.  The lump has remained and is bothersome to him.  He reports 1 bowel movement a day and admits to sometimes straining with his bowel movements.  He denies melena or bright red blood per rectum.  He denies abdominal pain.  His appetite is better than when I last saw him in November and he has gained some weight.  He stopped taking pantoprazole 3 to 4 months ago and denies heartburn or dysphagia.  He denies nausea or vomiting.  He does note occasional "repeating".  He also had had a negative H. pylori breath test on November 23, 2022.  Previous colonoscopy noted internal hemorrhoids.  The patient also had a 5 mm gallbladder polyp seen on abdominal ultrasound in October.\par \par \par Note from 11/17/2022 - We reviewed the evaluations performed since the patient's last visit on August 3, 2022.  Blood work from that day revealed normal celiac markers and TSH.  The patient underwent EGD and colonoscopy on August 12, 2022 performed by Dr. Hinds.  EGD was grossly normal with negative biopsies.  Colonoscopy was significant for moderate sigmoid diverticulosis as well as internal hemorrhoids which are asymptomatic.  The patient was feeling better and had gained weight with increased appetite.  Over the past 2 weeks, however, he has developed increased belching and again decreased appetite.  He feels a "uptight feeling" in his abdomen with stress.  He denies heartburn, dysphagia, abdominal pain.  He has a bowel movement every 1 to 2 days without melena or bright red blood per rectum.  He saw a vascular doctor who did not feel that ischemia is responsible for the patient's symptoms.  Of note, the patient takes Advil almost daily and aspirin every 2 to 3 days.  He has a history of a gallbladder polyp.\par \par \par Note from 8/3/2022 - The patient has been seeing Dr. Rodriguez but wishes to see me instead.  I spent some time reviewing the history and evaluations.  The patient states that he was well until July 11, 2022 when he had spots on both feet which have since resolved.  He was at first treated with hydrocortisone cream and was diagnosed visually with Henoch-Schönlein purpura although this diagnosis is in question.  He then developed pain in his bilateral groins with abdominal distention.  He notes decreased appetite with some nausea and had dry heaves 1 night.  He denies vomiting, heartburn, dysphagia.  He does note a reflux sensation.  His bowel movements are coming out and small amy every 2 days.  He denies melena or bright red blood per rectum.  Of note, the patient has lost 15 pounds over the past 3 weeks.\par \par We reviewed the evaluations done thus far.  Blood work on July 26 was net normal other than a 13% iron saturation.  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  Follow-up ultrasound was recommended in 6 to 12 months.  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  Additionally, he has a history of H. pylori in the past.\par \par  The patient has not been admitted to the hospital in the past year and denies any cardiac issues. 16-Dec-2022 09:48

## 2023-06-28 NOTE — H&P ADULT - PROBLEM SELECTOR PLAN 2
Nephrology Progress Note    Patient remains intubated/mechanically ventilated.    ROS:  Unable to obtain due to clinical condition    MEDICATIONS:  Current Facility-Administered Medications   Medication Dose Route Frequency Provider Last Rate Last Admin   • petrolatum (white)-mineral oil (LUBRIFRESH PM) ophthalmic ointment   Both Eyes 6 times per day Karlee Shields MD   Given at 06/28/23 1213   • piperacillin-tazobactam (ZOSYN) 3.375 g in sodium chloride 0.9 % 100 mL IVPB  3.375 g Intravenous 2 times per day Marjorie Villalobos CNP   Completed at 06/28/23 1251   • aspirin chewable 81 mg  81 mg Oral Daily Marjorie Villalobos CNP   81 mg at 06/28/23 0854   • insulin lispro (ADMELOG,HumaLOG) - Correction Dose   Subcutaneous 4 times per day Marjorie Villalobos CNP   3 Units at 06/28/23 0535   • docusate sodium-sennosides (SENOKOT S) 50-8.6 MG 1 tablet  1 tablet Oral BID Marjorie Villalobos CNP   1 tablet at 06/27/23 0810   • polyethylene glycol (MIRALAX) packet 17 g  17 g Oral Daily Marjorie Villalobos CNP   17 g at 06/27/23 0810   • pantoprazole (PROTONIX) 40 MG/20ML (compounded) suspension 40 mg  40 mg Per NG Tube Daily Marjorie Villalobos CNP   40 mg at 06/28/23 0853   • insulin glargine (LANTUS) injection 10 Units  10 Units Subcutaneous Daily Karlee Shields MD   10 Units at 06/28/23 0859   • chlorhexidine gluconate (PERIDEX) 0.12 % solution 15 mL  15 mL Swish & Spit 2 times per day Karlee Shields MD   15 mL at 06/28/23 0852          Physical Examination:  Vital Signs:    Visit Vitals  BP (!) 149/46   Pulse (!) 54   Temp 99.7 °F (37.6 °C)   Resp (!) 27   Ht 5' 2.01\" (1.575 m)   Wt 72.8 kg (160 lb 7.9 oz)   SpO2 95%   BMI 29.35 kg/m²     General:  Intubated   Head:  Normocephalic-atraumatic.   Neck:  Trachea is midline.   Eyes:  Normal conjunctivae.    ENT:   Mucous membranes are moist.    Cardiovascular:  S1, S2 auscultated.  No edema.  Respiratory:   Bilateral breath sounds heard.    Symmetric chest wall expansion.  Respirations are  non-labored.    Gastrointestinal:  Soft and nontender.   Positive bowel sounds.    Genitourinary: No CVA tenderness.    Musculoskeletal:  No joint swelling.    Skin: Warm and dry.  No rash noted.    I/O's    Intake/Output Summary (Last 24 hours) at 6/28/2023 1444  Last data filed at 6/28/2023 1359  Gross per 24 hour   Intake 2764.1 ml   Output 461 ml   Net 2303.1 ml       Labs:    Recent Labs   Lab 06/28/23  0439 06/27/23  0404 06/26/23  0437 06/25/23  0555 06/24/23 2039   SODIUM 137 132* 133* 133* 134*   POTASSIUM 4.3 4.4 5.2* 4.8 4.7   CHLORIDE 103 99 99 103 106   CO2 28 29 28 20* 19*   BUN 65* 49* 40* 32* 32*   CREATININE 2.75* 2.74* 2.39* 1.31* 1.26*   GLUCOSE 183* 166* 157* 351* 262*   CALCIUM 8.4 8.4 8.4 8.4 9.5      Recent Labs   Lab 06/28/23 0439   WBC 9.5   RBC 2.55*   HGB 7.3*   HCT 21.3*           Imaging    XR CHEST AP OR PA   Final Result      1.  Similar moderate to large layering right pleural effusion with adjacent   right basilar airspace disease.   2.  Stable small left pleural effusion.   3.  Stable positioning of the endotracheal and enteric tubes.         Electronically Signed by: JU BARNETT M.D.    Signed on: 6/27/2023 7:04 AM    Workstation ID: [id number]      XR CHEST AP OR PA   Final Result   1.  Persistent bilateral pulmonary opacities, right greater than left.    Small-moderate right effusion.      *The absence of findings should not deter follow-up or further evaluation   of concerning clinical findings.    FOR PHYSICIAN USE ONLY: Please note that this report was generated using   voice recognition software and may contain errors.  If you require   clarification or feel that there is an error in this report, please contact   me.      Electronically Signed by: JORY GRADY M.D.    Signed on: 6/26/2023 7:34 AM    Workstation ID: [id number]      XR CHEST AP OR PA   Final Result   1.  Increasing right lower lung airspace opacity and right pleural   effusion.   2.  Enlarged  heart with pulmonary venous hypertension and interstitial   edema.      Electronically Signed by: LAM FARR M.D.    Signed on: 6/25/2023 7:19 AM    Workstation ID: [id number]      CT HEAD WO CONTRAST   Final Result       No acute intracranial abnormality.      Electronically Signed by: COREEN CHU M.D.    Signed on: 6/24/2023 11:40 PM    Workstation ID: [id number]      XR CHEST PA OR AP 1 VIEW   Final Result   FINDINGS/IMPRESSION:         Endotracheal tube is 2.5 cm above the meena.      Nasogastric tube is in the stomach.      Hazy opacification of the right hemithorax, which may be due to layering   pleural effusion and/or atelectasis/consolidation.      Left lung is clear.      Electronically Signed by: RICKY PANTOJA M.D.    Signed on: 6/24/2023 8:47 PM    Workstation ID: [id number]            Assessment and Plan:  ALBAN on CKD3  - continue LR at 75 cc/hr   - Monitor labs     Cardiac arrest - cardiology following  - supportive care     Anemia  - s/p transfusion     Hyperkalemia  -resolved    Diabetes mellitus with proteinuria:  - U PCr ratio = 1.54gm     WBC 17 noted. No obvious systemic infectious symptoms. CXR and UA relatively unremarkable. Pt has known infected tooth with plans to pull, just received clearance from cards to hold Xarelto so has not yet scheduled appt with dentist. 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  -Cont. IV Clindamycin started by ER for now  -Will order BCxs  -Trend leukocytosis and fever curve

## 2023-08-10 ENCOUNTER — APPOINTMENT (OUTPATIENT)
Dept: OTOLARYNGOLOGY | Facility: CLINIC | Age: 83
End: 2023-08-10
Payer: MEDICARE

## 2023-08-10 VITALS
SYSTOLIC BLOOD PRESSURE: 124 MMHG | DIASTOLIC BLOOD PRESSURE: 71 MMHG | BODY MASS INDEX: 26.95 KG/M2 | OXYGEN SATURATION: 95 % | HEIGHT: 74 IN | WEIGHT: 210 LBS | HEART RATE: 67 BPM

## 2023-08-10 DIAGNOSIS — J38.3 OTHER DISEASES OF VOCAL CORDS: ICD-10-CM

## 2023-08-10 DIAGNOSIS — Z87.891 PERSONAL HISTORY OF NICOTINE DEPENDENCE: ICD-10-CM

## 2023-08-10 PROCEDURE — 99214 OFFICE O/P EST MOD 30 MIN: CPT | Mod: 25

## 2023-08-10 PROCEDURE — 31575 DIAGNOSTIC LARYNGOSCOPY: CPT

## 2023-08-10 RX ORDER — ESCITALOPRAM OXALATE 10 MG/1
10 TABLET, FILM COATED ORAL
Refills: 0 | Status: ACTIVE | COMMUNITY

## 2023-08-10 NOTE — HISTORY OF PRESENT ILLNESS
[de-identified] : 83 year old patient referred by Dr. 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   h/o complete right hip replacement ion Dec 2022, pt was intubated for at least 5 hrs per daughter.  h/o double bronchitis in May 2023, was treated with abx and steroid with [facility] Dr. Rojas Polo Since surgery in Dec 2022, the voice has been at a low raspy whisper, does not fluctuate or get better.  Pt unable to project the voice.   Denies complete loss of voice, sob when speaking, or pain with speaking.  Denies dysphagia, dyspnea, fever, chills, or weight loss. Eating and drinking without issues.  Denies h/o neck or spine surgery, or neck trauma Smoked cigarettes for 20 yrs. Quit smoking 1983

## 2023-08-10 NOTE — CONSULT LETTER
[Dear  ___] : Dear  [unfilled], [Consult Letter:] : I had the pleasure of evaluating your patient, [unfilled]. [Please see my note below.] : Please see my note below. [Consult Closing:] : Thank you very much for allowing me to participate in the care of this patient.  If you have any questions, please do not hesitate to contact me. [Sincerely,] : Sincerely, [FreeTextEntry2] : Dr Davis Mckay [FreeTextEntry3] :  Yg Sesay MD, FACS  Otolaryngology-Head and Neck Surgery [employer] [facility]

## 2023-08-10 NOTE — PLAN
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 - Discussed findings with the patient and his daughter today and recommended DL and biopsy , possible resection of the R lesion. - Risks and complications of the procedure discussed today.

## 2023-08-21 NOTE — HISTORY OF PRESENT ILLNESS
Dorothy from [facility] calling to obtain path report for mutual pt that we have medical records release for. Confirmed with Dorothy, release was rcvd, and faxed to [address].  Provided Dorothy with LAM musa to follow up [Home] : at home, [unfilled] , at the time of the visit. [Medical Office: ([facility])___] : at ~his/her~ medical office located in [place] [Spouse] : spouse [Formal Caregiver] : formal caregiver [FreeTextEntry1] : Mr. MARY BO   presents to the office with a wound for over 2 months duration.\par started alginate last visit, no fevers\par vac is still on  opened up space , some bleeding\par \par  Pt is s/p fall with pinning of right hip, 10/19. Then in 12/19 developed a hematoma, went for evacuation of hematoma, with subequent vac placement, recently removed from vac, has visiting nurse ]\par The wound is located on  the right hip.  \par The patient has complaints of pain in wound and radiating.   \par The patient has been dressing the wound with 1/4 inch iodoform.\par  The patient denies fevers or chills.  The patient has localized pain to the wound upon dressing changes.  The patient has no other complaints or associated symptoms.  \par had a vac before\par \par  [FreeTextEntry4] : aman elias

## 2023-09-12 ENCOUNTER — OUTPATIENT (OUTPATIENT)
Dept: OUTPATIENT SERVICES | Facility: HOSPITAL | Age: 83
LOS: 1 days | End: 2023-09-12
Payer: MEDICARE

## 2023-09-12 VITALS
HEIGHT: 70.5 IN | SYSTOLIC BLOOD PRESSURE: 121 MMHG | HEART RATE: 52 BPM | WEIGHT: 195.11 LBS | DIASTOLIC BLOOD PRESSURE: 77 MMHG | OXYGEN SATURATION: 96 % | RESPIRATION RATE: 14 BRPM | TEMPERATURE: 98 F

## 2023-09-12 DIAGNOSIS — J38.3 OTHER DISEASES OF VOCAL CORDS: ICD-10-CM

## 2023-09-12 DIAGNOSIS — K40.90 UNILATERAL INGUINAL HERNIA, WITHOUT OBSTRUCTION OR GANGRENE, NOT SPECIFIED AS RECURRENT: Chronic | ICD-10-CM

## 2023-09-12 DIAGNOSIS — H26.9 UNSPECIFIED CATARACT: Chronic | ICD-10-CM

## 2023-09-12 DIAGNOSIS — Z96.641 PRESENCE OF RIGHT ARTIFICIAL HIP JOINT: Chronic | ICD-10-CM

## 2023-09-12 DIAGNOSIS — R49.0 DYSPHONIA: ICD-10-CM

## 2023-09-12 DIAGNOSIS — S72.002A FRACTURE OF UNSPECIFIED PART OF NECK OF LEFT FEMUR, INITIAL ENCOUNTER FOR CLOSED FRACTURE: Chronic | ICD-10-CM

## 2023-09-12 LAB
ANION GAP SERPL CALC-SCNC: 15 MMOL/L — HIGH (ref 7–14)
BUN SERPL-MCNC: 20 MG/DL — SIGNIFICANT CHANGE UP (ref 7–23)
CALCIUM SERPL-MCNC: 10 MG/DL — SIGNIFICANT CHANGE UP (ref 8.4–10.5)
CHLORIDE SERPL-SCNC: 98 MMOL/L — SIGNIFICANT CHANGE UP (ref 98–107)
CO2 SERPL-SCNC: 27 MMOL/L — SIGNIFICANT CHANGE UP (ref 22–31)
CREAT SERPL-MCNC: 0.9 MG/DL — SIGNIFICANT CHANGE UP (ref 0.5–1.3)
EGFR: 85 ML/MIN/1.73M2 — SIGNIFICANT CHANGE UP
GLUCOSE SERPL-MCNC: 69 MG/DL — LOW (ref 70–99)
HCT VFR BLD CALC: 46.1 % — SIGNIFICANT CHANGE UP (ref 39–50)
HGB BLD-MCNC: 14.9 G/DL — SIGNIFICANT CHANGE UP (ref 13–17)
MCHC RBC-ENTMCNC: 31.7 PG — SIGNIFICANT CHANGE UP (ref 27–34)
MCHC RBC-ENTMCNC: 32.3 GM/DL — SIGNIFICANT CHANGE UP (ref 32–36)
MCV RBC AUTO: 98.1 FL — SIGNIFICANT CHANGE UP (ref 80–100)
NRBC # BLD: 0 /100 WBCS — SIGNIFICANT CHANGE UP (ref 0–0)
NRBC # FLD: 0 K/UL — SIGNIFICANT CHANGE UP (ref 0–0)
PLATELET # BLD AUTO: 171 K/UL — SIGNIFICANT CHANGE UP (ref 150–400)
POTASSIUM SERPL-MCNC: 4 MMOL/L — SIGNIFICANT CHANGE UP (ref 3.5–5.3)
POTASSIUM SERPL-SCNC: 4 MMOL/L — SIGNIFICANT CHANGE UP (ref 3.5–5.3)
RBC # BLD: 4.7 M/UL — SIGNIFICANT CHANGE UP (ref 4.2–5.8)
RBC # FLD: 13.9 % — SIGNIFICANT CHANGE UP (ref 10.3–14.5)
SODIUM SERPL-SCNC: 140 MMOL/L — SIGNIFICANT CHANGE UP (ref 135–145)
WBC # BLD: 7.2 K/UL — SIGNIFICANT CHANGE UP (ref 3.8–10.5)
WBC # FLD AUTO: 7.2 K/UL — SIGNIFICANT CHANGE UP (ref 3.8–10.5)

## 2023-09-12 PROCEDURE — 93010 ELECTROCARDIOGRAM REPORT: CPT

## 2023-09-12 RX ORDER — SODIUM CHLORIDE 9 MG/ML
1000 INJECTION, SOLUTION INTRAVENOUS
Refills: 0 | Status: DISCONTINUED | OUTPATIENT
Start: 2023-09-20 | End: 2023-10-05

## 2023-09-12 NOTE — H&P PST ADULT - HISTORY OF PRESENT ILLNESS
82y/o male scheduled for microdirect laryngoscopy with excision of tumor on 9/20/2023.  Pt states, "hx of right hip replacement 2022, was intubated for 6 hrs, since than has raspy voice.  Was scoped in the office identified vocal cord lesion."

## 2023-09-12 NOTE — H&P PST ADULT - PROBLEM SELECTOR PLAN 1
Pt scheduled for microdirect laryngoscopy with excision of tumor on 9/20/2023. labs done results pending, ekg done.  Preop teaching done, pt able to verbalize understanding.   medication day of procedure-  pepcid   afib on xarelto-  as per cardiology pt to hold 48 hrs prior to the procedure- last dose 9/17/2023   pst request   cardiology eval afib on xarelto- Dr. Farfan cardiologist [phone]   echo 2023 Dr. Farfan cardiologist [phone] Pt scheduled for microdirect laryngoscopy with excision of tumor on 9/20/2023. labs done results pending, ekg done.  Preop teaching done, pt able to verbalize understanding.   medication day of procedure-  pepcid   afib on xarelto-  as per cardiology pt to hold 48 hrs prior to the procedure- last dose 9/17/2023   anesthesia-  limited rom of head with moving from side to side   pst request   cardiology eval afib on xarelto- Dr. Farfan cardiologist [phone]   echo 2023 Dr. Farfan cardiologist [phone] Pt scheduled for microdirect laryngoscopy with excision of tumor on 9/20/2023. labs done results pending, ekg done.  Preop teaching done, pt able to verbalize understanding.   medication day of procedure-  pepcid   afib on xarelto-  as per cardiology pt to hold 48 hrs prior to the procedure- last dose 9/17/2023   anesthesia-  limited rom of head with moving from side to side   pst request   cardiology eval afib on xarelto- Dr. Farfan cardiologist [phone]   echo 2023 Dr. Farfan cardiologist [phone]  comparison ekg

## 2023-09-12 NOTE — H&P PST ADULT - GASTROINTESTINAL
details… normal/soft/nontender/nondistended/normal active bowel sounds/no guarding/no rigidity/no organomegaly/no palpable haylee/no masses palpable

## 2023-09-12 NOTE — H&P PST ADULT - NSICDXPASTMEDICALHX_GEN_ALL_CORE_FT
PAST MEDICAL HISTORY:  AF (atrial fibrillation)     Depression     Lesion of vocal cord      PAST MEDICAL HISTORY:  AF (atrial fibrillation)     Depression     DVT, lower extremity     Lesion of vocal cord

## 2023-09-12 NOTE — H&P PST ADULT - CONSTITUTIONAL
well-groomed/no distress Finasteride Counseling:  I discussed with the patient the risks of use of finasteride including but not limited to decreased libido, decreased ejaculate volume, gynecomastia, and depression. Women should not handle medication.  All of the patient's questions and concerns were addressed. Finasteride Male Counseling: Finasteride Counseling:  I discussed with the patient the risks of use of finasteride including but not limited to decreased libido, decreased ejaculate volume, gynecomastia, and depression. Women should not handle medication.  All of the patient's questions and concerns were addressed.

## 2023-09-12 NOTE — H&P PST ADULT - NSICDXPASTSURGICALHX_GEN_ALL_CORE_FT
PAST SURGICAL HISTORY:  Fracture of neck of left femur     Hernia, inguinal, right     S/P hip replacement, right      PAST SURGICAL HISTORY:  Cataract, right eye     Fracture of neck of left femur     Hernia, inguinal, right     S/P hip replacement, right

## 2023-09-12 NOTE — H&P PST ADULT - MUSCULOSKELETAL COMMENTS
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

## 2023-09-12 NOTE — H&P PST ADULT - ANESTHESIA, PREVIOUS REACTION, PROFILE
hoarse voice s/p right hip replacement/none hoarse voice s/p right hip replacement, mal 2 limited rom of neck with moving from side to side/none

## 2023-09-19 ENCOUNTER — TRANSCRIPTION ENCOUNTER (OUTPATIENT)
Age: 83
End: 2023-09-19

## 2023-09-19 NOTE — ASU PATIENT PROFILE, ADULT - FALL HARM RISK - HARM RISK INTERVENTIONS

## 2023-09-20 ENCOUNTER — APPOINTMENT (OUTPATIENT)
Dept: OTOLARYNGOLOGY | Facility: HOSPITAL | Age: 83
End: 2023-09-20

## 2023-09-20 ENCOUNTER — TRANSCRIPTION ENCOUNTER (OUTPATIENT)
Age: 83
End: 2023-09-20

## 2023-09-20 ENCOUNTER — RESULT REVIEW (OUTPATIENT)
Age: 83
End: 2023-09-20

## 2023-09-20 ENCOUNTER — OUTPATIENT (OUTPATIENT)
Dept: OUTPATIENT SERVICES | Facility: HOSPITAL | Age: 83
LOS: 1 days | Discharge: HOME | End: 2023-09-20
Payer: MEDICARE

## 2023-09-20 VITALS
OXYGEN SATURATION: 97 % | TEMPERATURE: 98 F | DIASTOLIC BLOOD PRESSURE: 62 MMHG | SYSTOLIC BLOOD PRESSURE: 122 MMHG | HEART RATE: 63 BPM | RESPIRATION RATE: 17 BRPM

## 2023-09-20 VITALS
HEIGHT: 70.5 IN | DIASTOLIC BLOOD PRESSURE: 68 MMHG | TEMPERATURE: 98 F | OXYGEN SATURATION: 100 % | SYSTOLIC BLOOD PRESSURE: 128 MMHG | RESPIRATION RATE: 16 BRPM | HEART RATE: 70 BPM | WEIGHT: 195.11 LBS

## 2023-09-20 DIAGNOSIS — S72.002A FRACTURE OF UNSPECIFIED PART OF NECK OF LEFT FEMUR, INITIAL ENCOUNTER FOR CLOSED FRACTURE: Chronic | ICD-10-CM

## 2023-09-20 DIAGNOSIS — H26.9 UNSPECIFIED CATARACT: Chronic | ICD-10-CM

## 2023-09-20 DIAGNOSIS — J38.3 OTHER DISEASES OF VOCAL CORDS: ICD-10-CM

## 2023-09-20 DIAGNOSIS — K40.90 UNILATERAL INGUINAL HERNIA, WITHOUT OBSTRUCTION OR GANGRENE, NOT SPECIFIED AS RECURRENT: Chronic | ICD-10-CM

## 2023-09-20 DIAGNOSIS — Z96.641 PRESENCE OF RIGHT ARTIFICIAL HIP JOINT: Chronic | ICD-10-CM

## 2023-09-20 PROCEDURE — 31536 LARYNGOSCOPY W/BX & OP SCOPE: CPT

## 2023-09-20 PROCEDURE — 88305 TISSUE EXAM BY PATHOLOGIST: CPT | Mod: 26

## 2023-09-20 RX ORDER — ACETAMINOPHEN 500 MG
650 TABLET ORAL EVERY 6 HOURS
Refills: 0 | Status: DISCONTINUED | OUTPATIENT
Start: 2023-09-20 | End: 2023-10-05

## 2023-09-20 RX ADMIN — SODIUM CHLORIDE 30 MILLILITER(S): 9 INJECTION, SOLUTION INTRAVENOUS at 14:00

## 2023-09-20 NOTE — ASU DISCHARGE PLAN (ADULT/PEDIATRIC) - ASU DC SPECIAL INSTRUCTIONSFT
- you may continue your regular diet  - you may take over-the-counter tylenol and motrin as needed for pain

## 2023-09-20 NOTE — BRIEF OPERATIVE NOTE - OPERATION/FINDINGS
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

## 2023-09-21 NOTE — ED PROVIDER NOTE - PROGRESS NOTE DETAILS
Daughter reports that patient is due to have a tooth extraction due to an infected tooth.     Tammy Larios MD, PGY2
22-Sep-2023 00:10

## 2023-09-22 LAB — SURGICAL PATHOLOGY STUDY: SIGNIFICANT CHANGE UP

## 2023-09-27 ENCOUNTER — NON-APPOINTMENT (OUTPATIENT)
Age: 83
End: 2023-09-27

## 2023-09-28 PROBLEM — F32.A DEPRESSION, UNSPECIFIED: Chronic | Status: ACTIVE | Noted: 2023-09-12

## 2023-09-28 PROBLEM — J38.3 OTHER DISEASES OF VOCAL CORDS: Chronic | Status: ACTIVE | Noted: 2023-09-12

## 2023-09-28 PROBLEM — I82.409 ACUTE EMBOLISM AND THROMBOSIS OF UNSPECIFIED DEEP VEINS OF UNSPECIFIED LOWER EXTREMITY: Chronic | Status: ACTIVE | Noted: 2023-09-12

## 2023-10-10 ENCOUNTER — APPOINTMENT (OUTPATIENT)
Dept: RADIATION ONCOLOGY | Facility: CLINIC | Age: 83
End: 2023-10-10
Payer: MEDICARE

## 2023-10-10 ENCOUNTER — OUTPATIENT (OUTPATIENT)
Dept: OUTPATIENT SERVICES | Facility: HOSPITAL | Age: 83
LOS: 1 days | Discharge: HOME | End: 2023-10-10
Payer: MEDICARE

## 2023-10-10 VITALS
HEIGHT: 74 IN | TEMPERATURE: 97.3 F | WEIGHT: 188.83 LBS | OXYGEN SATURATION: 97 % | BODY MASS INDEX: 24.23 KG/M2 | DIASTOLIC BLOOD PRESSURE: 77 MMHG | HEART RATE: 53 BPM | RESPIRATION RATE: 16 BRPM | SYSTOLIC BLOOD PRESSURE: 127 MMHG

## 2023-10-10 DIAGNOSIS — K40.90 UNILATERAL INGUINAL HERNIA, WITHOUT OBSTRUCTION OR GANGRENE, NOT SPECIFIED AS RECURRENT: Chronic | ICD-10-CM

## 2023-10-10 DIAGNOSIS — S72.002A FRACTURE OF UNSPECIFIED PART OF NECK OF LEFT FEMUR, INITIAL ENCOUNTER FOR CLOSED FRACTURE: Chronic | ICD-10-CM

## 2023-10-10 DIAGNOSIS — Z96.641 PRESENCE OF RIGHT ARTIFICIAL HIP JOINT: Chronic | ICD-10-CM

## 2023-10-10 DIAGNOSIS — Z78.9 OTHER SPECIFIED HEALTH STATUS: ICD-10-CM

## 2023-10-10 DIAGNOSIS — H26.9 UNSPECIFIED CATARACT: Chronic | ICD-10-CM

## 2023-10-10 PROCEDURE — 99204 OFFICE O/P NEW MOD 45 MIN: CPT | Mod: 25

## 2023-10-10 PROCEDURE — 77263 THER RADIOLOGY TX PLNG CPLX: CPT

## 2023-10-12 ENCOUNTER — APPOINTMENT (OUTPATIENT)
Dept: OTOLARYNGOLOGY | Facility: CLINIC | Age: 83
End: 2023-10-12

## 2023-10-13 ENCOUNTER — OUTPATIENT (OUTPATIENT)
Dept: OUTPATIENT SERVICES | Facility: HOSPITAL | Age: 83
LOS: 1 days | End: 2023-10-13
Payer: MEDICARE

## 2023-10-13 ENCOUNTER — APPOINTMENT (OUTPATIENT)
Dept: CT IMAGING | Facility: IMAGING CENTER | Age: 83
End: 2023-10-13
Payer: MEDICARE

## 2023-10-13 ENCOUNTER — APPOINTMENT (OUTPATIENT)
Dept: NUCLEAR MEDICINE | Facility: IMAGING CENTER | Age: 83
End: 2023-10-13
Payer: MEDICARE

## 2023-10-13 DIAGNOSIS — Z96.641 PRESENCE OF RIGHT ARTIFICIAL HIP JOINT: Chronic | ICD-10-CM

## 2023-10-13 DIAGNOSIS — H26.9 UNSPECIFIED CATARACT: Chronic | ICD-10-CM

## 2023-10-13 DIAGNOSIS — J38.3 OTHER DISEASES OF VOCAL CORDS: ICD-10-CM

## 2023-10-13 DIAGNOSIS — S72.002A FRACTURE OF UNSPECIFIED PART OF NECK OF LEFT FEMUR, INITIAL ENCOUNTER FOR CLOSED FRACTURE: Chronic | ICD-10-CM

## 2023-10-13 DIAGNOSIS — K40.90 UNILATERAL INGUINAL HERNIA, WITHOUT OBSTRUCTION OR GANGRENE, NOT SPECIFIED AS RECURRENT: Chronic | ICD-10-CM

## 2023-10-13 PROCEDURE — 78815 PET IMAGE W/CT SKULL-THIGH: CPT | Mod: 26,PI,MH

## 2023-10-13 PROCEDURE — 70491 CT SOFT TISSUE NECK W/DYE: CPT | Mod: 26,MH

## 2023-10-13 PROCEDURE — 78815 PET IMAGE W/CT SKULL-THIGH: CPT

## 2023-10-13 PROCEDURE — A9552: CPT

## 2023-10-13 PROCEDURE — 70491 CT SOFT TISSUE NECK W/DYE: CPT

## 2023-10-15 ENCOUNTER — NON-APPOINTMENT (OUTPATIENT)
Age: 83
End: 2023-10-15

## 2023-10-16 PROCEDURE — 77334 RADIATION TREATMENT AID(S): CPT | Mod: 26

## 2023-10-17 ENCOUNTER — NON-APPOINTMENT (OUTPATIENT)
Age: 83
End: 2023-10-17

## 2023-10-19 ENCOUNTER — NON-APPOINTMENT (OUTPATIENT)
Age: 83
End: 2023-10-19

## 2023-10-19 NOTE — REASON FOR VISIT
Noted and forwarded to Natalia to be aware of pt    [Initial Evaluation] : an initial evaluation for [Other: _____] : [unfilled] [FreeTextEntry1] : Vocal cord lesion

## 2023-10-24 NOTE — ED PROVIDER NOTE - WR ORDER STATUS 1
Resulted Advancement Flap (Double) Text: The defect edges were debeveled with a #15 scalpel blade.  Given the location of the defect and the proximity to free margins a double advancement flap was deemed most appropriate.  Using a sterile surgical marker, the appropriate advancement flaps were drawn incorporating the defect and placing the expected incisions within the relaxed skin tension lines where possible.    The area thus outlined was incised deep to adipose tissue with a #15 scalpel blade.  The skin margins were undermined to an appropriate distance in all directions utilizing iris scissors.

## 2023-10-31 PROCEDURE — 77301 RADIOTHERAPY DOSE PLAN IMRT: CPT | Mod: 26

## 2023-10-31 PROCEDURE — 77300 RADIATION THERAPY DOSE PLAN: CPT | Mod: 26

## 2023-10-31 PROCEDURE — 77338 DESIGN MLC DEVICE FOR IMRT: CPT | Mod: 26

## 2023-11-02 ENCOUNTER — APPOINTMENT (OUTPATIENT)
Dept: OTOLARYNGOLOGY | Facility: CLINIC | Age: 83
End: 2023-11-02
Payer: MEDICARE

## 2023-11-02 VITALS
WEIGHT: 210 LBS | RESPIRATION RATE: 17 BRPM | HEIGHT: 74 IN | BODY MASS INDEX: 26.95 KG/M2 | DIASTOLIC BLOOD PRESSURE: 74 MMHG | SYSTOLIC BLOOD PRESSURE: 129 MMHG | OXYGEN SATURATION: 96 % | HEART RATE: 100 BPM

## 2023-11-02 DIAGNOSIS — C32.9 MALIGNANT NEOPLASM OF LARYNX, UNSPECIFIED: ICD-10-CM

## 2023-11-02 PROCEDURE — 99213 OFFICE O/P EST LOW 20 MIN: CPT | Mod: 25

## 2023-11-02 PROCEDURE — 31575 DIAGNOSTIC LARYNGOSCOPY: CPT

## 2023-12-13 ENCOUNTER — INPATIENT (INPATIENT)
Facility: HOSPITAL | Age: 83
LOS: 13 days | Discharge: NURSING, REHAB OR LONG-TERM CARE | DRG: 70 | End: 2023-12-27
Attending: INTERNAL MEDICINE | Admitting: STUDENT IN AN ORGANIZED HEALTH CARE EDUCATION/TRAINING PROGRAM
Payer: MEDICARE

## 2023-12-13 VITALS
WEIGHT: 197.09 LBS | OXYGEN SATURATION: 97 % | HEIGHT: 74 IN | DIASTOLIC BLOOD PRESSURE: 84 MMHG | TEMPERATURE: 97 F | SYSTOLIC BLOOD PRESSURE: 136 MMHG | RESPIRATION RATE: 18 BRPM | HEART RATE: 70 BPM

## 2023-12-13 DIAGNOSIS — H26.9 UNSPECIFIED CATARACT: Chronic | ICD-10-CM

## 2023-12-13 DIAGNOSIS — R09.89 OTHER SPECIFIED SYMPTOMS AND SIGNS INVOLVING THE CIRCULATORY AND RESPIRATORY SYSTEMS: ICD-10-CM

## 2023-12-13 DIAGNOSIS — Z29.9 ENCOUNTER FOR PROPHYLACTIC MEASURES, UNSPECIFIED: ICD-10-CM

## 2023-12-13 DIAGNOSIS — Z96.641 PRESENCE OF RIGHT ARTIFICIAL HIP JOINT: Chronic | ICD-10-CM

## 2023-12-13 DIAGNOSIS — S72.002A FRACTURE OF UNSPECIFIED PART OF NECK OF LEFT FEMUR, INITIAL ENCOUNTER FOR CLOSED FRACTURE: Chronic | ICD-10-CM

## 2023-12-13 DIAGNOSIS — R60.0 LOCALIZED EDEMA: ICD-10-CM

## 2023-12-13 DIAGNOSIS — D64.9 ANEMIA, UNSPECIFIED: ICD-10-CM

## 2023-12-13 DIAGNOSIS — R33.9 RETENTION OF URINE, UNSPECIFIED: ICD-10-CM

## 2023-12-13 DIAGNOSIS — I48.20 CHRONIC ATRIAL FIBRILLATION, UNSPECIFIED: ICD-10-CM

## 2023-12-13 DIAGNOSIS — R53.1 WEAKNESS: ICD-10-CM

## 2023-12-13 DIAGNOSIS — K40.90 UNILATERAL INGUINAL HERNIA, WITHOUT OBSTRUCTION OR GANGRENE, NOT SPECIFIED AS RECURRENT: Chronic | ICD-10-CM

## 2023-12-13 DIAGNOSIS — G93.40 ENCEPHALOPATHY, UNSPECIFIED: ICD-10-CM

## 2023-12-13 DIAGNOSIS — F32.89 OTHER SPECIFIED DEPRESSIVE EPISODES: ICD-10-CM

## 2023-12-13 DIAGNOSIS — M54.9 DORSALGIA, UNSPECIFIED: ICD-10-CM

## 2023-12-13 LAB
ALBUMIN SERPL ELPH-MCNC: 3.1 G/DL — LOW (ref 3.3–5)
ALP SERPL-CCNC: 156 U/L — HIGH (ref 40–120)
ALT FLD-CCNC: 9 U/L — LOW (ref 10–45)
ANION GAP SERPL CALC-SCNC: 9 MMOL/L — SIGNIFICANT CHANGE UP (ref 5–17)
APPEARANCE UR: CLEAR — SIGNIFICANT CHANGE UP
APPEARANCE UR: CLEAR — SIGNIFICANT CHANGE UP
AST SERPL-CCNC: 13 U/L — SIGNIFICANT CHANGE UP (ref 10–40)
BACTERIA # UR AUTO: NEGATIVE /HPF — SIGNIFICANT CHANGE UP
BACTERIA # UR AUTO: NEGATIVE /HPF — SIGNIFICANT CHANGE UP
BASOPHILS # BLD AUTO: 0.04 K/UL — SIGNIFICANT CHANGE UP (ref 0–0.2)
BASOPHILS NFR BLD AUTO: 0.4 % — SIGNIFICANT CHANGE UP (ref 0–2)
BILIRUB SERPL-MCNC: 0.6 MG/DL — SIGNIFICANT CHANGE UP (ref 0.2–1.2)
BILIRUB UR-MCNC: NEGATIVE — SIGNIFICANT CHANGE UP
BILIRUB UR-MCNC: NEGATIVE — SIGNIFICANT CHANGE UP
BUN SERPL-MCNC: 29 MG/DL — HIGH (ref 7–23)
CALCIUM SERPL-MCNC: 9 MG/DL — SIGNIFICANT CHANGE UP (ref 8.4–10.5)
CAST: 0 /LPF — SIGNIFICANT CHANGE UP (ref 0–4)
CAST: 0 /LPF — SIGNIFICANT CHANGE UP (ref 0–4)
CHLORIDE SERPL-SCNC: 103 MMOL/L — SIGNIFICANT CHANGE UP (ref 96–108)
CO2 SERPL-SCNC: 24 MMOL/L — SIGNIFICANT CHANGE UP (ref 22–31)
COLOR SPEC: YELLOW — SIGNIFICANT CHANGE UP
COLOR SPEC: YELLOW — SIGNIFICANT CHANGE UP
CREAT SERPL-MCNC: 1.03 MG/DL — SIGNIFICANT CHANGE UP (ref 0.5–1.3)
DIFF PNL FLD: ABNORMAL
DIFF PNL FLD: ABNORMAL
EGFR: 72 ML/MIN/1.73M2 — SIGNIFICANT CHANGE UP
EOSINOPHIL # BLD AUTO: 0.16 K/UL — SIGNIFICANT CHANGE UP (ref 0–0.5)
EOSINOPHIL NFR BLD AUTO: 1.5 % — SIGNIFICANT CHANGE UP (ref 0–6)
FLUAV AG NPH QL: SIGNIFICANT CHANGE UP
FLUAV AG NPH QL: SIGNIFICANT CHANGE UP
FLUBV AG NPH QL: SIGNIFICANT CHANGE UP
FLUBV AG NPH QL: SIGNIFICANT CHANGE UP
GAS PNL BLDV: SIGNIFICANT CHANGE UP
GLUCOSE SERPL-MCNC: 104 MG/DL — HIGH (ref 70–99)
GLUCOSE UR QL: NEGATIVE MG/DL — SIGNIFICANT CHANGE UP
GLUCOSE UR QL: NEGATIVE MG/DL — SIGNIFICANT CHANGE UP
HCT VFR BLD CALC: 36.8 % — LOW (ref 39–50)
HGB BLD-MCNC: 11.7 G/DL — LOW (ref 13–17)
IMM GRANULOCYTES NFR BLD AUTO: 0.7 % — SIGNIFICANT CHANGE UP (ref 0–0.9)
KETONES UR-MCNC: NEGATIVE MG/DL — SIGNIFICANT CHANGE UP
KETONES UR-MCNC: NEGATIVE MG/DL — SIGNIFICANT CHANGE UP
LEUKOCYTE ESTERASE UR-ACNC: NEGATIVE — SIGNIFICANT CHANGE UP
LEUKOCYTE ESTERASE UR-ACNC: NEGATIVE — SIGNIFICANT CHANGE UP
LYMPHOCYTES # BLD AUTO: 1.1 K/UL — SIGNIFICANT CHANGE UP (ref 1–3.3)
LYMPHOCYTES # BLD AUTO: 10.5 % — LOW (ref 13–44)
MAGNESIUM SERPL-MCNC: 1.9 MG/DL — SIGNIFICANT CHANGE UP (ref 1.6–2.6)
MCHC RBC-ENTMCNC: 31.8 GM/DL — LOW (ref 32–36)
MCHC RBC-ENTMCNC: 33.2 PG — SIGNIFICANT CHANGE UP (ref 27–34)
MCV RBC AUTO: 104.5 FL — HIGH (ref 80–100)
MONOCYTES # BLD AUTO: 1.13 K/UL — HIGH (ref 0–0.9)
MONOCYTES NFR BLD AUTO: 10.8 % — SIGNIFICANT CHANGE UP (ref 2–14)
NEUTROPHILS # BLD AUTO: 7.93 K/UL — HIGH (ref 1.8–7.4)
NEUTROPHILS NFR BLD AUTO: 76.1 % — SIGNIFICANT CHANGE UP (ref 43–77)
NITRITE UR-MCNC: NEGATIVE — SIGNIFICANT CHANGE UP
NITRITE UR-MCNC: NEGATIVE — SIGNIFICANT CHANGE UP
NRBC # BLD: 0 /100 WBCS — SIGNIFICANT CHANGE UP (ref 0–0)
NT-PROBNP SERPL-SCNC: 2628 PG/ML — HIGH (ref 0–300)
PH UR: 5.5 — SIGNIFICANT CHANGE UP (ref 5–8)
PH UR: 5.5 — SIGNIFICANT CHANGE UP (ref 5–8)
PHOSPHATE SERPL-MCNC: 2.8 MG/DL — SIGNIFICANT CHANGE UP (ref 2.5–4.5)
PLATELET # BLD AUTO: 220 K/UL — SIGNIFICANT CHANGE UP (ref 150–400)
POTASSIUM SERPL-MCNC: 3.9 MMOL/L — SIGNIFICANT CHANGE UP (ref 3.5–5.3)
POTASSIUM SERPL-SCNC: 3.9 MMOL/L — SIGNIFICANT CHANGE UP (ref 3.5–5.3)
PROT SERPL-MCNC: 6.1 G/DL — SIGNIFICANT CHANGE UP (ref 6–8.3)
PROT UR-MCNC: NEGATIVE MG/DL — SIGNIFICANT CHANGE UP
PROT UR-MCNC: NEGATIVE MG/DL — SIGNIFICANT CHANGE UP
RBC # BLD: 3.52 M/UL — LOW (ref 4.2–5.8)
RBC # FLD: 15.8 % — HIGH (ref 10.3–14.5)
RBC CASTS # UR COMP ASSIST: 14 /HPF — HIGH (ref 0–4)
RBC CASTS # UR COMP ASSIST: 14 /HPF — HIGH (ref 0–4)
RSV RNA NPH QL NAA+NON-PROBE: SIGNIFICANT CHANGE UP
RSV RNA NPH QL NAA+NON-PROBE: SIGNIFICANT CHANGE UP
SARS-COV-2 RNA SPEC QL NAA+PROBE: SIGNIFICANT CHANGE UP
SARS-COV-2 RNA SPEC QL NAA+PROBE: SIGNIFICANT CHANGE UP
SODIUM SERPL-SCNC: 136 MMOL/L — SIGNIFICANT CHANGE UP (ref 135–145)
SP GR SPEC: 1.01 — SIGNIFICANT CHANGE UP (ref 1–1.03)
SP GR SPEC: 1.01 — SIGNIFICANT CHANGE UP (ref 1–1.03)
SQUAMOUS # UR AUTO: 3 /HPF — SIGNIFICANT CHANGE UP (ref 0–5)
SQUAMOUS # UR AUTO: 3 /HPF — SIGNIFICANT CHANGE UP (ref 0–5)
TROPONIN T, HIGH SENSITIVITY RESULT: 35 NG/L — SIGNIFICANT CHANGE UP (ref 0–51)
UROBILINOGEN FLD QL: 0.2 MG/DL — SIGNIFICANT CHANGE UP (ref 0.2–1)
UROBILINOGEN FLD QL: 0.2 MG/DL — SIGNIFICANT CHANGE UP (ref 0.2–1)
WBC # BLD: 10.43 K/UL — SIGNIFICANT CHANGE UP (ref 3.8–10.5)
WBC # FLD AUTO: 10.43 K/UL — SIGNIFICANT CHANGE UP (ref 3.8–10.5)
WBC UR QL: 2 /HPF — SIGNIFICANT CHANGE UP (ref 0–5)
WBC UR QL: 2 /HPF — SIGNIFICANT CHANGE UP (ref 0–5)

## 2023-12-13 PROCEDURE — 99285 EMERGENCY DEPT VISIT HI MDM: CPT | Mod: FS

## 2023-12-13 PROCEDURE — 71045 X-RAY EXAM CHEST 1 VIEW: CPT | Mod: 26

## 2023-12-13 PROCEDURE — 70450 CT HEAD/BRAIN W/O DYE: CPT | Mod: 26,MA

## 2023-12-13 PROCEDURE — 93971 EXTREMITY STUDY: CPT | Mod: 26,RT

## 2023-12-13 PROCEDURE — 73030 X-RAY EXAM OF SHOULDER: CPT | Mod: 26,RT

## 2023-12-13 PROCEDURE — 99223 1ST HOSP IP/OBS HIGH 75: CPT

## 2023-12-13 PROCEDURE — 51703 INSERT BLADDER CATH COMPLEX: CPT

## 2023-12-13 PROCEDURE — 73590 X-RAY EXAM OF LOWER LEG: CPT | Mod: 26,RT

## 2023-12-13 RX ORDER — CHOLECALCIFEROL (VITAMIN D3) 125 MCG
1 CAPSULE ORAL
Refills: 0 | DISCHARGE

## 2023-12-13 RX ORDER — SODIUM CHLORIDE 9 MG/ML
1000 INJECTION, SOLUTION INTRAVENOUS ONCE
Refills: 0 | Status: COMPLETED | OUTPATIENT
Start: 2023-12-13 | End: 2023-12-13

## 2023-12-13 RX ORDER — TAMSULOSIN HYDROCHLORIDE 0.4 MG/1
0.4 CAPSULE ORAL AT BEDTIME
Refills: 0 | Status: DISCONTINUED | OUTPATIENT
Start: 2023-12-13 | End: 2023-12-27

## 2023-12-13 RX ORDER — ACETAMINOPHEN 500 MG
650 TABLET ORAL EVERY 6 HOURS
Refills: 0 | Status: DISCONTINUED | OUTPATIENT
Start: 2023-12-13 | End: 2023-12-27

## 2023-12-13 RX ORDER — LANOLIN ALCOHOL/MO/W.PET/CERES
3 CREAM (GRAM) TOPICAL AT BEDTIME
Refills: 0 | Status: DISCONTINUED | OUTPATIENT
Start: 2023-12-13 | End: 2023-12-27

## 2023-12-13 RX ORDER — ESCITALOPRAM OXALATE 10 MG/1
1 TABLET, FILM COATED ORAL
Refills: 0 | DISCHARGE

## 2023-12-13 RX ORDER — ACETAMINOPHEN 500 MG
1000 TABLET ORAL ONCE
Refills: 0 | Status: COMPLETED | OUTPATIENT
Start: 2023-12-13 | End: 2023-12-13

## 2023-12-13 RX ORDER — RIVAROXABAN 15 MG-20MG
1 KIT ORAL
Refills: 0 | DISCHARGE

## 2023-12-13 RX ADMIN — Medication 400 MILLIGRAM(S): at 22:17

## 2023-12-13 RX ADMIN — SODIUM CHLORIDE 1000 MILLILITER(S): 9 INJECTION, SOLUTION INTRAVENOUS at 11:06

## 2023-12-13 NOTE — ED ADULT NURSE REASSESSMENT NOTE - NS ED NURSE REASSESS COMMENT FT1
Received report from JAZZY Cosme in [facility]. Pt A&Ox3 at this time, aware of plan of care Received report from JAZZY Cosme in [facility]. Pt A&Ox3 at this time, aware of plan of care

## 2023-12-13 NOTE — H&P ADULT - PROBLEM SELECTOR PLAN 2
Daughter endorsing worse swelling in legs. Do not note pitting edema but BNP is elevated. RLE doppler negative for DVT. Pt also noted to be on AC already.  -TTE  -Will order LLE duplex

## 2023-12-13 NOTE — ED ADULT TRIAGE NOTE - CHIEF COMPLAINT QUOTE
Ams x 5 days with chest pain orientedx2  rt shoulder pain rad rt side chest From [facility] for [place] for rehab s/p weakness IV #18 L ac 150 ns  12ead EKg per paramedic  Seen by Dr Acosta Ams x 5 days with chest pain orientedx2  rt shoulder pain rad rt side chest From [facility] for [place] for rehab s/p weakness IV #18 L ac 150 ns  12ead EKg per paramedic  Seen by Dr Acosta

## 2023-12-13 NOTE — H&P ADULT - NSHPPHYSICALEXAM_GEN_ALL_CORE
Vital Signs Last 24 Hrs  T(C): 36.7 (12-13-23 @ 20:00), Max: 37.3 (12-13-23 @ 11:13)  T(F): 98.1 (12-13-23 @ 20:00), Max: 99.2 (12-13-23 @ 11:13)  HR: 76 (12-13-23 @ 20:00) (68 - 80)  BP: 124/79 (12-13-23 @ 20:00) (117/77 - 136/84)  BP(mean): 77 (12-13-23 @ 11:13) (77 - 77)  RR: 16 (12-13-23 @ 20:00) (16 - 18)  SpO2: 97% (12-13-23 @ 20:00) (96% - 98%)

## 2023-12-13 NOTE — H&P ADULT - PROBLEM SELECTOR PLAN 1
Pt AAOx3 but otherwise somewhat confused. Daughter states some of this has been ongoing, was placed on puree diet for dysphagia issues at rehab. Has decub now. Could be in setting of urinary retention and deconditioning, CTH negative, infectious work up negative so far.   -Trend wbc and fever curve  -Cont. kiran  -Wound care consult  -Speech/ swallow consult and dysphagia diet  -Falls precautions, aspiration precautions  -PT consult  -F/u UCx  -Daughter states father told her in past he would want to be DNR/DNI. Coming to hospital in AM, can further fill out forms and discuss advanced care details at that point.  -F/u additional imaging as below

## 2023-12-13 NOTE — H&P ADULT - NSICDXPASTSURGICALHX_GEN_ALL_CORE_FT
PAST SURGICAL HISTORY:  Cataract, right eye     Fracture of neck of left femur     Hernia, inguinal, right     S/P hip replacement, right

## 2023-12-13 NOTE — H&P ADULT - ASSESSMENT
83M PMHx afib on xarelto, w/ hx of ortho issues, s/p L hip IMN (most recently s/p R hip IMN (Dr. Godinez 10/31/19), mild depression p/w acute encephalopathy, upper back shoulder and arm pain

## 2023-12-13 NOTE — H&P ADULT - PROBLEM SELECTOR PLAN 8
DVT PPx  -SCDs for now in setting of hematuria, resume Eliquis when able DVT PPx  -SCDs for now in setting of hematuria, resume Xarelto when able

## 2023-12-13 NOTE — H&P ADULT - HISTORY OF PRESENT ILLNESS
Admitted for pneumonia and human meta-pneumovirus in November for week at [place] then discharged to rehab at [facility]. Initially was on oxygen at rehab. Rehab admission complicated by UTI as per daughter. Also endorsing new level 2 sacral decub. Daughter feels patient did not have sufficient walking with PT and that shoulder pain is related to excessive upper extremity PT.  Admitted for pneumonia and human meta-pneumovirus in November for week at [place] then discharged to rehab at [facility]. Initially was on oxygen at rehab. Rehab admission complicated by UTI as per daughter. Also endorsing new level 2 sacral decub. Daughter feels patient did not have sufficient walking with PT and that shoulder pain is related to excessive upper extremity PT.  83M PMHx afib on xarelto, s/p L hip IMN (most recently s/p R hip IMN (Dr. Godinez 10/31/19), mild depression p/w AMS, upper back shoulder and arm pain, Admitted for pneumonia and human meta-pneumovirus in November for week at [place] then discharged to rehab at [facility]. Initially was on oxygen at rehab. Rehab admission complicated by UTI as per daughter. Also endorsing new level 2 sacral decub. Daughter feels patient did not have sufficient walking with PT and that shoulder pain is related to excessive upper extremity PT.  83M PMHx afib on xarelto, s/p L hip IMN (most recently s/p R hip IMN (Dr. Godinez 10/31/19), mild depression p/w AMS, upper back shoulder and arm pain, Admitted for pneumonia and human meta-pneumovirus in November for week at [place] then discharged to rehab at [facility]. Initially was on oxygen at rehab. Rehab admission complicated by UTI as per daughter. Also endorsing new level 2 sacral decub. Daughter feels patient did not have sufficient walking with PT and that shoulder pain is related to excessive upper extremity PT.  83M PMHx afib on xarelto, w/ hx of ortho issues, s/p L hip IMN (most recently s/p R hip IMN (Dr. Godinez 10/31/19), mild depression p/w AMS, upper back shoulder and arm pain, Admitted for pneumonia and human meta-pneumovirus in November for week at [place] then discharged to rehab at [facility]. Initially was on oxygen at rehab. Rehab admission complicated by UTI as per daughter. Also endorsing new level 2 sacral decub. Daughter feels patient did not have sufficient walking with PT and that shoulder pain is related to excessive upper extremity PT. Also endorsing some swallowing issues that prompted patient to be placed on puree diet. Denies fevers, chills or SOB. Today, daughter states patient endorsed much worse R shoulder pain radiating down R arm. Also noticed worsening LE swelling. She was concerned for cardiac issue and brought him to [facility] for evaluation. Also notes patient told her he could not pee today. Possible constipation but daughter states he had large BM in ER. Denies any known falls or trauma. No known urinary history but patient did see urologist briefly in past. Daughter denies any significant cardiac history    In ER: Given LR 1L 83M PMHx afib on xarelto, w/ hx of ortho issues, s/p L hip IMN (most recently s/p R hip IMN (Dr. Godinez 10/31/19), mild depression p/w AMS, upper back shoulder and arm pain, Admitted for pneumonia and human meta-pneumovirus in November for week at [place] then discharged to rehab at [facility]. Initially was on oxygen at rehab. Rehab admission complicated by UTI as per daughter. Also endorsing new level 2 sacral decub. Daughter feels patient did not have sufficient walking with PT and that shoulder pain is related to excessive upper extremity PT. Also endorsing some swallowing issues that prompted patient to be placed on puree diet. Denies fevers, chills or SOB. Today, daughter states patient endorsed much worse R shoulder pain radiating down R arm. Also noticed worsening LE swelling. She was concerned for cardiac issue and brought him to [facility] for evaluation. Also notes patient told her he could not pee today. Possible constipation but daughter states he had large BM in ER. Denies any known falls or trauma. No known urinary history but patient did see urologist briefly in past. Daughter denies any significant cardiac history    In ER: Given LR 1L

## 2023-12-13 NOTE — H&P ADULT - NSHPLABSRESULTS_GEN_ALL_CORE
I have personally reviewed the labs, imaging and ekg. EKG with afib HR 77 QTc 427 RBBB, CXR w/o large pneumothorax

## 2023-12-13 NOTE — ED ADULT NURSE REASSESSMENT NOTE - NS ED NURSE REASSESS COMMENT FT1
Attempted to straight cath Unable after several attempts. PA Aware RVP done Rectal temp 99.1. Sacral wound stage 2 Duoderm pressure dressing on X 1 BM Cleaned Made comfortable Social work aware daughter's concerned in nursing home.

## 2023-12-13 NOTE — H&P ADULT - TIME BILLING
Need to interview and examine patient, discuss care with family, discuss case with urology provide counseling, coordinate care, place orders, document, personally review imaging, ekg and review prior medical records

## 2023-12-13 NOTE — ED ADULT NURSE NOTE - NSFALLRISKINTERV_ED_ALL_ED
Assistance OOB with selected safe patient handling equipment if applicable/Assistance with ambulation/Communicate fall risk and risk factors to all staff, patient, and family/Provide visual cue: yellow wristband, yellow gown, etc/Reinforce activity limits and safety measures with patient and family/Call bell, personal items and telephone in reach/Instruct patient to call for assistance before getting out of bed/chair/stretcher/Non-slip footwear applied when patient is off stretcher/[place] to call system/Physically safe environment - no spills, clutter or unnecessary equipment/Purposeful Proactive Rounding/Room/bathroom lighting operational, light cord in reach Assistance OOB with selected safe patient handling equipment if applicable/Assistance with ambulation/Communicate fall risk and risk factors to all staff, patient, and family/Provide visual cue: yellow wristband, yellow gown, etc/Reinforce activity limits and safety measures with patient and family/Call bell, personal items and telephone in reach/Instruct patient to call for assistance before getting out of bed/chair/stretcher/Non-slip footwear applied when patient is off stretcher/[place] to call system/Physically safe environment - no spills, clutter or unnecessary equipment/Purposeful Proactive Rounding/Room/bathroom lighting operational, light cord in reach

## 2023-12-13 NOTE — ED PROVIDER NOTE - OBJECTIVE STATEMENT
83-year-old male with PMH A-fib on Xarelto, here for evaluation of worsening weakness, right-sided leg pain and shoulder pain starting 5 days ago.  History partially obtained by daughter who is at bedside, states patient is coming in from rehab where he resides after having pneumonia in November.  Since being in the rehab he is becoming much weaker which is much more profound over the past 5 days, patient was complaining of some chest discomfort today.  Patient had been having difficulty ambulating over the past 2 to 3 days.  Denies any fevers, chills, nausea, vomiting, shortness of breath or difficulty breathing.  Denies any chest pain currently, denies any dysuria or changes in bowel movements.  Denies any trauma or falls.

## 2023-12-13 NOTE — ED ADULT TRIAGE NOTE - BEFAST BALANCE
A catheter (Cath Model D Pig 145 [id number] 110cm)  was used to cross the aortic valve and perform left ventriculography by power injection.  at a rate of 13 ml/sec. 30ML  No

## 2023-12-13 NOTE — H&P ADULT - TREATMENT GUIDELINE COMMENT
Daughter states patient informed her in past he would want to be DNR/ DNI if his health deteriorated, she is sure of this.

## 2023-12-13 NOTE — ED ADULT NURSE NOTE - CHIEF COMPLAINT QUOTE
Ams x 5 days with chest pain orientedx2  rt shoulder pain rad rt side chest From [facility] for [place] for rehab s/p weakness IV #18 L ac 150 ns  12ead EKg per paramedic  Seen by Dr Acosta Ams x 5 days with chest pain orientedx2  rt shoulder pain rad rt side chest From [facility] for [employer] for rehab s/p weakness IV #18 L ac 150 ns  12ead EKg per paramedic  Seen by Dr Acosta

## 2023-12-13 NOTE — H&P ADULT - PROBLEM SELECTOR PLAN 5
Hgb 11.7 down from 14 prior. Unclear how acute this finding is.  -Trend cbc  -T/S  -Monitor bleeding/ hematuria  -Holding Xarelto for now

## 2023-12-13 NOTE — ED ADULT NURSE NOTE - OBJECTIVE STATEMENT
83y M MOODY from [facility] for rehab s/p weakness. EMS placed 18g IV L ac, administered 200ml NS. Pt is axo4, ambulates independently at baseline usually but lately having more difficulty ambulating requiring assistance. Daughter at bedside who mentions that within the last few days pt has been displaying AMS, decrease P.O. intake and weakness. This morning when visiting the pt he stated that he had chest pain which caused the daughter to activate EMS. No longer having chest pain in the ED. Daughter also states that she "believes the rehab center is lacking care for the pt, taking away PO fluids and not administering the proper care." Pt complaining of lower back discomfort and R leg pain. Hx of r hip replacement. Denies headache, dizziness, vision changes, chest pain, shortness of breath, abdominal pain, nausea, vomiting, diarrhea, fevers, chills, dysuria, hematuria, recent illness travel or fall. Patient undressed and placed into gown, call bell in hand and side rails up with bed in lowest position for safety. blanket provided. Comfort and safety provided. 83y M [facility]/p weakness. EMS placed 18g IV L ac, administered 200ml NS. Pt is axo4, ambulates independently at baseline usually but lately having more difficulty ambulating requiring assistance. Daughter at bedside who mentions that within the last few days pt has been displaying AMS, decrease P.O. intake and weakness. This morning when visiting the pt he stated that he had chest pain which caused the daughter to activate EMS. No longer having chest pain in the ED. Daughter also states that she "believes the rehab center is lacking care for the pt, taking away PO fluids and not administering the proper care." Pt complaining of lower back discomfort and R leg pain. Hx of r hip replacement. Denies headache, dizziness, vision changes, chest pain, shortness of breath, abdominal pain, nausea, vomiting, diarrhea, fevers, chills, dysuria, hematuria, recent illness travel or fall. Patient undressed and placed into gown, call bell in hand and side rails up with bed in lowest position for safety. blanket provided. Comfort and safety provided.

## 2023-12-13 NOTE — H&P ADULT - PROBLEM SELECTOR PLAN 3
Daughter denies known urologic hx, pt found to be retaining in ER. Very difficult kiran, required urology assistance.  -Cont. kiran catheter  -Strict I/Os  -Note Daughter denies known urologic hx, pt found to be retaining in ER. Very difficult kiran, required urology assistance.  -Cont. kiran catheter  -Strict I/Os  -Note significant hematuria in kiran, consulted urology regarding urinary retention and hematuria, discussed case. CBI? f/u recommendations  -Will start flomax, discussed risks and benefits with daughter

## 2023-12-13 NOTE — H&P ADULT - NSHPSOCIALHISTORY_GEN_ALL_CORE
Usually lives with daughter and uses walker at baseline but otherwise functional, Denies smoking or ETOH.

## 2023-12-13 NOTE — H&P ADULT - NSICDXPASTMEDICALHX_GEN_ALL_CORE_FT
PAST MEDICAL HISTORY:  AF (atrial fibrillation)     Depression     DVT, lower extremity     Lesion of vocal cord

## 2023-12-13 NOTE — ED PROVIDER NOTE - CLINICAL SUMMARY MEDICAL DECISION MAKING FREE TEXT BOX
Juanita: 83 year old male with pmhx of afib on xarelto herre for evaluation fo worsening weakness, right sided leg an dshoulder pain starting 5 days ago.  coming in from rehab.  difficulty ambulating over the past 2 -3 days.  dnenies any fever, no chills. no trauma.  PE: alert and oriented x 2, NAD, lungs cta b/l, + s1s2, abdomen soft nt/nd, no c/c/e, Left hip 5/5, unable to hip flex R hip , plantar/flexion 5/5.  R shoulder no deformity and no ttp.  + 2 radil puse intact, + 2 dp b/l le, skin itnact.

## 2023-12-13 NOTE — ED ADULT NURSE NOTE - NS_SISCREENINGSR_GEN_ALL_ED
----- Message from Kendra Copeland PA-C sent at 7/19/2021  7:00 PM CDT -----  Within normal limits.  Recheck in 6 months.      Negative

## 2023-12-13 NOTE — ED PROVIDER NOTE - PHYSICAL EXAMINATION
A&Ox2, NAD,   MM dry  No vertebral ttp of he c/t/ l spine  Lungs CTAB. No w/r/r  Cardiac  RRR  Abd soft, NT/ND, no rebound or guarding.   Extremities: cap refill <2, pulses in distal extremities 4+,   No focal Deficits, hip flexion/extension 5/5 L hip, unable to hip flex R hip (since hip replacement months ago per pts daughter), plantar flexion/extension 5/5 BL. R shoulder without any bony ttp, and has FAROM.

## 2023-12-14 LAB
ALBUMIN SERPL ELPH-MCNC: 2.9 G/DL — LOW (ref 3.3–5)
ALBUMIN SERPL ELPH-MCNC: 2.9 G/DL — LOW (ref 3.3–5)
ALP SERPL-CCNC: 139 U/L — HIGH (ref 40–120)
ALP SERPL-CCNC: 139 U/L — HIGH (ref 40–120)
ALT FLD-CCNC: 9 U/L — LOW (ref 10–45)
ALT FLD-CCNC: 9 U/L — LOW (ref 10–45)
ANION GAP SERPL CALC-SCNC: 9 MMOL/L — SIGNIFICANT CHANGE UP (ref 5–17)
ANION GAP SERPL CALC-SCNC: 9 MMOL/L — SIGNIFICANT CHANGE UP (ref 5–17)
APTT BLD: 32.4 SEC — SIGNIFICANT CHANGE UP (ref 24.5–35.6)
APTT BLD: 32.4 SEC — SIGNIFICANT CHANGE UP (ref 24.5–35.6)
AST SERPL-CCNC: 12 U/L — SIGNIFICANT CHANGE UP (ref 10–40)
AST SERPL-CCNC: 12 U/L — SIGNIFICANT CHANGE UP (ref 10–40)
BASOPHILS # BLD AUTO: 0.06 K/UL — SIGNIFICANT CHANGE UP (ref 0–0.2)
BASOPHILS # BLD AUTO: 0.06 K/UL — SIGNIFICANT CHANGE UP (ref 0–0.2)
BASOPHILS NFR BLD AUTO: 0.7 % — SIGNIFICANT CHANGE UP (ref 0–2)
BASOPHILS NFR BLD AUTO: 0.7 % — SIGNIFICANT CHANGE UP (ref 0–2)
BILIRUB SERPL-MCNC: 0.7 MG/DL — SIGNIFICANT CHANGE UP (ref 0.2–1.2)
BILIRUB SERPL-MCNC: 0.7 MG/DL — SIGNIFICANT CHANGE UP (ref 0.2–1.2)
BUN SERPL-MCNC: 25 MG/DL — HIGH (ref 7–23)
BUN SERPL-MCNC: 25 MG/DL — HIGH (ref 7–23)
CALCIUM SERPL-MCNC: 9 MG/DL — SIGNIFICANT CHANGE UP (ref 8.4–10.5)
CALCIUM SERPL-MCNC: 9 MG/DL — SIGNIFICANT CHANGE UP (ref 8.4–10.5)
CHLORIDE SERPL-SCNC: 105 MMOL/L — SIGNIFICANT CHANGE UP (ref 96–108)
CHLORIDE SERPL-SCNC: 105 MMOL/L — SIGNIFICANT CHANGE UP (ref 96–108)
CO2 SERPL-SCNC: 25 MMOL/L — SIGNIFICANT CHANGE UP (ref 22–31)
CO2 SERPL-SCNC: 25 MMOL/L — SIGNIFICANT CHANGE UP (ref 22–31)
CREAT SERPL-MCNC: 0.94 MG/DL — SIGNIFICANT CHANGE UP (ref 0.5–1.3)
CREAT SERPL-MCNC: 0.94 MG/DL — SIGNIFICANT CHANGE UP (ref 0.5–1.3)
EGFR: 80 ML/MIN/1.73M2 — SIGNIFICANT CHANGE UP
EGFR: 80 ML/MIN/1.73M2 — SIGNIFICANT CHANGE UP
EOSINOPHIL # BLD AUTO: 0.15 K/UL — SIGNIFICANT CHANGE UP (ref 0–0.5)
EOSINOPHIL # BLD AUTO: 0.15 K/UL — SIGNIFICANT CHANGE UP (ref 0–0.5)
EOSINOPHIL NFR BLD AUTO: 1.7 % — SIGNIFICANT CHANGE UP (ref 0–6)
EOSINOPHIL NFR BLD AUTO: 1.7 % — SIGNIFICANT CHANGE UP (ref 0–6)
GLUCOSE SERPL-MCNC: 93 MG/DL — SIGNIFICANT CHANGE UP (ref 70–99)
GLUCOSE SERPL-MCNC: 93 MG/DL — SIGNIFICANT CHANGE UP (ref 70–99)
HCT VFR BLD CALC: 36.7 % — LOW (ref 39–50)
HCT VFR BLD CALC: 36.7 % — LOW (ref 39–50)
HGB BLD-MCNC: 11.1 G/DL — LOW (ref 13–17)
HGB BLD-MCNC: 11.1 G/DL — LOW (ref 13–17)
IMM GRANULOCYTES NFR BLD AUTO: 0.9 % — SIGNIFICANT CHANGE UP (ref 0–0.9)
IMM GRANULOCYTES NFR BLD AUTO: 0.9 % — SIGNIFICANT CHANGE UP (ref 0–0.9)
INR BLD: 1.18 RATIO — SIGNIFICANT CHANGE UP (ref 0.85–1.18)
INR BLD: 1.18 RATIO — SIGNIFICANT CHANGE UP (ref 0.85–1.18)
LYMPHOCYTES # BLD AUTO: 1.9 K/UL — SIGNIFICANT CHANGE UP (ref 1–3.3)
LYMPHOCYTES # BLD AUTO: 1.9 K/UL — SIGNIFICANT CHANGE UP (ref 1–3.3)
LYMPHOCYTES # BLD AUTO: 22.1 % — SIGNIFICANT CHANGE UP (ref 13–44)
LYMPHOCYTES # BLD AUTO: 22.1 % — SIGNIFICANT CHANGE UP (ref 13–44)
MAGNESIUM SERPL-MCNC: 1.9 MG/DL — SIGNIFICANT CHANGE UP (ref 1.6–2.6)
MAGNESIUM SERPL-MCNC: 1.9 MG/DL — SIGNIFICANT CHANGE UP (ref 1.6–2.6)
MCHC RBC-ENTMCNC: 30.2 GM/DL — LOW (ref 32–36)
MCHC RBC-ENTMCNC: 30.2 GM/DL — LOW (ref 32–36)
MCHC RBC-ENTMCNC: 33.2 PG — SIGNIFICANT CHANGE UP (ref 27–34)
MCHC RBC-ENTMCNC: 33.2 PG — SIGNIFICANT CHANGE UP (ref 27–34)
MCV RBC AUTO: 109.9 FL — HIGH (ref 80–100)
MCV RBC AUTO: 109.9 FL — HIGH (ref 80–100)
MONOCYTES # BLD AUTO: 0.85 K/UL — SIGNIFICANT CHANGE UP (ref 0–0.9)
MONOCYTES # BLD AUTO: 0.85 K/UL — SIGNIFICANT CHANGE UP (ref 0–0.9)
MONOCYTES NFR BLD AUTO: 9.9 % — SIGNIFICANT CHANGE UP (ref 2–14)
MONOCYTES NFR BLD AUTO: 9.9 % — SIGNIFICANT CHANGE UP (ref 2–14)
NEUTROPHILS # BLD AUTO: 5.57 K/UL — SIGNIFICANT CHANGE UP (ref 1.8–7.4)
NEUTROPHILS # BLD AUTO: 5.57 K/UL — SIGNIFICANT CHANGE UP (ref 1.8–7.4)
NEUTROPHILS NFR BLD AUTO: 64.7 % — SIGNIFICANT CHANGE UP (ref 43–77)
NEUTROPHILS NFR BLD AUTO: 64.7 % — SIGNIFICANT CHANGE UP (ref 43–77)
NRBC # BLD: 0 /100 WBCS — SIGNIFICANT CHANGE UP (ref 0–0)
NRBC # BLD: 0 /100 WBCS — SIGNIFICANT CHANGE UP (ref 0–0)
PLATELET # BLD AUTO: 211 K/UL — SIGNIFICANT CHANGE UP (ref 150–400)
PLATELET # BLD AUTO: 211 K/UL — SIGNIFICANT CHANGE UP (ref 150–400)
POTASSIUM SERPL-MCNC: 4 MMOL/L — SIGNIFICANT CHANGE UP (ref 3.5–5.3)
POTASSIUM SERPL-MCNC: 4 MMOL/L — SIGNIFICANT CHANGE UP (ref 3.5–5.3)
POTASSIUM SERPL-SCNC: 4 MMOL/L — SIGNIFICANT CHANGE UP (ref 3.5–5.3)
POTASSIUM SERPL-SCNC: 4 MMOL/L — SIGNIFICANT CHANGE UP (ref 3.5–5.3)
PROT SERPL-MCNC: 5.8 G/DL — LOW (ref 6–8.3)
PROT SERPL-MCNC: 5.8 G/DL — LOW (ref 6–8.3)
PROTHROM AB SERPL-ACNC: 12.9 SEC — SIGNIFICANT CHANGE UP (ref 9.5–13)
PROTHROM AB SERPL-ACNC: 12.9 SEC — SIGNIFICANT CHANGE UP (ref 9.5–13)
RBC # BLD: 3.34 M/UL — LOW (ref 4.2–5.8)
RBC # BLD: 3.34 M/UL — LOW (ref 4.2–5.8)
RBC # FLD: 15.9 % — HIGH (ref 10.3–14.5)
RBC # FLD: 15.9 % — HIGH (ref 10.3–14.5)
SODIUM SERPL-SCNC: 139 MMOL/L — SIGNIFICANT CHANGE UP (ref 135–145)
SODIUM SERPL-SCNC: 139 MMOL/L — SIGNIFICANT CHANGE UP (ref 135–145)
WBC # BLD: 8.61 K/UL — SIGNIFICANT CHANGE UP (ref 3.8–10.5)
WBC # BLD: 8.61 K/UL — SIGNIFICANT CHANGE UP (ref 3.8–10.5)
WBC # FLD AUTO: 8.61 K/UL — SIGNIFICANT CHANGE UP (ref 3.8–10.5)
WBC # FLD AUTO: 8.61 K/UL — SIGNIFICANT CHANGE UP (ref 3.8–10.5)

## 2023-12-14 PROCEDURE — 71250 CT THORAX DX C-: CPT | Mod: 26

## 2023-12-14 PROCEDURE — 51700 IRRIGATION OF BLADDER: CPT

## 2023-12-14 PROCEDURE — 99222 1ST HOSP IP/OBS MODERATE 55: CPT

## 2023-12-14 PROCEDURE — 72125 CT NECK SPINE W/O DYE: CPT | Mod: 26

## 2023-12-14 PROCEDURE — 72131 CT LUMBAR SPINE W/O DYE: CPT | Mod: 26

## 2023-12-14 PROCEDURE — 93971 EXTREMITY STUDY: CPT | Mod: 26,LT

## 2023-12-14 RX ORDER — ESCITALOPRAM OXALATE 10 MG/1
10 TABLET, FILM COATED ORAL AT BEDTIME
Refills: 0 | Status: DISCONTINUED | OUTPATIENT
Start: 2023-12-14 | End: 2023-12-27

## 2023-12-14 RX ORDER — CHLORHEXIDINE GLUCONATE 213 G/1000ML
1 SOLUTION TOPICAL DAILY
Refills: 0 | Status: DISCONTINUED | OUTPATIENT
Start: 2023-12-14 | End: 2023-12-27

## 2023-12-14 RX ORDER — FUROSEMIDE 40 MG
20 TABLET ORAL DAILY
Refills: 0 | Status: DISCONTINUED | OUTPATIENT
Start: 2023-12-14 | End: 2023-12-17

## 2023-12-14 RX ORDER — ASCORBIC ACID 60 MG
500 TABLET,CHEWABLE ORAL DAILY
Refills: 0 | Status: DISCONTINUED | OUTPATIENT
Start: 2023-12-14 | End: 2023-12-27

## 2023-12-14 RX ORDER — FERROUS SULFATE 325(65) MG
325 TABLET ORAL DAILY
Refills: 0 | Status: DISCONTINUED | OUTPATIENT
Start: 2023-12-14 | End: 2023-12-27

## 2023-12-14 RX ADMIN — TAMSULOSIN HYDROCHLORIDE 0.4 MILLIGRAM(S): 0.4 CAPSULE ORAL at 21:22

## 2023-12-14 RX ADMIN — CHLORHEXIDINE GLUCONATE 1 APPLICATION(S): 213 SOLUTION TOPICAL at 11:07

## 2023-12-14 RX ADMIN — Medication 325 MILLIGRAM(S): at 11:07

## 2023-12-14 RX ADMIN — Medication 20 MILLIGRAM(S): at 13:23

## 2023-12-14 RX ADMIN — ESCITALOPRAM OXALATE 10 MILLIGRAM(S): 10 TABLET, FILM COATED ORAL at 21:22

## 2023-12-14 RX ADMIN — Medication 500 MILLIGRAM(S): at 11:07

## 2023-12-14 NOTE — SWALLOW BEDSIDE ASSESSMENT ADULT - ASR SWALLOW ASPIRATION MONITOR
Monitor for s/s aspiration/laryngeal penetration. If noted:  D/C p.o. intake, provide non-oral nutrition/hydration/meds, and contact this service @ [phone]/change of breathing pattern/cough/gurgly voice/fever/pneumonia/throat clearing/upper respiratory infection Monitor for s/s aspiration/laryngeal penetration. If noted:  D/C p.o. intake, provide non-oral nutrition/hydration/meds, and contact this service @ [phone]/change of breathing pattern/cough/gurgly voice/fever/pneumonia/throat clearing/upper respiratory infection

## 2023-12-14 NOTE — PHYSICAL THERAPY INITIAL EVALUATION ADULT - DIAGNOSIS, PT EVAL
decreased functional mobility secondary to generalized weakness, impaired balance, decreased activity tolerance

## 2023-12-14 NOTE — CONSULT NOTE ADULT - SUBJECTIVE AND OBJECTIVE BOX
Wound SURGERY CONSULT NOTE    HPI:  83M PMHx afib on xarelto, w/ hx of ortho issues, s/p Rt hip fx (s/p R hip IMN, washout, ORIF and s/p Hip Replacement), mild depression p/w AMS, upper back shoulder and arm pain, Admitted for pneumonia and human meta-pneumovirus in November for week at [facility] then discharged to rehab at [facility]. Initially was on oxygen at rehab. Rehab admission complicated by UTI as per daughter. Also endorsing new level 2 sacral decub. Daughter feels patient did not have sufficient walking with PT and that shoulder pain is related to excessive upper extremity PT. Also endorsing some swallowing issues that prompted patient to be placed on puree diet. Pt has been seen by ENT for this.  S&S consult appreciated. Denies fevers, chills or SOB.  Pt w/o c/o much worse R shoulder pain radiating down R arm prompting daughter to bring to ED. Also noticed worsening LE swelling. She was concerned for cardiac issue and brought him to [facility] for evaluation. Also notes patient told her he could not pee today. Appreciate  Consult.  Possible constipation but daughter states he had large BM in ER. Denies any known falls or trauma. No known urinary history but patient did see urologist briefly in past. Daughter denies any significant cardiac history  Wound consult requested by team to assist w/ management of sacral pressure injury. Pt w/o c/o pain, drainage, odor, color change,  or worsening swelling. Offloading and pericare initiated upon admission. pt Increasingly sedentary and ambulates w/ a walker. Pt is usually has issues w/ continence of urine & stool.  No h/o bites, scratches, falls, trauma.  No use of compression garments or f/u w/ Vascular.  Appetite ok w/o  weight loss.  S&S appreciated        PAST MEDICAL & SURGICAL HISTORY:  AF (atrial fibrillation)    Depression    Lesion of vocal cord    DVT, lower extremity    Hernia, inguinal, right    Fracture of neck of Rt femur  s/p IMN/ ORIF, s/p Debridement/ Washout, s/p Hip Replacement    s/p Cataract, right eye      REVIEW OF SYSTEMS: Pt unable to offer      MEDICATIONS  (STANDING):  ascorbic acid 500 milliGRAM(s) Oral daily  chlorhexidine 2% Cloths 1 Application(s) Topical daily  escitalopram 10 milliGRAM(s) Oral at bedtime  ferrous    sulfate 325 milliGRAM(s) Oral daily  furosemide   Injectable 20 milliGRAM(s) IV Push daily  tamsulosin 0.4 milliGRAM(s) Oral at bedtime    MEDICATIONS  (PRN):  acetaminophen Tablet 650 milliGRAM(s) Oral every 6 hours PRN Temp greater or equal to 38C (100.4F), Mild Pain (1 - 3)  melatonin 3 milliGRAM(s) Oral at bedtime PRN Insomnia    No Known Allergies      SOCIAL HISTORY:  lives w/ daughter, walks w/ walker, No smoking, ETOH, drugs    FAMILY HISTORY:  no h/o significant problems    PHYSICAL EXAM:  T(C): 36.6 (12-14-23 @ 08:32), Max: 37.3 (12-13-23 @ 16:23)  HR: 76 (12-14-23 @ 08:32) (68 - 79)  BP: 119/76 (12-14-23 @ 08:32) (113/73 - 124/79)  RR: 18 (12-14-23 @ 08:32) (16 - 18)  SpO2: 100% (12-14-23 @ 08:32) (79% - 100%)  Wt(kg): --  I&O's Summary      NAD sleepy, but arousable,  frail,  WD/ WN/ WG  [employer]a Care P500 bed  HEENT:  NC/AT, EOMI, sclera clear, mucosa moist, throat clear, trachea midline, neck supple  Respiratory: nonlabored w/ equal chest rise  Gastrointestinal: soft NT/ND  : (+)Sweet  Neurology:  strength & sensation grossly intact  Psych: calm/ appropriate  Musculoskeletal: FROM, no deformities/ contractures  Vascular: BLE equally warm,  no cyanosis, clubbing, nor acute ischemia         mild BLE edema equal          L>RLE DP pulses palpable          BLE hemosiderin staining,          BLE dry flakey skin, worse on feet  Skin: thin, dry, pale, frail  Sacral stage 2 pressure injury  partial thickness pink moist tissue    3cm x 1cm x 0.1cm   no blistering  or drainage  No odor, erythema, increased warmth, tenderness, induration, fluctuance, nor crepitus    LABS/ CULTURES/ RADIOLOGY:                        11.1   8.61  )-----------( 211      ( 14 Dec 2023 07:02 )             36.7     WBC Count: 8.61 K/uL (12-14-23 @ 07:02)  WBC Count: 10.43 K/uL (12-13-23 @ 11:23)    CMP: 12-14    139  |  105  |  25<H>  ----------------------------<  93  4.0   |  25  |  0.94    Ca    9.0      14 Dec 2023 07:02  Phos  2.8     12-13  Mg     1.9     12-14    TPro  5.8<L>  /  Alb  2.9<L>  /  TBili  0.7  /  DBili  x   /  AST  12  /  ALT  9<L>  /  AlkPhos  139<H>  12-14          PT/INR: PT 12.9 , INR 1.18       [12-14-23 @ 07:02]  PTT: 32.4       [12-14-23 @ 07:02]    ACC: [id number] EXAM:  DUPLEX EXT VEINS LOWER LT   ORDERED BY: PANFILO VILLASENOR     PROCEDURE DATE:  12/14/2023          INTERPRETATION:  CLINICAL INFORMATION: Left leg pain.    COMPARISON: Right Venous Doppler dated 12/13/2023 and venous Doppler   dated 12/06/2022.    TECHNIQUE: Duplex sonography of the LEFT LOWER extremity veins with color   and spectral Doppler, with and without compression.    FINDINGS:    There is normal compressibility of the left common femoral, femoral and   popliteal veins.  The contralateral common femoral vein is patent.  Doppler examination shows normal spontaneous and phasic flow.    No calf vein thrombosis is detected.    IMPRESSION:  No evidence of left lower extremity deep venous thrombosis.                           Wound SURGERY CONSULT NOTE    HPI:  83M PMHx afib on xarelto, w/ hx of ortho issues, s/p Rt hip fx (s/p R hip IMN, washout, ORIF and s/p Hip Replacement), mild depression p/w AMS, upper back shoulder and arm pain, Admitted for pneumonia and human meta-pneumovirus in November for week at [place] then discharged to rehab at [facility]. Initially was on oxygen at rehab. Rehab admission complicated by UTI as per daughter. Also endorsing new level 2 sacral decub. Daughter feels patient did not have sufficient walking with PT and that shoulder pain is related to excessive upper extremity PT. Also endorsing some swallowing issues that prompted patient to be placed on puree diet. Pt has been seen by ENT for this.  S&S consult appreciated. Denies fevers, chills or SOB.  Pt w/o c/o much worse R shoulder pain radiating down R arm prompting daughter to bring to ED. Also noticed worsening LE swelling. She was concerned for cardiac issue and brought him to [facility] for evaluation. Also notes patient told her he could not pee today. Appreciate  Consult.  Possible constipation but daughter states he had large BM in ER. Denies any known falls or trauma. No known urinary history but patient did see urologist briefly in past. Daughter denies any significant cardiac history  Wound consult requested by team to assist w/ management of sacral pressure injury. Pt w/o c/o pain, drainage, odor, color change,  or worsening swelling. Offloading and pericare initiated upon admission. pt Increasingly sedentary and ambulates w/ a walker. Pt is usually has issues w/ continence of urine & stool.  No h/o bites, scratches, falls, trauma.  No use of compression garments or f/u w/ Vascular.  Appetite ok w/o  weight loss.  S&S appreciated        PAST MEDICAL & SURGICAL HISTORY:  AF (atrial fibrillation)    Depression    Lesion of vocal cord    DVT, lower extremity    Hernia, inguinal, right    Fracture of neck of Rt femur  s/p IMN/ ORIF, s/p Debridement/ Washout, s/p Hip Replacement    s/p Cataract, right eye      REVIEW OF SYSTEMS: Pt unable to offer      MEDICATIONS  (STANDING):  ascorbic acid 500 milliGRAM(s) Oral daily  chlorhexidine 2% Cloths 1 Application(s) Topical daily  escitalopram 10 milliGRAM(s) Oral at bedtime  ferrous    sulfate 325 milliGRAM(s) Oral daily  furosemide   Injectable 20 milliGRAM(s) IV Push daily  tamsulosin 0.4 milliGRAM(s) Oral at bedtime    MEDICATIONS  (PRN):  acetaminophen Tablet 650 milliGRAM(s) Oral every 6 hours PRN Temp greater or equal to 38C (100.4F), Mild Pain (1 - 3)  melatonin 3 milliGRAM(s) Oral at bedtime PRN Insomnia    No Known Allergies      SOCIAL HISTORY:  lives w/ daughter, walks w/ walker, No smoking, ETOH, drugs    FAMILY HISTORY:  no h/o significant problems    PHYSICAL EXAM:  T(C): 36.6 (12-14-23 @ 08:32), Max: 37.3 (12-13-23 @ 16:23)  HR: 76 (12-14-23 @ 08:32) (68 - 79)  BP: 119/76 (12-14-23 @ 08:32) (113/73 - 124/79)  RR: 18 (12-14-23 @ 08:32) (16 - 18)  SpO2: 100% (12-14-23 @ 08:32) (79% - 100%)  Wt(kg): --  I&O's Summary      NAD sleepy, but arousable,  frail,  WD/ WN/ WG  [employer] Care P500 bed  HEENT:  NC/AT, EOMI, sclera clear, mucosa moist, throat clear, trachea midline, neck supple  Respiratory: nonlabored w/ equal chest rise  Gastrointestinal: soft NT/ND  : (+)Sweet  Neurology:  strength & sensation grossly intact  Psych: calm/ appropriate  Musculoskeletal: FROM, no deformities/ contractures  Vascular: BLE equally warm,  no cyanosis, clubbing, nor acute ischemia         mild BLE edema equal          L>RLE DP pulses palpable          BLE hemosiderin staining,          BLE dry flakey skin, worse on feet  Skin: thin, dry, pale, frail  Sacral stage 2 pressure injury  partial thickness pink moist tissue    3cm x 1cm x 0.1cm   no blistering  or drainage  No odor, erythema, increased warmth, tenderness, induration, fluctuance, nor crepitus    LABS/ CULTURES/ RADIOLOGY:                        11.1   8.61  )-----------( 211      ( 14 Dec 2023 07:02 )             36.7     WBC Count: 8.61 K/uL (12-14-23 @ 07:02)  WBC Count: 10.43 K/uL (12-13-23 @ 11:23)    CMP: 12-14    139  |  105  |  25<H>  ----------------------------<  93  4.0   |  25  |  0.94    Ca    9.0      14 Dec 2023 07:02  Phos  2.8     12-13  Mg     1.9     12-14    TPro  5.8<L>  /  Alb  2.9<L>  /  TBili  0.7  /  DBili  x   /  AST  12  /  ALT  9<L>  /  AlkPhos  139<H>  12-14          PT/INR: PT 12.9 , INR 1.18       [12-14-23 @ 07:02]  PTT: 32.4       [12-14-23 @ 07:02]    ACC: [id number] EXAM:  DUPLEX EXT VEINS LOWER LT   ORDERED BY: PANFILO VILLASENOR     PROCEDURE DATE:  12/14/2023          INTERPRETATION:  CLINICAL INFORMATION: Left leg pain.    COMPARISON: Right Venous Doppler dated 12/13/2023 and venous Doppler   dated 12/06/2022.    TECHNIQUE: Duplex sonography of the LEFT LOWER extremity veins with color   and spectral Doppler, with and without compression.    FINDINGS:    There is normal compressibility of the left common femoral, femoral and   popliteal veins.  The contralateral common femoral vein is patent.  Doppler examination shows normal spontaneous and phasic flow.    No calf vein thrombosis is detected.    IMPRESSION:  No evidence of left lower extremity deep venous thrombosis.                           Wound SURGERY CONSULT NOTE    HPI:  83M PMHx afib on xarelto, w/ hx of ortho issues, s/p Rt hip fx (s/p R hip IMN, washout, ORIF and s/p Hip Replacement), mild depression p/w AMS, upper back shoulder and arm pain, Admitted for pneumonia and human meta-pneumovirus in November for week at [place] then discharged to rehab at [facility]. Initially was on oxygen at rehab. Rehab admission complicated by UTI as per daughter. Also endorsing new level 2 sacral decub. Daughter feels patient did not have sufficient walking with PT and that shoulder pain is related to excessive upper extremity PT. Also endorsing some swallowing issues that prompted patient to be placed on puree diet. Pt has been seen by ENT for this.  S&S consult appreciated. Denies fevers, chills or SOB.  Pt w/o c/o much worse R shoulder pain radiating down R arm prompting daughter to bring to ED. Also noticed worsening LE swelling. She was concerned for cardiac issue and brought him to [facility] for evaluation. Also notes patient told her he could not pee today. Appreciate  Consult.  Possible constipation but daughter states he had large BM in ER. Denies any known falls or trauma. No known urinary history but patient did see urologist briefly in past. Daughter denies any significant cardiac history  Wound consult requested by team to assist w/ management of sacral pressure injury. Pt w/o c/o pain, drainage, odor, color change,  or worsening swelling. Offloading and pericare initiated upon admission. pt Increasingly sedentary and ambulates w/ a walker. Pt usually has issues w/ continence of urine & stool.  No h/o bites, scratches, falls, trauma.  No use of compression garments or f/u w/ Vascular.  Appetite ok w/o  weight loss.  S&S appreciated        PAST MEDICAL & SURGICAL HISTORY:  AF (atrial fibrillation)    Depression    Lesion of vocal cord    DVT, lower extremity    Hernia, inguinal, right    Fracture of neck of Rt femur  s/p IMN/ ORIF, s/p Debridement/ Washout, s/p Hip Replacement    s/p Cataract, right eye      REVIEW OF SYSTEMS: Pt unable to offer      MEDICATIONS  (STANDING):  ascorbic acid 500 milliGRAM(s) Oral daily  chlorhexidine 2% Cloths 1 Application(s) Topical daily  escitalopram 10 milliGRAM(s) Oral at bedtime  ferrous    sulfate 325 milliGRAM(s) Oral daily  furosemide   Injectable 20 milliGRAM(s) IV Push daily  tamsulosin 0.4 milliGRAM(s) Oral at bedtime    MEDICATIONS  (PRN):  acetaminophen Tablet 650 milliGRAM(s) Oral every 6 hours PRN Temp greater or equal to 38C (100.4F), Mild Pain (1 - 3)  melatonin 3 milliGRAM(s) Oral at bedtime PRN Insomnia    No Known Allergies      SOCIAL HISTORY:  lives w/ daughter, walks w/ walker, No smoking, ETOH, drugs    FAMILY HISTORY:  no h/o significant problems    PHYSICAL EXAM:  T(C): 36.6 (12-14-23 @ 08:32), Max: 37.3 (12-13-23 @ 16:23)  HR: 76 (12-14-23 @ 08:32) (68 - 79)  BP: 119/76 (12-14-23 @ 08:32) (113/73 - 124/79)  RR: 18 (12-14-23 @ 08:32) (16 - 18)  SpO2: 100% (12-14-23 @ 08:32) (79% - 100%)  Wt(kg): --  I&O's Summary      NAD sleepy, but arousable,  frail,  WD/ WN/ WG  [employer] Care P500 bed  HEENT:  NC/AT, EOMI, sclera clear, mucosa moist, throat clear, trachea midline, neck supple  Respiratory: nonlabored w/ equal chest rise  Gastrointestinal: soft NT/ND  : (+)Sweet  Neurology:  strength & sensation grossly intact  Psych: calm/ appropriate  Musculoskeletal: FROM, no deformities/ contractures  Vascular: BLE equally warm,  no cyanosis, clubbing, nor acute ischemia         mild BLE edema equal          L>RLE DP pulses palpable          BLE hemosiderin staining,          BLE dry flakey skin, worse on feet  Skin: thin, dry, pale, frail  Sacral stage 2 pressure injury  partial thickness pink moist tissue    3cm x 1cm x 0.1cm   no blistering  or drainage  No odor, erythema, increased warmth, tenderness, induration, fluctuance, nor crepitus    LABS/ CULTURES/ RADIOLOGY:                        11.1   8.61  )-----------( 211      ( 14 Dec 2023 07:02 )             36.7     WBC Count: 8.61 K/uL (12-14-23 @ 07:02)  WBC Count: 10.43 K/uL (12-13-23 @ 11:23)    CMP: 12-14    139  |  105  |  25<H>  ----------------------------<  93  4.0   |  25  |  0.94    Ca    9.0      14 Dec 2023 07:02  Phos  2.8     12-13  Mg     1.9     12-14    TPro  5.8<L>  /  Alb  2.9<L>  /  TBili  0.7  /  DBili  x   /  AST  12  /  ALT  9<L>  /  AlkPhos  139<H>  12-14          PT/INR: PT 12.9 , INR 1.18       [12-14-23 @ 07:02]  PTT: 32.4       [12-14-23 @ 07:02]    ACC: [id number] EXAM:  DUPLEX EXT VEINS LOWER LT   ORDERED BY: PANFILO VILLASENOR     PROCEDURE DATE:  12/14/2023          INTERPRETATION:  CLINICAL INFORMATION: Left leg pain.    COMPARISON: Right Venous Doppler dated 12/13/2023 and venous Doppler   dated 12/06/2022.    TECHNIQUE: Duplex sonography of the LEFT LOWER extremity veins with color   and spectral Doppler, with and without compression.    FINDINGS:    There is normal compressibility of the left common femoral, femoral and   popliteal veins.  The contralateral common femoral vein is patent.  Doppler examination shows normal spontaneous and phasic flow.    No calf vein thrombosis is detected.    IMPRESSION:  No evidence of left lower extremity deep venous thrombosis.                           Wound SURGERY CONSULT NOTE    HPI:  83M PMHx afib on xarelto, w/ hx of ortho issues, s/p Rt hip fx (s/p R hip IMN, washout, ORIF and s/p Hip Replacement), mild depression p/w AMS, upper back shoulder and arm pain, Admitted for pneumonia and human meta-pneumovirus in November for week at [place] then discharged to rehab at [facility]. Initially was on oxygen at rehab. Rehab admission complicated by UTI as per daughter. Also endorsing new level 2 sacral decub. Daughter feels patient did not have sufficient walking with PT and that shoulder pain is related to excessive upper extremity PT. Also endorsing some swallowing issues that prompted patient to be placed on puree diet. Pt has been seen by ENT for this.  S&S consult appreciated. Denies fevers, chills or SOB.  Pt w/o c/o much worse R shoulder pain radiating down R arm prompting daughter to bring to ED. Also noticed worsening LE swelling. She was concerned for cardiac issue and brought him to [facility] for evaluation. Also notes patient told her he could not pee today. Appreciate  Consult.  Possible constipation but daughter states he had large BM in ER. Denies any known falls or trauma. No known urinary history but patient did see urologist briefly in past. Daughter denies any significant cardiac history  Wound consult requested by team to assist w/ management of sacral pressure injury. Pt w/o c/o pain, drainage, odor, color change,  or worsening swelling. Offloading and pericare initiated upon admission. pt Increasingly sedentary and ambulates w/ a walker. Pt usually has issues w/ continence of urine & stool.  No h/o bites, scratches, falls, trauma.  No use of compression garments or f/u w/ Vascular.  Appetite ok w/o  weight loss.  S&S appreciated        PAST MEDICAL & SURGICAL HISTORY:  AF (atrial fibrillation)    Depression    Lesion of vocal cord    DVT, lower extremity    Hernia, inguinal, right    Fracture of neck of Rt femur  s/p IMN/ ORIF, s/p Debridement/ Washout, s/p Hip Replacement    s/p Cataract, right eye      REVIEW OF SYSTEMS: Pt unable to offer      MEDICATIONS  (STANDING):  ascorbic acid 500 milliGRAM(s) Oral daily  chlorhexidine 2% Cloths 1 Application(s) Topical daily  escitalopram 10 milliGRAM(s) Oral at bedtime  ferrous    sulfate 325 milliGRAM(s) Oral daily  furosemide   Injectable 20 milliGRAM(s) IV Push daily  tamsulosin 0.4 milliGRAM(s) Oral at bedtime    MEDICATIONS  (PRN):  acetaminophen Tablet 650 milliGRAM(s) Oral every 6 hours PRN Temp greater or equal to 38C (100.4F), Mild Pain (1 - 3)  melatonin 3 milliGRAM(s) Oral at bedtime PRN Insomnia    No Known Allergies      SOCIAL HISTORY:  lives w/ daughter, walks w/ walker, No smoking, ETOH, drugs    FAMILY HISTORY:  no h/o significant problems    PHYSICAL EXAM:  T(C): 36.6 (12-14-23 @ 08:32), Max: 37.3 (12-13-23 @ 16:23)  HR: 76 (12-14-23 @ 08:32) (68 - 79)  BP: 119/76 (12-14-23 @ 08:32) (113/73 - 124/79)  RR: 18 (12-14-23 @ 08:32) (16 - 18)  SpO2: 100% (12-14-23 @ 08:32) (79% - 100%)  Wt(kg): --  I&O's Summary      NAD sleepy, but arousable,  frail,  WD/ WN/ WG  [employer] Care P500 bed  HEENT:  NC/AT, EOMI, sclera clear, mucosa moist, throat clear, trachea midline, neck supple  Respiratory: nonlabored w/ equal chest rise  Gastrointestinal: soft NT/ND  : (+)Sweet  Neurology:  strength & sensation grossly intact  Psych: calm/ appropriate  Musculoskeletal: FROM, no deformities/ contractures  Vascular: BLE equally warm,  no cyanosis, clubbing, nor acute ischemia         mild BLE edema equal          L>RLE DP pulses palpable          BLE hemosiderin staining,          BLE dry flakey skin, worse on feet  Skin: thin, dry, pale, frail  Sacral stage 2 pressure injury  partial thickness pink moist tissue    3cm x 1cm x 0.1cm   no blistering  or drainage  No odor, erythema, increased warmth, tenderness, induration, fluctuance, nor crepitus    LABS/ CULTURES/ RADIOLOGY:                        11.1   8.61  )-----------( 211      ( 14 Dec 2023 07:02 )             36.7     WBC Count: 8.61 K/uL (12-14-23 @ 07:02)  WBC Count: 10.43 K/uL (12-13-23 @ 11:23)    CMP: 12-14    139  |  105  |  25<H>  ----------------------------<  93  4.0   |  25  |  0.94    Ca    9.0      14 Dec 2023 07:02  Phos  2.8     12-13  Mg     1.9     12-14    TPro  5.8<L>  /  Alb  2.9<L>  /  TBili  0.7  /  DBili  x   /  AST  12  /  ALT  9<L>  /  AlkPhos  139<H>  12-14          PT/INR: PT 12.9 , INR 1.18       [12-14-23 @ 07:02]  PTT: 32.4       [12-14-23 @ 07:02]    ACC: [id number] EXAM:  DUPLEX EXT VEINS LOWER LT   ORDERED BY: PANFILO VILLASENOR     PROCEDURE DATE:  12/14/2023          INTERPRETATION:  CLINICAL INFORMATION: Left leg pain.    COMPARISON: Right Venous Doppler dated 12/13/2023 and venous Doppler   dated 12/06/2022.    TECHNIQUE: Duplex sonography of the LEFT LOWER extremity veins with color   and spectral Doppler, with and without compression.    FINDINGS:    There is normal compressibility of the left common femoral, femoral and   popliteal veins.  The contralateral common femoral vein is patent.  Doppler examination shows normal spontaneous and phasic flow.    No calf vein thrombosis is detected.    IMPRESSION:  No evidence of left lower extremity deep venous thrombosis.

## 2023-12-14 NOTE — CONSULT NOTE ADULT - ASSESSMENT
83M PMHx afib on xarelto, w/ hx of ortho issues, s/p L hip IMN (most recently s/p R hip IMN (Dr. Godinez 10/31/19), mild depression p/w acute encephalopathy, upper back shoulder and arm pain      Wound Consult requested to assist w/ management of Xerosis  Stage 2 sacrum pressure injury  BLE PVD w/stasis dermatitis    Buttocks/ Sacrum TRIAD BID and prn soiling        Continue w/ attends under pads and Pericare as per protocol  Consider TYESHA/PVR if within GOC  LLE DUplex negative for DVT  Abx per Medicine/ ID  Moisturize intact skin w/ SWEEN cream BID  Nutrition Consult for optimization        encourage high quality protein, boris/ prosource, MVI & Vit C to promote wound healing  Hyperglycemia - ADA diet and FS w/ ISS  Continue turning and positioning w/ offloading assistive devices as per protocol  Waffle Cushion to chair when oob to chair  Continue w/ low air loss pressure redistribution bed surface   Pt will need Group 2 mattress on hospital bed and ROHO cushion for wheel chair upon discharge home  Care as per medicine, remain available as requested  Upon discharge [facility] outpatient at [facility] [address] [place], [place] [phone]  Seen w/ attng and D/w team & RN  Thank you for this consult  Hoa Cortés PA-C CWS [id number]  Nights/ Weekends/ Holidays please call:  General Surgery Consult pager [phone]) for emergencies  Wound PT for multilayer leg wrapping or VAC issues ([phone])   I spent 55minutes face to face w/ this pt of which more than 50% of the time was spent counseling & coordinating care of this pt.  83M PMHx afib on xarelto, w/ hx of ortho issues, s/p L hip IMN (most recently s/p R hip IMN (Dr. Godinez 10/31/19), mild depression p/w acute encephalopathy, upper back shoulder and arm pain      Wound Consult requested to assist w/ management of Xerosis  Stage 2 sacrum pressure injury  BLE PVD w/stasis dermatitis    Buttocks/ Sacrum TRIAD BID and prn soiling        Continue w/ attends under pads and Pericare as per protocol  Consider TYESHA/PVR if within GOC  LLE DUplex negative for DVT  Abx per Medicine/ ID  Moisturize intact skin w/ SWEEN cream BID  Nutrition Consult for optimization        encourage high quality protein, boris/ prosource, MVI & Vit C to promote wound healing  Hyperglycemia - ADA diet and FS w/ ISS  Continue turning and positioning w/ offloading assistive devices as per protocol  Waffle Cushion to chair when oob to chair  Continue w/ low air loss pressure redistribution bed surface   Pt will need Group 2 mattress on hospital bed and ROHO cushion for wheel chair upon discharge home  Care as per medicine, remain available as requested  Upon discharge f/u as outpatient at [facility] [address] [place], [place] [phone]  Seen w/ attng and D/w team & RN  Thank you for this consult  Hoa Cortés PA-C CWS [id number]  Nights/ Weekends/ Holidays please call:  General Surgery Consult pager [phone]) for emergencies  Wound PT for multilayer leg wrapping or VAC issues ([phone])   I spent 55minutes face to face w/ this pt of which more than 50% of the time was spent counseling & coordinating care of this pt.  83M PMHx afib on xarelto, w/ hx of ortho issues, s/p L hip IMN (most recently s/p R hip IMN (Dr. Godinez 10/31/19), mild depression p/w acute encephalopathy, upper back shoulder and arm pain      Wound Consult requested to assist w/ management of:  Xerosis  Stage 2 sacrum pressure injury  BLE PVD w/stasis dermatitis    Buttocks/ Sacrum TRIAD BID and prn soiling        Continue w/ attends under pads and Pericare as per protocol  Consider TYESHA/PVR if within GOC  LLE DUplex negative for DVT  Abx per Medicine/ ID  Moisturize intact skin w/ SWEEN cream BID  Nutrition Consult for optimization        encourage high quality protein, boris/ prosource, MVI & Vit C to promote wound healing  Hyperglycemia - ADA diet and FS w/ ISS  Continue turning and positioning w/ offloading assistive devices as per protocol  Waffle Cushion to chair when oob to chair  Continue w/ low air loss pressure redistribution bed surface   Care as per medicine, remain available as requested  Upon discharge f/u as outpatient at [facility] [address] [place], [place] [phone]  Seen w/ attng and D/w team & RN  Thank you for this consult  Hoa Cortés PA-C CWS [id number]  Nights/ Weekends/ Holidays please call:  General Surgery Consult pager ([phone]) for emergencies  Wound PT for multilayer leg wrapping or VAC issues ([phone])    83M PMHx afib on xarelto, w/ hx of ortho issues, s/p L hip IMN (most recently s/p R hip IMN (Dr. Godinez 10/31/19), mild depression p/w acute encephalopathy, upper back shoulder and arm pain      Wound Consult requested to assist w/ management of:  Xerosis  Stage 2 sacrum pressure injury  BLE PVD w/stasis dermatitis    Buttocks/ Sacrum TRIAD BID and prn soiling        Continue w/ attends under pads and Pericare as per protocol  Consider TYESHA/PVR if within GOC  LLE DUplex negative for DVT  Abx per Medicine/ ID  Moisturize intact skin w/ SWEEN cream BID  Nutrition Consult for optimization        encourage high quality protein, boris/ prosource, MVI & Vit C to promote wound healing  Hyperglycemia - ADA diet and FS w/ ISS  Continue turning and positioning w/ offloading assistive devices as per protocol  Waffle Cushion to chair when oob to chair  Continue w/ low air loss pressure redistribution bed surface   Care as per medicine, remain available as requested  Upon discharge f/u as outpatient at [facility] [address] [place], [place] [phone]  Seen w/ attng and D/w team & RN  Thank you for this consult  Hoa Cortés PA-C CWS [id number]  Nights/ Weekends/ Holidays please call:  General Surgery Consult pager ([phone]) for emergencies  Wound PT for multilayer leg wrapping or VAC issues ([phone])

## 2023-12-14 NOTE — SWALLOW BEDSIDE ASSESSMENT ADULT - SLP GENERAL OBSERVATIONS
Pt encountered upright in bed, 2LNC, awake/alert, dysarthric w/ periods of stutter (daughter endorsed somewhat at baseline - may be slightly worsened), hoarse/hypophonic vocal quality, and periods of confusion. Pt Aox2, pleasant and cooperative.

## 2023-12-14 NOTE — SWALLOW BEDSIDE ASSESSMENT ADULT - SWALLOW EVAL: PATIENT/FAMILY GOALS STATEMENT
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 Planned for radiation, however given recent hospital admission for PNA and d/c to rehab, radiation postpone. Daughter inquiring about a second opinion. Suggest Heme/Onc consult to further assess.

## 2023-12-14 NOTE — CONSULT NOTE ADULT - ASSESSMENT
84 yo M with extensive medical history admitted with acute encephalopathy and infectious workup, found to be in retention in ED s/p kiran placement now complicated by gross hematuria likely from traumatic kiran placement in setting of Eliquis.     Recs:  - urine color currently light pink, no clots. no indication for CBI  - continue kiran for retention  - monitor urine color  - hydration per primary team  - may irrigate catheter with piston syringe and NS prn if concerned for obstruction or worsening hematuria  - trend h/h, transfuse per primary team protocol  - AC plan per primary team   - follow up urine culture, treat if indicated  - begin flomax if not medically contraindicated  - bowel regimen   82 yo M with extensive medical history admitted with acute encephalopathy and infectious workup, found to be in retention in ED s/p kiran placement now complicated by gross hematuria likely from traumatic kiran placement in setting of Eliquis.     Recs:  - urine color currently light pink, no clots. no indication for CBI  - continue kiran for retention  - monitor urine color  - hydration per primary team  - may irrigate catheter with piston syringe and NS prn if concerned for obstruction or worsening hematuria  - trend h/h, transfuse per primary team protocol  - AC plan per primary team   - Would recommend restarting on 12/15 as long as urine color remains clear  - follow up urine culture, treat if indicated  - begin flomax if not medically contraindicated  - possible TOV prior to discharge  - bowel regimen   84 yo M with extensive medical history admitted with acute encephalopathy and infectious workup, found to be in retention in ED s/p kiran placement now complicated by gross hematuria likely from traumatic kiran placement in setting of Eliquis.     Recs:  - urine color currently light pink, no clots. no indication for CBI  - continue kiran for retention  - monitor urine color  - hydration per primary team  - may irrigate catheter with piston syringe and NS prn if concerned for obstruction or worsening hematuria  - trend h/h, transfuse per primary team protocol  - AC plan per primary team   - Would recommend restarting on 12/15 as long as urine color remains clear  - follow up urine culture, treat if indicated  - begin flomax if not medically contraindicated  - possible TOV prior to discharge  - bowel regimen   82 yo M with extensive medical history admitted with acute encephalopathy and infectious workup, found to be in retention in ED s/p kiran placement now complicated by gross hematuria likely from traumatic kiran placement in setting of Eliquis.     Recs:  - urine color currently light pink, no clots. no indication for CBI  - continue kiran for retention  - monitor urine color  - hydration per primary team  - may irrigate catheter with piston syringe and NS prn if concerned for obstruction or worsening hematuria  - trend h/h, transfuse per primary team protocol  - AC plan per primary team   - Would recommend restarting on 12/15 as long as urine color remains clear  - follow up urine culture, treat if indicated  - begin flomax if not medically contraindicated  - Recommend keeping kiran catheter on discharge  - bowel regimen   84 yo M with extensive medical history admitted with acute encephalopathy and infectious workup, found to be in retention in ED s/p kiran placement now complicated by gross hematuria likely from traumatic kiran placement in setting of Eliquis.     Recs:  - urine color currently light pink, no clots. no indication for CBI  - continue kiran for retention  - monitor urine color  - hydration per primary team  - may irrigate catheter with piston syringe and NS prn if concerned for obstruction or worsening hematuria  - trend h/h, transfuse per primary team protocol  - AC plan per primary team   - Would recommend restarting on 12/15 as long as urine color remains clear  - follow up urine culture, treat if indicated  - begin flomax if not medically contraindicated  - Recommend keeping kiran catheter on discharge  - bowel regimen   84 yo M with extensive medical history admitted with acute encephalopathy and infectious workup, found to be in retention in ED s/p kiran placement now complicated by gross hematuria likely from traumatic kiran placement in setting of Eliquis.     Recs:  - urine color currently light pink, no clots. no indication for CBI  - continue kiran for retention  - monitor urine color  - hydration per primary team  - may irrigate catheter with piston syringe and NS prn if concerned for obstruction or worsening hematuria  - trend h/h, transfuse per primary team protocol  - Would hold eliquis for now and plan on restarting on 12/15 as long as urine color remains clear  - follow up urine culture, treat if indicated  - begin flomax if not medically contraindicated  - Recommend keeping kiran catheter on discharge  - bowel regimen  - Discussed with Dr. Ortega

## 2023-12-14 NOTE — PATIENT PROFILE ADULT - FALL HARM RISK - HARM RISK INTERVENTIONS
Monitor: Condition is stable and well controlled and patient is requesting a refill of her medication today in office.  Evaluation: No diagnostic tests needed today.  Assessment/Treatment:  Continue with present treatment regimen as indicated.  Refill of metformin 500 mg PO every day given today, refer to orders.  Patient expresses understanding of the plan; all questions were answered.   Assistance with ambulation/Assistance OOB with selected safe patient handling equipment/Communicate Risk of Fall with Harm to all staff/Discuss with provider need for PT consult/Monitor gait and stability/Provide patient with walking aids - walker, cane, crutches/Reinforce activity limits and safety measures with patient and family/Tailored Fall Risk Interventions/Visual Cue: Yellow wristband and red socks/Bed in lowest position, wheels locked, appropriate side rails in place/Call bell, personal items and telephone in reach/Instruct patient to call for assistance before getting out of bed or chair/Non-slip footwear when patient is out of bed/[place] to call system/Physically safe environment - no spills, clutter or unnecessary equipment/Purposeful Proactive Rounding/Room/bathroom lighting operational, light cord in reach Assistance with ambulation/Assistance OOB with selected safe patient handling equipment/Communicate Risk of Fall with Harm to all staff/Discuss with provider need for PT consult/Monitor gait and stability/Provide patient with walking aids - walker, cane, crutches/Reinforce activity limits and safety measures with patient and family/Tailored Fall Risk Interventions/Visual Cue: Yellow wristband and red socks/Bed in lowest position, wheels locked, appropriate side rails in place/Call bell, personal items and telephone in reach/Instruct patient to call for assistance before getting out of bed or chair/Non-slip footwear when patient is out of bed/[place] to call system/Physically safe environment - no spills, clutter or unnecessary equipment/Purposeful Proactive Rounding/Room/bathroom lighting operational, light cord in reach

## 2023-12-14 NOTE — SWALLOW BEDSIDE ASSESSMENT ADULT - ASR SWALLOW RECOMMEND DIAG
Tentative plan for 12/18, mentation not at baseline and given recent need for oxygen, pt would benefit from time to be optimized/FEES

## 2023-12-14 NOTE — SWALLOW BEDSIDE ASSESSMENT ADULT - ORAL PREPARATORY PHASE
Within functional limits inefficient mastication w/ swallow of whole/mostly unchewed bolus suspected reduced bolus control resulting in premature spillover

## 2023-12-14 NOTE — PROGRESS NOTE ADULT - ASSESSMENT
83M PMHx afib on xarelto, w/ hx of ortho issues, s/p L hip IMN (most recently s/p R hip IMN (Dr. Godinez 10/31/19), mild depression p/w acute encephalopathy, upper back shoulder and arm pain      # Acute encephalopathy.   - Pt AAOx3 but otherwise somewhat confused. Daughter states some of this has been ongoing, was placed on puree diet for dysphagia issues at rehab. Has decub now. Could be in setting of urinary retention and deconditioning  - CTH negative, infectious work up negative so far   - Speech/ swallow consult and dysphagia diet  - Falls precautions, aspiration precautions  - PT consult  - F/u UCx  - ID consult pending (called)    # Lower extremity edema:  - As per chart, daughter endorsing worse swelling in legs  - Elevated BNP  - TTE  - LE Duplex negative for DVT  - Cards consult pending (called)    # Urinary retention:  - Kiran placed in ED by Urology  - Strict I/Os  - Hematuria noted - likely from traumatic kiran placement  - Monitor urine color  - Monitor h/h  - Started on Flomax  - Urology following    # Afib:  - Holding home Eliquis 2/2 hematuria    # Anemia:  - Monitor CBC  - Transfuse PRN    # Back pain:   - Multiple areas of upper extremity pain including R shoulder, between shoulder blades and R arm for many days as per daughter. Thought to be 2/2 PT of upper extremities. Also now having LE weakness amongst other issues.   - Tibia/Fibula and Right shoulder x-ray negative for fx  - CT C/L Spine pending    # Depression:  - C/w home meds    # DVT ppx:  - IPCs    [facility]  [phone]   83M PMHx afib on xarelto, w/ hx of ortho issues, s/p L hip IMN (most recently s/p R hip IMN (Dr. Godinez 10/31/19), mild depression p/w acute encephalopathy, upper back shoulder and arm pain      # Acute encephalopathy.   - Pt AAOx3 but otherwise somewhat confused. Daughter states some of this has been ongoing, was placed on puree diet for dysphagia issues at rehab. Has decub now. Could be in setting of urinary retention and deconditioning  - CTH negative, infectious work up negative so far   - Speech/ swallow consult and dysphagia diet  - Falls precautions, aspiration precautions  - PT consult  - F/u UCx  - ID consult pending (called)    # Lower extremity edema:  - As per chart, daughter endorsing worse swelling in legs  - Elevated BNP  - TTE  - LE Duplex negative for DVT  - Cards consult pending (called)    # Urinary retention:  - Kiran placed in ED by Urology  - Strict I/Os  - Hematuria noted - likely from traumatic kiran placement  - Monitor urine color  - Monitor h/h  - Started on Flomax  - Urology following    # Afib:  - Holding home Eliquis 2/2 hematuria    # Anemia:  - Monitor CBC  - Transfuse PRN    # Back pain:   - Multiple areas of upper extremity pain including R shoulder, between shoulder blades and R arm for many days as per daughter. Thought to be 2/2 PT of upper extremities. Also now having LE weakness amongst other issues.   - Tibia/Fibula and Right shoulder x-ray negative for fx  - CT C/L Spine pending    # Depression:  - C/w home meds    # DVT ppx:  - IPCs    [facility]  [phone]   83M PMHx afib on xarelto, w/ hx of ortho issues, s/p L hip IMN (most recently s/p R hip IMN (Dr. Godinez 10/31/19), mild depression p/w acute encephalopathy, upper back shoulder and arm pain      # Acute encephalopathy.   - Pt AAOx3 but otherwise somewhat confused. Daughter states some of this has been ongoing, was placed on puree diet for dysphagia issues at rehab. Has decub now. Could be in setting of urinary retention and deconditioning  - CTH negative, infectious work up negative so far   - Speech/ swallow consult and dysphagia diet  - Falls precautions, aspiration precautions  - PT consult  - F/u UCx  - ID consult pending (called)    # Lower extremity edema:  - As per chart, daughter endorsing worse swelling in legs  - Elevated BNP  - TTE  - LE Duplex negative for DVT  - Cards consult pending (called)    # Urinary retention:  - Kiran placed in ED by Urology  - Strict I/Os  - Hematuria noted - likely from traumatic kiran placement  - Monitor urine color  - Monitor h/h  - Started on Flomax  - Urology following    # Afib:  - Holding home Xarelto 2/2 hematuria    # Anemia:  - Monitor CBC  - Transfuse PRN    # Back pain:   - Multiple areas of upper extremity pain including R shoulder, between shoulder blades and R arm for many days as per daughter. Thought to be 2/2 PT of upper extremities. Also now having LE weakness amongst other issues.   - Tibia/Fibula and Right shoulder x-ray negative for fx  - CT C/L Spine pending    # Depression:  - C/w home meds    # DVT ppx:  - IPCs    [facility]  [phone]   83M PMHx afib on xarelto, w/ hx of ortho issues, s/p L hip IMN (most recently s/p R hip IMN (Dr. Godinez 10/31/19), mild depression p/w acute encephalopathy, upper back shoulder and arm pain      # Acute encephalopathy.   - Pt AAOx3 but otherwise somewhat confused. Daughter states some of this has been ongoing, was placed on puree diet for dysphagia issues at rehab. Has decub now. Could be in setting of urinary retention and deconditioning  - CTH negative, infectious work up negative so far   - Speech/ swallow consult and dysphagia diet  - Falls precautions, aspiration precautions  - PT consult  - F/u UCx  - ID consult pending (called)    # Lower extremity edema:  - As per chart, daughter endorsing worse swelling in legs  - Elevated BNP  - TTE  - LE Duplex negative for DVT  - Cards consult pending (called)    # Urinary retention:  - Kiran placed in ED by Urology  - Strict I/Os  - Hematuria noted - likely from traumatic kiran placement  - Monitor urine color  - Monitor h/h  - Started on Flomax  - Urology following    # Afib:  - Holding home Xarelto 2/2 hematuria    # Anemia:  - Monitor CBC  - Transfuse PRN    # Back pain:   - Multiple areas of upper extremity pain including R shoulder, between shoulder blades and R arm for many days as per daughter. Thought to be 2/2 PT of upper extremities. Also now having LE weakness amongst other issues.   - Tibia/Fibula and Right shoulder x-ray negative for fx  - CT C/L Spine pending    # Depression:  - C/w home meds    # DVT ppx:  - IPCs    [facility]  [phone]

## 2023-12-14 NOTE — CONSULT NOTE ADULT - TIME BILLING
Agree with above PA note.  A/p  83M PMHx afib on xarelto, w/ hx of ortho issues, s/p L hip IMN (most recently s/p R hip IMN (Dr. Godinez 10/31/19), mild depression p/w AMS, upper back shoulder and arm pain.    #AMS  -w/u per med  -Infectious w/u per med, ID    #Right Sided HF  -Overload on exam with b/l le edema  -iv lasix  -Old echo with normal LVEF, w R sided enlargement and mod-severe TR  -f/u repeat echo    #Afib  -rate controlled off meds  -Resume xarelto when ok per uro standpoint    #Back pain  -Mgmt per med

## 2023-12-14 NOTE — CONSULT NOTE ADULT - SUBJECTIVE AND OBJECTIVE BOX
CARDIOLOGY CONSULT - Dr. Marrufo         HPI:  83M PMHx afib on xarelto, w/ hx of ortho issues, s/p L hip IMN (most recently s/p R hip IMN (Dr. Godinez 10/31/19), mild depression p/w AMS, upper back shoulder and arm pain, Admitted for pneumonia and human meta-pneumovirus in November for week at [place] then discharged to rehab at [facility]. Initially was on oxygen at rehab. Rehab admission complicated by UTI as per daughter. Also endorsing new level 2 sacral decub. Daughter feels patient did not have sufficient walking with PT and that shoulder pain is related to excessive upper extremity PT. Also endorsing some swallowing issues that prompted patient to be placed on puree diet. Denies fevers, chills or SOB. Today, daughter states patient endorsed much worse R shoulder pain radiating down R arm. Also noticed worsening LE swelling. She was concerned for cardiac issue and brought him to [facility] for evaluation. Also notes patient told her he could not pee today. Possible constipation but daughter states he had large BM in ER. Denies any known falls or trauma. No known urinary history but patient did see urologist briefly in past. Daughter denies any significant cardiac history    In ER: Given LR 1L (13 Dec 2023 20:37)      PAST MEDICAL & SURGICAL HISTORY:  AF (atrial fibrillation)      Depression      Lesion of vocal cord      DVT, lower extremity      Hernia, inguinal, right      S/P hip replacement, right      Fracture of neck of left femur      Cataract, right eye      PREVIOUS DIAGNOSTIC TESTING:    [x] Echocardiogram:  < from: Transthoracic Echocardiogram (12.06.22 @ 08:53) >  Conclusions:  1. Normal mitral valve. Mild mitral regurgitation.  2. Calcified trileaflet aortic valve with normal opening.  3. Normal left ventricular systolic function. No segmental  wall motion abnormalities. EF by Simpsons Biplane was  calculated to be 65%.  4. Severe right atrial enlargement.  5. Right ventricular enlargement with normal right  ventricular systolic function.  6. Estimated right ventricular systolic pressure equals 37  mm Hg, assuming right atrial pressure equals 8 mm Hg,  consistent with borderline pulmonary hypertension.  7. Normal tricuspid valve. Moderate-severe tricuspid  regurgitation.  *** No previous Echo exam.    < end of copied text >    [ ]  Catheterization:  [ ] Stress Test:  	    MEDICATIONS:  Home Medications:  ascorbic acid 500 mg oral tablet: 1 tab(s) orally once a day (13 Dec 2023 16:41)  cholecalciferol 25 mcg (1000 intl units) oral tablet: 1 tab(s) orally once a day (13 Dec 2023 16:41)  DuoNeb 0.5 mg-2.5 mg/3 mL inhalation solution: 3 milliliter(s) by nebulizer every 6 hours as needed (13 Dec 2023 16:41)  ferrous sulfate 325 mg (65 mg elemental iron) oral tablet: 1 tab(s) orally once a day (13 Dec 2023 16:41)  Lexapro 10 mg oral tablet: 1 tab(s) orally once a day (in the evening) (13 Dec 2023 16:41)  Multiple Vitamins oral tablet: 1 tab(s) orally once a day (13 Dec 2023 16:41)  Xarelto 20 mg oral tablet: 1 tab(s) orally once a day (in the evening) (13 Dec 2023 16:41)      MEDICATIONS  (STANDING):  ascorbic acid 500 milliGRAM(s) Oral daily  chlorhexidine 2% Cloths 1 Application(s) Topical daily  escitalopram 10 milliGRAM(s) Oral at bedtime  ferrous    sulfate 325 milliGRAM(s) Oral daily  tamsulosin 0.4 milliGRAM(s) Oral at bedtime      FAMILY HISTORY:      SOCIAL HISTORY:    [x] Non-smoker  [ ] Smoker  [ ] Alcohol    Allergies    No Known Allergies    Intolerances    	    REVIEW OF SYSTEMS:  CONSTITUTIONAL: No fever, weight loss, or fatigue  EYES: No eye pain, visual disturbances, or discharge  ENMT:  No difficulty hearing, tinnitus, vertigo; No sinus or throat pain  NECK: No pain or stiffness  RESPIRATORY: No cough, wheezing, chills or hemoptysis; No Shortness of Breath  CARDIOVASCULAR: No chest pain, palpitations, passing out, dizziness, or leg swelling  GASTROINTESTINAL: No abdominal or epigastric pain. No nausea, vomiting, or hematemesis; No diarrhea or constipation. No melena or hematochezia.  GENITOURINARY: No dysuria, frequency, hematuria, or incontinence  NEUROLOGICAL: No headaches, memory loss, loss of strength, numbness, or tremors  SKIN: No itching, burning, rashes, or lesions   MSK: +Back and Shoulder pain   	    [x] All others negative	  [ ] Unable to obtain    PHYSICAL EXAM:  T(C): 36.6 (12-14-23 @ 08:32), Max: 37.3 (12-13-23 @ 16:23)  HR: 76 (12-14-23 @ 08:32) (68 - 79)  BP: 119/76 (12-14-23 @ 08:32) (113/73 - 124/79)  RR: 18 (12-14-23 @ 08:32) (16 - 18)  SpO2: 100% (12-14-23 @ 08:32) (79% - 100%)  Wt(kg): --  I&O's Summary    13 Dec 2023 07:01  -  14 Dec 2023 07:00  --------------------------------------------------------  IN: 0 mL / OUT: 1500 mL / NET: -1500 mL        Appearance: Elderly male 	  Psychiatry: A & O x 3, Mood & affect appropriate  HEENT:   Normal oral mucosa, PERRL, EOMI	  Lymphatic: No lymphadenopathy  Cardiovascular: Normal S1 S2,RRR, No JVD, No murmurs  Respiratory: Diminished at bases b/l   Gastrointestinal:  Soft, Non-tender, + BS	  Skin: No rashes, No ecchymoses, No cyanosis	  Neurologic: Non-focal  Extremities: Normal range of motion, No clubbing, cyanosis. +mild b/l le edema  Vascular: Peripheral pulses palpable 2+ bilaterally    TELEMETRY: 	    ECG:  Afib 80s 	  RADIOLOGY:  < from: CT Head No Cont (12.13.23 @ 15:12) >    IMPRESSION: No acute hemorrhage mass or mass effect.    --- End of Report ---    < end of copied text >    < from: Xray Shoulder 2 Views, Right (12.13.23 @ 12:50) >    IMPRESSION:  No fractures, dislocations, or AC separation.    Slightly hypertrophic AC joint arthrosis. Glenohumeral joint spacing   inadequately evaluated due to suboptimal positioning. Spinal degenerative   change with curvature apparent.    Mild posterior greater tuberosity enthesopathic change could correlate   with degree of underlying rotator cuff abnormality, MRI would be helpful   to further assess in this regard as warranted.    Generalized osteopenia otherwise no discrete lytic or blastic lesions.    --- End of Report ---    < end of copied text >    < from: Xray Chest 1 View- PORTABLE-Urgent (12.13.23 @ 12:00) >  FINDINGS:    The heart is normal in size.  There are hazy opacities at the lung bases.  There is no pneumothorax.  No acute bony abnormality.    IMPRESSION:  Bibasilar hazy opacities which may represent atelectasis or small   effusions    --- End of Report ---    < end of copied text >    < from: VA Duplex Lower Ext Vein Scan, Right (12.13.23 @ 12:52) >  IMPRESSION:  No evidence of right lower extremity deep venous thrombosis.            --- End of Report ---    < end of copied text >    < from: VA Duplex Lower Ext Vein Scan, Left (12.14.23 @ 10:08) >  IMPRESSION:  No evidence of left lower extremity deep venous thrombosis.            --- End of Report ---    < end of copied text >      OTHER: 	  	  LABS:	 	    CARDIAC MARKERS:  Troponin T, High Sensitivity Result: 35 ng/L (12-13 @ 13:48)  Troponin T, High Sensitivity Result: 35 ng/L (12-13 @ 11:23)                                  11.1   8.61  )-----------( 211      ( 14 Dec 2023 07:02 )             36.7     12-14    139  |  105  |  25<H>  ----------------------------<  93  4.0   |  25  |  0.94    Ca    9.0      14 Dec 2023 07:02  Phos  2.8     12-13  Mg     1.9     12-14    TPro  5.8<L>  /  Alb  2.9<L>  /  TBili  0.7  /  DBili  x   /  AST  12  /  ALT  9<L>  /  AlkPhos  139<H>  12-14    PT/INR - ( 14 Dec 2023 07:02 )   PT: 12.9 sec;   INR: 1.18 ratio         PTT - ( 14 Dec 2023 07:02 )  PTT:32.4 sec  proBNP:   Lipid Profile:   HgA1c:   TSH:        CARDIOLOGY CONSULT - Dr. Marrufo         HPI:  83M PMHx afib on xarelto, w/ hx of ortho issues, s/p L hip IMN (most recently s/p R hip IMN (Dr. Godinez 10/31/19), mild depression p/w AMS, upper back shoulder and arm pain, Admitted for pneumonia and human meta-pneumovirus in November for week at [place] then discharged to rehab at [facility]. Initially was on oxygen at rehab. Rehab admission complicated by UTI as per daughter. Also endorsing new level 2 sacral decub. Daughter feels patient did not have sufficient walking with PT and that shoulder pain is related to excessive upper extremity PT. Also endorsing some swallowing issues that prompted patient to be placed on puree diet. Denies fevers, chills or SOB. Today, daughter states patient endorsed much worse R shoulder pain radiating down R arm. Also noticed worsening LE swelling. She was concerned for cardiac issue and brought him to [facility] for evaluation. Also notes patient told her he could not pee today. Possible constipation but daughter states he had large BM in ER. Denies any known falls or trauma. No known urinary history but patient did see urologist briefly in past. Daughter denies any significant cardiac history    In ER: Given LR 1L (13 Dec 2023 20:37)      PAST MEDICAL & SURGICAL HISTORY:  AF (atrial fibrillation)      Depression      Lesion of vocal cord      DVT, lower extremity      Hernia, inguinal, right      S/P hip replacement, right      Fracture of neck of left femur      Cataract, right eye      PREVIOUS DIAGNOSTIC TESTING:    [x] Echocardiogram:  < from: Transthoracic Echocardiogram (12.06.22 @ 08:53) >  Conclusions:  1. Normal mitral valve. Mild mitral regurgitation.  2. Calcified trileaflet aortic valve with normal opening.  3. Normal left ventricular systolic function. No segmental  wall motion abnormalities. EF by Simpsons Biplane was  calculated to be 65%.  4. Severe right atrial enlargement.  5. Right ventricular enlargement with normal right  ventricular systolic function.  6. Estimated right ventricular systolic pressure equals 37  mm Hg, assuming right atrial pressure equals 8 mm Hg,  consistent with borderline pulmonary hypertension.  7. Normal tricuspid valve. Moderate-severe tricuspid  regurgitation.  *** No previous Echo exam.    < end of copied text >    [ ]  Catheterization:  [ ] Stress Test:  	    MEDICATIONS:  Home Medications:  ascorbic acid 500 mg oral tablet: 1 tab(s) orally once a day (13 Dec 2023 16:41)  cholecalciferol 25 mcg (1000 intl units) oral tablet: 1 tab(s) orally once a day (13 Dec 2023 16:41)  DuoNeb 0.5 mg-2.5 mg/3 mL inhalation solution: 3 milliliter(s) by nebulizer every 6 hours as needed (13 Dec 2023 16:41)  ferrous sulfate 325 mg (65 mg elemental iron) oral tablet: 1 tab(s) orally once a day (13 Dec 2023 16:41)  Lexapro 10 mg oral tablet: 1 tab(s) orally once a day (in the evening) (13 Dec 2023 16:41)  Multiple Vitamins oral tablet: 1 tab(s) orally once a day (13 Dec 2023 16:41)  Xarelto 20 mg oral tablet: 1 tab(s) orally once a day (in the evening) (13 Dec 2023 16:41)      MEDICATIONS  (STANDING):  ascorbic acid 500 milliGRAM(s) Oral daily  chlorhexidine 2% Cloths 1 Application(s) Topical daily  escitalopram 10 milliGRAM(s) Oral at bedtime  ferrous    sulfate 325 milliGRAM(s) Oral daily  tamsulosin 0.4 milliGRAM(s) Oral at bedtime      FAMILY HISTORY:      SOCIAL HISTORY:    [x] Non-smoker  [ ] Smoker  [ ] Alcohol    Allergies    No Known Allergies    Intolerances    	    REVIEW OF SYSTEMS:  CONSTITUTIONAL: No fever, weight loss, or fatigue  EYES: No eye pain, visual disturbances, or discharge  ENMT:  No difficulty hearing, tinnitus, vertigo; No sinus or throat pain  NECK: No pain or stiffness  RESPIRATORY: No cough, wheezing, chills or hemoptysis; No Shortness of Breath  CARDIOVASCULAR: No chest pain, palpitations, passing out, dizziness, or leg swelling  GASTROINTESTINAL: No abdominal or epigastric pain. No nausea, vomiting, or hematemesis; No diarrhea or constipation. No melena or hematochezia.  GENITOURINARY: No dysuria, frequency, hematuria, or incontinence  NEUROLOGICAL: No headaches, memory loss, loss of strength, numbness, or tremors  SKIN: No itching, burning, rashes, or lesions   MSK: +Back and Shoulder pain   	    [x] All others negative	  [ ] Unable to obtain    PHYSICAL EXAM:  T(C): 36.6 (12-14-23 @ 08:32), Max: 37.3 (12-13-23 @ 16:23)  HR: 76 (12-14-23 @ 08:32) (68 - 79)  BP: 119/76 (12-14-23 @ 08:32) (113/73 - 124/79)  RR: 18 (12-14-23 @ 08:32) (16 - 18)  SpO2: 100% (12-14-23 @ 08:32) (79% - 100%)  Wt(kg): --  I&O's Summary    13 Dec 2023 07:01  -  14 Dec 2023 07:00  --------------------------------------------------------  IN: 0 mL / OUT: 1500 mL / NET: -1500 mL        Appearance: Elderly male 	  Psychiatry: A & O x 3, Mood & affect appropriate  HEENT:   Normal oral mucosa, PERRL, EOMI	  Lymphatic: No lymphadenopathy  Cardiovascular: Normal S1 S2,RRR, No JVD, No murmurs  Respiratory: Diminished at bases b/l   Gastrointestinal:  Soft, Non-tender, + BS	  Skin: No rashes, No ecchymoses, No cyanosis	  Neurologic: Non-focal  Extremities: Normal range of motion, No clubbing, cyanosis. +mild b/l le edema  Vascular: Peripheral pulses palpable 2+ bilaterally    TELEMETRY: 	    ECG:  Afib 80s 	  RADIOLOGY:  < from: CT Head No Cont (12.13.23 @ 15:12) >    IMPRESSION: No acute hemorrhage mass or mass effect.    --- End of Report ---    < end of copied text >    < from: Xray Shoulder 2 Views, Right (12.13.23 @ 12:50) >    IMPRESSION:  No fractures, dislocations, or AC separation.    Slightly hypertrophic AC joint arthrosis. Glenohumeral joint spacing   inadequately evaluated due to suboptimal positioning. Spinal degenerative   change with curvature apparent.    Mild posterior greater tuberosity enthesopathic change could correlate   with degree of underlying rotator cuff abnormality, MRI would be helpful   to further assess in this regard as warranted.    Generalized osteopenia otherwise no discrete lytic or blastic lesions.    --- End of Report ---    < end of copied text >    < from: Xray Chest 1 View- PORTABLE-Urgent (12.13.23 @ 12:00) >  FINDINGS:    The heart is normal in size.  There are hazy opacities at the lung bases.  There is no pneumothorax.  No acute bony abnormality.    IMPRESSION:  Bibasilar hazy opacities which may represent atelectasis or small   effusions    --- End of Report ---    < end of copied text >    < from: VA Duplex Lower Ext Vein Scan, Right (12.13.23 @ 12:52) >  IMPRESSION:  No evidence of right lower extremity deep venous thrombosis.            --- End of Report ---    < end of copied text >    < from: VA Duplex Lower Ext Vein Scan, Left (12.14.23 @ 10:08) >  IMPRESSION:  No evidence of left lower extremity deep venous thrombosis.            --- End of Report ---    < end of copied text >      OTHER: 	  	  LABS:	 	    CARDIAC MARKERS:  Troponin T, High Sensitivity Result: 35 ng/L (12-13 @ 13:48)  Troponin T, High Sensitivity Result: 35 ng/L (12-13 @ 11:23)                                  11.1   8.61  )-----------( 211      ( 14 Dec 2023 07:02 )             36.7     12-14    139  |  105  |  25<H>  ----------------------------<  93  4.0   |  25  |  0.94    Ca    9.0      14 Dec 2023 07:02  Phos  2.8     12-13  Mg     1.9     12-14    TPro  5.8<L>  /  Alb  2.9<L>  /  TBili  0.7  /  DBili  x   /  AST  12  /  ALT  9<L>  /  AlkPhos  139<H>  12-14    PT/INR - ( 14 Dec 2023 07:02 )   PT: 12.9 sec;   INR: 1.18 ratio         PTT - ( 14 Dec 2023 07:02 )  PTT:32.4 sec  proBNP:   Lipid Profile:   HgA1c:   TSH:

## 2023-12-14 NOTE — CONSULT NOTE ADULT - ASSESSMENT
Patient is a 83 year old male with PMH of Afib on xarelto, w/ hx of ortho issues, s/p L hip IMN (most recently s/p R hip IMN (Dr. Godinez 10/31/19), mild depression who presented with AMS, upper back shoulder and arm pain.    Altered mental status   Urinary retention s/p kiran placement   Rule out infectious source  - desaturated earlier this morning, improved with NC 2L  - afebrile, no leukocytosis since admission  - COVID/Flu/RSV negative   - CXR with b/l hazy opacities - atelectasis vs small effusions  - SLP following, diet adjusted to mild thick liquids, suspect may be aspirating   - noted with history of dysphagia at rehab  - Urology following for hematuria, suspected traumatic d/t kiran placement while on Eliquis  - UA with no pyuria   - b/l LE duplex with no DVT     Recommendations:  Follow urine culture, in process   Obtain CT chest without contrast given hypoxia, ordered   Follow up CT cervical and lumbar spine   Continue to monitor off antibiotics   Monitor temps/WBC-if spikes fever or any worsening - send Bcx x2 and start on empiric ceftriaxone   Aspiration precautions       Slim Jackson M.D.  [facility], Division of Infectious Diseases  [phone]  After 5pm on weekdays and all day on weekends - please call [phone] Patient is a 83 year old male with PMH of Afib on xarelto, w/ hx of ortho issues, s/p L hip IMN (most recently s/p R hip IMN (Dr. Godinez 10/31/19), mild depression who presented with AMS, upper back shoulder and arm pain.    Altered mental status   Urinary retention s/p kiran placement   Rule out infectious source  - desaturated earlier this morning, improved with NC 2L  - afebrile, no leukocytosis since admission  - COVID/Flu/RSV negative   - CXR with b/l hazy opacities - atelectasis vs small effusions  - SLP following, diet adjusted to mild thick liquids, suspect may be aspirating   - noted with history of dysphagia at rehab  - Urology following for hematuria, suspected traumatic d/t kiran placement while on Eliquis  - UA with no pyuria   - b/l LE duplex with no DVT     Recommendations:  Follow urine culture, in process   Obtain CT chest without contrast given hypoxia, ordered   Follow up CT cervical and lumbar spine   Continue to monitor off antibiotics   Monitor temps/WBC-if spikes fever or any worsening - send Bcx x2 and start on empiric ceftriaxone   Aspiration precautions       Slim Jackson M.D.  [facility], Division of Infectious Diseases  [phone]  After 5pm on weekdays and all day on weekends - please call [phone]

## 2023-12-14 NOTE — PROGRESS NOTE ADULT - SUBJECTIVE AND OBJECTIVE BOX
SUBJECTIVE / OVERNIGHT EVENTS:    patient seen and examined  resting comfortably in bed    --------------------------------------------------------------------------------------------  LABS:                        11.1   8.61  )-----------( 211      ( 14 Dec 2023 07:02 )             36.7     12-14    139  |  105  |  25<H>  ----------------------------<  93  4.0   |  25  |  0.94    Ca    9.0      14 Dec 2023 07:02  Phos  2.8     12-13  Mg     1.9     12-14    TPro  5.8<L>  /  Alb  2.9<L>  /  TBili  0.7  /  DBili  x   /  AST  12  /  ALT  9<L>  /  AlkPhos  139<H>  12-14    PT/INR - ( 14 Dec 2023 07:02 )   PT: 12.9 sec;   INR: 1.18 ratio         PTT - ( 14 Dec 2023 07:02 )  PTT:32.4 sec  CAPILLARY BLOOD GLUCOSE            Urinalysis Basic - ( 14 Dec 2023 07:02 )    Color: x / Appearance: x / SG: x / pH: x  Gluc: 93 mg/dL / Ketone: x  / Bili: x / Urobili: x   Blood: x / Protein: x / Nitrite: x   Leuk Esterase: x / RBC: x / WBC x   Sq Epi: x / Non Sq Epi: x / Bacteria: x        RADIOLOGY & ADDITIONAL TESTS:    Imaging Personally Reviewed:  [x] YES  [ ] NO    Consultant(s) Notes Reviewed:  [x] YES  [ ] NO    MEDICATIONS  (STANDING):  ascorbic acid 500 milliGRAM(s) Oral daily  chlorhexidine 2% Cloths 1 Application(s) Topical daily  escitalopram 10 milliGRAM(s) Oral at bedtime  ferrous    sulfate 325 milliGRAM(s) Oral daily  tamsulosin 0.4 milliGRAM(s) Oral at bedtime    MEDICATIONS  (PRN):  acetaminophen     Tablet .. 650 milliGRAM(s) Oral every 6 hours PRN Temp greater or equal to 38C (100.4F), Mild Pain (1 - 3)  melatonin 3 milliGRAM(s) Oral at bedtime PRN Insomnia      Care Discussed with Consultants/Other Providers [x] YES  [ ] NO    Vital Signs Last 24 Hrs  T(C): 36.6 (14 Dec 2023 08:32), Max: 37.3 (13 Dec 2023 16:23)  T(F): 97.8 (14 Dec 2023 08:32), Max: 99.1 (13 Dec 2023 16:23)  HR: 76 (14 Dec 2023 08:32) (68 - 79)  BP: 119/76 (14 Dec 2023 08:32) (113/73 - 124/79)  BP(mean): --  RR: 18 (14 Dec 2023 08:32) (16 - 18)  SpO2: 100% (14 Dec 2023 08:32) (79% - 100%)    Parameters below as of 14 Dec 2023 08:32  Patient On (Oxygen Delivery Method): nasal cannula  O2 Flow (L/min): 2    I&O's Summary    13 Dec 2023 07:01  -  14 Dec 2023 07:00  --------------------------------------------------------  IN: 0 mL / OUT: 1500 mL / NET: -1500 mL    PHYSICAL EXAM:  GENERAL: NAD, well-developed, comfortable  HEAD:  Atraumatic, Normocephalic  EYES: EOMI, PERRLA, conjunctiva and sclera clear  NECK: Supple, No JVD  CHEST/LUNG: Clear to auscultation bilaterally; No wheeze  HEART: Regular rate and rhythm; No murmurs, rubs, or gallops  ABDOMEN: Soft, Nontender, Nondistended; Bowel sounds present  NEURO: AAOx2-3, forgetful, no focal weakness, 5/5 b/l extremity strength, b/l knee no arthritis, no effusion   EXTREMITIES:  2+ Peripheral Pulses, No clubbing, cyanosis, or edema  SKIN: No rashes or lesions  : Micki

## 2023-12-14 NOTE — CONSULT NOTE ADULT - NS ATTEND AMEND GEN_ALL_CORE FT
Pt seen and examined with ACP.  Assessment and plan reviewed and discussed.  Agree with above.      I spent  55 minutes face to face w/ this pt of which more than 50% of the time was spent counseling & coordinating care of this pt.

## 2023-12-14 NOTE — PHYSICAL THERAPY INITIAL EVALUATION ADULT - TRANSFER TRAINING, PT EVAL
GOAL: Pt will perform ALL transfers with MOD A x1, w/use of appropriate assistive device as needed, in 4 weeks.

## 2023-12-14 NOTE — SWALLOW BEDSIDE ASSESSMENT ADULT - COMMENTS
Acute encephalopathy - Pt AAOx3 but otherwise somewhat confused. Daughter states some of this has been ongoing, was placed on puree diet for dysphagia issues at rehab. Has decub now. Could be in setting of urinary retention and deconditioning, CTH negative, infectious work up negative so far.   Urology consulted for hematuria, likely from traumatic kiran placement in setting of Eliquis.   12/14 provider contact note - Pt said he felt SOB, O2 79%, placed on 2LNC    CT HEAD IMPRESSION: No acute hemorrhage mass or mass effect.  CXR IMPRESSION: Bibasilar hazy opacities which may represent atelectasis or small effusions    SWALLOW HISTORY: No reports in SCM or in PACS prior to this admission.

## 2023-12-14 NOTE — CONSULT NOTE ADULT - SUBJECTIVE AND OBJECTIVE BOX
HPI: "83M PMHx afib on xarelto, w/ hx of ortho issues, s/p L hip IMN (most recently s/p R hip IMN (Dr. Godinez 10/31/19), mild depression p/w AMS, upper back shoulder and arm pain. Admitted for pneumonia and human meta-pneumovirus in November for week at [facility] then discharged to rehab at [facility]. Initially was on oxygen at rehab. Rehab admission complicated by UTI as per daughter. Also endorsing new level 2 sacral decub. Daughter feels patient did not have sufficient walking with PT and that shoulder pain is related to excessive upper extremity PT. Also endorsing some swallowing issues that prompted patient to be placed on puree diet. Denies fevers, chills or SOB. Today, daughter states patient endorsed much worse R shoulder pain radiating down R arm. Also noticed worsening LE swelling. She was concerned for cardiac issue and brought him to [facility] for evaluation. Also notes patient told her he could not pee today. Possible constipation but daughter states he had large BM in ER. Denies any known falls or trauma. No known urinary history but patient did see urologist briefly in past. Daughter denies any significant cardiac history"    Urology initially consulted on  for kiran catheter placement after pt found to be in retention in ED. Catheterization difficult because of phimosis and inability to retract foreskin. Urology recalled now because gross hematuria noted in bag. Pt seen and examined. Reports he saw a urologist several years ago for a "lump" on penis, but had no intervention. Does not recall urologist. Denies gross hematuria in the past. Denies other acute gu issues. Takes Eliquis for AF.    PAST MEDICAL & SURGICAL HISTORY:  AF (atrial fibrillation)  Depression  Lesion of vocal cord  DVT, lower extremity  Hernia, inguinal, right  S/P hip replacement, right  Fracture of neck of left femur  Cataract, right eye    MEDICATIONS  (STANDING):  ascorbic acid 500 milliGRAM(s) Oral daily  escitalopram 10 milliGRAM(s) Oral at bedtime  ferrous    sulfate 325 milliGRAM(s) Oral daily  tamsulosin 0.4 milliGRAM(s) Oral at bedtime    MEDICATIONS  (PRN):  acetaminophen     Tablet .. 650 milliGRAM(s) Oral every 6 hours PRN Temp greater or equal to 38C (100.4F), Mild Pain (1 - 3)  melatonin 3 milliGRAM(s) Oral at bedtime PRN Insomnia      FAMILY HISTORY:      Allergies    No Known Allergies    Intolerances        SOCIAL HISTORY:    REVIEW OF SYSTEMS: Otherwise negative as stated in HPI    Physical Exam  Vital signs  T(C): 36.5 (23 @ 23:58), Max: 37.3 (23 @ 11:13)  HR: 79 (23 @ 23:58)  BP: 113/73 (23 @ 23:58)  SpO2: 97% (23 @ 23:58)  Wt(kg): --    Output      Gen: NAD, resting comfortably in bed  Pulm: No respiratory distress  Abd: S/ND/NT  : uncircumcised penis with phimosis. kiran in place with light pink urine draining in tubing and fruit punch colored urine in bag.     catheter irrigated with NS with immediate improvement in color to peach. no clots aspirated.       LABS:                          11.7   10.43 )-----------( 220      ( 13 Dec 2023 11:23 )             36.8       12-13    136  |  103  |  29<H>  ----------------------------<  104<H>  3.9   |  24  |  1.03    Ca    9.0      13 Dec 2023 11:23  Phos  2.8     12  Mg     1.9         TPro  6.1  /  Alb  3.1<L>  /  TBili  0.6  /  DBili  x   /  AST  13  /  ALT  9<L>  /  AlkPhos  156<H>  1213      Urinalysis Basic - ( 13 Dec 2023 17:42 )    Color: Yellow / Appearance: Clear / S.012 / pH: x  Gluc: x / Ketone: Negative mg/dL  / Bili: Negative / Urobili: 0.2 mg/dL   Blood: x / Protein: Negative mg/dL / Nitrite: Negative   Leuk Esterase: Negative / RBC: 14 /HPF / WBC 2 /HPF   Sq Epi: x / Non Sq Epi: 3 /HPF / Bacteria: Negative /HPF        Urine Cx: pending   HPI: "83M PMHx afib on xarelto, w/ hx of ortho issues, s/p L hip IMN (most recently s/p R hip IMN (Dr. Godinez 10/31/19), mild depression p/w AMS, upper back shoulder and arm pain. Admitted for pneumonia and human meta-pneumovirus in November for week at [facility] then discharged to rehab at [facility]. Initially was on oxygen at rehab. Rehab admission complicated by UTI as per daughter. Also endorsing new level 2 sacral decub. Daughter feels patient did not have sufficient walking with PT and that shoulder pain is related to excessive upper extremity PT. Also endorsing some swallowing issues that prompted patient to be placed on puree diet. Denies fevers, chills or SOB. Today, daughter states patient endorsed much worse R shoulder pain radiating down R arm. Also noticed worsening LE swelling. She was concerned for cardiac issue and brought him to [facility] for evaluation. Also notes patient told her he could not pee today. Possible constipation but daughter states he had large BM in ER. Denies any known falls or trauma. No known urinary history but patient did see urologist briefly in past. Daughter denies any significant cardiac history"    Urology initially consulted on  for kiran catheter placement after pt found to be in retention in ED. Catheterization difficult because of phimosis and inability to retract foreskin. Urology recalled now because gross hematuria noted in bag. Pt seen and examined. Reports he saw a urologist several years ago for a "lump" on penis, but had no intervention. Does not recall urologist. Denies gross hematuria in the past. Denies other acute gu issues. Takes Eliquis for AF.    PAST MEDICAL & SURGICAL HISTORY:  AF (atrial fibrillation)  Depression  Lesion of vocal cord  DVT, lower extremity  Hernia, inguinal, right  S/P hip replacement, right  Fracture of neck of left femur  Cataract, right eye    MEDICATIONS  (STANDING):  ascorbic acid 500 milliGRAM(s) Oral daily  escitalopram 10 milliGRAM(s) Oral at bedtime  ferrous    sulfate 325 milliGRAM(s) Oral daily  tamsulosin 0.4 milliGRAM(s) Oral at bedtime    MEDICATIONS  (PRN):  acetaminophen     Tablet .. 650 milliGRAM(s) Oral every 6 hours PRN Temp greater or equal to 38C (100.4F), Mild Pain (1 - 3)  melatonin 3 milliGRAM(s) Oral at bedtime PRN Insomnia      FAMILY HISTORY:      Allergies    No Known Allergies    Intolerances        SOCIAL HISTORY:    REVIEW OF SYSTEMS: Otherwise negative as stated in HPI    Physical Exam  Vital signs  T(C): 36.5 (23 @ 23:58), Max: 37.3 (23 @ 11:13)  HR: 79 (23 @ 23:58)  BP: 113/73 (23 @ 23:58)  SpO2: 97% (23 @ 23:58)  Wt(kg): --    Output      Gen: NAD, resting comfortably in bed  Pulm: No respiratory distress  Abd: S/ND/NT  : uncircumcised penis with phimosis. kiran in place with light pink urine draining in tubing and fruit punch colored urine in bag.     catheter irrigated with NS with immediate improvement in color to peach. no clots aspirated.       LABS:                          11.7   10.43 )-----------( 220      ( 13 Dec 2023 11:23 )             36.8       12-13    136  |  103  |  29<H>  ----------------------------<  104<H>  3.9   |  24  |  1.03    Ca    9.0      13 Dec 2023 11:23  Phos  2.8     12  Mg     1.9         TPro  6.1  /  Alb  3.1<L>  /  TBili  0.6  /  DBili  x   /  AST  13  /  ALT  9<L>  /  AlkPhos  156<H>  1213      Urinalysis Basic - ( 13 Dec 2023 17:42 )    Color: Yellow / Appearance: Clear / S.012 / pH: x  Gluc: x / Ketone: Negative mg/dL  / Bili: Negative / Urobili: 0.2 mg/dL   Blood: x / Protein: Negative mg/dL / Nitrite: Negative   Leuk Esterase: Negative / RBC: 14 /HPF / WBC 2 /HPF   Sq Epi: x / Non Sq Epi: 3 /HPF / Bacteria: Negative /HPF        Urine Cx: pending

## 2023-12-14 NOTE — SWALLOW BEDSIDE ASSESSMENT ADULT - SWALLOW EVAL: DIAGNOSIS
Pt is a 83M PMHx afib on xarelto, w/ hx of ortho issues, p/w acute encephalopathy, upper back shoulder and arm pain. As reported by patient's daughter, pt recently diagnosed with SCC. Pt p/w oropharyngeal dysphagia and hoarse/hypophonic vocal quality. Oral phase is inefficient for mastication of soft solids resulting in attempts to swallow bolus whole/unchewed. Suspect reduced bolus control resulting in premature spillover with less viscous liquids. Delayed oral transit time w/ all consistencies. Hyolaryngeal elevation is reduced and aggressive coughing observed with small controlled sips of thin liquids. No overt s/s aspiration observed w/ mildly thick liquids, puree, and minced-moist solids. FEES recommended to assess swallow function and assess laryngeal structures given recent diagnosis.

## 2023-12-14 NOTE — PHYSICAL THERAPY INITIAL EVALUATION ADULT - PERTINENT HX OF CURRENT PROBLEM, REHAB EVAL
83-year-old male with PMH A-fib on Xarelto, hx of ortho issues, s/p L hip IMN (most recently s/p R hip IMN (Dr. Godinez 10/31/19), mild depression; here for evaluation of AMS, worsening weakness, with difficulty walking for the last 2-3 days, right-sided leg pain and shoulder pain starting 5 days ago.  History partially obtained by daughter who is at bedside, states patient is coming in from rehab where he resides after having pneumonia in November.  Since being in the rehab he is becoming much weaker which is much more profound over the past 5 days, patient was c/o some chest discomfort day of admit.     CT Head: No acute hemorrhage mass or mass effect.  CXR: Bibasilar hazy opacities which may represent atelectasis or small effusions.  XR R Shoulder: No fractures, dislocations, or AC separation.Slightly hypertrophic AC joint arthrosis. Glenohumeral joint spacing inadequately evaluated due to suboptimal positioning. Spinal degenerative change with curvature apparent. Mild posterior greater tuberosity enthesopathic change could correlate with degree of underlying rotator cuff abnormality, MRI would be helpful to further assess in this regard as warranted. Generalized osteopenia otherwise no discrete lytic or blastic lesions.  XR R TIBIA/FIBULA: Intact partially visualized distal end of a lateral femoral contoured condylar buttress plate with fixation intercondylar screws. Underlying anatomic alignment maintained. No dislocations acute appearing fractures or knee joint effusion. Superior and inferior patellar and peripheral lateral tibiofemoral articular margin degenerative osteophytes otherwise relatively maintained knee joint spaces. Congruent ankle mortise with smooth intact talar dome. Preserved remaining visualized midfoot and hindfoot joint spaces and no joint margin erosions. Small calcaneal enthesophytes. Generalized osteopenia otherwise no discrete lytic or blastic lesions. 83-year-old male with PMH A-fib on Xarelto, hx of ortho issues, s/p L hip IMN (most recently s/p R hip IMN (Dr. Godinez 10/31/19), mild depression; here for evaluation of AMS, worsening weakness, with difficulty walking for the last 2-3 days, right-sided leg pain and shoulder pain starting 5 days ago.  History partially obtained by daughter who is at bedside, states patient is coming in from rehab where he resides after having pneumonia in November.  Since being in the rehab he is becoming much weaker which is much more profound over the past 5 days, patient was c/o some chest discomfort day of admit.     CT Head: No acute hemorrhage mass or mass effect.  CXR: Bibasilar hazy opacities which may represent atelectasis or small effusions.  XR R Shoulder: No fractures, dislocations, or AC separation. Slightly hypertrophic AC joint arthrosis. Glenohumeral joint spacing inadequately evaluated due to suboptimal positioning. Spinal degenerative change with curvature apparent. Mild posterior greater tuberosity enthesopathic change could correlate with degree of underlying rotator cuff abnormality, MRI would be helpful to further assess in this regard as warranted. Generalized osteopenia otherwise no discrete lytic or blastic lesions.  XR R TIBIA/FIBULA: Intact partially visualized distal end of a lateral femoral contoured condylar buttress plate with fixation intercondylar screws. Underlying anatomic alignment maintained. No dislocations acute appearing fractures or knee joint effusion. Superior and inferior patellar and peripheral lateral tibiofemoral articular margin degenerative osteophytes otherwise relatively maintained knee joint spaces. Congruent ankle mortise with smooth intact talar dome. Preserved remaining visualized midfoot and hindfoot joint spaces and no joint margin erosions. Small calcaneal enthesophytes. Generalized osteopenia otherwise no discrete lytic or blastic lesions.

## 2023-12-14 NOTE — CONSULT NOTE ADULT - ASSESSMENT
A/p  83M PMHx afib on xarelto, w/ hx of ortho issues, s/p L hip IMN (most recently s/p R hip IMN (Dr. Godinez 10/31/19), mild depression p/w AMS, upper back shoulder and arm pain.      #AMS  -CTH no acute findings  -F/u Ucx  -Infectious w/u per med, ID    #Right Sided HF  -Overload on exam with b/l le edema  -Venous dopplers negative for DVT  -Start 20mg iv lasix qd  -Old echo with normal LVEF, w R sided enlargement and mod-severe TR  -Repeat echo  -Wean supplemental O2 as able    #Afib  -rate controlled off meds  -Resume xarelto 20mg qd - per urology would resume on 12/15    #Back pain  -F/u CT scan  -Mgmt per med

## 2023-12-14 NOTE — PHYSICAL THERAPY INITIAL EVALUATION ADULT - ADDITIONAL COMMENTS
Pt admitted from rehab facility however lives with daughter and her family in private home with 2 entry stairs. Pt daughter reports pt was able to stand and take a couple of steps at rehab.

## 2023-12-14 NOTE — SWALLOW BEDSIDE ASSESSMENT ADULT - SLP PERTINENT HISTORY OF CURRENT PROBLEM
83M PMHx afib on xarelto, w/ hx of ortho issues, s/p L hip IMN (most recently s/p R hip IMN (Dr. Godinez 10/31/19), mild depression p/w acute encephalopathy, upper back shoulder and arm pain. Also endorsing some swallowing issues that prompted patient to be placed on puree diet at rehab.

## 2023-12-15 DIAGNOSIS — C32.0 MALIGNANT NEOPLASM OF GLOTTIS: ICD-10-CM

## 2023-12-15 LAB
ALBUMIN SERPL ELPH-MCNC: 2.5 G/DL — LOW (ref 3.3–5)
ALBUMIN SERPL ELPH-MCNC: 2.5 G/DL — LOW (ref 3.3–5)
ALP SERPL-CCNC: 113 U/L — SIGNIFICANT CHANGE UP (ref 40–120)
ALP SERPL-CCNC: 113 U/L — SIGNIFICANT CHANGE UP (ref 40–120)
ALT FLD-CCNC: 5 U/L — LOW (ref 10–45)
ALT FLD-CCNC: 5 U/L — LOW (ref 10–45)
ANION GAP SERPL CALC-SCNC: 11 MMOL/L — SIGNIFICANT CHANGE UP (ref 5–17)
ANION GAP SERPL CALC-SCNC: 11 MMOL/L — SIGNIFICANT CHANGE UP (ref 5–17)
AST SERPL-CCNC: 7 U/L — LOW (ref 10–40)
AST SERPL-CCNC: 7 U/L — LOW (ref 10–40)
BASOPHILS # BLD AUTO: 0.04 K/UL — SIGNIFICANT CHANGE UP (ref 0–0.2)
BASOPHILS # BLD AUTO: 0.04 K/UL — SIGNIFICANT CHANGE UP (ref 0–0.2)
BASOPHILS NFR BLD AUTO: 0.4 % — SIGNIFICANT CHANGE UP (ref 0–2)
BASOPHILS NFR BLD AUTO: 0.4 % — SIGNIFICANT CHANGE UP (ref 0–2)
BILIRUB SERPL-MCNC: 0.6 MG/DL — SIGNIFICANT CHANGE UP (ref 0.2–1.2)
BILIRUB SERPL-MCNC: 0.6 MG/DL — SIGNIFICANT CHANGE UP (ref 0.2–1.2)
BUN SERPL-MCNC: 19 MG/DL — SIGNIFICANT CHANGE UP (ref 7–23)
BUN SERPL-MCNC: 19 MG/DL — SIGNIFICANT CHANGE UP (ref 7–23)
CALCIUM SERPL-MCNC: 9 MG/DL — SIGNIFICANT CHANGE UP (ref 8.4–10.5)
CALCIUM SERPL-MCNC: 9 MG/DL — SIGNIFICANT CHANGE UP (ref 8.4–10.5)
CHLORIDE SERPL-SCNC: 105 MMOL/L — SIGNIFICANT CHANGE UP (ref 96–108)
CHLORIDE SERPL-SCNC: 105 MMOL/L — SIGNIFICANT CHANGE UP (ref 96–108)
CO2 SERPL-SCNC: 26 MMOL/L — SIGNIFICANT CHANGE UP (ref 22–31)
CO2 SERPL-SCNC: 26 MMOL/L — SIGNIFICANT CHANGE UP (ref 22–31)
CREAT SERPL-MCNC: 0.83 MG/DL — SIGNIFICANT CHANGE UP (ref 0.5–1.3)
CREAT SERPL-MCNC: 0.83 MG/DL — SIGNIFICANT CHANGE UP (ref 0.5–1.3)
CULTURE RESULTS: SIGNIFICANT CHANGE UP
CULTURE RESULTS: SIGNIFICANT CHANGE UP
EGFR: 87 ML/MIN/1.73M2 — SIGNIFICANT CHANGE UP
EGFR: 87 ML/MIN/1.73M2 — SIGNIFICANT CHANGE UP
EOSINOPHIL # BLD AUTO: 0.39 K/UL — SIGNIFICANT CHANGE UP (ref 0–0.5)
EOSINOPHIL # BLD AUTO: 0.39 K/UL — SIGNIFICANT CHANGE UP (ref 0–0.5)
EOSINOPHIL NFR BLD AUTO: 4.2 % — SIGNIFICANT CHANGE UP (ref 0–6)
EOSINOPHIL NFR BLD AUTO: 4.2 % — SIGNIFICANT CHANGE UP (ref 0–6)
GLUCOSE SERPL-MCNC: 97 MG/DL — SIGNIFICANT CHANGE UP (ref 70–99)
GLUCOSE SERPL-MCNC: 97 MG/DL — SIGNIFICANT CHANGE UP (ref 70–99)
HCT VFR BLD CALC: 31.5 % — LOW (ref 39–50)
HCT VFR BLD CALC: 31.5 % — LOW (ref 39–50)
HGB BLD-MCNC: 10.1 G/DL — LOW (ref 13–17)
HGB BLD-MCNC: 10.1 G/DL — LOW (ref 13–17)
IMM GRANULOCYTES NFR BLD AUTO: 1 % — HIGH (ref 0–0.9)
IMM GRANULOCYTES NFR BLD AUTO: 1 % — HIGH (ref 0–0.9)
LYMPHOCYTES # BLD AUTO: 1.72 K/UL — SIGNIFICANT CHANGE UP (ref 1–3.3)
LYMPHOCYTES # BLD AUTO: 1.72 K/UL — SIGNIFICANT CHANGE UP (ref 1–3.3)
LYMPHOCYTES # BLD AUTO: 18.7 % — SIGNIFICANT CHANGE UP (ref 13–44)
LYMPHOCYTES # BLD AUTO: 18.7 % — SIGNIFICANT CHANGE UP (ref 13–44)
MCHC RBC-ENTMCNC: 32.1 GM/DL — SIGNIFICANT CHANGE UP (ref 32–36)
MCHC RBC-ENTMCNC: 32.1 GM/DL — SIGNIFICANT CHANGE UP (ref 32–36)
MCHC RBC-ENTMCNC: 32.8 PG — SIGNIFICANT CHANGE UP (ref 27–34)
MCHC RBC-ENTMCNC: 32.8 PG — SIGNIFICANT CHANGE UP (ref 27–34)
MCV RBC AUTO: 102.3 FL — HIGH (ref 80–100)
MCV RBC AUTO: 102.3 FL — HIGH (ref 80–100)
MONOCYTES # BLD AUTO: 1.02 K/UL — HIGH (ref 0–0.9)
MONOCYTES # BLD AUTO: 1.02 K/UL — HIGH (ref 0–0.9)
MONOCYTES NFR BLD AUTO: 11.1 % — SIGNIFICANT CHANGE UP (ref 2–14)
MONOCYTES NFR BLD AUTO: 11.1 % — SIGNIFICANT CHANGE UP (ref 2–14)
NEUTROPHILS # BLD AUTO: 5.93 K/UL — SIGNIFICANT CHANGE UP (ref 1.8–7.4)
NEUTROPHILS # BLD AUTO: 5.93 K/UL — SIGNIFICANT CHANGE UP (ref 1.8–7.4)
NEUTROPHILS NFR BLD AUTO: 64.6 % — SIGNIFICANT CHANGE UP (ref 43–77)
NEUTROPHILS NFR BLD AUTO: 64.6 % — SIGNIFICANT CHANGE UP (ref 43–77)
NRBC # BLD: 0 /100 WBCS — SIGNIFICANT CHANGE UP (ref 0–0)
NRBC # BLD: 0 /100 WBCS — SIGNIFICANT CHANGE UP (ref 0–0)
PLATELET # BLD AUTO: 203 K/UL — SIGNIFICANT CHANGE UP (ref 150–400)
PLATELET # BLD AUTO: 203 K/UL — SIGNIFICANT CHANGE UP (ref 150–400)
POTASSIUM SERPL-MCNC: 3.6 MMOL/L — SIGNIFICANT CHANGE UP (ref 3.5–5.3)
POTASSIUM SERPL-MCNC: 3.6 MMOL/L — SIGNIFICANT CHANGE UP (ref 3.5–5.3)
POTASSIUM SERPL-SCNC: 3.6 MMOL/L — SIGNIFICANT CHANGE UP (ref 3.5–5.3)
POTASSIUM SERPL-SCNC: 3.6 MMOL/L — SIGNIFICANT CHANGE UP (ref 3.5–5.3)
PROT SERPL-MCNC: 5.2 G/DL — LOW (ref 6–8.3)
PROT SERPL-MCNC: 5.2 G/DL — LOW (ref 6–8.3)
RBC # BLD: 3.08 M/UL — LOW (ref 4.2–5.8)
RBC # BLD: 3.08 M/UL — LOW (ref 4.2–5.8)
RBC # FLD: 15.3 % — HIGH (ref 10.3–14.5)
RBC # FLD: 15.3 % — HIGH (ref 10.3–14.5)
SODIUM SERPL-SCNC: 142 MMOL/L — SIGNIFICANT CHANGE UP (ref 135–145)
SODIUM SERPL-SCNC: 142 MMOL/L — SIGNIFICANT CHANGE UP (ref 135–145)
SPECIMEN SOURCE: SIGNIFICANT CHANGE UP
SPECIMEN SOURCE: SIGNIFICANT CHANGE UP
WBC # BLD: 9.19 K/UL — SIGNIFICANT CHANGE UP (ref 3.8–10.5)
WBC # BLD: 9.19 K/UL — SIGNIFICANT CHANGE UP (ref 3.8–10.5)
WBC # FLD AUTO: 9.19 K/UL — SIGNIFICANT CHANGE UP (ref 3.8–10.5)
WBC # FLD AUTO: 9.19 K/UL — SIGNIFICANT CHANGE UP (ref 3.8–10.5)

## 2023-12-15 PROCEDURE — 31575 DIAGNOSTIC LARYNGOSCOPY: CPT

## 2023-12-15 PROCEDURE — 99222 1ST HOSP IP/OBS MODERATE 55: CPT

## 2023-12-15 PROCEDURE — 99223 1ST HOSP IP/OBS HIGH 75: CPT | Mod: FS,25

## 2023-12-15 RX ORDER — RIVAROXABAN 15 MG-20MG
20 KIT ORAL
Refills: 0 | Status: DISCONTINUED | OUTPATIENT
Start: 2023-12-15 | End: 2023-12-27

## 2023-12-15 RX ORDER — CEFTRIAXONE 500 MG/1
1000 INJECTION, POWDER, FOR SOLUTION INTRAMUSCULAR; INTRAVENOUS EVERY 24 HOURS
Refills: 0 | Status: COMPLETED | OUTPATIENT
Start: 2023-12-15 | End: 2023-12-19

## 2023-12-15 RX ADMIN — Medication 325 MILLIGRAM(S): at 12:16

## 2023-12-15 RX ADMIN — ESCITALOPRAM OXALATE 10 MILLIGRAM(S): 10 TABLET, FILM COATED ORAL at 21:31

## 2023-12-15 RX ADMIN — Medication 500 MILLIGRAM(S): at 12:16

## 2023-12-15 RX ADMIN — CEFTRIAXONE 100 MILLIGRAM(S): 500 INJECTION, POWDER, FOR SOLUTION INTRAMUSCULAR; INTRAVENOUS at 12:16

## 2023-12-15 RX ADMIN — TAMSULOSIN HYDROCHLORIDE 0.4 MILLIGRAM(S): 0.4 CAPSULE ORAL at 21:30

## 2023-12-15 RX ADMIN — RIVAROXABAN 20 MILLIGRAM(S): KIT at 17:12

## 2023-12-15 RX ADMIN — Medication 20 MILLIGRAM(S): at 05:09

## 2023-12-15 RX ADMIN — CHLORHEXIDINE GLUCONATE 1 APPLICATION(S): 213 SOLUTION TOPICAL at 12:22

## 2023-12-15 NOTE — DIETITIAN INITIAL EVALUATION ADULT - PERTINENT LABORATORY DATA
12-15    142  |  105  |  19  ----------------------------<  97  3.6   |  26  |  0.83    Ca    9.0      15 Dec 2023 07:07  Mg     1.9     12-14    TPro  5.2<L>  /  Alb  2.5<L>  /  TBili  0.6  /  DBili  x   /  AST  7<L>  /  ALT  5<L>  /  AlkPhos  113  12-15

## 2023-12-15 NOTE — DIETITIAN INITIAL EVALUATION ADULT - SIGNS/SYMPTOMS
right heel DTI Stage 2 sacrum pressure injury Moderate muscle wasting and fat loss per Nutrition Focused Physical Exam.

## 2023-12-15 NOTE — PROGRESS NOTE ADULT - SUBJECTIVE AND OBJECTIVE BOX
SUBJECTIVE / OVERNIGHT EVENTS:          --------------------------------------------------------------------------------------------  LABS:                        10.1   9.19  )-----------( 203      ( 15 Dec 2023 07:08 )             31.5     12-15    142  |  105  |  19  ----------------------------<  97  3.6   |  26  |  0.83    Ca    9.0      15 Dec 2023 07:07  Mg     1.9     12-14    TPro  5.2<L>  /  Alb  2.5<L>  /  TBili  0.6  /  DBili  x   /  AST  7<L>  /  ALT  5<L>  /  AlkPhos  113  12-15    PT/INR - ( 14 Dec 2023 07:02 )   PT: 12.9 sec;   INR: 1.18 ratio         PTT - ( 14 Dec 2023 07:02 )  PTT:32.4 sec  CAPILLARY BLOOD GLUCOSE            Urinalysis Basic - ( 15 Dec 2023 07:07 )    Color: x / Appearance: x / SG: x / pH: x  Gluc: 97 mg/dL / Ketone: x  / Bili: x / Urobili: x   Blood: x / Protein: x / Nitrite: x   Leuk Esterase: x / RBC: x / WBC x   Sq Epi: x / Non Sq Epi: x / Bacteria: x        RADIOLOGY & ADDITIONAL TESTS:    Imaging Personally Reviewed:  [x] YES  [ ] NO    Consultant(s) Notes Reviewed:  [x] YES  [ ] NO    MEDICATIONS  (STANDING):  ascorbic acid 500 milliGRAM(s) Oral daily  cefTRIAXone   IVPB 1000 milliGRAM(s) IV Intermittent every 24 hours  chlorhexidine 2% Cloths 1 Application(s) Topical daily  escitalopram 10 milliGRAM(s) Oral at bedtime  ferrous    sulfate 325 milliGRAM(s) Oral daily  furosemide   Injectable 20 milliGRAM(s) IV Push daily  rivaroxaban 20 milliGRAM(s) Oral with dinner  tamsulosin 0.4 milliGRAM(s) Oral at bedtime    MEDICATIONS  (PRN):  acetaminophen     Tablet .. 650 milliGRAM(s) Oral every 6 hours PRN Temp greater or equal to 38C (100.4F), Mild Pain (1 - 3)  melatonin 3 milliGRAM(s) Oral at bedtime PRN Insomnia      Care Discussed with Consultants/Other Providers [x] YES  [ ] NO    Vital Signs Last 24 Hrs  T(C): 37 (15 Dec 2023 09:44), Max: 37 (15 Dec 2023 09:44)  T(F): 98.6 (15 Dec 2023 09:44), Max: 98.6 (15 Dec 2023 09:44)  HR: 71 (15 Dec 2023 09:44) (59 - 83)  BP: 103/66 (15 Dec 2023 09:44) (103/66 - 120/74)  BP(mean): --  RR: 18 (15 Dec 2023 09:44) (18 - 18)  SpO2: 100% (15 Dec 2023 09:44) (92% - 100%)    Parameters below as of 15 Dec 2023 09:44  Patient On (Oxygen Delivery Method): nasal cannula  O2 Flow (L/min): 2    I&O's Summary    14 Dec 2023 07:01  -  15 Dec 2023 07:00  --------------------------------------------------------  IN: 240 mL / OUT: 3300 mL / NET: -3060 mL    PHYSICAL EXAM:  GENERAL: NAD, well-developed, comfortable  HEAD:  Atraumatic, Normocephalic  EYES: EOMI, PERRLA, conjunctiva and sclera clear  NECK: Supple, No JVD  CHEST/LUNG: Clear to auscultation bilaterally; No wheeze  HEART: Regular rate and rhythm; No murmurs, rubs, or gallops  ABDOMEN: Soft, Nontender, Nondistended; Bowel sounds present  NEURO: AAOx2-3, forgetful, no focal weakness, 5/5 b/l extremity strength, b/l knee no arthritis, no effusion   EXTREMITIES:  2+ Peripheral Pulses, No clubbing, cyanosis, or edema  SKIN: No rashes or lesions  : Sweet

## 2023-12-15 NOTE — DIETITIAN INITIAL EVALUATION ADULT - OTHER CALCULATIONS
Defer fluid needs to team  Estimations based on IBW 178lb/80.7kg with consideration for DTI. Defer fluid needs to team  Estimations based on [id number] pounds/86.1kg with consideration pressure injury Defer fluid needs to team  Estimations based on [id number] pounds/86.1kg with consideration pressure injury

## 2023-12-15 NOTE — DIETITIAN INITIAL EVALUATION ADULT - NS FNS DIET ORDER
Diet, Minced and Moist:   Mildly Thick Liquids (MILDTHICKLIQS) (12-14-23 @ 10:58) [Active]             Diet, Minced and Moist:   Mildly Thick Liquids (MILDTHICKLIQS) (12-14-23 @ 10:58) [Active]

## 2023-12-15 NOTE — DIETITIAN INITIAL EVALUATION ADULT - OTHER INFO
Weights:  - Source: Previous RD documentation  - UBW: 200 pounds   - Reported weight changes: Unknown    Current Admission Weights:  - Dosing weight: 89.4 kg/ 197.1 pounds (12/13/23)  - Daily weight: 98.6 kg/ 217.4 pounds  (12/13/23)       Weight History per [facility]:  96.1 kg/ 211.9 pounds (11/9/23); 95.3 kg/ 210.1 pounds (12/17/22)    Weight Change:  - Pt  with no apparently weight changes X 1 month and 1 year.   - Weight fluctuations in setting of fluid shifts (edema, diuresis). Will continue to monitor weight trends as available/able.     IBW:  190 pounds   %IBW: 114% Weights:  - Source: Previous RD documentation  - UBW: 200 pounds   - Reported weight changes: Unknown    Current Admission Weights:  - Dosing weight: 89.4 kg/ 197.1 pounds (12/13/23)  - Daily weight: 98.6 kg/ 217.4 pounds  (12/13/23)       Weight History per [facility]:  96.1 kg/ 211.9 pounds (11/9/23); 95.3 kg/ 210.1 pounds (12/17/22)    Weight Change:  - Pt  with no apparently weight changes X 1 month and 1 year.   - Weight fluctuations in setting of fluid shifts (edema, diuresis). Will continue to monitor weight trends as available/able.     IBW:  190 pounds   %IBW: 114%

## 2023-12-15 NOTE — DIETITIAN INITIAL EVALUATION ADULT - NSFNSNUTRCHEWSWALLOWFT_GEN_A_CORE
As per SLP documentation on 12/14/23, pt was assessed by SLP with recommendations for Minced-moist/mildly thick liquids.

## 2023-12-15 NOTE — CONSULT NOTE ADULT - NS ATTEND AMEND GEN_ALL_CORE FT
ENT consulted for vocal fold lesion    Patient is 83M PMHx afib on xarelto, w/ hx of ortho issues, s/p L hip IMN (most recently s/p R hip IMN (Dr. Godinez 10/31/19), mild depression p/w acute encephalopathy, upper back shoulder and arm pain.     Speech pending FEES on monday 2/2 pt endorsing having dysphagia at rehab and being placed on pureed diet. Pt states coughing after po liquids and solids in rehab.     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 Sesay.     Indirect laryngoscopy shows Right lesion on posterior region of vocal folds. Bilateral vocal folds mobile and intact with good closure, Airway patent.    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 Sesay 4wks after start of treatment   no further ENT intervention at this time   speech to continue with FEES monday ENT consulted for vocal fold lesion    Patient is 83M PMHx afib on xarelto, w/ hx of ortho issues, s/p L hip IMN (most recently s/p R hip IMN (Dr. Godinez 10/31/19), mild depression p/w acute encephalopathy, upper back shoulder and arm pain.     Speech pending FEES on monday 2/2 pt endorsing having dysphagia at rehab and being placed on pureed diet. Pt states coughing after po liquids and solids in rehab.     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 Sesay.     Indirect laryngoscopy shows Right lesion on posterior region of vocal folds. Bilateral vocal folds mobile and intact with good closure, Airway patent.    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 Sesay 4wks after start of treatment   -no further ENT intervention at this time   -speech to continue with FEES monday

## 2023-12-15 NOTE — DIETITIAN INITIAL EVALUATION ADULT - PROBLEM SELECTOR PLAN 4
Multiple areas of upper extremity pain including R shoulder, between shoulder blades and R arm for many days as per daughter. Thought to be 2/2 PT of upper extremities. Also now having LE weakness amongst other issues. Will perform other imaging/ work up. Daughter denies trauma or falls  -CT C-spine and L-spine given urinary retention and reported LE weakness. Overall low suspicion for acute issue.  -Tylenol PRN pain control  -Falls precautions and PT consult  -Plain films without fracture, showing mostly chronic degenerative issues and bone spurs. R shoulder x-ray possibly concerning for rotator cuff injury. If pain not improving would consider ortho consult.

## 2023-12-15 NOTE — PROGRESS NOTE ADULT - ASSESSMENT
A/p  83M PMHx afib on xarelto, w/ hx of ortho issues, s/p L hip IMN (most recently s/p R hip IMN (Dr. Godinez 10/31/19), mild depression p/w AMS, upper back shoulder and arm pain.      #AMS  -CTH no acute findings  -F/u Ucx  -Infectious w/u per med, ID    #Right Sided HF  -Overload on exam with b/l le edema  -improved  -Venous dopplers negative for DVT  -cont  20mg iv lasix qd for now   -Old echo with normal LVEF, w R sided enlargement and mod-severe TR  -Repeat echo  -Wean supplemental O2 as able    #Afib  -rate controlled off meds  -cont xarelto 20mg qd     #Back pain  -F/u CT scan  -Mgmt per med      35 minutes spent on total encounter; more than 50% of the visit was spent counseling and/or coordinating care by the attending physician.

## 2023-12-15 NOTE — DIETITIAN INITIAL EVALUATION ADULT - NAME AND PHONE
Corinne Lima, Dietetic Intern, Pager #: [phone]  Corinne Lima, Dietetic Intern, Pager #: [phone]  Jayla Mcdowell, MS,RDN,CDN AVAILABLE ON TEAMS

## 2023-12-15 NOTE — DIETITIAN INITIAL EVALUATION ADULT - PERSON TAUGHT/METHOD
Discussed importance of protein-energy intake to aid in DTI healing; encouraged PO intake of meals and oral nutrition supplements; patient preferences taken and to be honored by RD as able; patient made aware RD to remain available./verbal instruction/patient instructed

## 2023-12-15 NOTE — DIETITIAN INITIAL EVALUATION ADULT - ETIOLOGY
increased physiological demand for increased protein and energy Decreased ability to consume sufficient energy in the setting of increased nutrient needs

## 2023-12-15 NOTE — PROGRESS NOTE ADULT - SUBJECTIVE AND OBJECTIVE BOX
Subjective  Patient seen and examined. No acute complaints.     Objective    Vital signs  T(F): , Max: 98.4 (12-14-23 @ 16:57)  HR: 59 (12-15-23 @ 05:08)  BP: 105/61 (12-15-23 @ 05:08)  SpO2: 99% (12-15-23 @ 05:08)  Wt(kg): --    Output     OUT:    Indwelling Catheter - Urethral (mL): 3300 mL  Total OUT: 3300 mL    Total NET: -3300 mL          Gen: NAD  Abd: soft, nontender, nondistended  : kiran secured in place, draining concentrated yellow urine    Labs      12-15 @ 07:08    WBC 9.19  / Hct 31.5  / SCr --       12-15 @ 07:07    WBC --    / Hct --    / SCr 0.83         Culture - Urine (collected 12-13-23 @ 17:42)  Source: Catheterized Catheterized  Final Report (12-15-23 @ 06:27):    <10,000 CFU/mL Normal Urogenital Marla

## 2023-12-15 NOTE — CONSULT NOTE ADULT - PROBLEM SELECTOR RECOMMENDATION 9
Pt has apt with Dr Chow and is pending follow up with Dr. Sesay 4wks after start of treatment   no further ENT intervention at this time   speech to continue with FEES monday

## 2023-12-15 NOTE — PROGRESS NOTE ADULT - ASSESSMENT
Patient is a 83 year old male with PMH of Afib on xarelto, w/ hx of ortho issues, s/p L hip IMN (most recently s/p R hip IMN (Dr. Godinez 10/31/19), mild depression who presented with AMS, upper back shoulder and arm pain.    Altered mental status with hypoxia - c/f pneumonia   possible aspiration, known h/o dysphagia   Urinary retention s/p kiran placement   - afebrile, no leukocytosis since admission  - 12/14 desaturated, improved with NC 2L  - COVID/Flu/RSV negative   - CXR with b/l hazy opacities - atelectasis vs small effusions  - CT chest with ill defined airway with subpleural ggo in upper lobes - suspected infectious   - SLP following, diet adjusted to mild thick liquids, suspect may be aspirating   - Urology following for hematuria, suspected traumatic d/t kiran placement while on Eliquis  - UA with no pyuria, Ucx negative   - b/l LE duplex with no DVT     Recommendations:  Follow up CT cervical and lumbar spine reports    Started on ceftriaxone 1g IV Q24h - complete total 5d course   Monitor temps/WBC  Aspiration precautions     D/w RN  Slim Jackson M.D.  [facility], Division of Infectious Diseases  [phone]  After 5pm on weekdays and all day on weekends - please call [phone] Patient is a 83 year old male with PMH of Afib on xarelto, w/ hx of ortho issues, s/p L hip IMN (most recently s/p R hip IMN (Dr. Godinez 10/31/19), mild depression who presented with AMS, upper back shoulder and arm pain.    Altered mental status with hypoxia - c/f pneumonia   possible aspiration, known h/o dysphagia   Urinary retention s/p kiran placement   - afebrile, no leukocytosis since admission  - 12/14 desaturated, improved with NC 2L  - COVID/Flu/RSV negative   - CXR with b/l hazy opacities - atelectasis vs small effusions  - CT chest with ill defined airway with subpleural ggo in upper lobes - suspected infectious   - SLP following, diet adjusted to mild thick liquids, suspect may be aspirating   - Urology following for hematuria, suspected traumatic d/t kiran placement while on Eliquis  - UA with no pyuria, Ucx negative   - b/l LE duplex with no DVT     Recommendations:  Follow up CT cervical and lumbar spine reports    Started on ceftriaxone 1g IV Q24h - complete total 5d course   Monitor temps/WBC  Aspiration precautions     D/w RN  Slim Jackson M.D.  [facility], Division of Infectious Diseases  [phone]  After 5pm on weekdays and all day on weekends - please call [phone]

## 2023-12-15 NOTE — DIETITIAN INITIAL EVALUATION ADULT - ORAL INTAKE PTA/DIET HISTORY
As per Pt's private aide:  Reports good, normal appetite and PO intake PTA;  drinks a lot of water, eats prunes and dates for bowel movements. Denies difficulty chewing/swallowing. Denies nausea, vomiting, diarrhea, or constipation.

## 2023-12-15 NOTE — DIETITIAN INITIAL EVALUATION ADULT - REASON INDICATOR FOR ASSESSMENT
Consult for Stage 2 Pressure Injury or greater  Information obtained from: Review of pt's current medical record, interview with pt's private aid at pt's bedside on 8MONTI, previous RD documentation from admission to [facility] in December 2022.  Consult for Stage 2 Pressure Injury or greater  Information obtained from: Review of pt's current medical record, interview with pt's private aid at pt's bedside on 8MONTI, previous RD documentation from admission to [facility] in December 2022.

## 2023-12-15 NOTE — CONSULT NOTE ADULT - ASSESSMENT
83M PMHx afib on xarelto, w/ hx of ortho issues, s/p L hip IMN (most recently s/p R hip IMN (Dr. Godinez 10/31/19), mild depression p/w acute encephalopathy, upper back shoulder and arm pain. Speech pending FEES on monday 2/2 pt endorsing having dysphagia at rehab and being placed on pureed diet. Pt states coughing after po liquids and solids in rehab. 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 Sesay. indirect laryngoscopy- reveals smaller than a pea sized lesion on right vocal cord, however mobile and intact with good closure, airway patent.    Patient is 83M PMHx afib on xarelto, w/ hx of ortho issues, s/p L hip IMN (most recently s/p R hip IMN (Dr. Godinez 10/31/19), mild depression p/w acute encephalopathy, upper back shoulder and arm pain.     Speech pending FEES on monday 2/2 pt endorsing having dysphagia at rehab and being placed on pureed diet. Pt states coughing after po liquids and solids in rehab.     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 Sesay.     Indirect laryngoscopy shows Right lesion on posterior region of vocal folds. Bilateral vocal folds mobile and intact with good closure, Airway patent.

## 2023-12-15 NOTE — PROGRESS NOTE ADULT - NSPROGADDITIONALINFOA_GEN_ALL_CORE
I have personally seen and examined the patient.  I fully participated in the care of this patient in conjunction with NP.  I have made amendments to the documentation where necessary, and agree with the history, physical exam, and plan as documented by the NP.    Branden Valencia MD  [facility]  [phone]    #Acute metabolic encephalopathy 2/2 below  #Aspiration pneumonia?  #Urinary retention, hematuria - resolved   -monitor mental status; f/u swallow; c/w abx ; c/w kiran I have personally seen and examined the patient.  I fully participated in the care of this patient in conjunction with NP.  I have made amendments to the documentation where necessary, and agree with the history, physical exam, and plan as documented by the NP.    Branden Valencia MD  [facility]  [phone]    #Acute metabolic encephalopathy 2/2 below  #Aspiration pneumonia?  #Urinary retention, hematuria - resolved   -monitor mental status; f/u swallow; c/w abx ; c/w kiran

## 2023-12-15 NOTE — PROGRESS NOTE ADULT - ASSESSMENT
83M PMHx afib on xarelto, w/ hx of ortho issues, s/p L hip IMN (most recently s/p R hip IMN (Dr. Godinez 10/31/19), mild depression p/w acute encephalopathy, upper back shoulder and arm pain      # Acute encephalopathy.   - Pt AAOx3 but otherwise somewhat confused. Daughter states some of this has been ongoing, was placed on puree diet for dysphagia issues at rehab. Has decub now. Could be in setting of urinary retention and deconditioning  - CTH negative, infectious work up negative so far   - Speech/ swallow consult and dysphagia diet  - Falls precautions, aspiration precautions  - Abx mgmt per ID  - PT consult  - UCx NGTD  - Monitor temps/WBC  - ID following    # Hx of Vocal Cord Lesion:  - FEES Monday  - ENT consult pending    # Lower extremity edema:  - As per chart, daughter endorsing worse swelling in legs  - Elevated BNP  - TTE  - LE Duplex negative for DVT  - Cards following    # Urinary retention:  - Kiran placed in ED by Urology  - Strict I/Os  - Hematuria noted - likely from traumatic kiran placement  - Monitor urine color  - Monitor h/h  - Started on Flomax  - Urology following    # Afib:  - Home Xarelto resumed    # Anemia:  - Monitor CBC  - Transfuse PRN    # Back pain:   - Multiple areas of upper extremity pain including R shoulder, between shoulder blades and R arm for many days as per daughter. Thought to be 2/2 PT of upper extremities. Also now having LE weakness amongst other issues.   - Tibia/Fibula and Right shoulder x-ray negative for fx  - CT spine completed, fu results     # Depression:  - C/w home meds    # DVT ppx:  - IPCs  - Xarelto     [facility]  [phone]   83M PMHx afib on xarelto, w/ hx of ortho issues, s/p L hip IMN (most recently s/p R hip IMN (Dr. Godinez 10/31/19), mild depression p/w acute encephalopathy, upper back shoulder and arm pain      # Acute encephalopathy.   - Pt AAOx3 but otherwise somewhat confused. Daughter states some of this has been ongoing, was placed on puree diet for dysphagia issues at rehab. Has decub now. Could be in setting of urinary retention and deconditioning  - CTH negative, infectious work up negative so far   - Speech/ swallow consult and dysphagia diet  - Falls precautions, aspiration precautions  - Abx mgmt per ID  - PT consult  - UCx NGTD  - Monitor temps/WBC  - ID following    # Hx of Vocal Cord Lesion:  - FEES Monday  - ENT consult pending    # Lower extremity edema:  - As per chart, daughter endorsing worse swelling in legs  - Elevated BNP  - TTE  - LE Duplex negative for DVT  - Cards following    # Urinary retention:  - Kiran placed in ED by Urology  - Strict I/Os  - Hematuria noted - likely from traumatic kiran placement  - Monitor urine color  - Monitor h/h  - Started on Flomax  - Urology following    # Afib:  - Home Xarelto resumed    # Anemia:  - Monitor CBC  - Transfuse PRN    # Back pain:   - Multiple areas of upper extremity pain including R shoulder, between shoulder blades and R arm for many days as per daughter. Thought to be 2/2 PT of upper extremities. Also now having LE weakness amongst other issues.   - Tibia/Fibula and Right shoulder x-ray negative for fx  - CT spine completed, fu results     # Depression:  - C/w home meds    # DVT ppx:  - IPCs  - Xarelto     [facility]  [phone]

## 2023-12-15 NOTE — PROGRESS NOTE ADULT - SUBJECTIVE AND OBJECTIVE BOX
CARDIOLOGY FOLLOW UP NOTE - DR. ALVES    Patient Name: MARY BO    Date of Service: 12-15-23 @ 16:45    Patient seen and examined  less dyspnea      Subjective:    cv: denies chest pain, dyspnea, palpitations, dizziness  pulmonary: denies cough  GI: denies abdominal pain, nausea, vomiting  vascular/legs: less edema   skin: no rash  ROS: otherwise negative   overnight events:      PHYSICAL EXAM:  T(C): 36.7 (12-15-23 @ 15:56), Max: 37 (12-15-23 @ 09:44)  HR: 64 (12-15-23 @ 15:56) (59 - 83)  BP: 104/64 (12-15-23 @ 15:56) (103/66 - 120/74)  RR: 18 (12-15-23 @ 15:56) (18 - 18)  SpO2: 99% (12-15-23 @ 15:56) (92% - 100%)  Wt(kg): --  I&O's Summary    14 Dec 2023 07:01  -  15 Dec 2023 07:00  --------------------------------------------------------  IN: 240 mL / OUT: 3300 mL / NET: -3060 mL    15 Dec 2023 07:01  -  15 Dec 2023 16:45  --------------------------------------------------------  IN: 50 mL / OUT: 1400 mL / NET: -1350 mL      Daily     Daily     Appearance: Normal	  Cardiovascular: Normal S1 S2,RRR, No JVD, No murmurs  Respiratory: Lungs clear to auscultation	  Gastrointestinal:  Soft, Non-tender, + BS	  Extremities: Normal range of motion, less edema       Home Medications:  ascorbic acid 500 mg oral tablet: 1 tab(s) orally once a day (13 Dec 2023 16:41)  cholecalciferol 25 mcg (1000 intl units) oral tablet: 1 tab(s) orally once a day (13 Dec 2023 16:41)  DuoNeb 0.5 mg-2.5 mg/3 mL inhalation solution: 3 milliliter(s) by nebulizer every 6 hours as needed (13 Dec 2023 16:41)  ferrous sulfate 325 mg (65 mg elemental iron) oral tablet: 1 tab(s) orally once a day (13 Dec 2023 16:41)  Lexapro 10 mg oral tablet: 1 tab(s) orally once a day (in the evening) (13 Dec 2023 16:41)  Multiple Vitamins oral tablet: 1 tab(s) orally once a day (13 Dec 2023 16:41)  Xarelto 20 mg oral tablet: 1 tab(s) orally once a day (in the evening) (13 Dec 2023 16:41)      MEDICATIONS  (STANDING):  ascorbic acid 500 milliGRAM(s) Oral daily  cefTRIAXone   IVPB 1000 milliGRAM(s) IV Intermittent every 24 hours  chlorhexidine 2% Cloths 1 Application(s) Topical daily  escitalopram 10 milliGRAM(s) Oral at bedtime  ferrous    sulfate 325 milliGRAM(s) Oral daily  furosemide   Injectable 20 milliGRAM(s) IV Push daily  rivaroxaban 20 milliGRAM(s) Oral with dinner  tamsulosin 0.4 milliGRAM(s) Oral at bedtime      TELEMETRY: 	    ECG:  	  RADIOLOGY:   DIAGNOSTIC TESTING:  [ ] Echocardiogram:  [ ] Catheterization:  [ ] Stress Test:    OTHER: 	    LABS:	 	    CARDIAC MARKERS:        Troponin T, High Sensitivity Result: 35 ng/L (12-13 @ 13:48)  Troponin T, High Sensitivity Result: 35 ng/L (12-13 @ 11:23)                                10.1   9.19  )-----------( 203      ( 15 Dec 2023 07:08 )             31.5     12-15    142  |  105  |  19  ----------------------------<  97  3.6   |  26  |  0.83    Ca    9.0      15 Dec 2023 07:07  Mg     1.9     12-14    TPro  5.2<L>  /  Alb  2.5<L>  /  TBili  0.6  /  DBili  x   /  AST  7<L>  /  ALT  5<L>  /  AlkPhos  113  12-15    proBNP:   PT/INR - ( 14 Dec 2023 07:02 )   PT: 12.9 sec;   INR: 1.18 ratio         PTT - ( 14 Dec 2023 07:02 )  PTT:32.4 sec  Lipid Profile:   HgA1c:     Creatinine: 0.83 mg/dL (12-15-23 @ 07:07)  Creatinine: 0.94 mg/dL (12-14-23 @ 07:02)  Creatinine: 1.03 mg/dL (12-13-23 @ 11:23)

## 2023-12-15 NOTE — DIETITIAN INITIAL EVALUATION ADULT - REASON FOR ADMISSION
Chart Reviewed, Events Noted  "83M PMHx afib on xarelto, w/ hx of ortho issues, s/p L hip IMN (most recently s/p R hip IMN, mild depression p/w acute encephalopathy, upper back shoulder and arm pain."

## 2023-12-15 NOTE — DIETITIAN INITIAL EVALUATION ADULT - PERTINENT MEDS FT
MEDICATIONS  (STANDING):  ascorbic acid 500 milliGRAM(s) Oral daily  cefTRIAXone   IVPB 1000 milliGRAM(s) IV Intermittent every 24 hours  chlorhexidine 2% Cloths 1 Application(s) Topical daily  escitalopram 10 milliGRAM(s) Oral at bedtime  ferrous    sulfate 325 milliGRAM(s) Oral daily  furosemide   Injectable 20 milliGRAM(s) IV Push daily  rivaroxaban 20 milliGRAM(s) Oral with dinner  tamsulosin 0.4 milliGRAM(s) Oral at bedtime    MEDICATIONS  (PRN):  acetaminophen     Tablet .. 650 milliGRAM(s) Oral every 6 hours PRN Temp greater or equal to 38C (100.4F), Mild Pain (1 - 3)  melatonin 3 milliGRAM(s) Oral at bedtime PRN Insomnia       MEDICATIONS  (STANDING):  ascorbic acid 500 milliGRAM(s) Oral daily  cefTRIAXone   IVPB 1000 milliGRAM(s) IV Intermittent every 24 hours  chlorhexidine 2% Cloths 1 Application(s) Topical daily  escitalopram 10 milliGRAM(s) Oral at bedtime  ferrous    sulfate 325 milliGRAM(s) Oral daily  furosemide   Injectable 20 milliGRAM(s) IV Push daily  rivaroxaban 20 milliGRAM(s) Oral with dinner  tamsulosin 0.4 milliGRAM(s) Oral at bedtime    MEDICATIONS  (PRN):  acetaminophen     Tablet .. 650 milliGRAM(s) Oral every 6 hours PRN Temp greater or equal to 38C (100.4F), Mild Pain (1 - 3)  melatonin 3 milliGRAM(s) Oral at bedtime PRN Insomnia

## 2023-12-15 NOTE — PROGRESS NOTE ADULT - SUBJECTIVE AND OBJECTIVE BOX
[facility] DIVISION OF INFECTIOUS DISEASES  GIOVANY Ferguson Y. Patel, S. Shah, G. Delmer  [phone]  ([phone] - weekdays after 5pm and weekends)    Name: MARY BO  Age/Gender: 83y Male  MRN: [id number]    Interval History:  Patient seen and examined this morning.   Resting on NC, denies fever, pain, cough or dyspnea.   Notes reviewed. No concerning overnight events  Afebrile   Allergies: No Known Allergies      Objective:  Vitals:   T(F): 98.6 (12-15-23 @ 09:44), Max: 98.6 (12-15-23 @ 09:44)  HR: 71 (12-15-23 @ 09:44) (59 - 83)  BP: 103/66 (12-15-23 @ 09:44) (103/66 - 120/74)  RR: 18 (12-15-23 @ 09:44) (18 - 18)  SpO2: 100% (12-15-23 @ 09:44) (92% - 100%)  Physical Examination:  General: no acute distress, NC  HEENT: NC/AT, anicteric, neck supple  Respiratory: decreased breath sounds b/l  Cardiovascular: S1 and S2 present, normal rate   Gastrointestinal: soft, nontender, nondistended  Neuro: AAOx2-3, [place], answers/follows   Extremities: no edema, no cyanosis  Skin: no visible rash  Sweet: present with edemlira urine draining     Laboratory Studies:  CBC:                       10.1   9.19  )-----------( 203      ( 15 Dec 2023 07:08 )             31.5     WBC Trend:  9.19 12-15-23 @ 07:08  8.61 12-14-23 @ 07:02  10.43 12-13-23 @ 11:23    CMP: 12-15    142  |  105  |  19  ----------------------------<  97  3.6   |  26  |  0.83    Ca    9.0      15 Dec 2023 07:07  Phos  2.8     12-13  Mg     1.9     12-14    TPro  5.2<L>  /  Alb  2.5<L>  /  TBili  0.6  /  DBili  x   /  AST  7<L>  /  ALT  5<L>  /  AlkPhos  113  12-15    Creatinine: 0.83 mg/dL (12-15-23 @ 07:07)  Creatinine: 0.94 mg/dL (12-14-23 @ 07:02)  Creatinine: 1.03 mg/dL (12-13-23 @ 11:23)    LIVER FUNCTIONS - ( 15 Dec 2023 07:07 )  Alb: 2.5 g/dL / Pro: 5.2 g/dL / ALK PHOS: 113 U/L / ALT: 5 U/L / AST: 7 U/L / GGT: x           Microbiology: reviewed   Culture - Urine (collected 12-13-23 @ 17:42)  Source: Catheterized Catheterized  Final Report (12-15-23 @ 06:27):    <10,000 CFU/mL Normal Urogenital Marla    SARS-CoV-2 Result: NotDete (13 Dec 2023 16:25)    Radiology: reviewed   < from: CT Chest No Cont (12.14.23 @ 16:54) >  IMPRESSION:  Ill-defined airway associated subpleural groundglass opacities in the   upper lobes, likely infectious.  Right greater than left small pleural effusions.  cardiomegaly    < end of copied text >  < from: [facility] Duplex Lower Ext Vein Scan, Left (12.14.23 @ 10:08) >    IMPRESSION:  No evidence of left lower extremity deep venous thrombosis.    < end of copied text >    Medications:  acetaminophen     Tablet .. 650 milliGRAM(s) Oral every 6 hours PRN  ascorbic acid 500 milliGRAM(s) Oral daily  chlorhexidine 2% Cloths 1 Application(s) Topical daily  escitalopram 10 milliGRAM(s) Oral at bedtime  ferrous    sulfate 325 milliGRAM(s) Oral daily  furosemide   Injectable 20 milliGRAM(s) IV Push daily  melatonin 3 milliGRAM(s) Oral at bedtime PRN  rivaroxaban 20 milliGRAM(s) Oral with dinner  tamsulosin 0.4 milliGRAM(s) Oral at bedtime    Current Antimicrobials:    Prior/Completed Antimicrobials:   [facility] DIVISION OF INFECTIOUS DISEASES  GIOVANY Ferguson Y. Patel, S. Shah, G. Delmer  [phone]  ([phone] - weekdays after 5pm and weekends)    Name: MARY BO  Age/Gender: 83y Male  MRN: [id number]    Interval History:  Patient seen and examined this morning.   Resting on NC, denies fever, pain, cough or dyspnea.   Notes reviewed. No concerning overnight events  Afebrile   Allergies: No Known Allergies      Objective:  Vitals:   T(F): 98.6 (12-15-23 @ 09:44), Max: 98.6 (12-15-23 @ 09:44)  HR: 71 (12-15-23 @ 09:44) (59 - 83)  BP: 103/66 (12-15-23 @ 09:44) (103/66 - 120/74)  RR: 18 (12-15-23 @ 09:44) (18 - 18)  SpO2: 100% (12-15-23 @ 09:44) (92% - 100%)  Physical Examination:  General: no acute distress, NC  HEENT: NC/AT, anicteric, neck supple  Respiratory: decreased breath sounds b/l  Cardiovascular: S1 and S2 present, normal rate   Gastrointestinal: soft, nontender, nondistended  Neuro: AAOx2-3, [place], answers/follows   Extremities: no edema, no cyanosis  Skin: no visible rash  Sweet: present with edelmira urine draining     Laboratory Studies:  CBC:                       10.1   9.19  )-----------( 203      ( 15 Dec 2023 07:08 )             31.5     WBC Trend:  9.19 12-15-23 @ 07:08  8.61 12-14-23 @ 07:02  10.43 12-13-23 @ 11:23    CMP: 12-15    142  |  105  |  19  ----------------------------<  97  3.6   |  26  |  0.83    Ca    9.0      15 Dec 2023 07:07  Phos  2.8     12-13  Mg     1.9     12-14    TPro  5.2<L>  /  Alb  2.5<L>  /  TBili  0.6  /  DBili  x   /  AST  7<L>  /  ALT  5<L>  /  AlkPhos  113  12-15    Creatinine: 0.83 mg/dL (12-15-23 @ 07:07)  Creatinine: 0.94 mg/dL (12-14-23 @ 07:02)  Creatinine: 1.03 mg/dL (12-13-23 @ 11:23)    LIVER FUNCTIONS - ( 15 Dec 2023 07:07 )  Alb: 2.5 g/dL / Pro: 5.2 g/dL / ALK PHOS: 113 U/L / ALT: 5 U/L / AST: 7 U/L / GGT: x           Microbiology: reviewed   Culture - Urine (collected 12-13-23 @ 17:42)  Source: Catheterized Catheterized  Final Report (12-15-23 @ 06:27):    <10,000 CFU/mL Normal Urogenital Marla    SARS-CoV-2 Result: NotDete (13 Dec 2023 16:25)    Radiology: reviewed   < from: CT Chest No Cont (12.14.23 @ 16:54) >  IMPRESSION:  Ill-defined airway associated subpleural groundglass opacities in the   upper lobes, likely infectious.  Right greater than left small pleural effusions.  cardiomegaly    < end of copied text >  < from: [facility] Duplex Lower Ext Vein Scan, Left (12.14.23 @ 10:08) >    IMPRESSION:  No evidence of left lower extremity deep venous thrombosis.    < end of copied text >    Medications:  acetaminophen     Tablet .. 650 milliGRAM(s) Oral every 6 hours PRN  ascorbic acid 500 milliGRAM(s) Oral daily  chlorhexidine 2% Cloths 1 Application(s) Topical daily  escitalopram 10 milliGRAM(s) Oral at bedtime  ferrous    sulfate 325 milliGRAM(s) Oral daily  furosemide   Injectable 20 milliGRAM(s) IV Push daily  melatonin 3 milliGRAM(s) Oral at bedtime PRN  rivaroxaban 20 milliGRAM(s) Oral with dinner  tamsulosin 0.4 milliGRAM(s) Oral at bedtime    Current Antimicrobials:    Prior/Completed Antimicrobials:

## 2023-12-15 NOTE — DIETITIAN INITIAL EVALUATION ADULT - ENERGY INTAKE
Patient reports good, normal appetite and PO intake currently; patient preferences taken and to be honored by RD as able. Fair (50-75%)

## 2023-12-15 NOTE — DIETITIAN INITIAL EVALUATION ADULT - ADD RECOMMEND
1) Continue current diet as ordered: regular, adjust texture as tolerated/per patient preference per MD/SLP discretion.  2) Recommend adding Ensure Plus High Protein x 1/day (provides 350 kcal, 20 g protein) to assist in meeting nutritional needs.  3) Recommend adding multivitamin and vitamin C pending no medical contraindications to promote wound healing.  4) Will continue to monitor PO intake, diet, weight, labs, skin, GI symptoms, and BM regularity. 1) Continue no therapeutic dietary restrictions. Defer diet/texture advancement to medical team/SLP as indicated   2) Recommend adding Ensure Plus High Protein 2x/day to assist in meeting nutritional needs.  3) Continue daily ascorbic acid  500 mg; Recommend adding multivitamin 1x/Day pending no medical contraindications to promote wound healing.  4) Monitor PO intake, GI tolerance, skin integrity and labs. RD remains available if needed.  5) Malnutrition Sticker placed in pt. chart

## 2023-12-15 NOTE — DIETITIAN INITIAL EVALUATION ADULT - PROBLEM SELECTOR PLAN 3
Daughter denies known urologic hx, pt found to be retaining in ER. Very difficult kiran, required urology assistance.  -Cont. kiran catheter  -Strict I/Os  -Note significant hematuria in kiran, consulted urology regarding urinary retention and hematuria, discussed case. CBI? f/u recommendations  -Will start flomax, discussed risks and benefits with daughter

## 2023-12-15 NOTE — CONSULT NOTE ADULT - SUBJECTIVE AND OBJECTIVE BOX
CC: VC eval     HPI: 83M PMHx afib on xarelto, w/ hx of ortho issues, s/p L hip IMN (most recently s/p R hip IMN (Dr. Godinez 10/31/19), mild depression p/w acute encephalopathy, upper back shoulder and arm pain. Speech pending FEES on monday 2/2 pt endorsing having dysphagia at rehab and being placed on pureed diet.     PAST MEDICAL & SURGICAL HISTORY:  AF (atrial fibrillation)      Depression      Lesion of vocal cord      DVT, lower extremity      Hernia, inguinal, right      S/P hip replacement, right      Fracture of neck of left femur      Cataract, right eye        Allergies    No Known Allergies    Intolerances      MEDICATIONS  (STANDING):  ascorbic acid 500 milliGRAM(s) Oral daily  cefTRIAXone   IVPB 1000 milliGRAM(s) IV Intermittent every 24 hours  chlorhexidine 2% Cloths 1 Application(s) Topical daily  escitalopram 10 milliGRAM(s) Oral at bedtime  ferrous    sulfate 325 milliGRAM(s) Oral daily  furosemide   Injectable 20 milliGRAM(s) IV Push daily  rivaroxaban 20 milliGRAM(s) Oral with dinner  tamsulosin 0.4 milliGRAM(s) Oral at bedtime    MEDICATIONS  (PRN):  acetaminophen     Tablet .. 650 milliGRAM(s) Oral every 6 hours PRN Temp greater or equal to 38C (100.4F), Mild Pain (1 - 3)  melatonin 3 milliGRAM(s) Oral at bedtime PRN Insomnia      Social History: no tobacco, no etoh     Family history: Pt denies any sign FHx    ROS:   ENT: all negative except as noted in HPI   CV: denies palpitations  Pulm: denies SOB, cough, hemoptysis  GI: denies change in apetite, indigestion, n/v  : denies pertinent urinary symptoms, urgency  Neuro: denies numbness/tingling, loss of sensation  Psych: denies anxiety  MS: denies muscle weakness, instability  Heme: denies easy bruising or bleeding  Endo: denies heat/cold intolerance, excessive sweating  Vascular: denies LE edema    Vital Signs Last 24 Hrs  T(C): 37 (15 Dec 2023 09:44), Max: 37 (15 Dec 2023 09:44)  T(F): 98.6 (15 Dec 2023 09:44), Max: 98.6 (15 Dec 2023 09:44)  HR: 71 (15 Dec 2023 09:44) (59 - 83)  BP: 103/66 (15 Dec 2023 09:44) (103/66 - 120/74)  BP(mean): --  RR: 18 (15 Dec 2023 09:44) (18 - 18)  SpO2: 100% (15 Dec 2023 09:44) (92% - 100%)    Parameters below as of 15 Dec 2023 09:44  Patient On (Oxygen Delivery Method): nasal cannula  O2 Flow (L/min): 2                            10.1   9.19  )-----------( 203      ( 15 Dec 2023 07:08 )             31.5    12-15    142  |  105  |  19  ----------------------------<  97  3.6   |  26  |  0.83    Ca    9.0      15 Dec 2023 07:07  Phos  2.8     12-13  Mg     1.9     12-14    TPro  5.2<L>  /  Alb  2.5<L>  /  TBili  0.6  /  DBili  x   /  AST  7<L>  /  ALT  5<L>  /  AlkPhos  113  12-15   PT/INR - ( 14 Dec 2023 07:02 )   PT: 12.9 sec;   INR: 1.18 ratio         PTT - ( 14 Dec 2023 07:02 )  PTT:32.4 sec    PHYSICAL EXAM:  Gen: NAD  Skin: No rashes, bruises, or lesions  Head: Normocephalic, Atraumatic  Face: no edema, erythema, or fluctuance. Parotid glands soft without mass  Eyes: no scleral injection  Nose: Nares bilaterally patent, no discharge  Mouth: No Stridor / Drooling / Trismus.  Mucosa moist, tongue/uvula midline, oropharynx clear  Neck: Flat, supple, no lymphadenopathy, trachea midline, no masses  Lymphatic: No lymphadenopathy  Resp: breathing easily, no stridor  CV: no peripheral edema/cyanosis  GI: nondistended   Peripheral vascular: no JVD or edema  Neuro: facial nerve intact, no facial droop      Fiberoptic Indirect laryngoscopy:  (Scope #2 used)        IMAGING/ADDITIONAL STUDIES:  CC: VC eval     HPI: 83M PMHx afib on xarelto, w/ hx of ortho issues, s/p L hip IMN (most recently s/p R hip IMN (Dr. Godinez 10/31/19), mild depression p/w acute encephalopathy, upper back shoulder and arm pain. Speech pending FEES on monday 2/2 pt endorsing having dysphagia at rehab and being placed on pureed diet. Pt states coughing after po liquids and solids in rehab. 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 Sesay.     PAST MEDICAL & SURGICAL HISTORY:  AF (atrial fibrillation)      Depression      Lesion of vocal cord      DVT, lower extremity      Hernia, inguinal, right      S/P hip replacement, right      Fracture of neck of left femur      Cataract, right eye        Allergies    No Known Allergies    Intolerances      MEDICATIONS  (STANDING):  ascorbic acid 500 milliGRAM(s) Oral daily  cefTRIAXone   IVPB 1000 milliGRAM(s) IV Intermittent every 24 hours  chlorhexidine 2% Cloths 1 Application(s) Topical daily  escitalopram 10 milliGRAM(s) Oral at bedtime  ferrous    sulfate 325 milliGRAM(s) Oral daily  furosemide   Injectable 20 milliGRAM(s) IV Push daily  rivaroxaban 20 milliGRAM(s) Oral with dinner  tamsulosin 0.4 milliGRAM(s) Oral at bedtime    MEDICATIONS  (PRN):  acetaminophen     Tablet .. 650 milliGRAM(s) Oral every 6 hours PRN Temp greater or equal to 38C (100.4F), Mild Pain (1 - 3)  melatonin 3 milliGRAM(s) Oral at bedtime PRN Insomnia      Social History: no tobacco, no etoh     Family history: Pt denies any sign FHx    ROS:   ENT: all negative except as noted in HPI   CV: denies palpitations  Pulm: denies SOB, cough, hemoptysis  GI: denies change in apetite, indigestion, n/v  : denies pertinent urinary symptoms, urgency  Neuro: denies numbness/tingling, loss of sensation  Psych: denies anxiety  MS: denies muscle weakness, instability  Heme: denies easy bruising or bleeding  Endo: denies heat/cold intolerance, excessive sweating  Vascular: denies LE edema    Vital Signs Last 24 Hrs  T(C): 37 (15 Dec 2023 09:44), Max: 37 (15 Dec 2023 09:44)  T(F): 98.6 (15 Dec 2023 09:44), Max: 98.6 (15 Dec 2023 09:44)  HR: 71 (15 Dec 2023 09:44) (59 - 83)  BP: 103/66 (15 Dec 2023 09:44) (103/66 - 120/74)  BP(mean): --  RR: 18 (15 Dec 2023 09:44) (18 - 18)  SpO2: 100% (15 Dec 2023 09:44) (92% - 100%)    Parameters below as of 15 Dec 2023 09:44  Patient On (Oxygen Delivery Method): nasal cannula  O2 Flow (L/min): 2                            10.1   9.19  )-----------( 203      ( 15 Dec 2023 07:08 )             31.5    12-15    142  |  105  |  19  ----------------------------<  97  3.6   |  26  |  0.83    Ca    9.0      15 Dec 2023 07:07  Phos  2.8     12-13  Mg     1.9     12-14    TPro  5.2<L>  /  Alb  2.5<L>  /  TBili  0.6  /  DBili  x   /  AST  7<L>  /  ALT  5<L>  /  AlkPhos  113  12-15   PT/INR - ( 14 Dec 2023 07:02 )   PT: 12.9 sec;   INR: 1.18 ratio         PTT - ( 14 Dec 2023 07:02 )  PTT:32.4 sec    PHYSICAL EXAM:  Gen: NAD  Skin: No rashes, bruises, or lesions  Head: Normocephalic, Atraumatic  Face: no edema, erythema, or fluctuance. Parotid glands soft without mass  Eyes: no scleral injection  Nose: Nares bilaterally patent, no discharge  Mouth: No Stridor / Drooling / Trismus.  Mucosa moist, tongue/uvula midline, oropharynx clear  Neck: Flat, supple, no lymphadenopathy, trachea midline, no masses  Lymphatic: No lymphadenopathy  Resp: breathing easily, no stridor  CV: no peripheral edema/cyanosis  GI: nondistended   Peripheral vascular: no JVD or edema  Neuro: facial nerve intact, no facial droop      Fiberoptic Indirect laryngoscopy:  (Scope #2 used)Reason for Laryngoscopy:    Patient was unable to cooperate with mirror.  Nasopharynx, oropharynx, and hypopharynx clear, no bleeding. Tongue base, posterior pharyngeal wall, vallecula, epiglottis, and subglottis appear normal. No erythema, edema, pooling of secretions, masses or lesions. Airway patent, no foreign body visualized. No glottic/supraglottic edema. Right true vocal cord with smooth lesions about size of pea, Left true VC, arytenoids, vestibular folds, ventricles, pyriform sinuses, and aryepiglottic folds appear normal bilaterally. Vocal cords mobile with good contact b/l.           CC: VC eval     HPI: 83M PMHx afib on xarelto, w/ hx of ortho issues, s/p L hip IMN (most recently s/p R hip IMN (Dr. Godinez 10/31/19), mild depression p/w acute encephalopathy, upper back shoulder and arm pain. Speech pending FEES on monday 2/2 pt endorsing having dysphagia at rehab and being placed on pureed diet. Pt states coughing after po liquids and solids in rehab. 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 Sesay.     PAST MEDICAL & SURGICAL HISTORY:  AF (atrial fibrillation)      Depression      Lesion of vocal cord      DVT, lower extremity      Hernia, inguinal, right      S/P hip replacement, right      Fracture of neck of left femur      Cataract, right eye        Allergies    No Known Allergies    Intolerances      MEDICATIONS  (STANDING):  ascorbic acid 500 milliGRAM(s) Oral daily  cefTRIAXone   IVPB 1000 milliGRAM(s) IV Intermittent every 24 hours  chlorhexidine 2% Cloths 1 Application(s) Topical daily  escitalopram 10 milliGRAM(s) Oral at bedtime  ferrous    sulfate 325 milliGRAM(s) Oral daily  furosemide   Injectable 20 milliGRAM(s) IV Push daily  rivaroxaban 20 milliGRAM(s) Oral with dinner  tamsulosin 0.4 milliGRAM(s) Oral at bedtime    MEDICATIONS  (PRN):  acetaminophen     Tablet .. 650 milliGRAM(s) Oral every 6 hours PRN Temp greater or equal to 38C (100.4F), Mild Pain (1 - 3)  melatonin 3 milliGRAM(s) Oral at bedtime PRN Insomnia      Social History: no tobacco, no etoh     Family history: Pt denies any sign FHx    ROS:   ENT: all negative except as noted in HPI   CV: denies palpitations  Pulm: denies SOB, cough, hemoptysis  GI: denies change in apetite, indigestion, n/v  : denies pertinent urinary symptoms, urgency  Neuro: denies numbness/tingling, loss of sensation  Psych: denies anxiety  MS: denies muscle weakness, instability  Heme: denies easy bruising or bleeding  Endo: denies heat/cold intolerance, excessive sweating  Vascular: denies LE edema    Vital Signs Last 24 Hrs  T(C): 37 (15 Dec 2023 09:44), Max: 37 (15 Dec 2023 09:44)  T(F): 98.6 (15 Dec 2023 09:44), Max: 98.6 (15 Dec 2023 09:44)  HR: 71 (15 Dec 2023 09:44) (59 - 83)  BP: 103/66 (15 Dec 2023 09:44) (103/66 - 120/74)  BP(mean): --  RR: 18 (15 Dec 2023 09:44) (18 - 18)  SpO2: 100% (15 Dec 2023 09:44) (92% - 100%)    Parameters below as of 15 Dec 2023 09:44  Patient On (Oxygen Delivery Method): nasal cannula  O2 Flow (L/min): 2                            10.1   9.19  )-----------( 203      ( 15 Dec 2023 07:08 )             31.5    12-15    142  |  105  |  19  ----------------------------<  97  3.6   |  26  |  0.83    Ca    9.0      15 Dec 2023 07:07  Phos  2.8     12-13  Mg     1.9     12-14    TPro  5.2<L>  /  Alb  2.5<L>  /  TBili  0.6  /  DBili  x   /  AST  7<L>  /  ALT  5<L>  /  AlkPhos  113  12-15   PT/INR - ( 14 Dec 2023 07:02 )   PT: 12.9 sec;   INR: 1.18 ratio         PTT - ( 14 Dec 2023 07:02 )  PTT:32.4 sec    PHYSICAL EXAM:  Gen: NAD  Skin: No rashes, bruises, or lesions  Head: Normocephalic, Atraumatic  Face: no edema, erythema, or fluctuance. Parotid glands soft without mass  Eyes: no scleral injection  Nose: Nares bilaterally patent, no discharge  Mouth: No Stridor / Drooling / Trismus.  Mucosa moist, tongue/uvula midline, oropharynx clear  Neck: Flat, supple, no lymphadenopathy, trachea midline, no masses  Lymphatic: No lymphadenopathy  Resp: breathing easily, no stridor  CV: no peripheral edema/cyanosis  GI: nondistended   Peripheral vascular: no JVD or edema  Neuro: facial nerve intact, no facial droop      Fiberoptic Indirect laryngoscopy:  (Scope #2 used)Reason for Laryngoscopy:    Patient was unable to cooperate with mirror.  Nasopharynx, oropharynx, and hypopharynx clear, no bleeding. Tongue base, posterior pharyngeal wall, vallecula, epiglottis, and subglottis appear normal. No erythema, edema, pooling of secretions, masses or lesions. Airway patent, no foreign body visualized. No glottic/supraglottic edema. Right true vocal cord with smooth lesions about size of pea, Left true VC, arytenoids, vestibular folds, ventricles, pyriform sinuses, and aryepiglottic folds appear normal bilaterally. Vocal cords mobile with good contact b/l.

## 2023-12-15 NOTE — DIETITIAN INITIAL EVALUATION ADULT - NSFNSPHYEXAMSKINFT_GEN_A_CORE
As per nursing wound care documentation on 12/14/23: Stage 2 sacrum pressure injury       As per nursing wound care documentation on 12/14/23: Stage 2 sacrum pressure injury

## 2023-12-16 LAB
ANION GAP SERPL CALC-SCNC: 9 MMOL/L — SIGNIFICANT CHANGE UP (ref 5–17)
ANION GAP SERPL CALC-SCNC: 9 MMOL/L — SIGNIFICANT CHANGE UP (ref 5–17)
BUN SERPL-MCNC: 19 MG/DL — SIGNIFICANT CHANGE UP (ref 7–23)
BUN SERPL-MCNC: 19 MG/DL — SIGNIFICANT CHANGE UP (ref 7–23)
CALCIUM SERPL-MCNC: 8.8 MG/DL — SIGNIFICANT CHANGE UP (ref 8.4–10.5)
CALCIUM SERPL-MCNC: 8.8 MG/DL — SIGNIFICANT CHANGE UP (ref 8.4–10.5)
CHLORIDE SERPL-SCNC: 102 MMOL/L — SIGNIFICANT CHANGE UP (ref 96–108)
CHLORIDE SERPL-SCNC: 102 MMOL/L — SIGNIFICANT CHANGE UP (ref 96–108)
CO2 SERPL-SCNC: 28 MMOL/L — SIGNIFICANT CHANGE UP (ref 22–31)
CO2 SERPL-SCNC: 28 MMOL/L — SIGNIFICANT CHANGE UP (ref 22–31)
CREAT SERPL-MCNC: 0.77 MG/DL — SIGNIFICANT CHANGE UP (ref 0.5–1.3)
CREAT SERPL-MCNC: 0.77 MG/DL — SIGNIFICANT CHANGE UP (ref 0.5–1.3)
EGFR: 89 ML/MIN/1.73M2 — SIGNIFICANT CHANGE UP
EGFR: 89 ML/MIN/1.73M2 — SIGNIFICANT CHANGE UP
GLUCOSE SERPL-MCNC: 104 MG/DL — HIGH (ref 70–99)
GLUCOSE SERPL-MCNC: 104 MG/DL — HIGH (ref 70–99)
HCT VFR BLD CALC: 34.2 % — LOW (ref 39–50)
HCT VFR BLD CALC: 34.2 % — LOW (ref 39–50)
HGB BLD-MCNC: 11 G/DL — LOW (ref 13–17)
HGB BLD-MCNC: 11 G/DL — LOW (ref 13–17)
MCHC RBC-ENTMCNC: 32.2 GM/DL — SIGNIFICANT CHANGE UP (ref 32–36)
MCHC RBC-ENTMCNC: 32.2 GM/DL — SIGNIFICANT CHANGE UP (ref 32–36)
MCHC RBC-ENTMCNC: 32.6 PG — SIGNIFICANT CHANGE UP (ref 27–34)
MCHC RBC-ENTMCNC: 32.6 PG — SIGNIFICANT CHANGE UP (ref 27–34)
MCV RBC AUTO: 101.5 FL — HIGH (ref 80–100)
MCV RBC AUTO: 101.5 FL — HIGH (ref 80–100)
NRBC # BLD: 0 /100 WBCS — SIGNIFICANT CHANGE UP (ref 0–0)
NRBC # BLD: 0 /100 WBCS — SIGNIFICANT CHANGE UP (ref 0–0)
PLATELET # BLD AUTO: 226 K/UL — SIGNIFICANT CHANGE UP (ref 150–400)
PLATELET # BLD AUTO: 226 K/UL — SIGNIFICANT CHANGE UP (ref 150–400)
POTASSIUM SERPL-MCNC: 3.3 MMOL/L — LOW (ref 3.5–5.3)
POTASSIUM SERPL-MCNC: 3.3 MMOL/L — LOW (ref 3.5–5.3)
POTASSIUM SERPL-SCNC: 3.3 MMOL/L — LOW (ref 3.5–5.3)
POTASSIUM SERPL-SCNC: 3.3 MMOL/L — LOW (ref 3.5–5.3)
RBC # BLD: 3.37 M/UL — LOW (ref 4.2–5.8)
RBC # BLD: 3.37 M/UL — LOW (ref 4.2–5.8)
RBC # FLD: 14.8 % — HIGH (ref 10.3–14.5)
RBC # FLD: 14.8 % — HIGH (ref 10.3–14.5)
SODIUM SERPL-SCNC: 139 MMOL/L — SIGNIFICANT CHANGE UP (ref 135–145)
SODIUM SERPL-SCNC: 139 MMOL/L — SIGNIFICANT CHANGE UP (ref 135–145)
WBC # BLD: 10.63 K/UL — HIGH (ref 3.8–10.5)
WBC # BLD: 10.63 K/UL — HIGH (ref 3.8–10.5)
WBC # FLD AUTO: 10.63 K/UL — HIGH (ref 3.8–10.5)
WBC # FLD AUTO: 10.63 K/UL — HIGH (ref 3.8–10.5)

## 2023-12-16 RX ADMIN — Medication 20 MILLIGRAM(S): at 05:22

## 2023-12-16 RX ADMIN — CHLORHEXIDINE GLUCONATE 1 APPLICATION(S): 213 SOLUTION TOPICAL at 11:50

## 2023-12-16 RX ADMIN — Medication 500 MILLIGRAM(S): at 11:49

## 2023-12-16 RX ADMIN — TAMSULOSIN HYDROCHLORIDE 0.4 MILLIGRAM(S): 0.4 CAPSULE ORAL at 22:18

## 2023-12-16 RX ADMIN — CEFTRIAXONE 100 MILLIGRAM(S): 500 INJECTION, POWDER, FOR SOLUTION INTRAMUSCULAR; INTRAVENOUS at 11:49

## 2023-12-16 RX ADMIN — Medication 325 MILLIGRAM(S): at 11:49

## 2023-12-16 RX ADMIN — RIVAROXABAN 20 MILLIGRAM(S): KIT at 17:59

## 2023-12-16 RX ADMIN — ESCITALOPRAM OXALATE 10 MILLIGRAM(S): 10 TABLET, FILM COATED ORAL at 22:17

## 2023-12-16 NOTE — PROGRESS NOTE ADULT - ASSESSMENT
83M PMHx afib on xarelto, w/ hx of ortho issues, s/p L hip IMN (most recently s/p R hip IMN (Dr. Godinez 10/31/19), mild depression p/w acute encephalopathy, upper back shoulder and arm pain      # Acute encephalopathy.   # Aspiration pneumonia   - Pt AAOx3 but otherwise somewhat confused. Daughter states some of this has been ongoing, was placed on puree diet for dysphagia issues at rehab. Has decub now. Could be in setting of urinary retention and deconditioning  - CTH negative, infectious work up negative so far   - Speech/ swallow consult and dysphagia diet  - Falls precautions, aspiration precautions  - Abx mgmt per ID-- ceftriaxone   - PT consult  - UCx NGTD  - Monitor temps/WBC  - ID following    # Lower extremity edema:  # Acute hfpef   - As per chart, daughter endorsing worse swelling in legs  - Elevated BNP  - TTE pending   - holding lasix today   - LE Duplex negative for DVT  - Cards following    # Urinary retention:  - Kiran placed in ED by Urology  - Strict I/Os  - Hematuria noted - likely from traumatic kiran placement-- resolved   - Monitor urine color  - Monitor h/h  - Started on Flomax  - Urology following    # Hx of Vocal Cord Lesion:  - FEES Monday  - ENT consult noted    # Afib:  - Home Xarelto resumed    # Anemia:  - Monitor CBC  - Transfuse PRN    # Back pain:   - Multiple areas of upper extremity pain including R shoulder, between shoulder blades and R arm for many days as per daughter. Thought to be 2/2 PT of upper extremities. Also now having LE weakness amongst other issues.   - Tibia/Fibula and Right shoulder x-ray negative for fx  - CT spine completed, fu results     # Depression:  - C/w home meds    # DVT ppx:  - IPCs  - Xarelto     [facility]  [phone]   83M PMHx afib on xarelto, w/ hx of ortho issues, s/p L hip IMN (most recently s/p R hip IMN (Dr. Godinez 10/31/19), mild depression p/w acute encephalopathy, upper back shoulder and arm pain      # Acute encephalopathy.   # Aspiration pneumonia   - Pt AAOx3 but otherwise somewhat confused. Daughter states some of this has been ongoing, was placed on puree diet for dysphagia issues at rehab. Has decub now. Could be in setting of urinary retention and deconditioning  - CTH negative, infectious work up negative so far   - Speech/ swallow consult and dysphagia diet  - Falls precautions, aspiration precautions  - Abx mgmt per ID-- ceftriaxone   - PT consult  - UCx NGTD  - Monitor temps/WBC  - ID following    # Lower extremity edema:  # Acute hfpef   - As per chart, daughter endorsing worse swelling in legs  - Elevated BNP  - TTE pending   - holding lasix today   - LE Duplex negative for DVT  - Cards following    # Urinary retention:  - Kiran placed in ED by Urology  - Strict I/Os  - Hematuria noted - likely from traumatic kiran placement-- resolved   - Monitor urine color  - Monitor h/h  - Started on Flomax  - Urology following    # Hx of Vocal Cord Lesion:  - FEES Monday  - ENT consult noted    # Afib:  - Home Xarelto resumed    # Anemia:  - Monitor CBC  - Transfuse PRN    # Back pain:   - Multiple areas of upper extremity pain including R shoulder, between shoulder blades and R arm for many days as per daughter. Thought to be 2/2 PT of upper extremities. Also now having LE weakness amongst other issues.   - Tibia/Fibula and Right shoulder x-ray negative for fx  - CT spine completed, fu results     # Depression:  - C/w home meds    # DVT ppx:  - IPCs  - Xarelto     [facility]  [phone]

## 2023-12-16 NOTE — PROGRESS NOTE ADULT - ASSESSMENT
Patient is a 83 year old male with PMH of Afib on xarelto, w/ hx of ortho issues, s/p L hip IMN (most recently s/p R hip IMN (Dr. Godinez 10/31/19), mild depression who presented with AMS, upper back shoulder and arm pain.    Altered mental status with hypoxia - c/f pneumonia   possible aspiration, known h/o dysphagia   Urinary retention s/p kiran placement   - 12/14 desaturated, improved with NC 2L-- now on RA  - afebrile, mild leukocytosis today  - COVID/Flu/RSV negative   - CXR with b/l hazy opacities - atelectasis vs small effusions  - CT chest with ill defined airway with subpleural ggo in upper lobes - suspected infectious, suspect aspiration  - Urology following for hematuria, suspected traumatic d/t kiran placement while on Eliquis  - UA with no pyuria, Ucx negative  - b/l LE duplex with no DVT   - CT cervical spine with no acute fractures, has multilevel degenerative changes severe stenosis  - CT lumbar spine with interval development of sacral insufficiency fractures     Recommendations:  ENT and SLP following, plan for FEES on Monday  Continue ceftriaxone 1g IV Q24h - complete total 5d course on 12/19  Monitor temps/WBC  Aspiration precautions       Slim Jackson M.D.  [facility], Division of Infectious Diseases  [phone]  After 5pm on weekdays and all day on weekends - please call [phone] Patient is a 83 year old male with PMH of Afib on xarelto, w/ hx of ortho issues, s/p L hip IMN (most recently s/p R hip IMN (Dr. Godinez 10/31/19), mild depression who presented with AMS, upper back shoulder and arm pain.    Altered mental status with hypoxia - c/f pneumonia   possible aspiration, known h/o dysphagia   Urinary retention s/p kiran placement   - 12/14 desaturated, improved with NC 2L-- now on RA  - afebrile, mild leukocytosis today  - COVID/Flu/RSV negative   - CXR with b/l hazy opacities - atelectasis vs small effusions  - CT chest with ill defined airway with subpleural ggo in upper lobes - suspected infectious, suspect aspiration  - Urology following for hematuria, suspected traumatic d/t kiran placement while on Eliquis  - UA with no pyuria, Ucx negative  - b/l LE duplex with no DVT   - CT cervical spine with no acute fractures, has multilevel degenerative changes severe stenosis  - CT lumbar spine with interval development of sacral insufficiency fractures     Recommendations:  ENT and SLP following, plan for FEES on Monday  Continue ceftriaxone 1g IV Q24h - complete total 5d course on 12/19  Monitor temps/WBC  Aspiration precautions       Slim Jackson M.D.  [facility], Division of Infectious Diseases  [phone]  After 5pm on weekdays and all day on weekends - please call [phone]

## 2023-12-16 NOTE — PROGRESS NOTE ADULT - ASSESSMENT
A/p  83M PMHx afib on xarelto, w/ hx of ortho issues, s/p L hip IMN (most recently s/p R hip IMN (Dr. Godinez 10/31/19), mild depression p/w AMS, upper back shoulder and arm pain.      #AMS  -CTH no acute findings  -Infectious w/u per med, ID    #Right Sided HF  -improved  -Venous dopplers negative for DVT  -Change IVP lasix to 20mg  PO daily   -Old echo with normal LVEF, w R sided enlargement and mod-severe TR  -Repeat echo  -Wean supplemental O2 as able    #Afib  -rate controlled off meds  -cont xarelto 20mg qd     #Back pain  -CT scan noted   -Mgmt per med

## 2023-12-16 NOTE — PROGRESS NOTE ADULT - SUBJECTIVE AND OBJECTIVE BOX
Patient is a 83y old  Male who presents with a chief complaint of R shoulder pain, arm pain, back pain, AMS (16 Dec 2023 09:30)      SUBJECTIVE / OVERNIGHT EVENTS:  Patient seen and examined.   Sleepy.       Vital Signs Last 24 Hrs  T(C): 36.7 (16 Dec 2023 08:10), Max: 36.7 (15 Dec 2023 15:56)  T(F): 98.1 (16 Dec 2023 08:10), Max: 98.1 (16 Dec 2023 08:10)  HR: 72 (16 Dec 2023 08:10) (64 - 73)  BP: 141/70 (16 Dec 2023 08:10) (104/64 - 141/70)  BP(mean): --  RR: 18 (16 Dec 2023 08:10) (16 - 18)  SpO2: 97% (16 Dec 2023 08:10) (95% - 99%)    Parameters below as of 16 Dec 2023 08:10  Patient On (Oxygen Delivery Method): nasal cannula  O2 Flow (L/min): 2    I&O's Summary    15 Dec 2023 07:01  -  16 Dec 2023 07:00  --------------------------------------------------------  IN: 50 mL / OUT: 2000 mL / NET: -1950 mL    16 Dec 2023 07:01  -  16 Dec 2023 13:56  --------------------------------------------------------  IN: 0 mL / OUT: 1500 mL / NET: -1500 mL        PHYSICAL EXAM:  GENERAL: NAD, AAOx3  HEAD:  Atraumatic, Normocephalic  EYES: EOMI; conjunctiva and sclera clear  NECK: Supple, No JVD, No LAD  CHEST/LUNG: B/L air entry; No wheezes, rales or rhonci   HEART: Regular rate and rhythm; No murmurs, rubs, or gallops  ABDOMEN: Soft, Nontender, Nondistended; Bowel sounds present  EXTREMITIES:  2+ Peripheral Pulses, No clubbing, cyanosis, or edema  SKIN: No rashes or lesions    LABS:                        11.0   10.63 )-----------( 226      ( 16 Dec 2023 07:24 )             34.2     12-16    139  |  102  |  19  ----------------------------<  104<H>  3.3<L>   |  28  |  0.77    Ca    8.8      16 Dec 2023 07:22    TPro  5.2<L>  /  Alb  2.5<L>  /  TBili  0.6  /  DBili  x   /  AST  7<L>  /  ALT  5<L>  /  AlkPhos  113  12-15      CAPILLARY BLOOD GLUCOSE            Urinalysis Basic - ( 16 Dec 2023 07:22 )    Color: x / Appearance: x / SG: x / pH: x  Gluc: 104 mg/dL / Ketone: x  / Bili: x / Urobili: x   Blood: x / Protein: x / Nitrite: x   Leuk Esterase: x / RBC: x / WBC x   Sq Epi: x / Non Sq Epi: x / Bacteria: x        RADIOLOGY & ADDITIONAL TESTS:    Imaging Personally Reviewed:  [x] YES  [ ] NO    Consultant(s) Notes Reviewed:  [x] YES  [ ] NO      MEDICATIONS  (STANDING):  ascorbic acid 500 milliGRAM(s) Oral daily  cefTRIAXone   IVPB 1000 milliGRAM(s) IV Intermittent every 24 hours  chlorhexidine 2% Cloths 1 Application(s) Topical daily  escitalopram 10 milliGRAM(s) Oral at bedtime  ferrous    sulfate 325 milliGRAM(s) Oral daily  furosemide   Injectable 20 milliGRAM(s) IV Push daily  rivaroxaban 20 milliGRAM(s) Oral with dinner  tamsulosin 0.4 milliGRAM(s) Oral at bedtime    MEDICATIONS  (PRN):  acetaminophen     Tablet .. 650 milliGRAM(s) Oral every 6 hours PRN Temp greater or equal to 38C (100.4F), Mild Pain (1 - 3)  melatonin 3 milliGRAM(s) Oral at bedtime PRN Insomnia      Care Discussed with Consultants/Other Providers [x] YES  [ ] NO    HEALTH ISSUES - PROBLEM Dx:  Suspected pulmonary embolism    Suspected deep vein thrombosis (DVT)    Acute encephalopathy    Lower extremity edema    Urinary retention    Chronic atrial fibrillation    Back pain    Other depression    Prophylactic measure    Anemia    Vocal cord cancer

## 2023-12-16 NOTE — PROGRESS NOTE ADULT - SUBJECTIVE AND OBJECTIVE BOX
CARDIOLOGY FOLLOW UP - Dr. Marrufo  DATE OF SERVICE: 12/16/23     CC no cp or sob       REVIEW OF SYSTEMS:  CONSTITUTIONAL: No fever, weight loss, or fatigue  RESPIRATORY: No cough, wheezing, chills or hemoptysis; No Shortness of Breath  CARDIOVASCULAR: No chest pain, palpitations, passing out, dizziness, or leg swelling  GASTROINTESTINAL: No abdominal or epigastric pain. No nausea, vomiting, or hematemesis; No diarrhea or constipation. No melena or hematochezia.  VASCULAR: No edema     PHYSICAL EXAM:  T(C): 36.3 (12-16-23 @ 00:57), Max: 37 (12-15-23 @ 09:44)  HR: 73 (12-16-23 @ 00:57) (64 - 73)  BP: 114/69 (12-16-23 @ 00:57) (103/66 - 114/69)  RR: 16 (12-16-23 @ 00:57) (16 - 18)  SpO2: 95% (12-16-23 @ 00:57) (95% - 100%)  Wt(kg): --  I&O's Summary    15 Dec 2023 07:01  -  16 Dec 2023 07:00  --------------------------------------------------------  IN: 50 mL / OUT: 2000 mL / NET: -1950 mL        Appearance: Normal	  Cardiovascular: Normal S1 S2,RRR, No JVD, No murmurs  Respiratory: Lungs clear to auscultation	  Gastrointestinal:  Soft, Non-tender, + BS	  Extremities: Normal range of motion, No clubbing, cyanosis or edema      Home Medications:  ascorbic acid 500 mg oral tablet: 1 tab(s) orally once a day (13 Dec 2023 16:41)  cholecalciferol 25 mcg (1000 intl units) oral tablet: 1 tab(s) orally once a day (13 Dec 2023 16:41)  DuoNeb 0.5 mg-2.5 mg/3 mL inhalation solution: 3 milliliter(s) by nebulizer every 6 hours as needed (13 Dec 2023 16:41)  ferrous sulfate 325 mg (65 mg elemental iron) oral tablet: 1 tab(s) orally once a day (13 Dec 2023 16:41)  Lexapro 10 mg oral tablet: 1 tab(s) orally once a day (in the evening) (13 Dec 2023 16:41)  Multiple Vitamins oral tablet: 1 tab(s) orally once a day (13 Dec 2023 16:41)  Xarelto 20 mg oral tablet: 1 tab(s) orally once a day (in the evening) (13 Dec 2023 16:41)      MEDICATIONS  (STANDING):  ascorbic acid 500 milliGRAM(s) Oral daily  cefTRIAXone   IVPB 1000 milliGRAM(s) IV Intermittent every 24 hours  chlorhexidine 2% Cloths 1 Application(s) Topical daily  escitalopram 10 milliGRAM(s) Oral at bedtime  ferrous    sulfate 325 milliGRAM(s) Oral daily  furosemide   Injectable 20 milliGRAM(s) IV Push daily  rivaroxaban 20 milliGRAM(s) Oral with dinner  tamsulosin 0.4 milliGRAM(s) Oral at bedtime      TELEMETRY: 	    ECG:  	  RADIOLOGY: < from: CT Cervical Spine No Cont (12.14.23 @ 17:06) >  IMPRESSION:  -No acute fracture.  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 This appears to been   present on MRI cervical spine 12/9/2022. Follow-up MRI can be considered   to better assess for any interval changes if not contraindicated.  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    --- End of Report ---        < end of copied text >    DIAGNOSTIC TESTING:  [ ] Echocardiogram:  [ ]  Catheterization:  [ ] Stress Test:    OTHER: 	    LABS:	 	    Troponin T, High Sensitivity Result: 35 ng/L [0 - 51] (12-13 @ 13:48)  Troponin T, High Sensitivity Result: 35 ng/L [0 - 51] (12-13 @ 11:23)                          11.0   10.63 )-----------( 226      ( 16 Dec 2023 07:24 )             34.2     12-16    139  |  102  |  19  ----------------------------<  104<H>  3.3<L>   |  28  |  0.77    Ca    8.8      16 Dec 2023 07:22    TPro  5.2<L>  /  Alb  2.5<L>  /  TBili  0.6  /  DBili  x   /  AST  7<L>  /  ALT  5<L>  /  AlkPhos  113  12-15

## 2023-12-16 NOTE — PROGRESS NOTE ADULT - SUBJECTIVE AND OBJECTIVE BOX
[facility]  GIOVANY Ferguson Y. Patel, S. Shah, G. Delmer  [phone]  ([phone] - weekdays after 5pm and weekends)    Name: MARY BO  Age/Gender: 83y Male  MRN: [id number]    Interval History:  Patient seen and examined this morning.   Patient smiling, states he feels better.   Denies pain or any new complaints.   Notes reviewed  No concerning overnight events  Afebrile   Allergies: No Known Allergies      Objective:  Vitals:   T(F): 98.1 (12-16-23 @ 08:10), Max: 98.6 (12-15-23 @ 09:44)  HR: 72 (12-16-23 @ 08:10) (64 - 73)  BP: 141/70 (12-16-23 @ 08:10) (103/66 - 141/70)  RR: 18 (12-16-23 @ 08:10) (16 - 18)  SpO2: 97% (12-16-23 @ 08:10) (95% - 100%)  Physical Examination:  General: no acute distress, RA  HEENT: NC/AT, anicteric, neck supple  Respiratory: no acc muscle use, breathing comfortably  Cardiovascular: S1 and S2 present  Gastrointestinal: normal appearing, nondistended  Extremities: no edema, no cyanosis  Skin: no visible rash    Laboratory Studies:  CBC:                       11.0   10.63 )-----------( 226      ( 16 Dec 2023 07:24 )             34.2     WBC Trend:  10.63 12-16-23 @ 07:24  9.19 12-15-23 @ 07:08  8.61 12-14-23 @ 07:02  10.43 12-13-23 @ 11:23    CMP: 12-16    139  |  102  |  19  ----------------------------<  104<H>  3.3<L>   |  28  |  0.77    Ca    8.8      16 Dec 2023 07:22    TPro  5.2<L>  /  Alb  2.5<L>  /  TBili  0.6  /  DBili  x   /  AST  7<L>  /  ALT  5<L>  /  AlkPhos  113  12-15    Creatinine: 0.77 mg/dL (12-16-23 @ 07:22)  Creatinine: 0.83 mg/dL (12-15-23 @ 07:07)  Creatinine: 0.94 mg/dL (12-14-23 @ 07:02)  Creatinine: 1.03 mg/dL (12-13-23 @ 11:23)    LIVER FUNCTIONS - ( 15 Dec 2023 07:07 )  Alb: 2.5 g/dL / Pro: 5.2 g/dL / ALK PHOS: 113 U/L / ALT: 5 U/L / AST: 7 U/L / GGT: x           Microbiology: reviewed   Culture - Urine (collected 12-13-23 @ 17:42)  Source: Catheterized Catheterized  Final Report (12-15-23 @ 06:27):    <10,000 CFU/mL Normal Urogenital Marla    SARS-CoV-2 Result: NotDetec (13 Dec 2023 16:25)    Radiology: reviewed     Medications:  acetaminophen     Tablet .. 650 milliGRAM(s) Oral every 6 hours PRN  ascorbic acid 500 milliGRAM(s) Oral daily  cefTRIAXone   IVPB 1000 milliGRAM(s) IV Intermittent every 24 hours  chlorhexidine 2% Cloths 1 Application(s) Topical daily  escitalopram 10 milliGRAM(s) Oral at bedtime  ferrous    sulfate 325 milliGRAM(s) Oral daily  furosemide   Injectable 20 milliGRAM(s) IV Push daily  melatonin 3 milliGRAM(s) Oral at bedtime PRN  rivaroxaban 20 milliGRAM(s) Oral with dinner  tamsulosin 0.4 milliGRAM(s) Oral at bedtime    Current Antimicrobials:  cefTRIAXone   IVPB 1000 milliGRAM(s) IV Intermittent every 24 hours    Prior/Completed Antimicrobials:   [facility]  GIOVANY Ferguson Y. Patel, S. Shah, G. Delmer  [phone]  ([phone] - weekdays after 5pm and weekends)    Name: MARY BO  Age/Gender: 83y Male  MRN: [id number]    Interval History:  Patient seen and examined this morning.   Patient smiling, states he feels better.   Denies pain or any new complaints.   Notes reviewed  No concerning overnight events  Afebrile   Allergies: No Known Allergies      Objective:  Vitals:   T(F): 98.1 (12-16-23 @ 08:10), Max: 98.6 (12-15-23 @ 09:44)  HR: 72 (12-16-23 @ 08:10) (64 - 73)  BP: 141/70 (12-16-23 @ 08:10) (103/66 - 141/70)  RR: 18 (12-16-23 @ 08:10) (16 - 18)  SpO2: 97% (12-16-23 @ 08:10) (95% - 100%)  Physical Examination:  General: no acute distress, RA  HEENT: NC/AT, anicteric, neck supple  Respiratory: no acc muscle use, breathing comfortably  Cardiovascular: S1 and S2 present  Gastrointestinal: normal appearing, nondistended  Extremities: no edema, no cyanosis  Skin: no visible rash    Laboratory Studies:  CBC:                       11.0   10.63 )-----------( 226      ( 16 Dec 2023 07:24 )             34.2     WBC Trend:  10.63 12-16-23 @ 07:24  9.19 12-15-23 @ 07:08  8.61 12-14-23 @ 07:02  10.43 12-13-23 @ 11:23    CMP: 12-16    139  |  102  |  19  ----------------------------<  104<H>  3.3<L>   |  28  |  0.77    Ca    8.8      16 Dec 2023 07:22    TPro  5.2<L>  /  Alb  2.5<L>  /  TBili  0.6  /  DBili  x   /  AST  7<L>  /  ALT  5<L>  /  AlkPhos  113  12-15    Creatinine: 0.77 mg/dL (12-16-23 @ 07:22)  Creatinine: 0.83 mg/dL (12-15-23 @ 07:07)  Creatinine: 0.94 mg/dL (12-14-23 @ 07:02)  Creatinine: 1.03 mg/dL (12-13-23 @ 11:23)    LIVER FUNCTIONS - ( 15 Dec 2023 07:07 )  Alb: 2.5 g/dL / Pro: 5.2 g/dL / ALK PHOS: 113 U/L / ALT: 5 U/L / AST: 7 U/L / GGT: x           Microbiology: reviewed   Culture - Urine (collected 12-13-23 @ 17:42)  Source: Catheterized Catheterized  Final Report (12-15-23 @ 06:27):    <10,000 CFU/mL Normal Urogenital Marla    SARS-CoV-2 Result: NotDetec (13 Dec 2023 16:25)    Radiology: reviewed     Medications:  acetaminophen     Tablet .. 650 milliGRAM(s) Oral every 6 hours PRN  ascorbic acid 500 milliGRAM(s) Oral daily  cefTRIAXone   IVPB 1000 milliGRAM(s) IV Intermittent every 24 hours  chlorhexidine 2% Cloths 1 Application(s) Topical daily  escitalopram 10 milliGRAM(s) Oral at bedtime  ferrous    sulfate 325 milliGRAM(s) Oral daily  furosemide   Injectable 20 milliGRAM(s) IV Push daily  melatonin 3 milliGRAM(s) Oral at bedtime PRN  rivaroxaban 20 milliGRAM(s) Oral with dinner  tamsulosin 0.4 milliGRAM(s) Oral at bedtime    Current Antimicrobials:  cefTRIAXone   IVPB 1000 milliGRAM(s) IV Intermittent every 24 hours    Prior/Completed Antimicrobials:

## 2023-12-17 LAB
ANION GAP SERPL CALC-SCNC: 7 MMOL/L — SIGNIFICANT CHANGE UP (ref 5–17)
ANION GAP SERPL CALC-SCNC: 7 MMOL/L — SIGNIFICANT CHANGE UP (ref 5–17)
BUN SERPL-MCNC: 17 MG/DL — SIGNIFICANT CHANGE UP (ref 7–23)
BUN SERPL-MCNC: 17 MG/DL — SIGNIFICANT CHANGE UP (ref 7–23)
CALCIUM SERPL-MCNC: 9.2 MG/DL — SIGNIFICANT CHANGE UP (ref 8.4–10.5)
CALCIUM SERPL-MCNC: 9.2 MG/DL — SIGNIFICANT CHANGE UP (ref 8.4–10.5)
CHLORIDE SERPL-SCNC: 100 MMOL/L — SIGNIFICANT CHANGE UP (ref 96–108)
CHLORIDE SERPL-SCNC: 100 MMOL/L — SIGNIFICANT CHANGE UP (ref 96–108)
CO2 SERPL-SCNC: 31 MMOL/L — SIGNIFICANT CHANGE UP (ref 22–31)
CO2 SERPL-SCNC: 31 MMOL/L — SIGNIFICANT CHANGE UP (ref 22–31)
CREAT SERPL-MCNC: 0.77 MG/DL — SIGNIFICANT CHANGE UP (ref 0.5–1.3)
CREAT SERPL-MCNC: 0.77 MG/DL — SIGNIFICANT CHANGE UP (ref 0.5–1.3)
EGFR: 89 ML/MIN/1.73M2 — SIGNIFICANT CHANGE UP
EGFR: 89 ML/MIN/1.73M2 — SIGNIFICANT CHANGE UP
GLUCOSE SERPL-MCNC: 108 MG/DL — HIGH (ref 70–99)
GLUCOSE SERPL-MCNC: 108 MG/DL — HIGH (ref 70–99)
HCT VFR BLD CALC: 38.6 % — LOW (ref 39–50)
HCT VFR BLD CALC: 38.6 % — LOW (ref 39–50)
HGB BLD-MCNC: 12.4 G/DL — LOW (ref 13–17)
HGB BLD-MCNC: 12.4 G/DL — LOW (ref 13–17)
MCHC RBC-ENTMCNC: 32.1 GM/DL — SIGNIFICANT CHANGE UP (ref 32–36)
MCHC RBC-ENTMCNC: 32.1 GM/DL — SIGNIFICANT CHANGE UP (ref 32–36)
MCHC RBC-ENTMCNC: 32.8 PG — SIGNIFICANT CHANGE UP (ref 27–34)
MCHC RBC-ENTMCNC: 32.8 PG — SIGNIFICANT CHANGE UP (ref 27–34)
MCV RBC AUTO: 102.1 FL — HIGH (ref 80–100)
MCV RBC AUTO: 102.1 FL — HIGH (ref 80–100)
NRBC # BLD: 0 /100 WBCS — SIGNIFICANT CHANGE UP (ref 0–0)
NRBC # BLD: 0 /100 WBCS — SIGNIFICANT CHANGE UP (ref 0–0)
PLATELET # BLD AUTO: 229 K/UL — SIGNIFICANT CHANGE UP (ref 150–400)
PLATELET # BLD AUTO: 229 K/UL — SIGNIFICANT CHANGE UP (ref 150–400)
POTASSIUM SERPL-MCNC: 3.6 MMOL/L — SIGNIFICANT CHANGE UP (ref 3.5–5.3)
POTASSIUM SERPL-MCNC: 3.6 MMOL/L — SIGNIFICANT CHANGE UP (ref 3.5–5.3)
POTASSIUM SERPL-SCNC: 3.6 MMOL/L — SIGNIFICANT CHANGE UP (ref 3.5–5.3)
POTASSIUM SERPL-SCNC: 3.6 MMOL/L — SIGNIFICANT CHANGE UP (ref 3.5–5.3)
RBC # BLD: 3.78 M/UL — LOW (ref 4.2–5.8)
RBC # BLD: 3.78 M/UL — LOW (ref 4.2–5.8)
RBC # FLD: 14.4 % — SIGNIFICANT CHANGE UP (ref 10.3–14.5)
RBC # FLD: 14.4 % — SIGNIFICANT CHANGE UP (ref 10.3–14.5)
SODIUM SERPL-SCNC: 138 MMOL/L — SIGNIFICANT CHANGE UP (ref 135–145)
SODIUM SERPL-SCNC: 138 MMOL/L — SIGNIFICANT CHANGE UP (ref 135–145)
WBC # BLD: 12.25 K/UL — HIGH (ref 3.8–10.5)
WBC # BLD: 12.25 K/UL — HIGH (ref 3.8–10.5)
WBC # FLD AUTO: 12.25 K/UL — HIGH (ref 3.8–10.5)
WBC # FLD AUTO: 12.25 K/UL — HIGH (ref 3.8–10.5)

## 2023-12-17 RX ORDER — FUROSEMIDE 40 MG
20 TABLET ORAL DAILY
Refills: 0 | Status: DISCONTINUED | OUTPATIENT
Start: 2023-12-17 | End: 2023-12-27

## 2023-12-17 RX ADMIN — ESCITALOPRAM OXALATE 10 MILLIGRAM(S): 10 TABLET, FILM COATED ORAL at 21:30

## 2023-12-17 RX ADMIN — Medication 325 MILLIGRAM(S): at 11:32

## 2023-12-17 RX ADMIN — RIVAROXABAN 20 MILLIGRAM(S): KIT at 17:42

## 2023-12-17 RX ADMIN — TAMSULOSIN HYDROCHLORIDE 0.4 MILLIGRAM(S): 0.4 CAPSULE ORAL at 21:30

## 2023-12-17 RX ADMIN — CHLORHEXIDINE GLUCONATE 1 APPLICATION(S): 213 SOLUTION TOPICAL at 11:33

## 2023-12-17 RX ADMIN — CEFTRIAXONE 100 MILLIGRAM(S): 500 INJECTION, POWDER, FOR SOLUTION INTRAMUSCULAR; INTRAVENOUS at 11:32

## 2023-12-17 RX ADMIN — Medication 500 MILLIGRAM(S): at 11:33

## 2023-12-17 RX ADMIN — Medication 20 MILLIGRAM(S): at 05:52

## 2023-12-17 NOTE — PROGRESS NOTE ADULT - SUBJECTIVE AND OBJECTIVE BOX
Patient is a 83y old  Male who presents with a chief complaint of R shoulder pain, arm pain, back pain, AMS (17 Dec 2023 09:34)      SUBJECTIVE / OVERNIGHT EVENTS:  Patient seen and examined.   Doing well.       Vital Signs Last 24 Hrs  T(C): 36.6 (17 Dec 2023 09:32), Max: 36.7 (16 Dec 2023 15:51)  T(F): 97.8 (17 Dec 2023 09:32), Max: 98 (16 Dec 2023 15:51)  HR: 68 (17 Dec 2023 09:32) (63 - 94)  BP: 111/77 (17 Dec 2023 09:32) (107/52 - 121/74)  BP(mean): --  RR: 18 (17 Dec 2023 09:32) (18 - 18)  SpO2: 96% (17 Dec 2023 09:32) (96% - 97%)    Parameters below as of 17 Dec 2023 09:32  Patient On (Oxygen Delivery Method): room air      I&O's Summary    16 Dec 2023 07:01  -  17 Dec 2023 07:00  --------------------------------------------------------  IN: 50 mL / OUT: 1700 mL / NET: -1650 mL        PHYSICAL EXAM:  GENERAL: NAD, AAOx3  HEAD:  Atraumatic, Normocephalic  EYES: EOMI; conjunctiva and sclera clear  NECK: Supple, No JVD, No LAD  CHEST/LUNG: B/L air entry; No wheezes, rales or rhonci   HEART: Regular rate and rhythm; No murmurs, rubs, or gallops  ABDOMEN: Soft, Nontender, Nondistended; Bowel sounds present  EXTREMITIES:  2+ Peripheral Pulses, No clubbing, cyanosis, or edema  SKIN: No rashes or lesions    LABS:                        12.4   12.25 )-----------( 229      ( 17 Dec 2023 11:06 )             38.6     12-17    138  |  100  |  17  ----------------------------<  108<H>  3.6   |  31  |  0.77    Ca    9.2      17 Dec 2023 11:06        CAPILLARY BLOOD GLUCOSE            Urinalysis Basic - ( 17 Dec 2023 11:06 )    Color: x / Appearance: x / SG: x / pH: x  Gluc: 108 mg/dL / Ketone: x  / Bili: x / Urobili: x   Blood: x / Protein: x / Nitrite: x   Leuk Esterase: x / RBC: x / WBC x   Sq Epi: x / Non Sq Epi: x / Bacteria: x        RADIOLOGY & ADDITIONAL TESTS:    Imaging Personally Reviewed:  [x] YES  [ ] NO    Consultant(s) Notes Reviewed:  [x] YES  [ ] NO      MEDICATIONS  (STANDING):  ascorbic acid 500 milliGRAM(s) Oral daily  cefTRIAXone   IVPB 1000 milliGRAM(s) IV Intermittent every 24 hours  chlorhexidine 2% Cloths 1 Application(s) Topical daily  escitalopram 10 milliGRAM(s) Oral at bedtime  ferrous    sulfate 325 milliGRAM(s) Oral daily  furosemide    Tablet 20 milliGRAM(s) Oral daily  rivaroxaban 20 milliGRAM(s) Oral with dinner  tamsulosin 0.4 milliGRAM(s) Oral at bedtime    MEDICATIONS  (PRN):  acetaminophen     Tablet .. 650 milliGRAM(s) Oral every 6 hours PRN Temp greater or equal to 38C (100.4F), Mild Pain (1 - 3)  melatonin 3 milliGRAM(s) Oral at bedtime PRN Insomnia      Care Discussed with Consultants/Other Providers [x] YES  [ ] NO    HEALTH ISSUES - PROBLEM Dx:  Suspected pulmonary embolism    Suspected deep vein thrombosis (DVT)    Acute encephalopathy    Lower extremity edema    Urinary retention    Chronic atrial fibrillation    Back pain    Other depression    Prophylactic measure    Anemia    Vocal cord cancer

## 2023-12-17 NOTE — PROGRESS NOTE ADULT - SUBJECTIVE AND OBJECTIVE BOX
CARDIOLOGY FOLLOW UP - Dr. Marrufo  Date of Service: 12/17/2023  CC: no events    Review of Systems:  Constitutional: No fever, weight loss, or fatigue  Respiratory: No cough, wheezing, or hemoptysis, no shortness of breath  Cardiovascular: No chest pain, palpitations, passing out, dizziness, or leg swelling  Gastrointestinal: No abd or epigastric pain. No nausea, vomiting, or hematemesis; no diarrhea or consiptaiton, no melena or hematochezia  Vascular: No edema     TELEMETRY:    PHYSICAL EXAM:  T(C): 36.6 (12-17-23 @ 00:00), Max: 36.7 (12-16-23 @ 15:51)  HR: 63 (12-17-23 @ 00:00) (63 - 94)  BP: 121/74 (12-17-23 @ 00:00) (107/52 - 121/74)  RR: 18 (12-17-23 @ 00:00) (18 - 18)  SpO2: 97% (12-17-23 @ 00:00) (97% - 97%)  Wt(kg): --  I&O's Summary    16 Dec 2023 07:01  -  17 Dec 2023 07:00  --------------------------------------------------------  IN: 50 mL / OUT: 1700 mL / NET: -1650 mL        Appearance: Normal	  Cardiovascular: Normal S1 S2,RRR, No JVD, No murmurs  Respiratory: Lungs clear to auscultation	  Gastrointestinal:  Soft, Non-tender, + BS	  Extremities: Normal range of motion, No clubbing, cyanosis or edema  Vascular: Peripheral pulses palpable 2+ bilaterally       Home Medications:  ascorbic acid 500 mg oral tablet: 1 tab(s) orally once a day (13 Dec 2023 16:41)  cholecalciferol 25 mcg (1000 intl units) oral tablet: 1 tab(s) orally once a day (13 Dec 2023 16:41)  DuoNeb 0.5 mg-2.5 mg/3 mL inhalation solution: 3 milliliter(s) by nebulizer every 6 hours as needed (13 Dec 2023 16:41)  ferrous sulfate 325 mg (65 mg elemental iron) oral tablet: 1 tab(s) orally once a day (13 Dec 2023 16:41)  Lexapro 10 mg oral tablet: 1 tab(s) orally once a day (in the evening) (13 Dec 2023 16:41)  Multiple Vitamins oral tablet: 1 tab(s) orally once a day (13 Dec 2023 16:41)  Xarelto 20 mg oral tablet: 1 tab(s) orally once a day (in the evening) (13 Dec 2023 16:41)        MEDICATIONS  (STANDING):  ascorbic acid 500 milliGRAM(s) Oral daily  cefTRIAXone   IVPB 1000 milliGRAM(s) IV Intermittent every 24 hours  chlorhexidine 2% Cloths 1 Application(s) Topical daily  escitalopram 10 milliGRAM(s) Oral at bedtime  ferrous    sulfate 325 milliGRAM(s) Oral daily  furosemide   Injectable 20 milliGRAM(s) IV Push daily  rivaroxaban 20 milliGRAM(s) Oral with dinner  tamsulosin 0.4 milliGRAM(s) Oral at bedtime        EKG:  RADIOLOGY:  DIAGNOSTIC TESTING:  [ ] Echocardiogram:  [ ] Catherterization:  [ ] Stress Test:  OTHER:     LABS:	 	                          11.0   10.63 )-----------( 226      ( 16 Dec 2023 07:24 )             34.2     12-16    139  |  102  |  19  ----------------------------<  104<H>  3.3<L>   |  28  |  0.77    Ca    8.8      16 Dec 2023 07:22            CARDIAC MARKERS:

## 2023-12-17 NOTE — PROGRESS NOTE ADULT - ASSESSMENT
83M PMHx afib on xarelto, w/ hx of ortho issues, s/p L hip IMN (most recently s/p R hip IMN (Dr. Godinez 10/31/19), mild depression p/w acute encephalopathy, upper back shoulder and arm pain      # Acute encephalopathy.   # Aspiration pneumonia   - Pt AAOx3 but otherwise somewhat confused. Daughter states some of this has been ongoing, was placed on puree diet for dysphagia issues at rehab. Has decub now. Could be in setting of urinary retention and deconditioning  - CTH negative, infectious work up negative so far   - Speech/ swallow consult and dysphagia diet  - Falls precautions, aspiration precautions  - Abx mgmt per ID-- ceftriaxone   - PT consult  - UCx NGTD  - Monitor temps/WBC  - ID following    # Lower extremity edema:  # Acute hfpef   - As per chart, daughter endorsing worse swelling in legs  - Elevated BNP  - TTE pending   - holding lasix today   - LE Duplex negative for DVT  - Cards following    # Urinary retention:  - Kiran placed in ED by Urology  - Strict I/Os  - Hematuria noted - likely from traumatic kiran placement-- resolved   - Monitor urine color  - Monitor h/h  - Started on Flomax  - Urology following    # Hx of Vocal Cord Lesion:  - FEES Monday  - ENT consult noted    # Afib:  - Home Xarelto resumed    # Anemia:  - Monitor CBC  - Transfuse PRN    # Back pain:   - Multiple areas of upper extremity pain including R shoulder, between shoulder blades and R arm for many days as per daughter. Thought to be 2/2 PT of upper extremities. Also now having LE weakness amongst other issues.   - Tibia/Fibula and Right shoulder x-ray negative for fx  - CT spine completed, fu results     # Depression:  - C/w home meds    # DVT ppx:  - IPCs  - Xarelto     [facility]  [phone]   83M PMHx afib on xarelto, w/ hx of ortho issues, s/p L hip IMN (most recently s/p R hip IMN (Dr. Godinez 10/31/19), mild depression p/w acute encephalopathy, upper back shoulder and arm pain      # Acute encephalopathy.   # Aspiration pneumonia   - Pt AAOx3 but otherwise somewhat confused. Daughter states some of this has been ongoing, was placed on puree diet for dysphagia issues at rehab. Has decub now. Could be in setting of urinary retention and deconditioning  - CTH negative, infectious work up negative so far   - Speech/ swallow consult and dysphagia diet  - Falls precautions, aspiration precautions  - Abx mgmt per ID-- ceftriaxone   - PT consult  - UCx NGTD  - Monitor temps/WBC  - ID following    # Lower extremity edema:  # Acute hfpef   - As per chart, daughter endorsing worse swelling in legs  - Elevated BNP  - TTE pending   - holding lasix today   - LE Duplex negative for DVT  - Cards following    # Urinary retention:  - Kiran placed in ED by Urology  - Strict I/Os  - Hematuria noted - likely from traumatic kiran placement-- resolved   - Monitor urine color  - Monitor h/h  - Started on Flomax  - Urology following    # Hx of Vocal Cord Lesion:  - FEES Monday  - ENT consult noted    # Afib:  - Home Xarelto resumed    # Anemia:  - Monitor CBC  - Transfuse PRN    # Back pain:   - Multiple areas of upper extremity pain including R shoulder, between shoulder blades and R arm for many days as per daughter. Thought to be 2/2 PT of upper extremities. Also now having LE weakness amongst other issues.   - Tibia/Fibula and Right shoulder x-ray negative for fx  - CT spine completed, fu results     # Depression:  - C/w home meds    # DVT ppx:  - IPCs  - Xarelto     [facility]  [phone]

## 2023-12-17 NOTE — PROGRESS NOTE ADULT - ASSESSMENT
A/p  83M PMHx afib on xarelto, w/ hx of ortho issues, s/p L hip IMN (most recently s/p R hip IMN (Dr. Godinez 10/31/19), mild depression p/w AMS, upper back shoulder and arm pain.      #AMS  -CTH no acute findings  -Infectious w/u per med, ID    #Right Sided HF  -improved  -Venous dopplers negative for DVT  -Change IVP lasix to 20mg  PO daily   -Old echo with normal LVEF, w R sided enlargement and mod-severe TR  -Repeat echo  -Wean supplemental O2 as able    #Afib  -rate controlled off meds  -cont xarelto 20mg qd     #Back pain  -CT scan noted   -Mgmt per med    35 minutes spent on total encounter; more than 50% of the visit was spent counseling and/or coordinating care by the attending physician.

## 2023-12-17 NOTE — PROGRESS NOTE ADULT - ASSESSMENT
Patient is a 83 year old male with PMH of Afib on xarelto, w/ hx of ortho issues, s/p L hip IMN (most recently s/p R hip IMN (Dr. Godinez 10/31/19), mild depression who presented with AMS, upper back shoulder and arm pain.    Altered mental status with hypoxia - c/f pneumonia   possible aspiration, known h/o dysphagia   Urinary retention s/p kiran placement   - afebrile, leukocytosis noted, no new findings, on RA, nontoxic appearing   - CT chest with ill defined airway with subpleural ggo in upper lobes - suspected infectious, suspect aspiration  - Urology following for hematuria, suspected traumatic d/t kiran placement while on Eliquis  - UA with no pyuria, Ucx negative  - CT cervical spine with no acute fractures, has multilevel degenerative changes severe stenosis  - CT lumbar spine with interval development of sacral insufficiency fractures     Recommendations:  ENT and SLP following, plan for FEES on Monday  Continue ceftriaxone 1g IV Q24h - complete total 5d course on 12/19  Monitor temps/WBC  Aspiration precautions       Slim Jackson M.D.  [facility], Division of Infectious Diseases  [phone]  After 5pm on weekdays and all day on weekends - please call [phone] Patient is a 83 year old male with PMH of Afib on xarelto, w/ hx of ortho issues, s/p L hip IMN (most recently s/p R hip IMN (Dr. Godinez 10/31/19), mild depression who presented with AMS, upper back shoulder and arm pain.    Altered mental status with hypoxia - c/f pneumonia   possible aspiration, known h/o dysphagia   Urinary retention s/p kiran placement   - afebrile, leukocytosis noted, no new findings, on RA, nontoxic appearing   - CT chest with ill defined airway with subpleural ggo in upper lobes - suspected infectious, suspect aspiration  - Urology following for hematuria, suspected traumatic d/t kiran placement while on Eliquis  - UA with no pyuria, Ucx negative  - CT cervical spine with no acute fractures, has multilevel degenerative changes severe stenosis  - CT lumbar spine with interval development of sacral insufficiency fractures     Recommendations:  ENT and SLP following, plan for FEES on Monday  Continue ceftriaxone 1g IV Q24h - complete total 5d course on 12/19  Monitor temps/WBC  Aspiration precautions       Slim Jackson M.D.  [facility], Division of Infectious Diseases  [phone]  After 5pm on weekdays and all day on weekends - please call [phone]

## 2023-12-17 NOTE — PROGRESS NOTE ADULT - SUBJECTIVE AND OBJECTIVE BOX
[facility] DIVISION OF INFECTIOUS DISEASES  GIOVANY Ferguson Y. Patel, S. Shah, G. Delmer  [phone]  ([phone] - weekdays after 5pm and weekends)    Name: MARY BO  Age/Gender: 83y Male  MRN: [id number]    Interval History:  Patient seen and examined this morning.   No new complaints noted.  Notes reviewed  No concerning overnight events  Afebrile   Allergies: No Known Allergies      Objective:  Vitals:   T(F): 97.8 (12-17-23 @ 09:32), Max: 98 (12-16-23 @ 15:51)  HR: 68 (12-17-23 @ 09:32) (63 - 94)  BP: 111/77 (12-17-23 @ 09:32) (107/52 - 121/74)  RR: 18 (12-17-23 @ 09:32) (18 - 18)  SpO2: 96% (12-17-23 @ 09:32) (96% - 97%)  Physical Examination:  General: no acute distress, on RA, nontoxic  HEENT: NC/AT, anicteric, neck supple  Respiratory: no acc muscle use, breathing comfortably  Cardiovascular: S1 and S2 present  Gastrointestinal: normal appearing, nondistended  Extremities: no edema, no cyanosis  Skin: no visible rash    Laboratory Studies:  CBC:                       12.4   12.25 )-----------( 229      ( 17 Dec 2023 11:06 )             38.6     WBC Trend:  12.25 12-17-23 @ 11:06  10.63 12-16-23 @ 07:24  9.19 12-15-23 @ 07:08  8.61 12-14-23 @ 07:02  10.43 12-13-23 @ 11:23    CMP: 12-17    138  |  100  |  17  ----------------------------<  108<H>  3.6   |  31  |  0.77    Ca    9.2      17 Dec 2023 11:06      Creatinine: 0.77 mg/dL (12-17-23 @ 11:06)  Creatinine: 0.77 mg/dL (12-16-23 @ 07:22)  Creatinine: 0.83 mg/dL (12-15-23 @ 07:07)  Creatinine: 0.94 mg/dL (12-14-23 @ 07:02)  Creatinine: 1.03 mg/dL (12-13-23 @ 11:23)    Microbiology: reviewed   Culture - Urine (collected 12-13-23 @ 17:42)  Source: Catheterized Catheterized  Final Report (12-15-23 @ 06:27):    <10,000 CFU/mL Normal Urogenital Marla    SARS-CoV-2 Result: NotDete (13 Dec 2023 16:25)    Radiology: reviewed     Medications:  acetaminophen     Tablet .. 650 milliGRAM(s) Oral every 6 hours PRN  ascorbic acid 500 milliGRAM(s) Oral daily  cefTRIAXone   IVPB 1000 milliGRAM(s) IV Intermittent every 24 hours  chlorhexidine 2% Cloths 1 Application(s) Topical daily  escitalopram 10 milliGRAM(s) Oral at bedtime  ferrous    sulfate 325 milliGRAM(s) Oral daily  furosemide   Injectable 20 milliGRAM(s) IV Push daily  melatonin 3 milliGRAM(s) Oral at bedtime PRN  rivaroxaban 20 milliGRAM(s) Oral with dinner  tamsulosin 0.4 milliGRAM(s) Oral at bedtime    Current Antimicrobials:  cefTRIAXone   IVPB 1000 milliGRAM(s) IV Intermittent every 24 hours    Prior/Completed Antimicrobials:   [facility]  GIOVANY Ferguson Y. Patel, S. Shah, G. Delmer  [phone]  ([phone] - weekdays after 5pm and weekends)    Name: MARY BO  Age/Gender: 83y Male  MRN: [id number]    Interval History:  Patient seen and examined this morning.   No new complaints noted.  Notes reviewed  No concerning overnight events  Afebrile   Allergies: No Known Allergies      Objective:  Vitals:   T(F): 97.8 (12-17-23 @ 09:32), Max: 98 (12-16-23 @ 15:51)  HR: 68 (12-17-23 @ 09:32) (63 - 94)  BP: 111/77 (12-17-23 @ 09:32) (107/52 - 121/74)  RR: 18 (12-17-23 @ 09:32) (18 - 18)  SpO2: 96% (12-17-23 @ 09:32) (96% - 97%)  Physical Examination:  General: no acute distress, on RA, nontoxic  HEENT: NC/AT, anicteric, neck supple  Respiratory: no acc muscle use, breathing comfortably  Cardiovascular: S1 and S2 present  Gastrointestinal: normal appearing, nondistended  Extremities: no edema, no cyanosis  Skin: no visible rash    Laboratory Studies:  CBC:                       12.4   12.25 )-----------( 229      ( 17 Dec 2023 11:06 )             38.6     WBC Trend:  12.25 12-17-23 @ 11:06  10.63 12-16-23 @ 07:24  9.19 12-15-23 @ 07:08  8.61 12-14-23 @ 07:02  10.43 12-13-23 @ 11:23    CMP: 12-17    138  |  100  |  17  ----------------------------<  108<H>  3.6   |  31  |  0.77    Ca    9.2      17 Dec 2023 11:06      Creatinine: 0.77 mg/dL (12-17-23 @ 11:06)  Creatinine: 0.77 mg/dL (12-16-23 @ 07:22)  Creatinine: 0.83 mg/dL (12-15-23 @ 07:07)  Creatinine: 0.94 mg/dL (12-14-23 @ 07:02)  Creatinine: 1.03 mg/dL (12-13-23 @ 11:23)    Microbiology: reviewed   Culture - Urine (collected 12-13-23 @ 17:42)  Source: Catheterized Catheterized  Final Report (12-15-23 @ 06:27):    <10,000 CFU/mL Normal Urogenital Marla    SARS-CoV-2 Result: NotDete (13 Dec 2023 16:25)    Radiology: reviewed     Medications:  acetaminophen     Tablet .. 650 milliGRAM(s) Oral every 6 hours PRN  ascorbic acid 500 milliGRAM(s) Oral daily  cefTRIAXone   IVPB 1000 milliGRAM(s) IV Intermittent every 24 hours  chlorhexidine 2% Cloths 1 Application(s) Topical daily  escitalopram 10 milliGRAM(s) Oral at bedtime  ferrous    sulfate 325 milliGRAM(s) Oral daily  furosemide   Injectable 20 milliGRAM(s) IV Push daily  melatonin 3 milliGRAM(s) Oral at bedtime PRN  rivaroxaban 20 milliGRAM(s) Oral with dinner  tamsulosin 0.4 milliGRAM(s) Oral at bedtime    Current Antimicrobials:  cefTRIAXone   IVPB 1000 milliGRAM(s) IV Intermittent every 24 hours    Prior/Completed Antimicrobials:

## 2023-12-18 PROCEDURE — 74230 X-RAY XM SWLNG FUNCJ C+: CPT | Mod: 26

## 2023-12-18 PROCEDURE — 76376 3D RENDER W/INTRP POSTPROCES: CPT | Mod: 26

## 2023-12-18 PROCEDURE — 93356 MYOCRD STRAIN IMG SPCKL TRCK: CPT

## 2023-12-18 PROCEDURE — 93306 TTE W/DOPPLER COMPLETE: CPT | Mod: 26

## 2023-12-18 RX ADMIN — Medication 325 MILLIGRAM(S): at 12:52

## 2023-12-18 RX ADMIN — CEFTRIAXONE 100 MILLIGRAM(S): 500 INJECTION, POWDER, FOR SOLUTION INTRAMUSCULAR; INTRAVENOUS at 12:52

## 2023-12-18 RX ADMIN — ESCITALOPRAM OXALATE 10 MILLIGRAM(S): 10 TABLET, FILM COATED ORAL at 21:43

## 2023-12-18 RX ADMIN — TAMSULOSIN HYDROCHLORIDE 0.4 MILLIGRAM(S): 0.4 CAPSULE ORAL at 21:43

## 2023-12-18 RX ADMIN — RIVAROXABAN 20 MILLIGRAM(S): KIT at 17:35

## 2023-12-18 RX ADMIN — Medication 500 MILLIGRAM(S): at 12:52

## 2023-12-18 RX ADMIN — Medication 20 MILLIGRAM(S): at 05:45

## 2023-12-18 RX ADMIN — CHLORHEXIDINE GLUCONATE 1 APPLICATION(S): 213 SOLUTION TOPICAL at 12:52

## 2023-12-18 NOTE — PROGRESS NOTE ADULT - ASSESSMENT
Patient is a 83 year old male with PMH of Afib on xarelto, w/ hx of ortho issues, s/p L hip IMN (most recently s/p R hip IMN (Dr. Godinez 10/31/19), mild depression who presented with AMS, upper back shoulder and arm pain.    Altered mental status with hypoxia - c/f pneumonia   possible aspiration, known h/o dysphagia   Urinary retention s/p kiran placement   - afebrile, leukocytosis noted, no new findings, on RA, nontoxic appearing   - CT chest with ill defined airway with subpleural ggo in upper lobes - suspected infectious, suspect aspiration  - Urology following for hematuria, suspected traumatic d/t kiran placement while on Eliquis  - UA with no pyuria, Ucx negative  - CT cervical spine with no acute fractures, has multilevel degenerative changes severe stenosis  - CT lumbar spine with interval development of sacral insufficiency fractures     Recommendations:  ENT and SLP following, plan for FEES today  Continue ceftriaxone 1g IV Q24h - complete total 5d course on 12/19  Monitor temps/WBC  Aspiration precautions       Slim Jackson M.D.  [facility], Division of Infectious Diseases  [phone]  After 5pm on weekdays and all day on weekends - please call [phone] Patient is a 83 year old male with PMH of Afib on xarelto, w/ hx of ortho issues, s/p L hip IMN (most recently s/p R hip IMN (Dr. Godinez 10/31/19), mild depression who presented with AMS, upper back shoulder and arm pain.    Altered mental status with hypoxia - c/f pneumonia   possible aspiration, known h/o dysphagia   Urinary retention s/p kiran placement   - afebrile, leukocytosis noted, no new findings, on RA, nontoxic appearing   - CT chest with ill defined airway with subpleural ggo in upper lobes - suspected infectious, suspect aspiration  - Urology following for hematuria, suspected traumatic d/t kiran placement while on Eliquis  - UA with no pyuria, Ucx negative  - CT cervical spine with no acute fractures, has multilevel degenerative changes severe stenosis  - CT lumbar spine with interval development of sacral insufficiency fractures     Recommendations:  ENT and SLP following, plan for FEES today  Continue ceftriaxone 1g IV Q24h - complete total 5d course on 12/19  Monitor temps/WBC  Aspiration precautions       Slim Jackson M.D.  [facility], Division of Infectious Diseases  [phone]  After 5pm on weekdays and all day on weekends - please call [phone]

## 2023-12-18 NOTE — SWALLOW VFSS/MBS ASSESSMENT ADULT - INCOMPLETE EPIGLOTTIC DEFLECTION
Adult/ Internal Medicine Teaching Service (AMTS/IMTS)  History and Physical   Patient: Kirill Bhakta Admission Length: 0 Days IMTS Intern: Ela Mcgraw MD   YOB: 1994 Admission Date: 4/25/2019 IMTS Team Color: YELLOW   MRN: [id number] Admitting Physician: No admitting provider for patient encounter. Outpatient PCP: No Pcp     History and Physical     Chief Complaint:  Chief Complaint   Patient presents with   • Abdominal Pain    Admission Indication:  Alcohol-induced acute pancreatitis, unspecified complication status [K85.20]     HPI: This patient is a 24 year old male with a past medical history significant for, but not limited to alcohol and marijuana abuse, acute pancreatitis, who presented to the ER 4/25/2019 with chief compliant of 10/10 upper epigastric pain radiating to his back, increases with food, and movement. Patient also endorses nausea, constipation for the last 2 days, decreased appetite. She denies fever, chills, chest pain, vomiting, diarrhea, or dysuria. Pain feels similar to previous acute pancreatitis 12/2018, he admits to binge of drinking alcohol 2 drinks of vodka in additional to beer, last drink was yesterday.Current every day smoker. Endorses marijuana, denies other illicit drugs. PTA medications only Tylenol. Denies  personal or family history of gallstones    ED Course: Patient was hypertensive 160/94, HR 73, RR 20, saturating well on room air, UA negative for infection, hemogram unremarkable, no leukocytosis, electrolytes within normal, AST 60, lipase 1, 644. She was given one bolus of normal saline, Zofran, morphine, and admitted to the internal medicine teaching service for further management    Past Medical / Family / Surgical / Social History   ALLERGIES:  No Known Allergies  Past Medical History    Pancreatitis                                                  Social History     Tobacco Use   • Smoking status: Current Every Day Smoker     Packs/day: 0.25     Types:  Cigarettes   • Smokeless tobacco: Never Used   Substance Use Topics   • Alcohol use: Yes     Comment: 2 pints per day   • Drug use: Yes     Frequency: 4.0 times per week     Types: Marijuana     Social History     Social History Narrative   • Not on file    Past Surgical History  There is no previous surgical history on file.    No family history on file.     Home Medications     Outpatient Medications Marked as Taking for the 4/25/19 encounter (Hospital Encounter)   Medication Sig Dispense Refill   • acetaminophen (TYLENOL) 500 MG tablet Take 1 tablet by mouth every 6 hours as needed for Pain. 30 tablet 0       Review of System     GEN  Denies: fevers, chills, weight loss/gain, fatigue, or generalized weakness   EYES  Denies: vision changes, eye pain, photophobia or occular discharge   ENT  Denies: sore throat, nasal congestion, nasal discharge, epistaxis, tinnitus, hearing loss   CV  Denies: chest pain, palpitations, dyspnea on exertion, orthopnea, paroxysmal nocturnal dyspnea, or lower extremity edema   PULM Denies: cough, shortness of breath, sputum, or wheezing   GI  Endorses: Nausea, Constipation, abdominal pain   Denies:  vomiting, diarrhea,  change in appetite, heartburn or reflux, melena, or hematochezia     Denies: dysuria, hematuria, polyuria, incontinence, or discharge   MSKT  Denies: myalgia, arthralgia, weakness, joint swelling, or joint erythema   HEME  LYMPH  Denies: swollen lymph nodes, bleeding, or easy bruising   SKIN  Denies: rashes, itching, or jaundice   ENDO  Denies: heat or cold intolerance, or excessive thirst   NEURO  Denies: headaches, loss of consciousness, change in mental status, seizures, sensory deficits, motor deficits, or unsteady gait, falls   PSYCH  Endorses:Depresison, substance abuse   Denies: anxiety   Delete \"Endorses\" statement if system negative.    Physical Exam     Visit Vitals  BP (!) 159/101   Pulse 57   Temp 98 °F (36.7 °C) (Oral)   Resp 20   Ht 6' 3\" (1.905 m)   Wt  87.4 kg   SpO2 98%   BMI 24.07 kg/m²       Gen: Layin in bed, appears in distress, Girlfriend at bedside, withdrawn, -american male   HEENT:  No scleral icterus or conjunctival pallor, no nasal discharge, MMM, oropharynx without erythema or exudate   Neck:  Trachea midline, supple, no cervical or supraclavicular lymphadenopathy or masses.   Cardio:   RRR, no M/R/G, S1/S2 normal, no JVD, no B/L LE edema.   Resp:   Clear to auscultation and percussion bilaterally, no retractions or increased work of breathing   GI:   Hypoactive BS, non-distended, soft, mild diffuse tenderness, Mcburney negative, Murophy negative, no rebound or guarding, no hepatosplenomegaly   : No suprapubic or costovertebral angle tenderness.   MS:  ROM normal throughout. No clubbing, edema, or cyanosis.   Skin:   No rashes/lesions/ecchymoses/jaundice.   Neuro:  A/O x3. EOMI, CN 2-12 grossly intact. No gross motor or sensory deficits   Psych:   Flat  mood/affect. The patient is alert, interactive, appropriate.     Body mass index is 24.07 kg/m². Weight    04/25/19 1116   Weight: 87.4 kg        Labs (Abridged)     Recent Labs   Lab 04/25/19  1234   SODIUM 138   POTASSIUM 3.6   CHLORIDE 100   CO2 26   ANIONGAP 16   BUN 8   CREATININE 0.88   GLUCOSE 100*   CALCIUM 9.9   ALBUMIN 4.2   AST 60*   GPT 63   ALKPT 90   BILIRUBIN 0.7   LIPA 1,644*    Recent Labs   Lab 04/25/19  1234   WBC 4.7   ANEUT 3.4   RBC 4.74   HGB 15.5   HCT 44.4        No results found       Imaging (Abridged)   None    Assessment and Plan   Kirill Bhakta is a 24 year old male admitted on 4/25/2019 with acute pancreatitis   Further evaluation and work up as follows:    #Alcohol induced Acute pancreatitis, as per HPI, patient endorses heavy alcohol consumption, not on any medication, this is his second episode, will rule out other etiologies as well, he has no bowel movement/gas for 2 days, elevated AST consistent with alcoholism.   -Calcium 9.9, Lipase 1,644   - IV   cc/hour   - IV morphine 2mg QH4 , increase to QH2 if pain is uncontrolled   - NPO, advance to  clear liquids if tolerated   -Zofran PRN/EKG ordered /check QT   - KUB, will consider RUQ ultrasound   - Lipid panel       #Alcoho dependence and substance abuse  -Urine drug screen  -[facility] protocol   - alcohol not detected on admission. Monitor closely for withdrawal symtoms  - Councel regarding alcohol abuse, once more stable    #Depression, patient states he is DNR-MMS, denies any suicidal or homicidal thoughts.   - Depression screening when patient is more stable    #Hypertension, likely stress related, BP on admission 160/94   -Hydrazine PRN       Nutrition: Npo Diet With Exceptions; Medications  GI Prophylaxis: Pantoprazole IV  DVT Prophylaxis: Enoxaparin SQ DVT ppx  VTE Pharmacologic Prophylaxis: Yes  VTE Mechanical Prophylaxis: Yes    Disposition   The patient is expected to require at least a two midnight stay in the hospital.   Anticipated disposition to: Home   Anticipated discharge date:    Anticipated barriers to discharge:      Best Practices - MI - CHF - STROKE - DVT/VTE -    CMS: NA  ____________________________  Ela Mcgraw MD PGY-1   4/25/2019 1:57 PM  Pager: [phone]    The patient's history and physical were dicussed with  Dr. Carter MD who, upon seeing the patient agreed with the above assessment and plan.    Attending Addendum:        Team Color: YELLOW     As of 1:57 PM on 04/25/19 until 19:00  Primary Team Contact: Ela Mcgraw MD / Pager: [phone]   Starting at 7:00 on 04/26/19   Primary Team Contact: Lucien / Pager: [phone]     [facility] Cross Cover Intern:    / [phone]  Covers all AMTS/IMTS patients during the following hours:  Mon - Fri: 8:00 PM to 7:00 AM  Sat - Sun: 11:00 AM to 7:00 AM  [facility] Intern:    / [phone]  Covers all AMTS/IMTS patients during the following hours:  Mon - Fri: 6:00 PM to 7:00 AM  Sat - Sun: 11:00 AM to 7:00 AM      -  Attending -   PrimaryPhysician  - Resident -   Team Senior - Intern -   Team Jef      Treatment Team: Attending Provider: Loretta Carvalho MD; Resident: Shari Orellana MD; Registered Nurse: Bobo Odom RN; Resident: Ela Mcgraw MD      AMTS/IMTS DC Summary. Dept. of Internal Medicine/ IMTS/ AMTS. Rev. 2.4; 03/15/16.   Support/ Technical Difficulties: [email or web address]      complete

## 2023-12-18 NOTE — SWALLOW VFSS/MBS ASSESSMENT ADULT - LARYNGEAL PENETRATION DURING THE SWALLOW - SILENT
Cup; trace laryngeal penetration over the AE folds w/ complete retrieval TSP; to the level of the vocal cords; not retrieved. Trace remains in laryngeal vestibule./Trace

## 2023-12-18 NOTE — SWALLOW VFSS/MBS ASSESSMENT ADULT - ADDITIONAL RECOMMENDATIONS
Goals:   1. Pt/family/caregiver will demonstrate understanding and carryover of dysphagia management (safe swallow guidelines, compensatory strategies, dysphagia diet).  2. Pt will complete dysphagia exercises to improve swallow function.  3. Pt will tolerate recommended diet with no overt, clinical s/s of aspiration.    Phone call made to daughter (Samaria) to discuss findings of MBS. All questions answered.

## 2023-12-18 NOTE — SWALLOW VFSS/MBS ASSESSMENT ADULT - SLP GENERAL OBSERVATIONS
Pt encountered upright in SOHAIL chair, RA, awake/alert, dysarthric w/ periods of stutter (daughter endorsed somewhat at baseline - may be slightly worsened), hoarse/hypophonic vocal quality, and periods of confusion. Pt Aox2, pleasant and cooperative.

## 2023-12-18 NOTE — PROGRESS NOTE ADULT - SUBJECTIVE AND OBJECTIVE BOX
SUBJECTIVE / OVERNIGHT EVENTS:    Patient seen and examined at bedside. No events noted overnight. Resting comfortably in bed      --------------------------------------------------------------------------------------------  LABS:                        12.4   12.25 )-----------( 229      ( 17 Dec 2023 11:06 )             38.6     12-17    138  |  100  |  17  ----------------------------<  108<H>  3.6   |  31  |  0.77    Ca    9.2      17 Dec 2023 11:06        CAPILLARY BLOOD GLUCOSE            Urinalysis Basic - ( 17 Dec 2023 11:06 )    Color: x / Appearance: x / SG: x / pH: x  Gluc: 108 mg/dL / Ketone: x  / Bili: x / Urobili: x   Blood: x / Protein: x / Nitrite: x   Leuk Esterase: x / RBC: x / WBC x   Sq Epi: x / Non Sq Epi: x / Bacteria: x        RADIOLOGY & ADDITIONAL TESTS:     Imaging Personally Reviewed:  [x] YES  [ ] NO    Consultant(s) Notes Reviewed:  [x] YES  [ ] NO    MEDICATIONS  (STANDING):  ascorbic acid 500 milliGRAM(s) Oral daily  cefTRIAXone   IVPB 1000 milliGRAM(s) IV Intermittent every 24 hours  chlorhexidine 2% Cloths 1 Application(s) Topical daily  escitalopram 10 milliGRAM(s) Oral at bedtime  ferrous    sulfate 325 milliGRAM(s) Oral daily  furosemide    Tablet 20 milliGRAM(s) Oral daily  rivaroxaban 20 milliGRAM(s) Oral with dinner  tamsulosin 0.4 milliGRAM(s) Oral at bedtime    MEDICATIONS  (PRN):  acetaminophen     Tablet .. 650 milliGRAM(s) Oral every 6 hours PRN Temp greater or equal to 38C (100.4F), Mild Pain (1 - 3)  melatonin 3 milliGRAM(s) Oral at bedtime PRN Insomnia      Care Discussed with Consultants/Other Providers [x] YES  [ ] NO    Vital Signs Last 24 Hrs  T(C): 36.7 (18 Dec 2023 07:45), Max: 37 (18 Dec 2023 00:00)  T(F): 98.1 (18 Dec 2023 07:45), Max: 98.6 (18 Dec 2023 00:00)  HR: 75 (18 Dec 2023 07:45) (71 - 83)  BP: 122/67 (18 Dec 2023 07:45) (109/66 - 124/78)  BP(mean): --  RR: 18 (18 Dec 2023 07:45) (18 - 18)  SpO2: 93% (18 Dec 2023 07:45) (93% - 97%)    Parameters below as of 18 Dec 2023 07:45  Patient On (Oxygen Delivery Method): room air      I&O's Summary    17 Dec 2023 07:01  -  18 Dec 2023 07:00  --------------------------------------------------------  IN: 0 mL / OUT: 2300 mL / NET: -2300 mL    18 Dec 2023 07:01  -  18 Dec 2023 12:14  --------------------------------------------------------  IN: 0 mL / OUT: 300 mL / NET: -300 mL      PHYSICAL EXAM:  GENERAL: NAD, well-developed, comfortable  HEAD:  Atraumatic, Normocephalic  EYES: EOMI, PERRLA, conjunctiva and sclera clear  NECK: Supple, No JVD  CHEST/LUNG: Diminished bilaterally; No wheeze  HEART: Regular rate and rhythm; No murmurs, rubs, or gallops  ABDOMEN: Soft, Nontender, Nondistended; Bowel sounds present  NEURO: AAOx3, no focal weakness, 5/5 b/l extremity strength, b/l knee no arthritis, no effusion   EXTREMITIES:  2+ Peripheral Pulses, No clubbing, cyanosis, or edema  SKIN: No rashes or lesions, + kiran

## 2023-12-18 NOTE — SWALLOW VFSS/MBS ASSESSMENT ADULT - COMMENTS
Acute encephalopathy - Pt AAOx3 but otherwise somewhat confused. Daughter states some of this has been ongoing, was placed on puree diet for dysphagia issues at rehab. Has decub now. Could be in setting of urinary retention and deconditioning, CTH negative, infectious work up negative so far.   Urology consulted for hematuria, likely from traumatic kiran placement in setting of Eliquis.   12/14 provider contact note - Pt said he felt SOB, O2 79%, placed on 2LNC    CT CHEST IMPRESSION: Ill-defined airway associated subpleural groundglass opacities in the upper lobes, likely infectious. Right greater than left small pleural effusions. cardiomegaly.  CT CERVICAL SPINE IMPRESSION: No acute fracture. Multilevel degenerative changes resulting in severe spinal canal and right neuroforaminal stenosis at C3-C4 and C4-C5. This appears to been present on MRI cervical spine 12/9/2022. Follow-up MRI can be considered to better assess for any interval changes if not contraindicated. 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    SWALLOW HISTORY: No reports in SCM or in PACS prior to this admission.  *Pt seen 12/14 for bedside swallow evaluation w/ recommendations for minced-moist/mildly thick liquids. 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 Planned for radiation, however given recent hospital admission for PNA and d/c to rehab, radiation postpone. Daughter inquiring about a second opinion. Suggest Heme/Onc consult to further assess.  12/15 pt seen by ENT - Indirect laryngoscopy shows Right lesion on posterior region of vocal folds. Bilateral vocal folds mobile and intact with good closure, Airway patent.

## 2023-12-18 NOTE — SWALLOW VFSS/MBS ASSESSMENT ADULT - MODE OF PRESENTATION
cup/spoon/self fed/fed by clinician cup/spoon/straw/fed by clinician spoon/self fed/fed by clinician spoon/fed by clinician fed by clinician

## 2023-12-18 NOTE — SWALLOW VFSS/MBS ASSESSMENT ADULT - ORAL PHASE
Uncontrolled bolus / spillover in viki-pharynx/Uncontrolled bolus / spillover in hypopharynx Mild oral residue/Delayed oral transit time Mild oral residue/Uncontrolled bolus / spillover in viki-pharynx/Uncontrolled bolus / spillover in hypopharynx Delayed oral transit time

## 2023-12-18 NOTE — SWALLOW VFSS/MBS ASSESSMENT ADULT - NS SWALLOW VFSS REC ASPIR MON
Monitor for s/s aspiration/laryngeal penetration. If noted:  D/C p.o. intake, provide non-oral nutrition/hydration/meds, and contact this service @ [phone]/change of breathing pattern/cough/gurgly voice/fever/pneumonia/throat clearing/upper respiratory infection Monitor for s/s aspiration/laryngeal penetration. If noted:  D/C p.o. intake, provide non-oral nutrition/hydration/meds, and contact this service @ [phone]/change of breathing pattern/cough/gurgly voice/fever/pneumonia/throat clearing/upper respiratory infection

## 2023-12-18 NOTE — PROGRESS NOTE ADULT - ASSESSMENT
A/p  83M PMHx afib on xarelto, w/ hx of ortho issues, s/p L hip IMN (most recently s/p R hip IMN (Dr. Godinez 10/31/19), mild depression p/w AMS, upper back shoulder and arm pain.      #AMS  -CTH no acute findings  -Infectious w/u per med, ID    #Right Sided HF  -improved  -Venous dopplers negative for DVT  -cont laisx 20mg po qd  -f/u echo   -Old echo with normal LVEF, w R sided enlargement and mod-severe TR    #Afib  -rate controlled off meds  -cont xarelto 20mg qd     #Back pain  -CT scan noted   -Mgmt per med    35 minutes spent on total encounter; more than 50% of the visit was spent counseling and/or coordinating care by the attending physician.

## 2023-12-18 NOTE — SWALLOW VFSS/MBS ASSESSMENT ADULT - RESIDUE IN PYRIFORM SINUSES
Moderate amount post swallow for minced-moist solids upon re-flouro; cleared w/ following soft-bite sized trial. ?retrograde flow through UES, not reduplicated w/ further trials. Trace

## 2023-12-18 NOTE — PROGRESS NOTE ADULT - ASSESSMENT
83M PMHx afib on xarelto, w/ hx of ortho issues, s/p L hip IMN (most recently s/p R hip IMN (Dr. Godinez 10/31/19), mild depression p/w acute encephalopathy, upper back shoulder and arm pain    Plan:  # Acute encephalopathy/ Aspiration pneumonia:  - Pt AAOx3 but otherwise somewhat confused. Daughter states some of this has been ongoing, was placed on puree diet for dysphagia issues at rehab. Has decub now. Could be in setting of urinary retention and deconditioning  - CTH negative, infectious work up negative so far   - Speech/ swallow consult and dysphagia diet  - Falls precautions, aspiration precautions  - Abx mgmt per ID-- ceftriaxone complete total 5d course on 12/19  - PT consult  - UCx NGTD  - Monitor temps/WBC  - ENT and SLP following, plan for FEES today  - ID following    # Lower extremity edema/ Acute hfpef:   - As per chart, daughter endorsing worse swelling in legs  - Elevated BNP  - Old echo with normal LVEF, w R sided enlargement and mod-severe TR  - Repeat echo pending  - C/w diuresis per Cards   - LE Duplex negative for DVT  - Cards following    # Urinary retention:  - Kiran placed in ED by Urology  - Strict I/Os  - Hematuria noted - likely from traumatic kiran placement-- resolved   - Monitor urine color  - Monitor h/h  - C/w Flomax  - Urology following    # Hx of Vocal Cord Lesion:  - FEES Monday  - ENT consult noted    # Afib:  - C/w Xarelto     # Anemia:  - Monitor CBC  - Transfuse PRN    # Back pain:   - Multiple areas of upper extremity pain including R shoulder, between shoulder blades and R arm for many days as per daughter. Thought to be 2/2 PT of upper extremities. Also now having LE weakness amongst other issues.   - Tibia/Fibula and Right shoulder x-ray negative for fx  - CT spine reviewed  - Pain control    # Depression:  - C/w current meds    # DVT ppx:  - IPCs  - Xarelto     [facility]  [phone]   83M PMHx afib on xarelto, w/ hx of ortho issues, s/p L hip IMN (most recently s/p R hip IMN (Dr. Godinez 10/31/19), mild depression p/w acute encephalopathy, upper back shoulder and arm pain    Plan:  # Acute encephalopathy/ Aspiration pneumonia:  - Pt AAOx3 but otherwise somewhat confused. Daughter states some of this has been ongoing, was placed on puree diet for dysphagia issues at rehab. Has decub now. Could be in setting of urinary retention and deconditioning  - CTH negative, infectious work up negative so far   - Speech/ swallow consult and dysphagia diet  - Falls precautions, aspiration precautions  - Abx mgmt per ID-- ceftriaxone complete total 5d course on 12/19  - PT consult  - UCx NGTD  - Monitor temps/WBC  - ENT and SLP following, plan for FEES today  - ID following    # Lower extremity edema/ Acute hfpef:   - As per chart, daughter endorsing worse swelling in legs  - Elevated BNP  - Old echo with normal LVEF, w R sided enlargement and mod-severe TR  - Repeat echo pending  - C/w diuresis per Cards   - LE Duplex negative for DVT  - Cards following    # Urinary retention:  - Kiran placed in ED by Urology  - Strict I/Os  - Hematuria noted - likely from traumatic kiran placement-- resolved   - Monitor urine color  - Monitor h/h  - C/w Flomax  - Urology following    # Hx of Vocal Cord Lesion:  - FEES Monday  - ENT consult noted    # Afib:  - C/w Xarelto     # Anemia:  - Monitor CBC  - Transfuse PRN    # Back pain:   - Multiple areas of upper extremity pain including R shoulder, between shoulder blades and R arm for many days as per daughter. Thought to be 2/2 PT of upper extremities. Also now having LE weakness amongst other issues.   - Tibia/Fibula and Right shoulder x-ray negative for fx  - CT spine reviewed  - Pain control    # Depression:  - C/w current meds    # DVT ppx:  - IPCs  - Xarelto     [facility]  [phone]

## 2023-12-18 NOTE — SWALLOW VFSS/MBS ASSESSMENT ADULT - DEMONSTRATES NEED FOR REFERRAL TO ANOTHER SERVICE
Consider Heme/Onc consult; daughter inquiring about second opinion to see if pt is a candidate for resection vs. radiation

## 2023-12-18 NOTE — PROGRESS NOTE ADULT - SUBJECTIVE AND OBJECTIVE BOX
[facility]  GIOVANY Ferguson Y. Patel, S. Shah, G. Delmer  [phone]  ([phone] - weekdays after 5pm and weekends)    Name: MARY BO  Age/Gender: 83y Male  MRN: [id number]    Interval History:  Patient seen and examined this morning.   Denies fever, pain or any new complaints.   Notes reviewed  No concerning overnight events  Afebrile   Allergies: No Known Allergies      Objective:  Vitals:   T(F): 98.6 (12-18-23 @ 00:00), Max: 98.6 (12-18-23 @ 00:00)  HR: 83 (12-18-23 @ 00:00) (68 - 83)  BP: 113/74 (12-18-23 @ 00:00) (109/66 - 124/78)  RR: 18 (12-18-23 @ 00:00) (18 - 18)  SpO2: 93% (12-18-23 @ 00:00) (93% - 97%)  Physical Examination:  General: no acute distress, on RA  HEENT: NC/AT, anicteric, neck supple  Respiratory: no acc muscle use, breathing comfortably  Cardiovascular: S1 and S2 present  Gastrointestinal: normal appearing, nondistended  Neuro: AAOx3, no obvious focal deficits   Extremities: no edema, no cyanosis  Skin: no visible rash    Laboratory Studies:  CBC:                       12.4   12.25 )-----------( 229      ( 17 Dec 2023 11:06 )             38.6     WBC Trend:  12.25 12-17-23 @ 11:06  10.63 12-16-23 @ 07:24  9.19 12-15-23 @ 07:08  8.61 12-14-23 @ 07:02  10.43 12-13-23 @ 11:23    CMP: 12-17    138  |  100  |  17  ----------------------------<  108<H>  3.6   |  31  |  0.77    Ca    9.2      17 Dec 2023 11:06      Creatinine: 0.77 mg/dL (12-17-23 @ 11:06)  Creatinine: 0.77 mg/dL (12-16-23 @ 07:22)  Creatinine: 0.83 mg/dL (12-15-23 @ 07:07)  Creatinine: 0.94 mg/dL (12-14-23 @ 07:02)  Creatinine: 1.03 mg/dL (12-13-23 @ 11:23)    Microbiology: reviewed   Culture - Urine (collected 12-13-23 @ 17:42)  Source: Catheterized Catheterized  Final Report (12-15-23 @ 06:27):    <10,000 CFU/mL Normal Urogenital Marla    SARS-CoV-2 Result: NotDetec (13 Dec 2023 16:25)    Radiology: reviewed     Medications:  acetaminophen     Tablet .. 650 milliGRAM(s) Oral every 6 hours PRN  ascorbic acid 500 milliGRAM(s) Oral daily  cefTRIAXone   IVPB 1000 milliGRAM(s) IV Intermittent every 24 hours  chlorhexidine 2% Cloths 1 Application(s) Topical daily  escitalopram 10 milliGRAM(s) Oral at bedtime  ferrous    sulfate 325 milliGRAM(s) Oral daily  furosemide    Tablet 20 milliGRAM(s) Oral daily  melatonin 3 milliGRAM(s) Oral at bedtime PRN  rivaroxaban 20 milliGRAM(s) Oral with dinner  tamsulosin 0.4 milliGRAM(s) Oral at bedtime    Current Antimicrobials:  cefTRIAXone   IVPB 1000 milliGRAM(s) IV Intermittent every 24 hours    Prior/Completed Antimicrobials:   [facility]  GIOVANY Ferguson Y. Patel, S. Shah, G. Delmer  [phone]  ([phone] - weekdays after 5pm and weekends)    Name: MARY BO  Age/Gender: 83y Male  MRN: [id number]    Interval History:  Patient seen and examined this morning.   Denies fever, pain or any new complaints.   Notes reviewed  No concerning overnight events  Afebrile   Allergies: No Known Allergies      Objective:  Vitals:   T(F): 98.6 (12-18-23 @ 00:00), Max: 98.6 (12-18-23 @ 00:00)  HR: 83 (12-18-23 @ 00:00) (68 - 83)  BP: 113/74 (12-18-23 @ 00:00) (109/66 - 124/78)  RR: 18 (12-18-23 @ 00:00) (18 - 18)  SpO2: 93% (12-18-23 @ 00:00) (93% - 97%)  Physical Examination:  General: no acute distress, on RA  HEENT: NC/AT, anicteric, neck supple  Respiratory: no acc muscle use, breathing comfortably  Cardiovascular: S1 and S2 present  Gastrointestinal: normal appearing, nondistended  Neuro: AAOx3, no obvious focal deficits   Extremities: no edema, no cyanosis  Skin: no visible rash    Laboratory Studies:  CBC:                       12.4   12.25 )-----------( 229      ( 17 Dec 2023 11:06 )             38.6     WBC Trend:  12.25 12-17-23 @ 11:06  10.63 12-16-23 @ 07:24  9.19 12-15-23 @ 07:08  8.61 12-14-23 @ 07:02  10.43 12-13-23 @ 11:23    CMP: 12-17    138  |  100  |  17  ----------------------------<  108<H>  3.6   |  31  |  0.77    Ca    9.2      17 Dec 2023 11:06      Creatinine: 0.77 mg/dL (12-17-23 @ 11:06)  Creatinine: 0.77 mg/dL (12-16-23 @ 07:22)  Creatinine: 0.83 mg/dL (12-15-23 @ 07:07)  Creatinine: 0.94 mg/dL (12-14-23 @ 07:02)  Creatinine: 1.03 mg/dL (12-13-23 @ 11:23)    Microbiology: reviewed   Culture - Urine (collected 12-13-23 @ 17:42)  Source: Catheterized Catheterized  Final Report (12-15-23 @ 06:27):    <10,000 CFU/mL Normal Urogenital Marla    SARS-CoV-2 Result: NotDetec (13 Dec 2023 16:25)    Radiology: reviewed     Medications:  acetaminophen     Tablet .. 650 milliGRAM(s) Oral every 6 hours PRN  ascorbic acid 500 milliGRAM(s) Oral daily  cefTRIAXone   IVPB 1000 milliGRAM(s) IV Intermittent every 24 hours  chlorhexidine 2% Cloths 1 Application(s) Topical daily  escitalopram 10 milliGRAM(s) Oral at bedtime  ferrous    sulfate 325 milliGRAM(s) Oral daily  furosemide    Tablet 20 milliGRAM(s) Oral daily  melatonin 3 milliGRAM(s) Oral at bedtime PRN  rivaroxaban 20 milliGRAM(s) Oral with dinner  tamsulosin 0.4 milliGRAM(s) Oral at bedtime    Current Antimicrobials:  cefTRIAXone   IVPB 1000 milliGRAM(s) IV Intermittent every 24 hours    Prior/Completed Antimicrobials:

## 2023-12-18 NOTE — SWALLOW VFSS/MBS ASSESSMENT ADULT - ASPIRATION DURING THE SWALLOW - SILENT
Single CUP sips; over laryngeal surface of the epiglottis and AE folds; pt insensate. Material descends along anterior trachea. Cough ineffective in ejecting/Mild

## 2023-12-18 NOTE — SWALLOW VFSS/MBS ASSESSMENT ADULT - DIAGNOSTIC IMPRESSIONS
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 Pt p/w oropharyngeal dysphagia, suspected to be chronic given multiple admissions for PNA as per daughter. Reduced bolus control resulting in spillover to hypopharynx, delayed pharyngeal trigger, reduced laryngeal vestibule closure, and reduced subglottic sensation results in silent aspiration for thin liquids and laryngeal penetration w/ complete retrieval for mildly thick liquids. Compensatory strategies (i.e. chin tuck) ineffective in improving airway protection. Mastication is slowed, however efficient for solid textures. Pharyngeal residue is trace-mild. Liquid modification warranted at this time. Suggest ongoing dysphagia tx given current oropharyngeal dysphagia and plans for radiation in setting of H+N CA.     Disorders: reduced tongue-palate seal, reduced lingual strength/control, delayed initiation of pharyngeal trigger, reduced supraglottic sensation, reduced subglottic sensation.

## 2023-12-18 NOTE — PROGRESS NOTE ADULT - SUBJECTIVE AND OBJECTIVE BOX
CARDIOLOGY FOLLOW UP NOTE - DR. ALVES    Patient Name: MARY BO    Date of Service: 12-18-23 @ 14:09    at echo   no new events      Subjective:    cv: denies chest pain, dyspnea, palpitations, dizziness  pulmonary: denies cough  GI: denies abdominal pain, nausea, vomiting  vascular/legs: no edema   skin: no rash  ROS: otherwise negative   overnight events:      PHYSICAL EXAM:  T(C): 36.7 (12-18-23 @ 07:45), Max: 37 (12-18-23 @ 00:00)  HR: 75 (12-18-23 @ 07:45) (71 - 83)  BP: 122/67 (12-18-23 @ 07:45) (109/66 - 124/78)  RR: 18 (12-18-23 @ 07:45) (18 - 18)  SpO2: 93% (12-18-23 @ 07:45) (93% - 97%)  Wt(kg): --  I&O's Summary    17 Dec 2023 07:01  -  18 Dec 2023 07:00  --------------------------------------------------------  IN: 0 mL / OUT: 2300 mL / NET: -2300 mL    18 Dec 2023 07:01  -  18 Dec 2023 14:09  --------------------------------------------------------  IN: 0 mL / OUT: 300 mL / NET: -300 mL      Daily     Daily     Appearance: Normal	  Cardiovascular: Normal S1 S2,RRR, No JVD, No murmurs  Respiratory: Lungs clear to auscultation	  Gastrointestinal:  Soft, Non-tender, + BS	  Extremities: Normal range of motion, less edema      Home Medications:  ascorbic acid 500 mg oral tablet: 1 tab(s) orally once a day (13 Dec 2023 16:41)  cholecalciferol 25 mcg (1000 intl units) oral tablet: 1 tab(s) orally once a day (13 Dec 2023 16:41)  DuoNeb 0.5 mg-2.5 mg/3 mL inhalation solution: 3 milliliter(s) by nebulizer every 6 hours as needed (13 Dec 2023 16:41)  ferrous sulfate 325 mg (65 mg elemental iron) oral tablet: 1 tab(s) orally once a day (13 Dec 2023 16:41)  Lexapro 10 mg oral tablet: 1 tab(s) orally once a day (in the evening) (13 Dec 2023 16:41)  Multiple Vitamins oral tablet: 1 tab(s) orally once a day (13 Dec 2023 16:41)  Xarelto 20 mg oral tablet: 1 tab(s) orally once a day (in the evening) (13 Dec 2023 16:41)      MEDICATIONS  (STANDING):  ascorbic acid 500 milliGRAM(s) Oral daily  cefTRIAXone   IVPB 1000 milliGRAM(s) IV Intermittent every 24 hours  chlorhexidine 2% Cloths 1 Application(s) Topical daily  escitalopram 10 milliGRAM(s) Oral at bedtime  ferrous    sulfate 325 milliGRAM(s) Oral daily  furosemide    Tablet 20 milliGRAM(s) Oral daily  rivaroxaban 20 milliGRAM(s) Oral with dinner  tamsulosin 0.4 milliGRAM(s) Oral at bedtime      TELEMETRY: 	    ECG:  	  RADIOLOGY:   DIAGNOSTIC TESTING:  [ ] Echocardiogram:  [ ] Catheterization:  [ ] Stress Test:    OTHER: 	    LABS:	 	    CARDIAC MARKERS:                                      12.4   12.25 )-----------( 229      ( 17 Dec 2023 11:06 )             38.6     12-17    138  |  100  |  17  ----------------------------<  108<H>  3.6   |  31  |  0.77    Ca    9.2      17 Dec 2023 11:06      proBNP:     Lipid Profile:   HgA1c:     Creatinine: 0.77 mg/dL (12-17-23 @ 11:06)  Creatinine: 0.77 mg/dL (12-16-23 @ 07:22)

## 2023-12-19 RX ADMIN — RIVAROXABAN 20 MILLIGRAM(S): KIT at 17:08

## 2023-12-19 RX ADMIN — Medication 20 MILLIGRAM(S): at 05:20

## 2023-12-19 RX ADMIN — CEFTRIAXONE 100 MILLIGRAM(S): 500 INJECTION, POWDER, FOR SOLUTION INTRAMUSCULAR; INTRAVENOUS at 12:13

## 2023-12-19 RX ADMIN — TAMSULOSIN HYDROCHLORIDE 0.4 MILLIGRAM(S): 0.4 CAPSULE ORAL at 20:54

## 2023-12-19 RX ADMIN — Medication 325 MILLIGRAM(S): at 12:13

## 2023-12-19 RX ADMIN — CHLORHEXIDINE GLUCONATE 1 APPLICATION(S): 213 SOLUTION TOPICAL at 12:16

## 2023-12-19 RX ADMIN — Medication 500 MILLIGRAM(S): at 12:13

## 2023-12-19 RX ADMIN — ESCITALOPRAM OXALATE 10 MILLIGRAM(S): 10 TABLET, FILM COATED ORAL at 20:54

## 2023-12-19 NOTE — PROGRESS NOTE ADULT - ASSESSMENT
Telephone Encounter by Giovanni Doherty DPM at 01/25/17 06:59 PM     Author:  Giovanni Doherty DPM Service:  (none) Author Type:  Physician     Filed:  01/25/17 07:00 PM Encounter Date:  1/25/2017 Status:  Signed     :  Giovanni Doherty DPM (Physician)            bruising is normal after surgery, but if toes are cool and becoming darker, then he should come in for eval.[KR1.1M]      Revision History        User Key Date/Time User Provider Type Action    > KR1.1 01/25/17 07:00 PM Giovanni Doherty DPM Physician Sign    M - Manual               A/p  83M PMHx afib on xarelto, w/ hx of ortho issues, s/p L hip IMN (most recently s/p R hip IMN (Dr. Godinez 10/31/19), mild depression p/w AMS, upper back shoulder and arm pain.      #AMS  -CTH no acute findings  -Infectious w/u per med, ID    #Right Sided HF  -improved  -Venous dopplers negative for DVT  -cont laisx 20mg po qd  -f/u echo   -Old echo with normal LVEF, w R sided enlargement and mod-severe TR    #Afib  -rate controlled off meds  -cont xarelto 20mg qd     #Back pain  -CT scan noted   -Mgmt per med    35 minutes spent on total encounter; more than 50% of the visit was spent counseling and/or coordinating care by the attending physician.

## 2023-12-19 NOTE — CONSULT NOTE ADULT - REASON FOR ADMISSION
R shoulder pain, arm pain, back pain, AMS

## 2023-12-19 NOTE — PROGRESS NOTE ADULT - ASSESSMENT
Patient is a 83 year old male with PMH of Afib on xarelto, w/ hx of ortho issues, s/p L hip IMN (most recently s/p R hip IMN (Dr. Godinez 10/31/19), mild depression who presented with AMS, upper back shoulder and arm pain.    Altered mental status with hypoxia - c/f pneumonia   possible aspiration, known h/o dysphagia   Urinary retention s/p kiran placement   - afebrile, leukocytosis noted, no new findings, on RA, nontoxic appearing   - CT chest with ill defined airway with subpleural ggo in upper lobes - suspected infectious, suspect aspiration  - Urology following for hematuria, suspected traumatic d/t kiran placement while on Eliquis  - UA with no pyuria, Ucx negative  - CT cervical spine with no acute fractures, has multilevel degenerative changes severe stenosis  - CT lumbar spine with interval development of sacral insufficiency fractures   - S/p FEES, SLP following -rec regular solids and mildly thick liquids     Recommendations:  Continue ceftriaxone 1g IV Q24h - complete total 5d course today 12/19  Aspiration precautions     Stable from ID standpoint at this time.  ID will sign off at this time but remains available for any further questions/concerns.     Slim Jackson M.D.  [facility], Division of Infectious Diseases  [phone]  After 5pm on weekdays and all day on weekends - please call [phone] Patient is a 83 year old male with PMH of Afib on xarelto, w/ hx of ortho issues, s/p L hip IMN (most recently s/p R hip IMN (Dr. Godinez 10/31/19), mild depression who presented with AMS, upper back shoulder and arm pain.    Altered mental status with hypoxia - c/f pneumonia   possible aspiration, known h/o dysphagia   Urinary retention s/p kiarn placement   - afebrile, leukocytosis noted, no new findings, on RA, nontoxic appearing   - CT chest with ill defined airway with subpleural ggo in upper lobes - suspected infectious, suspect aspiration  - Urology following for hematuria, suspected traumatic d/t kiran placement while on Eliquis  - UA with no pyuria, Ucx negative  - CT cervical spine with no acute fractures, has multilevel degenerative changes severe stenosis  - CT lumbar spine with interval development of sacral insufficiency fractures   - S/p FEES, SLP following -rec regular solids and mildly thick liquids     Recommendations:  Continue ceftriaxone 1g IV Q24h - complete total 5d course today 12/19  Aspiration precautions     Stable from ID standpoint at this time.  ID will sign off at this time but remains available for any further questions/concerns.     Slim Jackson M.D.  [facility], Division of Infectious Diseases  [phone]  After 5pm on weekdays and all day on weekends - please call [phone]

## 2023-12-19 NOTE — CONSULT NOTE ADULT - ASSESSMENT
THIS NOTE IS INCOMPLETE PATIENT WAS SEEN THIS MORNING, RECOMMENDATIONS PENDING         Thank you for allowing me to participate in the care of this patient, please do  not hesitate to call or text me if you have further questions or concerns.     Denton Jackson MD  [facility] [place]   Division of Hematology/Oncology  [address] [place] [address], [address]  [place] [id number]  P: [phone]  F: [phone]    Attestation:   Total time spent on the encounter: >** minutes   ----Including ** min face-to-face interaction in addition to chart review, reviewing treatment plan, and managing the patient’s chronic diagnoses as listed in the assessment----     THIS NOTE IS INCOMPLETE PATIENT WAS SEEN THIS MORNING, RECOMMENDATIONS PENDING         Thank you for allowing me to participate in the care of this patient, please do  not hesitate to call or text me if you have further questions or concerns.     Denton Jackson MD  [facility] [place]   Division of Hematology/Oncology  [address] [place] [address], [address]  [place] [id number]  P: [phone]  F: [phone]    Attestation:   Total time spent on the encounter: >** minutes   ----Including ** min face-to-face interaction in addition to chart review, reviewing treatment plan, and managing the patient’s chronic diagnoses as listed in the assessment---- 83y Male with PMHx of Afib on Xarelto, s/p L hip IMN (most recently s/p R hip IMN (Dr. 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 PET-CT 10/13/2023 with R vocal cord lesion abd BILATERAL vocal cord uptake with SUV 7.9 , non-specific activity at the posterior RIGHT nasopharyngeal wall is likely related to activity in the longus coli muscle and is otherwise nonspecific and an additional focus seen further posterolaterally is of indeterminate  significance due to beam hardening artifact.     Overall Impression: Mr. 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 He has not undergone further workup or resection given his ongoing medical comorbidities. At this time pts PET-CT from two months ago is not sufficient to assess patients malignancy especially in light of previous indeterminate increased areas of uptake. At this time I recommend continuing medical management and if pts clinical status improves to repeat PET-CT outpatient and determine stage and surgical candidacy with ENT if no distant disease is identified Can consider MRI neck w/ and w/o if pt can tolerated while inpatient     # SCC of R vocal cord  - Moderately differentiated from biopsy 09/2023  - PET-CT without obvious distant mets  - CT scans this admission again describe known R vocal cord lesion without obvious LAD   - ENT follow up required  - Can consider MRI Head and Neck w/ and w/o contrast if pts clinical status improves and can follow directions   - PET-CT as outpatient depending on pts clinical course     # Macrocytic anemia   - Hb 11.1, .9  - Normal liver and renal function   - Given age, nutritional deficiency vs primary bone marrow disorder  - Obtain Iron panel, B12, Folate     # AMS  - Unclear cause   - CTH 12/13/2023 without acute pathology     # Back pain, Upper arm pain  - CT C-L spine with degenerative changes  - US LE negative for DVT    # A.fib  - Continues on Xarleto 20mg daily    Thank you for allowing me to participate in the care of Mr. Youssef, please do not hesitate to call or text me if you have further questions or concerns.     Denton Jackson MD  [facility] [place]   Division of Hematology/Oncology  [address] [facility], [address]  [place] [address]  P: [phone]  F: [phone]    Attestation:   Total time spent on the encounter: >** minutes   ----Including ** min face-to-face interaction in addition to chart review, reviewing treatment plan, and managing the patient’s chronic diagnoses as listed in the assessment---- 83y Male with PMHx of Afib on Xarelto, s/p L hip IMN (most recently s/p R hip IMN (Dr. 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 PET-CT 10/13/2023 with R vocal cord lesion abd BILATERAL vocal cord uptake with SUV 7.9 , non-specific activity at the posterior RIGHT nasopharyngeal wall is likely related to activity in the longus coli muscle and is otherwise nonspecific and an additional focus seen further posterolaterally is of indeterminate  significance due to beam hardening artifact.     Overall Impression: Mr. 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 He has not undergone further workup or resection given his ongoing medical comorbidities. At this time pts PET-CT from two months ago is not sufficient to assess patients malignancy especially in light of previous indeterminate increased areas of uptake. At this time I recommend continuing medical management and if pts clinical status improves to repeat PET-CT outpatient and determine stage and surgical candidacy with ENT if no distant disease is identified Can consider MRI neck w/ and w/o if pt can tolerated while inpatient     # SCC of R vocal cord  - Moderately differentiated from biopsy 09/2023  - PET-CT without obvious distant mets  - CT scans this admission again describe known R vocal cord lesion without obvious LAD   - ENT follow up required  - Can consider MRI Head and Neck w/ and w/o contrast if pts clinical status improves and can follow directions   - PET-CT as outpatient depending on pts clinical course     # Macrocytic anemia   - Hb 11.1, .9  - Normal liver and renal function   - Given age, nutritional deficiency vs primary bone marrow disorder  - Obtain Iron panel, B12, Folate     # AMS  - Unclear cause   - CTH 12/13/2023 without acute pathology     # Back pain, Upper arm pain  - CT C-L spine with degenerative changes  - US LE negative for DVT    # A.fib  - Continues on Xarleto 20mg daily    Thank you for allowing me to participate in the care of Mr. Youssef, please do not hesitate to call or text me if you have further questions or concerns.     Denton Jackson MD  [facility] [place]   Division of Hematology/Oncology  [address] [facility], [address]  [place] [address]  P: [phone]  F: [phone]    Attestation:   Total time spent on the encounter: >** minutes   ----Including ** min face-to-face interaction in addition to chart review, reviewing treatment plan, and managing the patient’s chronic diagnoses as listed in the assessment---- 83y Male with PMHx of Afib on Xarelto, s/p L hip IMN (most recently s/p R hip IMN (Dr. 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 PET-CT 10/13/2023 with R vocal cord lesion abd BILATERAL vocal cord uptake with SUV 7.9 , non-specific activity at the posterior RIGHT nasopharyngeal wall is likely related to activity in the longus coli muscle and is otherwise nonspecific and an additional focus seen further posterolaterally is of indeterminate  significance due to beam hardening artifact.     Overall Impression: Mr. 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 He has not undergone further workup or resection given his ongoing medical comorbidities. At this time pts PET-CT from two months ago is not sufficient to assess patients malignancy especially in light of previous indeterminate increased areas of uptake. At this time I recommend continuing medical management and if pts clinical status improves to repeat PET-CT outpatient and determine stage and surgical candidacy with ENT if no distant disease is identified Can consider MRI neck w/ and w/o if pt can tolerated while inpatient     # SCC of R vocal cord  - Moderately differentiated from biopsy 09/2023  - PET-CT without obvious distant mets  - CT scans this admission again describe known R vocal cord lesion without obvious LAD   - ENT follow up required  - Can consider MRI Head and Neck w/ and w/o contrast if pts clinical status improves and can follow directions   - PET-CT as outpatient depending on pts clinical course     # Macrocytic anemia   - Hb 11.1, .9  - Normal liver and renal function   - Given age, nutritional deficiency vs primary bone marrow disorder  - Obtain Iron panel, B12, Folate     # AMS  - Unclear cause   - CTH 12/13/2023 without acute pathology     # Back pain, Upper arm pain  - CT C-L spine with degenerative changes  - US LE negative for DVT    # A.fib  - Continues on Xarleto 20mg daily    Thank you for allowing me to participate in the care of Mr. Yousesf, please do not hesitate to call or text me if you have further questions or concerns.     Denton Jackson MD  [facility] [place]   Division of Hematology/Oncology  [address] [facility], [address]  [place] [address]  P: [phone]  F: [phone]    Attestation:   Total time spent on the encounter: >60 minutes   ----Including >15min min face-to-face interaction in addition to chart review, reviewing treatment plan, and managing the patient’s chronic diagnoses as listed in the assessment, discussed case with Dr. Shepherd---- 83y Male with PMHx of Afib on Xarelto, s/p L hip IMN (most recently s/p R hip IMN (Dr. 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 PET-CT 10/13/2023 with R vocal cord lesion abd BILATERAL vocal cord uptake with SUV 7.9 , non-specific activity at the posterior RIGHT nasopharyngeal wall is likely related to activity in the longus coli muscle and is otherwise nonspecific and an additional focus seen further posterolaterally is of indeterminate  significance due to beam hardening artifact.     Overall Impression: Mr. 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 He has not undergone further workup or resection given his ongoing medical comorbidities. At this time pts PET-CT from two months ago is not sufficient to assess patients malignancy especially in light of previous indeterminate increased areas of uptake. At this time I recommend continuing medical management and if pts clinical status improves to repeat PET-CT outpatient and determine stage and surgical candidacy with ENT if no distant disease is identified Can consider MRI neck w/ and w/o if pt can tolerated while inpatient     # SCC of R vocal cord  - Moderately differentiated from biopsy 09/2023  - PET-CT without obvious distant mets  - CT scans this admission again describe known R vocal cord lesion without obvious LAD   - ENT follow up required  - Can consider MRI Head and Neck w/ and w/o contrast if pts clinical status improves and can follow directions   - PET-CT as outpatient depending on pts clinical course     # Macrocytic anemia   - Hb 11.1, .9  - Normal liver and renal function   - Given age, nutritional deficiency vs primary bone marrow disorder  - Obtain Iron panel, B12, Folate     # AMS  - Unclear cause   - CTH 12/13/2023 without acute pathology     # Back pain, Upper arm pain  - CT C-L spine with degenerative changes  - US LE negative for DVT    # A.fib  - Continues on Xarleto 20mg daily    Thank you for allowing me to participate in the care of Mr. Youssef, please do not hesitate to call or text me if you have further questions or concerns.     Denton Jackson MD  [facility] [place]   Division of Hematology/Oncology  [address] [facility], [address]  [place] [address]  P: [phone]  F: [phone]    Attestation:   Total time spent on the encounter: >60 minutes   ----Including >15min min face-to-face interaction in addition to chart review, reviewing treatment plan, and managing the patient’s chronic diagnoses as listed in the assessment, discussed case with Dr. Shepherd----

## 2023-12-19 NOTE — PROGRESS NOTE ADULT - SUBJECTIVE AND OBJECTIVE BOX
[facility] DIVISION OF INFECTIOUS DISEASES  GI[address] Ferguson Y. Patel, S. Shah, G. Delmer  [phone]  ([phone] - weekdays after 5pm and weekends)    Name: MARY BO  Age/Gender: 83y Male  MRN: [id number]    Interval History:  Patient seen and examined this morning.   States he feels well, was having breakfast.   Denies pain, dyspnea, or any other complaints.   Notes reviewed  No concerning overnight events  Afebrile   Allergies: No Known Allergies      Objective:  Vitals:   T(F): 98.2 (12-19-23 @ 08:43), Max: 98.5 (12-19-23 @ 04:41)  HR: 95 (12-19-23 @ 08:43) (61 - 95)  BP: 111/71 (12-19-23 @ 08:43) (105/68 - 117/69)  RR: 18 (12-19-23 @ 08:43) (18 - 18)  SpO2: 97% (12-19-23 @ 08:43) (95% - 97%)  Physical Examination:  General: no acute distress  HEENT: NC/AT, anicteric, neck supple  Respiratory: no acc muscle use, breathing comfortably  Cardiovascular: S1 and S2 present  Gastrointestinal: normal appearing, nondistended  Extremities: no edema, no cyanosis  Skin: no visible rash    Laboratory Studies:  CBC:                       12.4   12.25 )-----------( 229      ( 17 Dec 2023 11:06 )             38.6     WBC Trend:  12.25 12-17-23 @ 11:06  10.63 12-16-23 @ 07:24  9.19 12-15-23 @ 07:08  8.61 12-14-23 @ 07:02  10.43 12-13-23 @ 11:23    CMP: 12-17    138  |  100  |  17  ----------------------------<  108<H>  3.6   |  31  |  0.77    Ca    9.2      17 Dec 2023 11:06      Creatinine: 0.77 mg/dL (12-17-23 @ 11:06)  Creatinine: 0.77 mg/dL (12-16-23 @ 07:22)  Creatinine: 0.83 mg/dL (12-15-23 @ 07:07)  Creatinine: 0.94 mg/dL (12-14-23 @ 07:02)  Creatinine: 1.03 mg/dL (12-13-23 @ 11:23)    Microbiology: reviewed   Culture - Urine (collected 12-13-23 @ 17:42)  Source: Catheterized Catheterized  Final Report (12-15-23 @ 06:27):    <10,000 CFU/mL Normal Urogenital Marla    SARS-CoV-2 Result: NotDetec (13 Dec 2023 16:25)    Radiology: reviewed     Medications:  acetaminophen     Tablet .. 650 milliGRAM(s) Oral every 6 hours PRN  ascorbic acid 500 milliGRAM(s) Oral daily  cefTRIAXone   IVPB 1000 milliGRAM(s) IV Intermittent every 24 hours  chlorhexidine 2% Cloths 1 Application(s) Topical daily  escitalopram 10 milliGRAM(s) Oral at bedtime  ferrous    sulfate 325 milliGRAM(s) Oral daily  furosemide    Tablet 20 milliGRAM(s) Oral daily  melatonin 3 milliGRAM(s) Oral at bedtime PRN  rivaroxaban 20 milliGRAM(s) Oral with dinner  tamsulosin 0.4 milliGRAM(s) Oral at bedtime    Current Antimicrobials:  cefTRIAXone   IVPB 1000 milliGRAM(s) IV Intermittent every 24 hours    Prior/Completed Antimicrobials:   [facility] DISEASES  [facility] Ferguson Y. Patel, S. Shah, G. [place]  [phone]  ([phone] - weekdays after 5pm and weekends)    Name: MARY BO  Age/Gender: 83y Male  MRN: [id number]    Interval History:  Patient seen and examined this morning.   States he feels well, was having breakfast.   Denies pain, dyspnea, or any other complaints.   Notes reviewed  No concerning overnight events  Afebrile   Allergies: No Known Allergies      Objective:  Vitals:   T(F): 98.2 (12-19-23 @ 08:43), Max: 98.5 (12-19-23 @ 04:41)  HR: 95 (12-19-23 @ 08:43) (61 - 95)  BP: 111/71 (12-19-23 @ 08:43) (105/68 - 117/69)  RR: 18 (12-19-23 @ 08:43) (18 - 18)  SpO2: 97% (12-19-23 @ 08:43) (95% - 97%)  Physical Examination:  General: no acute distress  HEENT: NC/AT, anicteric, neck supple  Respiratory: no acc muscle use, breathing comfortably  Cardiovascular: S1 and S2 present  Gastrointestinal: normal appearing, nondistended  Extremities: no edema, no cyanosis  Skin: no visible rash    Laboratory Studies:  CBC:                       12.4   12.25 )-----------( 229      ( 17 Dec 2023 11:06 )             38.6     WBC Trend:  12.25 12-17-23 @ 11:06  10.63 12-16-23 @ 07:24  9.19 12-15-23 @ 07:08  8.61 12-14-23 @ 07:02  10.43 12-13-23 @ 11:23    CMP: 12-17    138  |  100  |  17  ----------------------------<  108<H>  3.6   |  31  |  0.77    Ca    9.2      17 Dec 2023 11:06      Creatinine: 0.77 mg/dL (12-17-23 @ 11:06)  Creatinine: 0.77 mg/dL (12-16-23 @ 07:22)  Creatinine: 0.83 mg/dL (12-15-23 @ 07:07)  Creatinine: 0.94 mg/dL (12-14-23 @ 07:02)  Creatinine: 1.03 mg/dL (12-13-23 @ 11:23)    Microbiology: reviewed   Culture - Urine (collected 12-13-23 @ 17:42)  Source: Catheterized Catheterized  Final Report (12-15-23 @ 06:27):    <10,000 CFU/mL Normal Urogenital Marla    SARS-CoV-2 Result: NotDetec (13 Dec 2023 16:25)    Radiology: reviewed     Medications:  acetaminophen     Tablet .. 650 milliGRAM(s) Oral every 6 hours PRN  ascorbic acid 500 milliGRAM(s) Oral daily  cefTRIAXone   IVPB 1000 milliGRAM(s) IV Intermittent every 24 hours  chlorhexidine 2% Cloths 1 Application(s) Topical daily  escitalopram 10 milliGRAM(s) Oral at bedtime  ferrous    sulfate 325 milliGRAM(s) Oral daily  furosemide    Tablet 20 milliGRAM(s) Oral daily  melatonin 3 milliGRAM(s) Oral at bedtime PRN  rivaroxaban 20 milliGRAM(s) Oral with dinner  tamsulosin 0.4 milliGRAM(s) Oral at bedtime    Current Antimicrobials:  cefTRIAXone   IVPB 1000 milliGRAM(s) IV Intermittent every 24 hours    Prior/Completed Antimicrobials:

## 2023-12-19 NOTE — PROGRESS NOTE ADULT - SUBJECTIVE AND OBJECTIVE BOX
SUBJECTIVE / OVERNIGHT EVENTS:      Patient seen and examined at bedside. No events noted overnight. Resting comfortably in bed    --------------------------------------------------------------------------------------------  LABS:                        12.4   12.25 )-----------( 229      ( 17 Dec 2023 11:06 )             38.6     12-17    138  |  100  |  17  ----------------------------<  108<H>  3.6   |  31  |  0.77    Ca    9.2      17 Dec 2023 11:06        CAPILLARY BLOOD GLUCOSE            Urinalysis Basic - ( 17 Dec 2023 11:06 )    Color: x / Appearance: x / SG: x / pH: x  Gluc: 108 mg/dL / Ketone: x  / Bili: x / Urobili: x   Blood: x / Protein: x / Nitrite: x   Leuk Esterase: x / RBC: x / WBC x   Sq Epi: x / Non Sq Epi: x / Bacteria: x        RADIOLOGY & ADDITIONAL TESTS: < from: Xray Cinesophagram Swallow Function w/ Contrast (12.18.23 @ 10:13) >  IMPRESSION:    Evidence of penetration and aspiration.    For further information and recommendations, please refer to the speech   pathologist final report which is available for review in the electronic   medical record.    --- End of Report --    < end of copied text >    < from: TTE W or WO Ultrasound Enhancing Agent (12.18.23 @ 13:39) >  _______________________________________________________________________________________     CONCLUSIONS:      1. Left ventricular cavity is normal. Left ventricular wall thickness is normal. Left ventricular systolic function is normal with an ejection fraction of 67 % by 3D. There are no regional wall motion abnormalities seen.   2. Left ventricular global longitudinal strain is -22.1 % which is normal (< -18%). Images were acquired on a [facility] ultrasound system and processed using [employer] strain analysis software with aheart rate of 78 bpm and a blood pressure of 113/74 mmHg.   3. Severely enlarged right ventricular cavity size, wall thickness, and systolic function.   4. Mitrral annular dilation with normal leaflet opening.   5. Mild mitral regurgitation.   6. Trileaflet aortic valve with normal systolic excursion. calcification of the aortic valve leaflets.   7. No evidence of aortic regurgitation.   8. Tricuspid annular dilation. Moderate to severe tricuspid regurgitation.   9. No pericardial effusion seen.  10. No prior echocardiogram is available for comparison.    < end of copied text >    Imaging Personally Reviewed:  [x] YES  [ ] NO    Consultant(s) Notes Reviewed:  [x] YES  [ ] NO    MEDICATIONS  (STANDING):  ascorbic acid 500 milliGRAM(s) Oral daily  cefTRIAXone   IVPB 1000 milliGRAM(s) IV Intermittent every 24 hours  chlorhexidine 2% Cloths 1 Application(s) Topical daily  escitalopram 10 milliGRAM(s) Oral at bedtime  ferrous    sulfate 325 milliGRAM(s) Oral daily  furosemide    Tablet 20 milliGRAM(s) Oral daily  rivaroxaban 20 milliGRAM(s) Oral with dinner  tamsulosin 0.4 milliGRAM(s) Oral at bedtime    MEDICATIONS  (PRN):  acetaminophen     Tablet .. 650 milliGRAM(s) Oral every 6 hours PRN Temp greater or equal to 38C (100.4F), Mild Pain (1 - 3)  melatonin 3 milliGRAM(s) Oral at bedtime PRN Insomnia      Care Discussed with Consultants/Other Providers [x] YES  [ ] NO    Vital Signs Last 24 Hrs  T(C): 36.8 (19 Dec 2023 08:43), Max: 36.9 (19 Dec 2023 04:41)  T(F): 98.2 (19 Dec 2023 08:43), Max: 98.5 (19 Dec 2023 04:41)  HR: 95 (19 Dec 2023 08:43) (61 - 95)  BP: 111/71 (19 Dec 2023 08:43) (105/68 - 117/69)  BP(mean): --  RR: 18 (19 Dec 2023 08:43) (18 - 18)  SpO2: 97% (19 Dec 2023 08:43) (95% - 97%)    Parameters below as of 19 Dec 2023 08:43  Patient On (Oxygen Delivery Method): room air      I&O's Summary    18 Dec 2023 07:01  -  19 Dec 2023 07:00  --------------------------------------------------------  IN: 50 mL / OUT: 300 mL / NET: -250 mL        PHYSICAL EXAM:  GENERAL: NAD, well-developed, comfortable  HEAD:  Atraumatic, Normocephalic  EYES: EOMI, PERRLA, conjunctiva and sclera clear  NECK: Supple, No JVD  CHEST/LUNG: Diminished bilaterally; No wheeze  HEART: Regular rate and rhythm; No murmurs, rubs, or gallops  ABDOMEN: Soft, Nontender, Nondistended; Bowel sounds present  NEURO: AAOx3, no focal weakness, 5/5 b/l extremity strength, b/l knee no arthritis, no effusion   EXTREMITIES:  2+ Peripheral Pulses, No clubbing, cyanosis, or edema  SKIN: No rashes or lesions, + kiran SUBJECTIVE / OVERNIGHT EVENTS:      Patient seen and examined at bedside. No events noted overnight. Resting comfortably in bed    --------------------------------------------------------------------------------------------  LABS:                        12.4   12.25 )-----------( 229      ( 17 Dec 2023 11:06 )             38.6     12-17    138  |  100  |  17  ----------------------------<  108<H>  3.6   |  31  |  0.77    Ca    9.2      17 Dec 2023 11:06        CAPILLARY BLOOD GLUCOSE            Urinalysis Basic - ( 17 Dec 2023 11:06 )    Color: x / Appearance: x / SG: x / pH: x  Gluc: 108 mg/dL / Ketone: x  / Bili: x / Urobili: x   Blood: x / Protein: x / Nitrite: x   Leuk Esterase: x / RBC: x / WBC x   Sq Epi: x / Non Sq Epi: x / Bacteria: x        RADIOLOGY & ADDITIONAL TESTS: < from: Xray Cinesophagram Swallow Function w/ Contrast (12.18.23 @ 10:13) >  IMPRESSION:    Evidence of penetration and aspiration.    For further information and recommendations, please refer to the speech   pathologist final report which is available for review in the electronic   medical record.    --- End of Report --    < end of copied text >    < from: TTE W or WO Ultrasound Enhancing Agent (12.18.23 @ 13:39) >  _______________________________________________________________________________________     CONCLUSIONS:      1. Left ventricular cavity is normal. Left ventricular wall thickness is normal. Left ventricular systolic function is normal with an ejection fraction of 67 % by 3D. There are no regional wall motion abnormalities seen.   2. Left ventricular global longitudinal strain is -22.1 % which is normal (< -18%). Images were acquired on a [employer] ultrasound system and processed using [employer] strain analysis software with aheart rate of 78 bpm and a blood pressure of 113/74 mmHg.   3. Severely enlarged right ventricular cavity size, wall thickness, and systolic function.   4. Mitrral annular dilation with normal leaflet opening.   5. Mild mitral regurgitation.   6. Trileaflet aortic valve with normal systolic excursion. calcification of the aortic valve leaflets.   7. No evidence of aortic regurgitation.   8. Tricuspid annular dilation. Moderate to severe tricuspid regurgitation.   9. No pericardial effusion seen.  10. No prior echocardiogram is available for comparison.    < end of copied text >    Imaging Personally Reviewed:  [x] YES  [ ] NO    Consultant(s) Notes Reviewed:  [x] YES  [ ] NO    MEDICATIONS  (STANDING):  ascorbic acid 500 milliGRAM(s) Oral daily  cefTRIAXone   IVPB 1000 milliGRAM(s) IV Intermittent every 24 hours  chlorhexidine 2% Cloths 1 Application(s) Topical daily  escitalopram 10 milliGRAM(s) Oral at bedtime  ferrous    sulfate 325 milliGRAM(s) Oral daily  furosemide    Tablet 20 milliGRAM(s) Oral daily  rivaroxaban 20 milliGRAM(s) Oral with dinner  tamsulosin 0.4 milliGRAM(s) Oral at bedtime    MEDICATIONS  (PRN):  acetaminophen     Tablet .. 650 milliGRAM(s) Oral every 6 hours PRN Temp greater or equal to 38C (100.4F), Mild Pain (1 - 3)  melatonin 3 milliGRAM(s) Oral at bedtime PRN Insomnia      Care Discussed with Consultants/Other Providers [x] YES  [ ] NO    Vital Signs Last 24 Hrs  T(C): 36.8 (19 Dec 2023 08:43), Max: 36.9 (19 Dec 2023 04:41)  T(F): 98.2 (19 Dec 2023 08:43), Max: 98.5 (19 Dec 2023 04:41)  HR: 95 (19 Dec 2023 08:43) (61 - 95)  BP: 111/71 (19 Dec 2023 08:43) (105/68 - 117/69)  BP(mean): --  RR: 18 (19 Dec 2023 08:43) (18 - 18)  SpO2: 97% (19 Dec 2023 08:43) (95% - 97%)    Parameters below as of 19 Dec 2023 08:43  Patient On (Oxygen Delivery Method): room air      I&O's Summary    18 Dec 2023 07:01  -  19 Dec 2023 07:00  --------------------------------------------------------  IN: 50 mL / OUT: 300 mL / NET: -250 mL        PHYSICAL EXAM:  GENERAL: NAD, well-developed, comfortable  HEAD:  Atraumatic, Normocephalic  EYES: EOMI, PERRLA, conjunctiva and sclera clear  NECK: Supple, No JVD  CHEST/LUNG: Diminished bilaterally; No wheeze  HEART: Regular rate and rhythm; No murmurs, rubs, or gallops  ABDOMEN: Soft, Nontender, Nondistended; Bowel sounds present  NEURO: AAOx3, no focal weakness, 5/5 b/l extremity strength, b/l knee no arthritis, no effusion   EXTREMITIES:  2+ Peripheral Pulses, No clubbing, cyanosis, or edema  SKIN: No rashes or lesions, + kiran

## 2023-12-19 NOTE — CONSULT NOTE ADULT - CONSULT REQUESTED DATE/TIME
14-Dec-2023 00:44
19-Dec-2023 06:14
14-Dec-2023 12:41
15-Dec-2023 11:19
14-Dec-2023 11:53
14-Dec-2023 12:34

## 2023-12-19 NOTE — PROGRESS NOTE ADULT - ASSESSMENT
83M PMHx afib on xarelto, w/ hx of ortho issues, s/p L hip IMN (most recently s/p R hip IMN (Dr. Godinez 10/31/19), mild depression p/w acute encephalopathy, upper back shoulder and arm pain    Plan:  # Acute encephalopathy/ Aspiration pneumonia:  - Pt AAOx3 but otherwise somewhat confused. Daughter states some of this has been ongoing, was placed on puree diet for dysphagia issues at rehab. Has decub now. Could be in setting of urinary retention and deconditioning  - CTH negative, infectious work up negative so far   - Speech/ swallow consult and dysphagia diet  - Falls precautions, aspiration precautions  - Abx mgmt per ID-- ceftriaxone to complete total 5d course on 12/19  - PT consult  - UCx NGTD  - Monitor temps/WBC  - S/p Xray Cinesophagram 12/18 w/ Evidence of penetration and aspiration,  rec'ed for Regular solids, mildly thick liquids  - ID following    # Lower extremity edema/ Acute hfpef:   - As per chart, daughter endorsing worse swelling in legs  - Elevated BNP  - Old echo with normal LVEF, w R sided enlargement and mod-severe TR  - Echo w/ EF 67%  - C/w diuresis per Cards   - LE Duplex negative for DVT  - Cards following    # Urinary retention:  - Kiran placed in ED by Urology  - Strict I/Os  - Hematuria noted - likely from traumatic kiran placement-- resolved   - Monitor urine color  - Monitor h/h  - C/w Flomax  - Urology following    # Hx of Vocal Cord Lesion:  - S/p Xray Cinesophagram 12/18 w/ Evidence of penetration and aspiration, rec'ed for Regular solids, mildly thick liquids  - ENT consult noted    # Afib:  - C/w Xarelto     # Anemia:  - Monitor CBC  - Transfuse PRN    # Back pain:   - Multiple areas of upper extremity pain including R shoulder, between shoulder blades and R arm for many days as per daughter. Thought to be 2/2 PT of upper extremities. Also now having LE weakness amongst other issues.   - Tibia/Fibula and Right shoulder x-ray negative for fx  - CT spine reviewed  - Pain control    # Depression:  - C/w current meds    # DVT ppx:  - IPCs  - Xarelto     Pending Heme consult    [employer]

## 2023-12-19 NOTE — PROGRESS NOTE ADULT - SUBJECTIVE AND OBJECTIVE BOX
CARDIOLOGY FOLLOW UP - Dr. Marrufo  Date of Service: 12/19/2023  CC: no events    Review of Systems:  Constitutional: No fever, weight loss, or fatigue  Respiratory: No cough, wheezing, or hemoptysis, no shortness of breath  Cardiovascular: No chest pain, palpitations, passing out, dizziness, or leg swelling  Gastrointestinal: No abd or epigastric pain. No nausea, vomiting, or hematemesis; no diarrhea or consiptaiton, no melena or hematochezia  Vascular: No edema     TELEMETRY:    PHYSICAL EXAM:  T(C): 36.8 (12-19-23 @ 08:43), Max: 36.9 (12-19-23 @ 04:41)  HR: 95 (12-19-23 @ 08:43) (61 - 95)  BP: 111/71 (12-19-23 @ 08:43) (105/68 - 117/69)  RR: 18 (12-19-23 @ 08:43) (18 - 18)  SpO2: 97% (12-19-23 @ 08:43) (95% - 97%)  Wt(kg): --  I&O's Summary    18 Dec 2023 07:01  -  19 Dec 2023 07:00  --------------------------------------------------------  IN: 50 mL / OUT: 300 mL / NET: -250 mL        Appearance: Normal	  Cardiovascular: Normal S1 S2,RRR, No JVD, No murmurs  Respiratory: Lungs clear to auscultation	  Gastrointestinal:  Soft, Non-tender, + BS	  Extremities: Normal range of motion, No clubbing, cyanosis or edema  Vascular: Peripheral pulses palpable 2+ bilaterally       Home Medications:  ascorbic acid 500 mg oral tablet: 1 tab(s) orally once a day (13 Dec 2023 16:41)  cholecalciferol 25 mcg (1000 intl units) oral tablet: 1 tab(s) orally once a day (13 Dec 2023 16:41)  DuoNeb 0.5 mg-2.5 mg/3 mL inhalation solution: 3 milliliter(s) by nebulizer every 6 hours as needed (13 Dec 2023 16:41)  ferrous sulfate 325 mg (65 mg elemental iron) oral tablet: 1 tab(s) orally once a day (13 Dec 2023 16:41)  Lexapro 10 mg oral tablet: 1 tab(s) orally once a day (in the evening) (13 Dec 2023 16:41)  Multiple Vitamins oral tablet: 1 tab(s) orally once a day (13 Dec 2023 16:41)  Xarelto 20 mg oral tablet: 1 tab(s) orally once a day (in the evening) (13 Dec 2023 16:41)        MEDICATIONS  (STANDING):  ascorbic acid 500 milliGRAM(s) Oral daily  cefTRIAXone   IVPB 1000 milliGRAM(s) IV Intermittent every 24 hours  chlorhexidine 2% Cloths 1 Application(s) Topical daily  escitalopram 10 milliGRAM(s) Oral at bedtime  ferrous    sulfate 325 milliGRAM(s) Oral daily  furosemide    Tablet 20 milliGRAM(s) Oral daily  rivaroxaban 20 milliGRAM(s) Oral with dinner  tamsulosin 0.4 milliGRAM(s) Oral at bedtime        EKG:  RADIOLOGY:  DIAGNOSTIC TESTING:  [ ] Echocardiogram:  [ ] Catherterization:  [ ] Stress Test:  OTHER:     LABS:	 	                  CARDIAC MARKERS:

## 2023-12-20 LAB
FERRITIN SERPL-MCNC: 414 NG/ML — HIGH (ref 30–400)
FERRITIN SERPL-MCNC: 414 NG/ML — HIGH (ref 30–400)
FOLATE SERPL-MCNC: 11.1 NG/ML — SIGNIFICANT CHANGE UP
FOLATE SERPL-MCNC: 11.1 NG/ML — SIGNIFICANT CHANGE UP
HCT VFR BLD CALC: 34.5 % — LOW (ref 39–50)
HCT VFR BLD CALC: 34.5 % — LOW (ref 39–50)
HGB BLD-MCNC: 11.1 G/DL — LOW (ref 13–17)
HGB BLD-MCNC: 11.1 G/DL — LOW (ref 13–17)
IRON SATN MFR SERPL: 24 % — SIGNIFICANT CHANGE UP (ref 16–55)
IRON SATN MFR SERPL: 24 % — SIGNIFICANT CHANGE UP (ref 16–55)
IRON SATN MFR SERPL: 46 UG/DL — SIGNIFICANT CHANGE UP (ref 45–165)
IRON SATN MFR SERPL: 46 UG/DL — SIGNIFICANT CHANGE UP (ref 45–165)
MCHC RBC-ENTMCNC: 32.2 GM/DL — SIGNIFICANT CHANGE UP (ref 32–36)
MCHC RBC-ENTMCNC: 32.2 GM/DL — SIGNIFICANT CHANGE UP (ref 32–36)
MCHC RBC-ENTMCNC: 32.6 PG — SIGNIFICANT CHANGE UP (ref 27–34)
MCHC RBC-ENTMCNC: 32.6 PG — SIGNIFICANT CHANGE UP (ref 27–34)
MCV RBC AUTO: 101.5 FL — HIGH (ref 80–100)
MCV RBC AUTO: 101.5 FL — HIGH (ref 80–100)
NRBC # BLD: 0 /100 WBCS — SIGNIFICANT CHANGE UP (ref 0–0)
NRBC # BLD: 0 /100 WBCS — SIGNIFICANT CHANGE UP (ref 0–0)
PLATELET # BLD AUTO: 248 K/UL — SIGNIFICANT CHANGE UP (ref 150–400)
PLATELET # BLD AUTO: 248 K/UL — SIGNIFICANT CHANGE UP (ref 150–400)
RBC # BLD: 3.4 M/UL — LOW (ref 4.2–5.8)
RBC # BLD: 3.4 M/UL — LOW (ref 4.2–5.8)
RBC # FLD: 14.3 % — SIGNIFICANT CHANGE UP (ref 10.3–14.5)
RBC # FLD: 14.3 % — SIGNIFICANT CHANGE UP (ref 10.3–14.5)
TIBC SERPL-MCNC: 196 UG/DL — LOW (ref 220–430)
TIBC SERPL-MCNC: 196 UG/DL — LOW (ref 220–430)
UIBC SERPL-MCNC: 150 UG/DL — SIGNIFICANT CHANGE UP (ref 110–370)
UIBC SERPL-MCNC: 150 UG/DL — SIGNIFICANT CHANGE UP (ref 110–370)
VIT B12 SERPL-MCNC: 749 PG/ML — SIGNIFICANT CHANGE UP (ref 232–1245)
VIT B12 SERPL-MCNC: 749 PG/ML — SIGNIFICANT CHANGE UP (ref 232–1245)
WBC # BLD: 10.96 K/UL — HIGH (ref 3.8–10.5)
WBC # BLD: 10.96 K/UL — HIGH (ref 3.8–10.5)
WBC # FLD AUTO: 10.96 K/UL — HIGH (ref 3.8–10.5)
WBC # FLD AUTO: 10.96 K/UL — HIGH (ref 3.8–10.5)

## 2023-12-20 RX ADMIN — Medication 325 MILLIGRAM(S): at 13:00

## 2023-12-20 RX ADMIN — RIVAROXABAN 20 MILLIGRAM(S): KIT at 17:33

## 2023-12-20 RX ADMIN — Medication 20 MILLIGRAM(S): at 05:19

## 2023-12-20 RX ADMIN — CHLORHEXIDINE GLUCONATE 1 APPLICATION(S): 213 SOLUTION TOPICAL at 13:02

## 2023-12-20 RX ADMIN — TAMSULOSIN HYDROCHLORIDE 0.4 MILLIGRAM(S): 0.4 CAPSULE ORAL at 22:30

## 2023-12-20 RX ADMIN — ESCITALOPRAM OXALATE 10 MILLIGRAM(S): 10 TABLET, FILM COATED ORAL at 22:30

## 2023-12-20 RX ADMIN — Medication 500 MILLIGRAM(S): at 12:59

## 2023-12-20 NOTE — PROGRESS NOTE ADULT - SUBJECTIVE AND OBJECTIVE BOX
CARDIOLOGY FOLLOW UP - Dr. Marrufo  DATE OF SERVICE: 12/20/23    CC  No CV complaints    REVIEW OF SYSTEMS:  CONSTITUTIONAL: No fever, weight loss, or fatigue  RESPIRATORY: No cough, wheezing, chills or hemoptysis; No Shortness of Breath  CARDIOVASCULAR: No chest pain, palpitations, passing out, dizziness, or leg swelling  GASTROINTESTINAL: No abdominal or epigastric pain. No nausea, vomiting, or hematemesis; No diarrhea or constipation. No melena or hematochezia.  VASCULAR: No edema     PHYSICAL EXAM:  T(C): 37 (12-20-23 @ 09:29), Max: 37 (12-20-23 @ 09:29)  HR: 64 (12-20-23 @ 09:29) (64 - 87)  BP: 117/71 (12-20-23 @ 09:29) (104/61 - 119/73)  RR: 18 (12-20-23 @ 09:29) (18 - 18)  SpO2: 95% (12-20-23 @ 09:29) (93% - 98%)  Wt(kg): --  I&O's Summary    19 Dec 2023 07:01  -  20 Dec 2023 07:00  --------------------------------------------------------  IN: 200 mL / OUT: 1700 mL / NET: -1500 mL        Appearance: Normal	  Cardiovascular: Normal S1 S2,RRR, No JVD, No murmurs  Respiratory: Lungs clear to auscultation	  Gastrointestinal:  Soft, Non-tender, + BS	  Extremities: Normal range of motion, No clubbing, cyanosis or edema      Home Medications:  ascorbic acid 500 mg oral tablet: 1 tab(s) orally once a day (13 Dec 2023 16:41)  cholecalciferol 25 mcg (1000 intl units) oral tablet: 1 tab(s) orally once a day (13 Dec 2023 16:41)  DuoNeb 0.5 mg-2.5 mg/3 mL inhalation solution: 3 milliliter(s) by nebulizer every 6 hours as needed (13 Dec 2023 16:41)  ferrous sulfate 325 mg (65 mg elemental iron) oral tablet: 1 tab(s) orally once a day (13 Dec 2023 16:41)  Lexapro 10 mg oral tablet: 1 tab(s) orally once a day (in the evening) (13 Dec 2023 16:41)  Multiple Vitamins oral tablet: 1 tab(s) orally once a day (13 Dec 2023 16:41)  Xarelto 20 mg oral tablet: 1 tab(s) orally once a day (in the evening) (13 Dec 2023 16:41)      MEDICATIONS  (STANDING):  ascorbic acid 500 milliGRAM(s) Oral daily  chlorhexidine 2% Cloths 1 Application(s) Topical daily  escitalopram 10 milliGRAM(s) Oral at bedtime  ferrous    sulfate 325 milliGRAM(s) Oral daily  furosemide    Tablet 20 milliGRAM(s) Oral daily  rivaroxaban 20 milliGRAM(s) Oral with dinner  tamsulosin 0.4 milliGRAM(s) Oral at bedtime      TELEMETRY: 	    ECG:  	  RADIOLOGY:   DIAGNOSTIC TESTING:  [x] Echocardiogram:  < from: TTE W or WO Ultrasound Enhancing Agent (12.18.23 @ 13:39) >  CONCLUSIONS:      1. Left ventricular cavity is normal. Left ventricular wall thickness is normal. Left ventricular systolic function is normal with an ejection fraction of 67 % by 3D. There are no regional wall motion abnormalities seen.   2. Left ventricular global longitudinal strain is -22.1 % which is normal (< -18%). Images were acquired on a [employer] ultrasound system and processed using [employer] strain analysis software with aheart rate of 78 bpm and a blood pressure of 113/74 mmHg.   3. Severely enlarged right ventricular cavity size, wall thickness, and systolic function.   4. Mitrral annular dilation with normal leaflet opening.   5. Mild mitral regurgitation.   6. Trileaflet aortic valve with normal systolic excursion. calcification of the aortic valve leaflets.   7. No evidence of aortic regurgitation.   8. Tricuspid annular dilation. Moderate to severe tricuspid regurgitation.   9. No pericardial effusion seen.  10. No prior echocardiogram is available for comparison.    < end of copied text >    [ ]  Catheterization:  [ ] Stress Test:    OTHER: 	    LABS:	 	                            11.1   10.96 )-----------( 248      ( 20 Dec 2023 07:30 )             34.5                    CARDIOLOGY FOLLOW UP - Dr. Marrufo  DATE OF SERVICE: 12/20/23    CC  No CV complaints    REVIEW OF SYSTEMS:  CONSTITUTIONAL: No fever, weight loss, or fatigue  RESPIRATORY: No cough, wheezing, chills or hemoptysis; No Shortness of Breath  CARDIOVASCULAR: No chest pain, palpitations, passing out, dizziness, or leg swelling  GASTROINTESTINAL: No abdominal or epigastric pain. No nausea, vomiting, or hematemesis; No diarrhea or constipation. No melena or hematochezia.  VASCULAR: No edema     PHYSICAL EXAM:  T(C): 37 (12-20-23 @ 09:29), Max: 37 (12-20-23 @ 09:29)  HR: 64 (12-20-23 @ 09:29) (64 - 87)  BP: 117/71 (12-20-23 @ 09:29) (104/61 - 119/73)  RR: 18 (12-20-23 @ 09:29) (18 - 18)  SpO2: 95% (12-20-23 @ 09:29) (93% - 98%)  Wt(kg): --  I&O's Summary    19 Dec 2023 07:01  -  20 Dec 2023 07:00  --------------------------------------------------------  IN: 200 mL / OUT: 1700 mL / NET: -1500 mL        Appearance: Normal	  Cardiovascular: Normal S1 S2,RRR, No JVD, No murmurs  Respiratory: Lungs clear to auscultation	  Gastrointestinal:  Soft, Non-tender, + BS	  Extremities: Normal range of motion, No clubbing, cyanosis or edema      Home Medications:  ascorbic acid 500 mg oral tablet: 1 tab(s) orally once a day (13 Dec 2023 16:41)  cholecalciferol 25 mcg (1000 intl units) oral tablet: 1 tab(s) orally once a day (13 Dec 2023 16:41)  DuoNeb 0.5 mg-2.5 mg/3 mL inhalation solution: 3 milliliter(s) by nebulizer every 6 hours as needed (13 Dec 2023 16:41)  ferrous sulfate 325 mg (65 mg elemental iron) oral tablet: 1 tab(s) orally once a day (13 Dec 2023 16:41)  Lexapro 10 mg oral tablet: 1 tab(s) orally once a day (in the evening) (13 Dec 2023 16:41)  Multiple Vitamins oral tablet: 1 tab(s) orally once a day (13 Dec 2023 16:41)  Xarelto 20 mg oral tablet: 1 tab(s) orally once a day (in the evening) (13 Dec 2023 16:41)      MEDICATIONS  (STANDING):  ascorbic acid 500 milliGRAM(s) Oral daily  chlorhexidine 2% Cloths 1 Application(s) Topical daily  escitalopram 10 milliGRAM(s) Oral at bedtime  ferrous    sulfate 325 milliGRAM(s) Oral daily  furosemide    Tablet 20 milliGRAM(s) Oral daily  rivaroxaban 20 milliGRAM(s) Oral with dinner  tamsulosin 0.4 milliGRAM(s) Oral at bedtime      TELEMETRY: 	    ECG:  	  RADIOLOGY:   DIAGNOSTIC TESTING:  [x] Echocardiogram:  < from: TTE W or WO Ultrasound Enhancing Agent (12.18.23 @ 13:39) >  CONCLUSIONS:      1. Left ventricular cavity is normal. Left ventricular wall thickness is normal. Left ventricular systolic function is normal with an ejection fraction of 67 % by 3D. There are no regional wall motion abnormalities seen.   2. Left ventricular global longitudinal strain is -22.1 % which is normal (< -18%). Images were acquired on a [employer] ultrasound system and processed using [employer] strain analysis software with aheart rate of 78 bpm and a blood pressure of 113/74 mmHg.   3. Severely enlarged right ventricular cavity size, wall thickness, and systolic function.   4. Mitrral annular dilation with normal leaflet opening.   5. Mild mitral regurgitation.   6. Trileaflet aortic valve with normal systolic excursion. calcification of the aortic valve leaflets.   7. No evidence of aortic regurgitation.   8. Tricuspid annular dilation. Moderate to severe tricuspid regurgitation.   9. No pericardial effusion seen.  10. No prior echocardiogram is available for comparison.    < end of copied text >    [ ]  Catheterization:  [ ] Stress Test:    OTHER: 	    LABS:	 	                            11.1   10.96 )-----------( 248      ( 20 Dec 2023 07:30 )             34.5

## 2023-12-20 NOTE — PROGRESS NOTE ADULT - ASSESSMENT
A/p  83M PMHx afib on xarelto, w/ hx of ortho issues, s/p L hip IMN (most recently s/p R hip IMN (Dr. Godinez 10/31/19), mild depression p/w AMS, upper back shoulder and arm pain.      #AMS  -CTH no acute findings  -Infectious w/u per med, ID    #Right Sided HF  -improved  -Venous dopplers negative for DVT  -cont laisx 20mg po qd  -Echo with nml lv fxn, severely enlarged RV, mild MR     #Afib  -rate controlled off meds  -cont xarelto 20mg qd     #Back pain  -CT scan noted   -Mgmt per med

## 2023-12-20 NOTE — PROGRESS NOTE ADULT - SUBJECTIVE AND OBJECTIVE BOX
SUBJECTIVE / OVERNIGHT EVENTS:    Patient seen and examined at bedside. No events noted overnight. Resting comfortably in bed    --------------------------------------------------------------------------------------------  LABS:                        11.1   10.96 )-----------( 248      ( 20 Dec 2023 07:30 )             34.5             CAPILLARY BLOOD GLUCOSE                RADIOLOGY & ADDITIONAL TESTS:    Imaging Personally Reviewed:  [x] YES  [ ] NO    Consultant(s) Notes Reviewed:  [x] YES  [ ] NO    MEDICATIONS  (STANDING):  ascorbic acid 500 milliGRAM(s) Oral daily  chlorhexidine 2% Cloths 1 Application(s) Topical daily  escitalopram 10 milliGRAM(s) Oral at bedtime  ferrous    sulfate 325 milliGRAM(s) Oral daily  furosemide    Tablet 20 milliGRAM(s) Oral daily  rivaroxaban 20 milliGRAM(s) Oral with dinner  tamsulosin 0.4 milliGRAM(s) Oral at bedtime    MEDICATIONS  (PRN):  acetaminophen     Tablet .. 650 milliGRAM(s) Oral every 6 hours PRN Temp greater or equal to 38C (100.4F), Mild Pain (1 - 3)  melatonin 3 milliGRAM(s) Oral at bedtime PRN Insomnia      Care Discussed with Consultants/Other Providers [x] YES  [ ] NO    Vital Signs Last 24 Hrs  T(C): 37 (20 Dec 2023 09:29), Max: 37 (20 Dec 2023 09:29)  T(F): 98.6 (20 Dec 2023 09:29), Max: 98.6 (20 Dec 2023 09:29)  HR: 64 (20 Dec 2023 09:29) (64 - 87)  BP: 117/71 (20 Dec 2023 09:29) (104/61 - 119/73)  BP(mean): --  RR: 18 (20 Dec 2023 09:29) (18 - 18)  SpO2: 95% (20 Dec 2023 09:29) (93% - 98%)    Parameters below as of 20 Dec 2023 09:29  Patient On (Oxygen Delivery Method): room air      I&O's Summary    19 Dec 2023 07:01  -  20 Dec 2023 07:00  --------------------------------------------------------  IN: 200 mL / OUT: 1700 mL / NET: -1500 mL          PHYSICAL EXAM:  GENERAL: NAD, Elderly, comfortable  HEAD:  Atraumatic, Normocephalic  EYES: EOMI, PERRLA, conjunctiva and sclera clear  NECK: Supple, No JVD  CHEST/LUNG: Diminished bilaterally; No wheeze  HEART: Regular rate and rhythm; No murmurs, rubs, or gallops  ABDOMEN: Soft, Nontender, Nondistended; Bowel sounds present  NEURO: AAO2- 3, no focal weakness, 5/5 b/l extremity strength, b/l knee no arthritis, no effusion   EXTREMITIES:  2+ Peripheral Pulses, No clubbing, cyanosis, or edema  SKIN: No rashes or lesions, + kiran

## 2023-12-20 NOTE — PROGRESS NOTE ADULT - ASSESSMENT
83M PMHx afib on xarelto, w/ hx of ortho issues, s/p L hip IMN (most recently s/p R hip IMN (Dr. Godinez 10/31/19), mild depression p/w acute encephalopathy, upper back shoulder and arm pain    Plan:  # Acute encephalopathy/ Aspiration pneumonia:  - Pt AAOx3 but otherwise somewhat confused. Daughter states some of this has been ongoing, was placed on puree diet for dysphagia issues at rehab. Has decub now. Could be in setting of urinary retention and deconditioning  - CTH negative, infectious work up negative so far   - Speech/ swallow consult and dysphagia diet  - Falls precautions, aspiration precautions  - Completed total 5d course on 12/19 of Ceftriaxone   - PT consult  - UCx NGTD  - Monitor temps/WBC  - S/p Xray Cinesophagram 12/18 w/ Evidence of penetration and aspiration, rec'ed for Regular solids, mildly thick liquids  - ID following    # Lower extremity edema/ Acute hfpef:   - As per chart, daughter endorsing worse swelling in legs  - Elevated BNP  - Old echo with normal LVEF, w R sided enlargement and mod-severe TR  - Echo w/ EF 67%  - C/w diuresis per Cards   - LE Duplex negative for DVT  - Cards following    # Urinary retention:  - Kiran placed in ED by Urology  - Strict I/Os  - Hematuria noted - likely from traumatic kiran placement-- resolved   - Monitor urine color  - Monitor h/h  - C/w Flomax  - Urology following    # 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:  - Moderately differentiated from biopsy 09/2023  - PET-CT without obvious distant mets  - CT scans this admission again describe known R vocal cord lesion without obvious LAD   - S/p Xray Cinesophagram 12/18 w/ Evidence of penetration and aspiration, rec'ed for Regular solids, mildly thick liquids  - ENT consult noted  - Per Heme-> - Can consider MRI Head and Neck w/ and w/o contrast if pts clinical status improves and can follow directions   - PET-CT as outpatient depending on pts clinical course    # Afib:  - C/w Xarelto     # Anemia:  - Monitor CBC  - Transfuse PRN    # Back pain:   - Multiple areas of upper extremity pain including R shoulder, between shoulder blades and R arm for many days as per daughter. Thought to be 2/2 PT of upper extremities. Also now having LE weakness amongst other issues.   - Tibia/Fibula and Right shoulder x-ray negative for fx  - CT spine reviewed  - Pain control    # Depression:  - C/w current meds    # DVT ppx:  - IPCs  - Xarelto     [facility]

## 2023-12-20 NOTE — PROGRESS NOTE ADULT - ASSESSMENT
83y Male with PMHx of Afib on Xarelto, s/p L hip IMN (most recently s/p R hip IMN (Dr. 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 PET-CT 10/13/2023 with R vocal cord lesion abd BILATERAL vocal cord uptake with SUV 7.9 , non-specific activity at the posterior RIGHT nasopharyngeal wall is likely related to activity in the longus coli muscle and is otherwise nonspecific and an additional focus seen further posterolaterally is of indeterminate  significance due to beam hardening artifact.     Overall Impression: Mr. 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 He has not undergone further workup or resection given his ongoing medical comorbidities. At this time pts PET-CT from two months ago is not sufficient to assess patients malignancy especially in light of previous indeterminate increased areas of uptake. At this time I recommend continuing medical management and if pts clinical status improves to repeat PET-CT outpatient and determine stage and surgical candidacy with ENT if no distant disease is identified Can consider MRI neck w/ and w/o if pt can tolerated while inpatient     # SCC of R vocal cord  - Moderately differentiated from biopsy 09/2023  - PET-CT without obvious distant mets  - CT scans this admission again describe known R vocal cord lesion without obvious LAD   - ENT follow up required  - Can consider MRI Head and Neck w/ and w/o contrast if pts clinical status improves and can follow directions   - PET-CT as outpatient depending on pts clinical course     # Macrocytic anemia   - Hb 11.1, .9  - Normal liver and renal function   - Given age, nutritional deficiency vs primary bone marrow disorder  - Obtain Iron panel, B12, Folate     # AMS  - Unclear cause   - CTH 12/13/2023 without acute pathology     # Back pain, Upper arm pain  - CT C-L spine with degenerative changes  - US LE negative for DVT    # A.fib  - Continues on Xarleto 20mg daily    Thank you for allowing me to participate in the care of Mr. Youssef, please do not hesitate to call or text me if you have further questions or concerns.     Denton Jackson MD  [facility] [place]   Division of Hematology/Oncology  [address], [address]  [place] [address]  P: [phone]  F: [phone]    Attestation:   Total time spent on the encounter: >** minutes   ----Including ** min face-to-face interaction in addition to chart review, reviewing treatment plan, and managing the patient’s chronic diagnoses as listed in the assessment---- 83y Male with PMHx of Afib on Xarelto, s/p L hip IMN (most recently s/p R hip IMN (Dr. 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 PET-CT 10/13/2023 with R vocal cord lesion abd BILATERAL vocal cord uptake with SUV 7.9 , non-specific activity at the posterior RIGHT nasopharyngeal wall is likely related to activity in the longus coli muscle and is otherwise nonspecific and an additional focus seen further posterolaterally is of indeterminate  significance due to beam hardening artifact.     Overall Impression: Mr. 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 He has not undergone further workup or resection given his ongoing medical comorbidities. At this time pts PET-CT from two months ago is not sufficient to assess patients malignancy especially in light of previous indeterminate increased areas of uptake. At this time I recommend continuing medical management and if pts clinical status improves to repeat PET-CT outpatient and determine stage and surgical candidacy with ENT if no distant disease is identified Can consider MRI neck w/ and w/o if pt can tolerated while inpatient     # SCC of R vocal cord  - Moderately differentiated from biopsy 09/2023  - PET-CT without obvious distant mets  - CT scans this admission again describe known R vocal cord lesion without obvious LAD   - ENT follow up required  - Can consider MRI Head and Neck w/ and w/o contrast if pts clinical status improves and can follow directions   - PET-CT as outpatient depending on pts clinical course     # Macrocytic anemia   - Hb 11.1, .9  - Normal liver and renal function   - Given age, nutritional deficiency vs primary bone marrow disorder  - Obtain Iron panel, B12, Folate     # AMS  - Unclear cause   - CTH 12/13/2023 without acute pathology     # Back pain, Upper arm pain  - CT C-L spine with degenerative changes  - US LE negative for DVT    # A.fib  - Continues on Xarleto 20mg daily    Thank you for allowing me to participate in the care of Mr. Youssef, please do not hesitate to call or text me if you have further questions or concerns.     Denton Jackson MD  [facility] [place]   Division of Hematology/Oncology  [address], [address]  [place] [address]  P: [phone]  F: [phone]    Attestation:   Total time spent on the encounter: >** minutes   ----Including ** min face-to-face interaction in addition to chart review, reviewing treatment plan, and managing the patient’s chronic diagnoses as listed in the assessment---- 83y Male with PMHx of Afib on Xarelto, s/p L hip IMN (most recently s/p R hip IMN (Dr. 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 PET-CT 10/13/2023 with R vocal cord lesion abd BILATERAL vocal cord uptake with SUV 7.9 , non-specific activity at the posterior RIGHT nasopharyngeal wall is likely related to activity in the longus coli muscle and is otherwise nonspecific and an additional focus seen further posterolaterally is of indeterminate  significance due to beam hardening artifact.     Overall Impression: Mr. 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 He has not undergone further workup or resection given his ongoing medical comorbidities. At this time pts PET-CT from two months ago is not sufficient to assess patients malignancy especially in light of previous indeterminate increased areas of uptake. At this time I recommend continuing medical management and if pts clinical status improves to repeat PET-CT outpatient and determine stage and surgical candidacy with ENT if no distant disease is identified Can consider MRI neck w/ and w/o if pt can tolerated while inpatient     # SCC of R vocal cord  - Moderately differentiated from biopsy 09/2023  - PET-CT without obvious distant mets  - CT scans this admission again describe known R vocal cord lesion without obvious LAD   - ENT follow up required  - Can consider MRI Head and Neck w/ and w/o contrast if pts clinical status improves and can follow directions   - PET-CT as outpatient depending on pts clinical course     # Macrocytic anemia   - Hb 11.1, .9  - Normal liver and renal function   - Given age, nutritional deficiency vs primary bone marrow disorder  - f/u Iron panel, B12, Folate     # AMS  - Unclear cause   - CTH 12/13/2023 without acute pathology     # Back pain, Upper arm pain  - CT C-L spine with degenerative changes  - US LE negative for DVT    # A.fib  - Continues on Xarleto 20mg daily    Thank you for allowing me to participate in the care of Mr. Youssef, please do not hesitate to call or text me if you have further questions or concerns.     Denton Skyler Jackson, MD  [facility] [place]   Division of Hematology/Oncology  [address], [address]  [place] [address]  P: [phone]  F: [phone]    Attestation:    ---- Pt evaluated Including face-to-face interaction in addition to chart review, reviewing treatment plan, and managing the patient’s chronic diagnoses as listed in the assessment---- 83y Male with PMHx of Afib on Xarelto, s/p L hip IMN (most recently s/p R hip IMN (Dr. 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 PET-CT 10/13/2023 with R vocal cord lesion abd BILATERAL vocal cord uptake with SUV 7.9 , non-specific activity at the posterior RIGHT nasopharyngeal wall is likely related to activity in the longus coli muscle and is otherwise nonspecific and an additional focus seen further posterolaterally is of indeterminate  significance due to beam hardening artifact.     Overall Impression: Mr. 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 He has not undergone further workup or resection given his ongoing medical comorbidities. At this time pts PET-CT from two months ago is not sufficient to assess patients malignancy especially in light of previous indeterminate increased areas of uptake. At this time I recommend continuing medical management and if pts clinical status improves to repeat PET-CT outpatient and determine stage and surgical candidacy with ENT if no distant disease is identified Can consider MRI neck w/ and w/o if pt can tolerated while inpatient     # SCC of R vocal cord  - Moderately differentiated from biopsy 09/2023  - PET-CT without obvious distant mets  - CT scans this admission again describe known R vocal cord lesion without obvious LAD   - ENT follow up required  - Can consider MRI Head and Neck w/ and w/o contrast if pts clinical status improves and can follow directions   - PET-CT as outpatient depending on pts clinical course     # Macrocytic anemia   - Hb 11.1, .9  - Normal liver and renal function   - Given age, nutritional deficiency vs primary bone marrow disorder  - f/u Iron panel, B12, Folate     # AMS  - Unclear cause   - CTH 12/13/2023 without acute pathology     # Back pain, Upper arm pain  - CT C-L spine with degenerative changes  - US LE negative for DVT    # A.fib  - Continues on Xarleto 20mg daily    Thank you for allowing me to participate in the care of Mr. Youssef, please do not hesitate to call or text me if you have further questions or concerns.     Denton Skyler Jackson, MD  [facility] [place]   Division of Hematology/Oncology  [address], [address]  [place] [address]  P: [phone]  F: [phone]    Attestation:    ---- Pt evaluated Including face-to-face interaction in addition to chart review, reviewing treatment plan, and managing the patient’s chronic diagnoses as listed in the assessment----

## 2023-12-20 NOTE — PROGRESS NOTE ADULT - SUBJECTIVE AND OBJECTIVE BOX
[facility]   HEMATOLOGY/ONCOLOGY INPATIENT PROGRESS NOTE     Interval Hx:   12/20/23    Meds:   MEDICATIONS  (STANDING):  ascorbic acid 500 milliGRAM(s) Oral daily  chlorhexidine 2% Cloths 1 Application(s) Topical daily  escitalopram 10 milliGRAM(s) Oral at bedtime  ferrous    sulfate 325 milliGRAM(s) Oral daily  furosemide    Tablet 20 milliGRAM(s) Oral daily  rivaroxaban 20 milliGRAM(s) Oral with dinner  tamsulosin 0.4 milliGRAM(s) Oral at bedtime    MEDICATIONS  (PRN):  acetaminophen     Tablet .. 650 milliGRAM(s) Oral every 6 hours PRN Temp greater or equal to 38C (100.4F), Mild Pain (1 - 3)  melatonin 3 milliGRAM(s) Oral at bedtime PRN Insomnia    Vital Signs Last 24 Hrs  T(C): 36.8 (20 Dec 2023 01:01), Max: 36.8 (19 Dec 2023 08:43)  T(F): 98.2 (20 Dec 2023 01:01), Max: 98.2 (19 Dec 2023 08:43)  HR: 69 (20 Dec 2023 01:01) (69 - 95)  BP: 104/61 (20 Dec 2023 01:01) (104/61 - 119/73)  BP(mean): --  RR: 18 (20 Dec 2023 01:01) (18 - 18)  SpO2: 93% (20 Dec 2023 01:01) (93% - 98%)    Parameters below as of 20 Dec 2023 01:01  Patient On (Oxygen Delivery Method): room air    Physical Exam:  Gen: NAD, slowed speech   HEENT: moist mucous membranes, EOMI  Chest: equal chest rise  Cardiac: irregular   Abd: non tender, non distended, no hepatomegaly or splenomegaly  Ext: Trace edema  Neuro: AAOX2, confused, slowed speech     Labs:               [facility]   HEMATOLOGY/ONCOLOGY INPATIENT PROGRESS NOTE     Interval Hx:   12/20/23: Mr. Youssef was seen at bedside, pleasant but continues with confusion and delayed speech, no overnight events noted     Meds:   MEDICATIONS  (STANDING):  ascorbic acid 500 milliGRAM(s) Oral daily  chlorhexidine 2% Cloths 1 Application(s) Topical daily  escitalopram 10 milliGRAM(s) Oral at bedtime  ferrous    sulfate 325 milliGRAM(s) Oral daily  furosemide    Tablet 20 milliGRAM(s) Oral daily  rivaroxaban 20 milliGRAM(s) Oral with dinner  tamsulosin 0.4 milliGRAM(s) Oral at bedtime    MEDICATIONS  (PRN):  acetaminophen     Tablet .. 650 milliGRAM(s) Oral every 6 hours PRN Temp greater or equal to 38C (100.4F), Mild Pain (1 - 3)  melatonin 3 milliGRAM(s) Oral at bedtime PRN Insomnia    Vital Signs Last 24 Hrs  T(C): 36.8 (20 Dec 2023 01:01), Max: 36.8 (19 Dec 2023 08:43)  T(F): 98.2 (20 Dec 2023 01:01), Max: 98.2 (19 Dec 2023 08:43)  HR: 69 (20 Dec 2023 01:01) (69 - 95)  BP: 104/61 (20 Dec 2023 01:01) (104/61 - 119/73)  BP(mean): --  RR: 18 (20 Dec 2023 01:01) (18 - 18)  SpO2: 93% (20 Dec 2023 01:01) (93% - 98%)    Parameters below as of 20 Dec 2023 01:01  Patient On (Oxygen Delivery Method): room air    Physical Exam:  Gen: NAD, slowed speech   HEENT: moist mucous membranes, EOMI  Chest: equal chest rise  Cardiac: irregular   Abd: non tender, non distended, no hepatomegaly or splenomegaly  Ext: Trace edema  Neuro: AAOX2, confused, slowed speech     Labs:

## 2023-12-21 PROCEDURE — 70543 MRI ORBT/FAC/NCK W/O &W/DYE: CPT | Mod: 26

## 2023-12-21 PROCEDURE — 70553 MRI BRAIN STEM W/O & W/DYE: CPT | Mod: 26

## 2023-12-21 RX ADMIN — ESCITALOPRAM OXALATE 10 MILLIGRAM(S): 10 TABLET, FILM COATED ORAL at 22:09

## 2023-12-21 RX ADMIN — Medication 500 MILLIGRAM(S): at 12:47

## 2023-12-21 RX ADMIN — CHLORHEXIDINE GLUCONATE 1 APPLICATION(S): 213 SOLUTION TOPICAL at 12:50

## 2023-12-21 RX ADMIN — Medication 325 MILLIGRAM(S): at 12:47

## 2023-12-21 RX ADMIN — RIVAROXABAN 20 MILLIGRAM(S): KIT at 17:11

## 2023-12-21 RX ADMIN — Medication 20 MILLIGRAM(S): at 05:27

## 2023-12-21 RX ADMIN — TAMSULOSIN HYDROCHLORIDE 0.4 MILLIGRAM(S): 0.4 CAPSULE ORAL at 22:11

## 2023-12-21 NOTE — PROGRESS NOTE ADULT - SUBJECTIVE AND OBJECTIVE BOX
[facility]   HEMATOLOGY/ONCOLOGY INPATIENT PROGRESS NOTE     Interval Hx:   12/21/23    Meds:   MEDICATIONS  (STANDING):  ascorbic acid 500 milliGRAM(s) Oral daily  chlorhexidine 2% Cloths 1 Application(s) Topical daily  escitalopram 10 milliGRAM(s) Oral at bedtime  ferrous    sulfate 325 milliGRAM(s) Oral daily  furosemide    Tablet 20 milliGRAM(s) Oral daily  rivaroxaban 20 milliGRAM(s) Oral with dinner  tamsulosin 0.4 milliGRAM(s) Oral at bedtime    MEDICATIONS  (PRN):  acetaminophen     Tablet .. 650 milliGRAM(s) Oral every 6 hours PRN Temp greater or equal to 38C (100.4F), Mild Pain (1 - 3)  melatonin 3 milliGRAM(s) Oral at bedtime PRN Insomnia    Vital Signs Last 24 Hrs  T(C): 36.6 (21 Dec 2023 00:27), Max: 37 (20 Dec 2023 09:29)  T(F): 97.9 (21 Dec 2023 00:27), Max: 98.6 (20 Dec 2023 09:29)  HR: 69 (21 Dec 2023 00:27) (64 - 78)  BP: 114/67 (21 Dec 2023 00:27) (100/68 - 117/71)  BP(mean): --  RR: 18 (21 Dec 2023 00:27) (18 - 18)  SpO2: 93% (21 Dec 2023 00:27) (92% - 95%)    Parameters below as of 21 Dec 2023 00:27  Patient On (Oxygen Delivery Method): room air    Physical Exam:  Gen: NAD, slowed speech   HEENT: moist mucous membranes, EOMI  Chest: equal chest rise  Cardiac: irregular   Abd: non tender, non distended, no hepatomegaly or splenomegaly  Ext: Trace edema  Neuro: AAOX2, confused, slowed speech     Labs:                        11.1   10.96 )-----------( 248      ( 20 Dec 2023 07:30 )             34.5     CBC Full  -  ( 20 Dec 2023 07:30 )  WBC Count : 10.96 K/uL  RBC Count : 3.40 M/uL  Hemoglobin : 11.1 g/dL  Hematocrit : 34.5 %  Platelet Count - Automated : 248 K/uL  Mean Cell Volume : 101.5 fl  Mean Cell Hemoglobin : 32.6 pg  Mean Cell Hemoglobin Concentration : 32.2 gm/dL  Auto Neutrophil # : x  Auto Lymphocyte # : x  Auto Monocyte # : x  Auto Eosinophil # : x  Auto Basophil # : x  Auto Neutrophil % : x  Auto Lymphocyte % : x  Auto Monocyte % : x  Auto Eosinophil % : x  Auto Basophil % : x            Ferritin: 414 ng/mL (12-20-23 @ 07:30)  Vitamin B12, Serum: 749 pg/mL (12-20-23 @ 07:30)  Folate, Serum: 11.1 ng/mL (12-20-23 @ 07:30)   [facility]   HEMATOLOGY/ONCOLOGY INPATIENT PROGRESS NOTE     Interval Hx:   12/21/23: Mr. Youssef was seen at bedside this morning, no overnight events noted. Discussed pts case with his daughter Samaria yesterday evening, she asked I discuss with her father. This morning reviewed details of his hx and current status. He understands the situation, but he is frustrated, ultimately. I will reach out to his daughter later today. Mental status has improved.     Meds:   MEDICATIONS  (STANDING):  ascorbic acid 500 milliGRAM(s) Oral daily  chlorhexidine 2% Cloths 1 Application(s) Topical daily  escitalopram 10 milliGRAM(s) Oral at bedtime  ferrous    sulfate 325 milliGRAM(s) Oral daily  furosemide    Tablet 20 milliGRAM(s) Oral daily  rivaroxaban 20 milliGRAM(s) Oral with dinner  tamsulosin 0.4 milliGRAM(s) Oral at bedtime    MEDICATIONS  (PRN):  acetaminophen     Tablet .. 650 milliGRAM(s) Oral every 6 hours PRN Temp greater or equal to 38C (100.4F), Mild Pain (1 - 3)  melatonin 3 milliGRAM(s) Oral at bedtime PRN Insomnia    Vital Signs Last 24 Hrs  T(C): 36.6 (21 Dec 2023 00:27), Max: 37 (20 Dec 2023 09:29)  T(F): 97.9 (21 Dec 2023 00:27), Max: 98.6 (20 Dec 2023 09:29)  HR: 69 (21 Dec 2023 00:27) (64 - 78)  BP: 114/67 (21 Dec 2023 00:27) (100/68 - 117/71)  BP(mean): --  RR: 18 (21 Dec 2023 00:27) (18 - 18)  SpO2: 93% (21 Dec 2023 00:27) (92% - 95%)    Parameters below as of 21 Dec 2023 00:27  Patient On (Oxygen Delivery Method): room air    Physical Exam:  Gen: NAD, slowed speech, hoarse voice    HEENT: moist mucous membranes, EOMI  Chest: equal chest rise  Cardiac: irregular   Abd: non tender, non distended, no hepatomegaly or splenomegaly  Ext: Trace edema  Neuro: AAOX3, slowed speech     Labs:                        11.1   10.96 )-----------( 248      ( 20 Dec 2023 07:30 )             34.5     CBC Full  -  ( 20 Dec 2023 07:30 )  WBC Count : 10.96 K/uL  RBC Count : 3.40 M/uL  Hemoglobin : 11.1 g/dL  Hematocrit : 34.5 %  Platelet Count - Automated : 248 K/uL  Mean Cell Volume : 101.5 fl  Mean Cell Hemoglobin : 32.6 pg  Mean Cell Hemoglobin Concentration : 32.2 gm/dL      Ferritin: 414 ng/mL (12-20-23 @ 07:30)  Vitamin B12, Serum: 749 pg/mL (12-20-23 @ 07:30)  Folate, Serum: 11.1 ng/mL (12-20-23 @ 07:30) [facility]   HEMATOLOGY/ONCOLOGY INPATIENT PROGRESS NOTE     Interval Hx:   12/21/23: Mr. Youssef was seen at bedside this morning, no overnight events noted. Discussed pts case with his daughter Samaria yesterday evening, she asked I discuss with her father. This morning reviewed details of his hx and current status. He understands the situation, but he is frustrated, ultimately. I will reach out to his daughter later today. Mental status has improved.     Meds:   MEDICATIONS  (STANDING):  ascorbic acid 500 milliGRAM(s) Oral daily  chlorhexidine 2% Cloths 1 Application(s) Topical daily  escitalopram 10 milliGRAM(s) Oral at bedtime  ferrous    sulfate 325 milliGRAM(s) Oral daily  furosemide    Tablet 20 milliGRAM(s) Oral daily  rivaroxaban 20 milliGRAM(s) Oral with dinner  tamsulosin 0.4 milliGRAM(s) Oral at bedtime    MEDICATIONS  (PRN):  acetaminophen     Tablet .. 650 milliGRAM(s) Oral every 6 hours PRN Temp greater or equal to 38C (100.4F), Mild Pain (1 - 3)  melatonin 3 milliGRAM(s) Oral at bedtime PRN Insomnia    Vital Signs Last 24 Hrs  T(C): 36.6 (21 Dec 2023 00:27), Max: 37 (20 Dec 2023 09:29)  T(F): 97.9 (21 Dec 2023 00:27), Max: 98.6 (20 Dec 2023 09:29)  HR: 69 (21 Dec 2023 00:27) (64 - 78)  BP: 114/67 (21 Dec 2023 00:27) (100/68 - 117/71)  BP(mean): --  RR: 18 (21 Dec 2023 00:27) (18 - 18)  SpO2: 93% (21 Dec 2023 00:27) (92% - 95%)    Parameters below as of 21 Dec 2023 00:27  Patient On (Oxygen Delivery Method): room air    Physical Exam:  Gen: NAD, slowed speech, hoarse voice    HEENT: moist mucous membranes, EOMI  Chest: equal chest rise  Cardiac: irregular   Abd: non tender, non distended, no hepatomegaly or splenomegaly  Ext: Trace edema  Neuro: AAOX3, slowed speech     Labs:                        11.1   10.96 )-----------( 248      ( 20 Dec 2023 07:30 )             34.5     CBC Full  -  ( 20 Dec 2023 07:30 )  WBC Count : 10.96 K/uL  RBC Count : 3.40 M/uL  Hemoglobin : 11.1 g/dL  Hematocrit : 34.5 %  Platelet Count - Automated : 248 K/uL  Mean Cell Volume : 101.5 fl  Mean Cell Hemoglobin : 32.6 pg  Mean Cell Hemoglobin Concentration : 32.2 gm/dL      Ferritin: 414 ng/mL (12-20-23 @ 07:30)  Vitamin B12, Serum: 749 pg/mL (12-20-23 @ 07:30)  Folate, Serum: 11.1 ng/mL (12-20-23 @ 07:30)

## 2023-12-21 NOTE — PROGRESS NOTE ADULT - ASSESSMENT
83y Male with PMHx of Afib on Xarelto, s/p L hip IMN (most recently s/p R hip IMN (Dr. 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 PET-CT 10/13/2023 with R vocal cord lesion abd BILATERAL vocal cord uptake with SUV 7.9 , non-specific activity at the posterior RIGHT nasopharyngeal wall is likely related to activity in the longus coli muscle and is otherwise nonspecific and an additional focus seen further posterolaterally is of indeterminate  significance due to beam hardening artifact.     Overall Impression: Mr. 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 He has not undergone further workup or resection given his ongoing medical comorbidities. At this time pts PET-CT from two months ago is not sufficient to assess patients malignancy especially in light of previous indeterminate increased areas of uptake. At this time I recommend continuing medical management and if pts clinical status improves to repeat PET-CT outpatient and determine stage and surgical candidacy with ENT if no distant disease is identified Can consider MRI neck w/ and w/o if pt can tolerated while inpatient     # SCC of R vocal cord  - Moderately differentiated from biopsy 09/2023  - PET-CT without obvious distant mets  - CT scans this admission again describe known R vocal cord lesion without obvious LAD   - ENT follow up required  - Can consider MRI Head and Neck w/ and w/o contrast if pts clinical status improves and can follow directions   - PET-CT as outpatient depending on pts clinical course     # Macrocytic anemia   - Hb 11.1, .9  - Normal liver and renal function   - Given age, nutritional deficiency vs primary bone marrow disorder  - f/u Iron panel, B12, Folate     # AMS  - Unclear cause   - CTH 12/13/2023 without acute pathology     # Back pain, Upper arm pain  - CT C-L spine with degenerative changes  - US LE negative for DVT    # A.fib  - Continues on Xarleto 20mg daily    Thank you for allowing me to participate in the care of Mr. Youssef, please do not hesitate to call or text me if you have further questions or concerns.     Denton Skyler Jackson, MD  [facility] [place]   Division of Hematology/Oncology  [address], [address]  [place] [address]  P: [phone]  F: [phone]    Attestation:    ---- Pt evaluated Including face-to-face interaction in addition to chart review, reviewing treatment plan, and managing the patient’s chronic diagnoses as listed in the assessment---- 83y Male with PMHx of Afib on Xarelto, s/p L hip IMN (most recently s/p R hip IMN (Dr. 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 PET-CT 10/13/2023 with R vocal cord lesion abd BILATERAL vocal cord uptake with SUV 7.9 , non-specific activity at the posterior RIGHT nasopharyngeal wall is likely related to activity in the longus coli muscle and is otherwise nonspecific and an additional focus seen further posterolaterally is of indeterminate  significance due to beam hardening artifact.     Overall Impression: Mr. 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 He has not undergone further workup or resection given his ongoing medical comorbidities. At this time pts PET-CT from two months ago is not sufficient to assess patients malignancy especially in light of previous indeterminate increased areas of uptake. At this time I recommend continuing medical management and if pts clinical status improves to repeat PET-CT outpatient and determine stage and surgical candidacy with ENT if no distant disease is identified Can consider MRI neck w/ and w/o if pt can tolerated while inpatient     # SCC of R vocal cord  - Moderately differentiated from biopsy 09/2023  - PET-CT without obvious distant mets  - CT scans this admission again describe known R vocal cord lesion without obvious LAD   - ENT follow up required  - Can consider MRI Head and Neck w/ and w/o contrast if pts clinical status improves and can follow directions   - PET-CT as outpatient depending on pts clinical course     # Macrocytic anemia   - Hb 11.1, .9  - Normal liver and renal function   - Given age, nutritional deficiency vs primary bone marrow disorder  - f/u Iron panel, B12, Folate     # AMS  - Unclear cause   - CTH 12/13/2023 without acute pathology     # Back pain, Upper arm pain  - CT C-L spine with degenerative changes  - US LE negative for DVT    # A.fib  - Continues on Xarleto 20mg daily    Thank you for allowing me to participate in the care of Mr. Youssef, please do not hesitate to call or text me if you have further questions or concerns.     Denton Skyler Jackson, MD  [facility] [place]   Division of Hematology/Oncology  [address], [address]  [place] [address]  P: [phone]  F: [phone]    Attestation:    ---- Pt evaluated Including face-to-face interaction in addition to chart review, reviewing treatment plan, and managing the patient’s chronic diagnoses as listed in the assessment---- 83y Male with PMHx of Afib on Xarelto, s/p L hip IMN (most recently s/p R hip IMN (Dr. 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 PET-CT 10/13/2023 with R vocal cord lesion abd BILATERAL vocal cord uptake with SUV 7.9 , non-specific activity at the posterior RIGHT nasopharyngeal wall is likely related to activity in the longus coli muscle and is otherwise nonspecific and an additional focus seen further posterolaterally is of indeterminate  significance due to beam hardening artifact.     Overall Impression: Mr. 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 He has not undergone further workup or resection given his ongoing medical comorbidities. At this time pts PET-CT from two months ago is not sufficient to assess patients malignancy especially in light of previous indeterminate increased areas of uptake. At this time I recommend continuing medical management and if pts clinical status improves to repeat PET-CT outpatient and determine stage and surgical candidacy with ENT if no distant disease is identified. Can consider MRI neck w/ and w/o if pt can tolerated while inpatient. Will discuss with pts daughter, Samaria     # SCC of R vocal cord  - Moderately differentiated from biopsy 09/2023  - PET-CT without obvious distant mets  - CT scans this admission again describe known R vocal cord lesion without obvious LAD   - ENT follow up required  - Can consider MRI Head and Neck w/ and w/o contrast   - PET-CT as outpatient depending on pts clinical course     # Macrocytic anemia   - Hb 11.1, .5, macrocytosis improving since admission   - Normal liver and renal function   - Given age, nutritional deficiency vs primary bone marrow disorder  - Iron panel without LISA, Ferritin 414, Sat 24 some element of AOCD present  -, B12, Folate wnl     # AMS  - Unclear cause, improved mental status   - CTH 12/13/2023 without acute pathology     # Back pain, Upper arm pain  - CT C-L spine with degenerative changes  - US LE negative for DVT    # A.fib  - Continues on Xarleto 20mg daily    Thank you for allowing me to participate in the care of Mr. Youssef, please do not hesitate to call or text me if you have further questions or concerns.     Denton Jackson MD  [facility]   Division of Hematology/Oncology  [address] [place] [address], [address]  [place] [id number]  P: [phone]  F: [phone]    Attestation:    ---- Pt evaluated Including face-to-face interaction in addition to chart review, reviewing treatment plan, and managing the patient’s chronic diagnoses as listed in the assessment---- 83y Male with PMHx of Afib on Xarelto, s/p L hip IMN (most recently s/p R hip IMN (Dr. 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 PET-CT 10/13/2023 with R vocal cord lesion abd BILATERAL vocal cord uptake with SUV 7.9 , non-specific activity at the posterior RIGHT nasopharyngeal wall is likely related to activity in the longus coli muscle and is otherwise nonspecific and an additional focus seen further posterolaterally is of indeterminate  significance due to beam hardening artifact.     Overall Impression: Mr. 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 He has not undergone further workup or resection given his ongoing medical comorbidities. At this time pts PET-CT from two months ago is not sufficient to assess patients malignancy especially in light of previous indeterminate increased areas of uptake. At this time I recommend continuing medical management and if pts clinical status improves to repeat PET-CT outpatient and determine stage and surgical candidacy with ENT if no distant disease is identified. Can consider MRI neck w/ and w/o if pt can tolerated while inpatient. Will discuss with pts daughter, Samaria     # SCC of R vocal cord  - Moderately differentiated from biopsy 09/2023  - PET-CT without obvious distant mets  - CT scans this admission again describe known R vocal cord lesion without obvious LAD   - ENT follow up required  - Can consider MRI Head and Neck w/ and w/o contrast   - PET-CT as outpatient depending on pts clinical course     # Macrocytic anemia   - Hb 11.1, .5, macrocytosis improving since admission   - Normal liver and renal function   - Given age, nutritional deficiency vs primary bone marrow disorder  - Iron panel without LISA, Ferritin 414, Sat 24 some element of AOCD present  -, B12, Folate wnl     # AMS  - Unclear cause, improved mental status   - CTH 12/13/2023 without acute pathology     # Back pain, Upper arm pain  - CT C-L spine with degenerative changes  - US LE negative for DVT    # A.fib  - Continues on Xarleto 20mg daily    Thank you for allowing me to participate in the care of Mr. Youssef, please do not hesitate to call or text me if you have further questions or concerns.     Denton Jackson MD  [facility]   Division of Hematology/Oncology  [address] [place] [address], [address]  [place] [id number]  P: [phone]  F: [phone]    Attestation:    ---- Pt evaluated Including face-to-face interaction in addition to chart review, reviewing treatment plan, and managing the patient’s chronic diagnoses as listed in the assessment----

## 2023-12-21 NOTE — PROGRESS NOTE ADULT - ASSESSMENT
A/p  83M PMHx afib on xarelto, w/ hx of ortho issues, s/p L hip IMN (most recently s/p R hip IMN (Dr. Godinez 10/31/19), mild depression p/w AMS, upper back shoulder and arm pain.      #AMS  -CTH no acute findings  -Infectious w/u per med, ID    #Right Sided HF  -improved  -Venous dopplers negative for DVT  -cont laisx 20mg po qd  -Echo with nml lv fxn, severely enlarged RV, mild MR     #Afib  -rate controlled off meds  -cont xarelto 20mg qd     #Back pain  -CT scan noted   -Mgmt per med    35 minutes spent on total encounter; more than 50% of the visit was spent counseling and/or coordinating care by the attending physician.

## 2023-12-21 NOTE — PROGRESS NOTE ADULT - ASSESSMENT
83M PMHx afib on xarelto, w/ hx of ortho issues, s/p L hip IMN (most recently s/p R hip IMN (Dr. Godinez 10/31/19), mild depression p/w acute encephalopathy, upper back shoulder and arm pain    Plan:  # Acute encephalopathy/ Aspiration pneumonia:  - Pt AAOx3 but otherwise somewhat confused. Daughter states some of this has been ongoing, was placed on puree diet for dysphagia issues at rehab. Has decub now. Could be in setting of urinary retention and deconditioning  - CTH negative, infectious work up negative so far   - Speech/ swallow consult and dysphagia diet  - Falls precautions, aspiration precautions  - Completed total 5d course on 12/19 of Ceftriaxone   - PT consult  - UCx NGTD  - Monitor temps/WBC  - S/p Xray Cinesophagram 12/18 w/ Evidence of penetration and aspiration, rec'ed for Regular solids, mildly thick liquids  - ID following    # Lower extremity edema/ Acute hfpef:   - As per chart, daughter endorsing worse swelling in legs  - Elevated BNP  - Old echo with normal LVEF, w R sided enlargement and mod-severe TR  - Echo w/ EF 67%  - C/w Lasix per Cards   - LE Duplex negative for DVT  - Cards following    # Urinary retention:  - Kiran placed in ED by Urology  - Strict I/Os  - Hematuria noted - likely from traumatic kiran placement-- resolved   - Monitor urine color  - Monitor h/h  - C/w Flomax  - Urology following    # 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:  - Moderately differentiated from biopsy 09/2023  - PET-CT without obvious distant mets  - CT scans this admission again describe known R vocal cord lesion without obvious LAD   - S/p Xray Cinesophagram 12/18 w/ Evidence of penetration and aspiration, rec'ed for Regular solids, mildly thick liquids  - ENT consult noted  - Per Heme-> - Can consider MRI Head and Neck w/ and w/o contrast   - PET-CT as outpatient depending on pts clinical course    # Afib:  - C/w Xarelto     # Anemia:  - Monitor CBC  - Transfuse PRN    # Back pain:   - Multiple areas of upper extremity pain including R shoulder, between shoulder blades and R arm for many days as per daughter. Thought to be 2/2 PT of upper extremities. Also now having LE weakness amongst other issues.   - Tibia/Fibula and Right shoulder x-ray negative for fx  - CT spine reviewed  - Pain control    # Depression:  - C/w current meds    # DVT ppx:  - IPCs  - Xarelto     [facility]

## 2023-12-21 NOTE — PROGRESS NOTE ADULT - SUBJECTIVE AND OBJECTIVE BOX
CARDIOLOGY FOLLOW UP NOTE - DR. ALVES    Patient Name: MARY BO    Date of Service: 23 @ 11:58    Patient seen and examined    Subjective:    cv: denies chest pain, dyspnea, palpitations, dizziness  pulmonary: denies cough  GI: denies abdominal pain, nausea, vomiting  vascular/legs: no edema   skin: no rash  ROS: otherwise negative   overnight events:      PHYSICAL EXAM:  T(C): 36.6 (23 @ 08:00), Max: 36.9 (23 @ 05:33)  HR: 78 (23 @ 08:00) (66 - 78)  BP: 112/79 (23 @ 08:00) (100/68 - 117/68)  RR: 18 (23 @ 08:00) (18 - 18)  SpO2: 96% (23 @ 08:00) (92% - 96%)  Wt(kg): --  I&O's Summary    20 Dec 2023 07:01  -  21 Dec 2023 07:00  --------------------------------------------------------  IN: 150 mL / OUT: 1600 mL / NET: -1450 mL      Daily     Daily Weight in k.8 (20 Dec 2023 14:12)    Appearance: Normal	  Cardiovascular: Normal S1 S2,RRR, No JVD, No murmurs  Respiratory: Lungs clear to auscultation	  Gastrointestinal:  Soft, Non-tender, + BS	  Extremities: Normal range of motion, No clubbing, cyanosis or edema      Home Medications:  ascorbic acid 500 mg oral tablet: 1 tab(s) orally once a day (13 Dec 2023 16:41)  cholecalciferol 25 mcg (1000 intl units) oral tablet: 1 tab(s) orally once a day (13 Dec 2023 16:41)  DuoNeb 0.5 mg-2.5 mg/3 mL inhalation solution: 3 milliliter(s) by nebulizer every 6 hours as needed (13 Dec 2023 16:41)  ferrous sulfate 325 mg (65 mg elemental iron) oral tablet: 1 tab(s) orally once a day (13 Dec 2023 16:41)  Lexapro 10 mg oral tablet: 1 tab(s) orally once a day (in the evening) (13 Dec 2023 16:41)  Multiple Vitamins oral tablet: 1 tab(s) orally once a day (13 Dec 2023 16:41)  Xarelto 20 mg oral tablet: 1 tab(s) orally once a day (in the evening) (13 Dec 2023 16:41)      MEDICATIONS  (STANDING):  ascorbic acid 500 milliGRAM(s) Oral daily  chlorhexidine 2% Cloths 1 Application(s) Topical daily  escitalopram 10 milliGRAM(s) Oral at bedtime  ferrous    sulfate 325 milliGRAM(s) Oral daily  furosemide    Tablet 20 milliGRAM(s) Oral daily  rivaroxaban 20 milliGRAM(s) Oral with dinner  tamsulosin 0.4 milliGRAM(s) Oral at bedtime      TELEMETRY: 	    ECG:  	  RADIOLOGY:   DIAGNOSTIC TESTING:  [ ] Echocardiogram:  [ ] Catheterization:  [ ] Stress Test:    OTHER: 	    LABS:	 	    CARDIAC MARKERS:                                      11.1   10.96 )-----------( 248      ( 20 Dec 2023 07:30 )             34.5           proBNP:     Lipid Profile:   HgA1c:

## 2023-12-21 NOTE — PROGRESS NOTE ADULT - SUBJECTIVE AND OBJECTIVE BOX
SUBJECTIVE / OVERNIGHT EVENTS:    Patient seen and examined at bedside. No events noted overnight. Resting comfortably in bed      --------------------------------------------------------------------------------------------  LABS:                        11.1   10.96 )-----------( 248      ( 20 Dec 2023 07:30 )             34.5             CAPILLARY BLOOD GLUCOSE                RADIOLOGY & ADDITIONAL TESTS:     Imaging Personally Reviewed:  [x] YES  [ ] NO    Consultant(s) Notes Reviewed:  [x] YES  [ ] NO    MEDICATIONS  (STANDING):  ascorbic acid 500 milliGRAM(s) Oral daily  chlorhexidine 2% Cloths 1 Application(s) Topical daily  escitalopram 10 milliGRAM(s) Oral at bedtime  ferrous    sulfate 325 milliGRAM(s) Oral daily  furosemide    Tablet 20 milliGRAM(s) Oral daily  rivaroxaban 20 milliGRAM(s) Oral with dinner  tamsulosin 0.4 milliGRAM(s) Oral at bedtime    MEDICATIONS  (PRN):  acetaminophen     Tablet .. 650 milliGRAM(s) Oral every 6 hours PRN Temp greater or equal to 38C (100.4F), Mild Pain (1 - 3)  melatonin 3 milliGRAM(s) Oral at bedtime PRN Insomnia      Care Discussed with Consultants/Other Providers [x] YES  [ ] NO    Vital Signs Last 24 Hrs  T(C): 36.9 (21 Dec 2023 05:33), Max: 37 (20 Dec 2023 09:29)  T(F): 98.4 (21 Dec 2023 05:33), Max: 98.6 (20 Dec 2023 09:29)  HR: 66 (21 Dec 2023 05:33) (64 - 78)  BP: 110/76 (21 Dec 2023 05:33) (100/68 - 117/71)  BP(mean): --  RR: 18 (21 Dec 2023 05:33) (18 - 18)  SpO2: 94% (21 Dec 2023 05:33) (92% - 95%)    Parameters below as of 21 Dec 2023 05:33  Patient On (Oxygen Delivery Method): room air      I&O's Summary    20 Dec 2023 07:01  -  21 Dec 2023 07:00  --------------------------------------------------------  IN: 150 mL / OUT: 1600 mL / NET: -1450 mL      PHYSICAL EXAM:  GENERAL: NAD, Elderly, comfortable  HEAD:  Atraumatic, Normocephalic  EYES: EOMI, PERRLA, conjunctiva and sclera clear  NECK: Supple, No JVD  CHEST/LUNG: Diminished bilaterally; No wheeze  HEART: Regular rate and rhythm; No murmurs, rubs, or gallops  ABDOMEN: Soft, Nontender, Nondistended; Bowel sounds present  NEURO: AAO2- 3, no focal weakness, 5/5 b/l extremity strength, b/l knee no arthritis, no effusion   EXTREMITIES:  2+ Peripheral Pulses, No clubbing, cyanosis, or edema  SKIN: No rashes or lesions, + kiran

## 2023-12-22 RX ADMIN — RIVAROXABAN 20 MILLIGRAM(S): KIT at 16:43

## 2023-12-22 RX ADMIN — CHLORHEXIDINE GLUCONATE 1 APPLICATION(S): 213 SOLUTION TOPICAL at 11:12

## 2023-12-22 RX ADMIN — TAMSULOSIN HYDROCHLORIDE 0.4 MILLIGRAM(S): 0.4 CAPSULE ORAL at 22:27

## 2023-12-22 RX ADMIN — Medication 500 MILLIGRAM(S): at 11:07

## 2023-12-22 RX ADMIN — ESCITALOPRAM OXALATE 10 MILLIGRAM(S): 10 TABLET, FILM COATED ORAL at 22:25

## 2023-12-22 RX ADMIN — Medication 325 MILLIGRAM(S): at 11:07

## 2023-12-22 RX ADMIN — Medication 20 MILLIGRAM(S): at 05:25

## 2023-12-22 NOTE — PROGRESS NOTE ADULT - ASSESSMENT
83y Male with PMHx of Afib on Xarelto, s/p L hip IMN (most recently s/p R hip IMN (Dr. 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 PET-CT 10/13/2023 with R vocal cord lesion abd BILATERAL vocal cord uptake with SUV 7.9 , non-specific activity at the posterior RIGHT nasopharyngeal wall is likely related to activity in the longus coli muscle and is otherwise nonspecific and an additional focus seen further posterolaterally is of indeterminate  significance due to beam hardening artifact.     Overall Impression: Mr. 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 He has not undergone further workup or resection given his ongoing medical comorbidities. At this time pts PET-CT from two months ago is not sufficient to assess patients malignancy especially in light of previous indeterminate increased areas of uptake. At this time I recommend continuing medical management and if pts clinical status improves to repeat PET-CT outpatient and determine stage and surgical candidacy with ENT if no distant disease is identified.     MRI neck w/ and w/o redemonstration of a nodular enhancing soft tissue   focus within the right true cord with edema signal which is partially   exophytic into the airway. Enhancement is seen infiltrating into the   anterior commissure and slightly crosses over to the contralateral side.   No gross subglottic extension is seen. Overall dimensions appear   unchanged in comparison to the prior neck CT exam. Previously seen   right-sided arytenoid sclerosis is nonspecific and is not well evaluated   on MR modality. No abnormal enhancement is seen in this location. No   gross extralaryngeal spread of tumor is seen.      # SCC of R vocal cord  - Moderately differentiated from biopsy 09/2023  - PET-CT without obvious distant mets  - CT scans this admission again describe known R vocal cord lesion without obvious LAD   - ENT follow up required, can be done as outpatient   - MRI neck this admission as above, no obvious gross change  - PET-CT as outpatient depending on pts clinical course     # Macrocytic anemia   -Hb 11.1, .5, macrocytosis improving since admission   - Normal liver and renal function   - Given age, nutritional deficiency vs primary bone marrow disorder  - Iron panel without LISA, Ferritin 414, Sat 24 some element of AOCD present  -, B12, Folate wnl     # AMS  - Unclear cause   - CTH 12/13/2023 without acute pathology     # Back pain, Upper arm pain  - CT C-L spine with degenerative changes  - US LE negative for DVT    # A.fib  - Continues on Xarleto 20mg daily    Thank you for allowing me to participate in the care of Mr. Youssef, please do not hesitate to call or text me if you have further questions or concerns.     Denton Jackson MD  [facility]   Division of Hematology/Oncology  [address], [address]  [place] [address]  P: [phone]  F: [phone]    Attestation:    ---- Pt evaluated Including face-to-face interaction in addition to chart review, reviewing treatment plan, and managing the patient’s chronic diagnoses as listed in the assessment---- 83y Male with PMHx of Afib on Xarelto, s/p L hip IMN (most recently s/p R hip IMN (Dr. 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 PET-CT 10/13/2023 with R vocal cord lesion abd BILATERAL vocal cord uptake with SUV 7.9 , non-specific activity at the posterior RIGHT nasopharyngeal wall is likely related to activity in the longus coli muscle and is otherwise nonspecific and an additional focus seen further posterolaterally is of indeterminate  significance due to beam hardening artifact.     Overall Impression: Mr. 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 He has not undergone further workup or resection given his ongoing medical comorbidities. At this time pts PET-CT from two months ago is not sufficient to assess patients malignancy especially in light of previous indeterminate increased areas of uptake. At this time I recommend continuing medical management and if pts clinical status improves to repeat PET-CT outpatient and determine stage and surgical candidacy with ENT if no distant disease is identified.     MRI neck w/ and w/o redemonstration of a nodular enhancing soft tissue   focus within the right true cord with edema signal which is partially   exophytic into the airway. Enhancement is seen infiltrating into the   anterior commissure and slightly crosses over to the contralateral side.   No gross subglottic extension is seen. Overall dimensions appear   unchanged in comparison to the prior neck CT exam. Previously seen   right-sided arytenoid sclerosis is nonspecific and is not well evaluated   on MR modality. No abnormal enhancement is seen in this location. No   gross extralaryngeal spread of tumor is seen.      # SCC of R vocal cord  - Moderately differentiated from biopsy 09/2023  - PET-CT without obvious distant mets  - CT scans this admission again describe known R vocal cord lesion without obvious LAD   - ENT follow up required, can be done as outpatient   - MRI neck this admission as above, no obvious gross change  - PET-CT as outpatient depending on pts clinical course     # Macrocytic anemia   -Hb 11.1, .5, macrocytosis improving since admission   - Normal liver and renal function   - Given age, nutritional deficiency vs primary bone marrow disorder  - Iron panel without LISA, Ferritin 414, Sat 24 some element of AOCD present  -, B12, Folate wnl     # AMS  - Unclear cause   - CTH 12/13/2023 without acute pathology     # Back pain, Upper arm pain  - CT C-L spine with degenerative changes  - US LE negative for DVT    # A.fib  - Continues on Xarleto 20mg daily    Thank you for allowing me to participate in the care of Mr. Youssef, please do not hesitate to call or text me if you have further questions or concerns.     Denton Jackson MD  [facility]   Division of Hematology/Oncology  [address], [address]  [place] [address]  P: [phone]  F: [phone]    Attestation:    ---- Pt evaluated Including face-to-face interaction in addition to chart review, reviewing treatment plan, and managing the patient’s chronic diagnoses as listed in the assessment----

## 2023-12-22 NOTE — PROGRESS NOTE ADULT - SUBJECTIVE AND OBJECTIVE BOX
CARDIOLOGY FOLLOW UP NOTE - DR. ALVES    Patient Name: MARY BO    Date of Service: 12-22-23 @ 13:57    Patient seen and examined    Subjective:    cv: denies chest pain, dyspnea, palpitations, dizziness  pulmonary: denies cough  GI: denies abdominal pain, nausea, vomiting  vascular/legs: no edema   skin: no rash  ROS: otherwise negative   overnight events:      PHYSICAL EXAM:  T(C): 36.6 (12-22-23 @ 10:48), Max: 36.7 (12-21-23 @ 16:52)  HR: 77 (12-22-23 @ 10:48) (68 - 82)  BP: 108/69 (12-22-23 @ 10:48) (100/64 - 115/66)  RR: 16 (12-22-23 @ 10:48) (16 - 18)  SpO2: 96% (12-22-23 @ 10:48) (95% - 99%)  Wt(kg): --  I&O's Summary    21 Dec 2023 07:01  -  22 Dec 2023 07:00  --------------------------------------------------------  IN: 150 mL / OUT: 800 mL / NET: -650 mL      Daily     Daily     Appearance: Normal	  Cardiovascular: Normal S1 S2,RRR, No JVD, No murmurs  Respiratory: Lungs clear to auscultation	  Gastrointestinal:  Soft, Non-tender, + BS	  Extremities: Normal range of motion, No clubbing, cyanosis or edema      Home Medications:  ascorbic acid 500 mg oral tablet: 1 tab(s) orally once a day (13 Dec 2023 16:41)  cholecalciferol 25 mcg (1000 intl units) oral tablet: 1 tab(s) orally once a day (13 Dec 2023 16:41)  DuoNeb 0.5 mg-2.5 mg/3 mL inhalation solution: 3 milliliter(s) by nebulizer every 6 hours as needed (13 Dec 2023 16:41)  ferrous sulfate 325 mg (65 mg elemental iron) oral tablet: 1 tab(s) orally once a day (13 Dec 2023 16:41)  Lexapro 10 mg oral tablet: 1 tab(s) orally once a day (in the evening) (13 Dec 2023 16:41)  Multiple Vitamins oral tablet: 1 tab(s) orally once a day (13 Dec 2023 16:41)  Xarelto 20 mg oral tablet: 1 tab(s) orally once a day (in the evening) (13 Dec 2023 16:41)      MEDICATIONS  (STANDING):  ascorbic acid 500 milliGRAM(s) Oral daily  chlorhexidine 2% Cloths 1 Application(s) Topical daily  escitalopram 10 milliGRAM(s) Oral at bedtime  ferrous    sulfate 325 milliGRAM(s) Oral daily  furosemide    Tablet 20 milliGRAM(s) Oral daily  rivaroxaban 20 milliGRAM(s) Oral with dinner  tamsulosin 0.4 milliGRAM(s) Oral at bedtime      TELEMETRY: 	    ECG:  	  RADIOLOGY:   DIAGNOSTIC TESTING:  [ ] Echocardiogram:  [ ] Catheterization:  [ ] Stress Test:    OTHER: 	    LABS:	 	    CARDIAC MARKERS:                        proBNP:     Lipid Profile:   HgA1c:

## 2023-12-22 NOTE — PROGRESS NOTE ADULT - SUBJECTIVE AND OBJECTIVE BOX
[facility]   HEMATOLOGY/ONCOLOGY INPATIENT PROGRESS NOTE     Interval Hx:   12/22/2023: Mr. Youssef was seen at bedside this morning, resting comfortably, alert, I discussed MRI results with him which redemonstrate R vocal cord SCC crossing over to L, without other obvious LAD or masses noted, PET-CT to be considered for outpatient setting, will update pts daughter Samaria, later this morning     Meds:   MEDICATIONS  (STANDING):  ascorbic acid 500 milliGRAM(s) Oral daily  chlorhexidine 2% Cloths 1 Application(s) Topical daily  escitalopram 10 milliGRAM(s) Oral at bedtime  ferrous    sulfate 325 milliGRAM(s) Oral daily  furosemide    Tablet 20 milliGRAM(s) Oral daily  rivaroxaban 20 milliGRAM(s) Oral with dinner  tamsulosin 0.4 milliGRAM(s) Oral at bedtime    MEDICATIONS  (PRN):  acetaminophen     Tablet .. 650 milliGRAM(s) Oral every 6 hours PRN Temp greater or equal to 38C (100.4F), Mild Pain (1 - 3)  melatonin 3 milliGRAM(s) Oral at bedtime PRN Insomnia    Vital Signs Last 24 Hrs  T(C): 36.4 (22 Dec 2023 05:16), Max: 36.9 (21 Dec 2023 05:33)  T(F): 97.5 (22 Dec 2023 05:16), Max: 98.4 (21 Dec 2023 05:33)  HR: 70 (22 Dec 2023 05:16) (66 - 82)  BP: 100/64 (22 Dec 2023 05:16) (100/64 - 115/66)  BP(mean): --  RR: 16 (22 Dec 2023 05:16) (16 - 18)  SpO2: 99% (22 Dec 2023 05:16) (94% - 99%)    Parameters below as of 22 Dec 2023 05:16  Patient On (Oxygen Delivery Method): room air      Physical Exam:  Gen: NAD, slowed speech, hoarse voice    HEENT: moist mucous membranes, EOMI  Chest: equal chest rise  Cardiac: irregular   Abd: non tender, non distended, no hepatomegaly or splenomegaly  Ext: Trace edema  Neuro: AAOX3, slowed speech     Labs:                        11.1   10.96 )-----------( 248      ( 20 Dec 2023 07:30 )             34.5     CBC Full  -  ( 20 Dec 2023 07:30 )  WBC Count : 10.96 K/uL  RBC Count : 3.40 M/uL  Hemoglobin : 11.1 g/dL  Hematocrit : 34.5 %  Platelet Count - Automated : 248 K/uL  Mean Cell Volume : 101.5 fl  Mean Cell Hemoglobin : 32.6 pg  Mean Cell Hemoglobin Concentration : 32.2 gm/dL

## 2023-12-22 NOTE — PROGRESS NOTE ADULT - ASSESSMENT
A/p  83M PMHx afib on xarelto, w/ hx of ortho issues, s/p L hip IMN (most recently s/p R hip IMN (Dr. Godinez 10/31/19), mild depression p/w AMS, upper back shoulder and arm pain.      #AMS  -CTH no acute findings  -Infectious w/u per med, ID  -mri noted    #Right Sided HF  -improved  -Venous dopplers negative for DVT  -cont laisx 20mg po qd  -Echo with nml lv fxn, severely enlarged RV, mild MR     #Afib  -rate controlled off meds  -cont xarelto 20mg qd     #Back pain  -CT scan noted   -Mgmt per med    35 minutes spent on total encounter; more than 50% of the visit was spent counseling and/or coordinating care by the attending physician.

## 2023-12-22 NOTE — PROGRESS NOTE ADULT - ASSESSMENT
83M PMHx afib on xarelto, w/ hx of ortho issues, s/p L hip IMN (most recently s/p R hip IMN (Dr. Godinez 10/31/19), mild depression p/w acute encephalopathy, upper back shoulder and arm pain    Plan:  # Acute encephalopathy/ Aspiration pneumonia:  - Pt AAOx3 but otherwise somewhat confused. Daughter states some of this has been ongoing, was placed on puree diet for dysphagia issues at rehab. Has decub now. Could be in setting of urinary retention and deconditioning  - CTH negative, infectious work up negative so far   - Speech/ swallow consult and dysphagia diet  - Falls precautions, aspiration precautions  - Completed total 5d course on 12/19 of Ceftriaxone   - PT consult  - UCx NGTD  - Monitor temps/WBC  - S/p Xray Cinesophagram 12/18 w/ Evidence of penetration and aspiration, rec'ed for Regular solids, mildly thick liquids  - ID following    # Lower extremity edema/ Acute hfpef:   - As per chart, daughter endorsing worse swelling in legs  - Elevated BNP  - Old echo with normal LVEF, w R sided enlargement and mod-severe TR  - Echo w/ EF 67%  - C/w Lasix per Cards   - LE Duplex negative for DVT  - Cards following    # Urinary retention:  - Kiran placed in ED by Urology  - Strict I/Os  - Hematuria noted - likely from traumatic kiran placement-- resolved   - Monitor urine color  - Monitor h/h  - C/w Flomax  - Urology following    # 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:  - Moderately differentiated from biopsy 09/2023  - PET-CT without obvious distant mets  - CT scans this admission again describe known R vocal cord lesion without obvious LAD   - S/p Xray Cinesophagram 12/18 w/ Evidence of penetration and aspiration, rec'ed for Regular solids, mildly thick liquids  - ENT consult noted  - MRI Head and Neck w/ and w/o contrast redemonstrate R vocal cord SCC crossing over to L, without other obvious LAD or masses noted  - PET-CT as outpatient depending on pts clinical course  - Mgmt per Heme/ Onc    # Afib:  - C/w Xarelto     # Anemia:  - Monitor CBC  - Transfuse PRN    # Back pain:   - Multiple areas of upper extremity pain including R shoulder, between shoulder blades and R arm for many days as per daughter. Thought to be 2/2 PT of upper extremities. Also now having LE weakness amongst other issues.   - Tibia/Fibula and Right shoulder x-ray negative for fx  - CT spine reviewed  - Pain control    # Depression:  - C/w current meds    # DVT ppx:  - IPCs  - Xarelto     [facility]

## 2023-12-22 NOTE — PROGRESS NOTE ADULT - SUBJECTIVE AND OBJECTIVE BOX
SUBJECTIVE / OVERNIGHT EVENTS:    Patient seen and examined at bedside. No events noted overnight. Resting comfortably in bed      --------------------------------------------------------------------------------------------  LABS:            CAPILLARY BLOOD GLUCOSE                RADIOLOGY & ADDITIONAL TESTS: < from: MR Neck Soft Tissue Only w/wo IV Cont (12.21.23 @ 22:20) >  IMPRESSION: Redemonstration of right-sided vocal cord squamous cell   carcinoma extending across the anterior commissure and to the   contralateral left anterior cord without gross subglottic extension, as   discussed.    No cervical lymphadenopathy.    --- End of Report ---    < end of copied text >  < from: MR Head w/wo IV Cont (12.21.23 @ 22:20) >  IMPRESSION: No acute intracranial hemorrhage, acute ischemia, or abnormal   intracranial enhancement.    Multiple nonspecific abnormal white matter foci of T2/FLAIR prolongation   statistically favoring microvascular type changes.    --- End of Report ---    < end of copied text >      Imaging Personally Reviewed:  [x] YES  [ ] NO    Consultant(s) Notes Reviewed:  [x] YES  [ ] NO    MEDICATIONS  (STANDING):  ascorbic acid 500 milliGRAM(s) Oral daily  chlorhexidine 2% Cloths 1 Application(s) Topical daily  escitalopram 10 milliGRAM(s) Oral at bedtime  ferrous    sulfate 325 milliGRAM(s) Oral daily  furosemide    Tablet 20 milliGRAM(s) Oral daily  rivaroxaban 20 milliGRAM(s) Oral with dinner  tamsulosin 0.4 milliGRAM(s) Oral at bedtime    MEDICATIONS  (PRN):  acetaminophen     Tablet .. 650 milliGRAM(s) Oral every 6 hours PRN Temp greater or equal to 38C (100.4F), Mild Pain (1 - 3)  melatonin 3 milliGRAM(s) Oral at bedtime PRN Insomnia      Care Discussed with Consultants/Other Providers [x] YES  [ ] NO    Vital Signs Last 24 Hrs  T(C): 36.4 (22 Dec 2023 05:16), Max: 36.7 (21 Dec 2023 16:52)  T(F): 97.5 (22 Dec 2023 05:16), Max: 98.1 (21 Dec 2023 16:52)  HR: 70 (22 Dec 2023 05:16) (68 - 82)  BP: 100/64 (22 Dec 2023 05:16) (100/64 - 115/66)  BP(mean): --  RR: 16 (22 Dec 2023 05:16) (16 - 18)  SpO2: 99% (22 Dec 2023 05:16) (95% - 99%)    Parameters below as of 22 Dec 2023 05:16  Patient On (Oxygen Delivery Method): room air      I&O's Summary    21 Dec 2023 07:01  -  22 Dec 2023 07:00  --------------------------------------------------------  IN: 150 mL / OUT: 800 mL / NET: -650 mL        PHYSICAL EXAM:  GENERAL: NAD, Elderly, comfortable  HEAD:  Atraumatic, Normocephalic  EYES: EOMI, PERRLA, conjunctiva and sclera clear  NECK: Supple, No JVD  CHEST/LUNG: Diminished bilaterally; No wheeze  HEART: Regular rate and rhythm; No murmurs, rubs, or gallops  ABDOMEN: Soft, Nontender, Nondistended; Bowel sounds present  NEURO: AAO2- 3, no focal weakness, 5/5 b/l extremity strength, b/l knee no arthritis, no effusion   EXTREMITIES:  2+ Peripheral Pulses, No clubbing, cyanosis, or edema  SKIN: No rashes or lesions, + kiran

## 2023-12-23 LAB
HCT VFR BLD CALC: 33.7 % — LOW (ref 39–50)
HCT VFR BLD CALC: 33.7 % — LOW (ref 39–50)
HGB BLD-MCNC: 10.9 G/DL — LOW (ref 13–17)
HGB BLD-MCNC: 10.9 G/DL — LOW (ref 13–17)
MCHC RBC-ENTMCNC: 32.3 GM/DL — SIGNIFICANT CHANGE UP (ref 32–36)
MCHC RBC-ENTMCNC: 32.3 GM/DL — SIGNIFICANT CHANGE UP (ref 32–36)
MCHC RBC-ENTMCNC: 33.1 PG — SIGNIFICANT CHANGE UP (ref 27–34)
MCHC RBC-ENTMCNC: 33.1 PG — SIGNIFICANT CHANGE UP (ref 27–34)
MCV RBC AUTO: 102.4 FL — HIGH (ref 80–100)
MCV RBC AUTO: 102.4 FL — HIGH (ref 80–100)
NRBC # BLD: 0 /100 WBCS — SIGNIFICANT CHANGE UP (ref 0–0)
NRBC # BLD: 0 /100 WBCS — SIGNIFICANT CHANGE UP (ref 0–0)
PLATELET # BLD AUTO: 270 K/UL — SIGNIFICANT CHANGE UP (ref 150–400)
PLATELET # BLD AUTO: 270 K/UL — SIGNIFICANT CHANGE UP (ref 150–400)
RBC # BLD: 3.29 M/UL — LOW (ref 4.2–5.8)
RBC # BLD: 3.29 M/UL — LOW (ref 4.2–5.8)
RBC # FLD: 14.1 % — SIGNIFICANT CHANGE UP (ref 10.3–14.5)
RBC # FLD: 14.1 % — SIGNIFICANT CHANGE UP (ref 10.3–14.5)
WBC # BLD: 11.06 K/UL — HIGH (ref 3.8–10.5)
WBC # BLD: 11.06 K/UL — HIGH (ref 3.8–10.5)
WBC # FLD AUTO: 11.06 K/UL — HIGH (ref 3.8–10.5)
WBC # FLD AUTO: 11.06 K/UL — HIGH (ref 3.8–10.5)

## 2023-12-23 RX ADMIN — RIVAROXABAN 20 MILLIGRAM(S): KIT at 16:37

## 2023-12-23 RX ADMIN — CHLORHEXIDINE GLUCONATE 1 APPLICATION(S): 213 SOLUTION TOPICAL at 11:08

## 2023-12-23 RX ADMIN — ESCITALOPRAM OXALATE 10 MILLIGRAM(S): 10 TABLET, FILM COATED ORAL at 21:45

## 2023-12-23 RX ADMIN — TAMSULOSIN HYDROCHLORIDE 0.4 MILLIGRAM(S): 0.4 CAPSULE ORAL at 21:45

## 2023-12-23 RX ADMIN — Medication 325 MILLIGRAM(S): at 11:08

## 2023-12-23 RX ADMIN — Medication 20 MILLIGRAM(S): at 05:37

## 2023-12-23 RX ADMIN — Medication 500 MILLIGRAM(S): at 11:08

## 2023-12-23 NOTE — PROGRESS NOTE ADULT - SUBJECTIVE AND OBJECTIVE BOX
CARDIOLOGY FOLLOW UP - Dr. Marrufo  DATE OF SERVICE:    CC      REVIEW OF SYSTEMS:  CONSTITUTIONAL: No fever, weight loss, or fatigue  RESPIRATORY: No cough, wheezing, chills or hemoptysis; No Shortness of Breath  CARDIOVASCULAR: No chest pain, palpitations, passing out, dizziness, or leg swelling  GASTROINTESTINAL: No abdominal or epigastric pain. No nausea, vomiting, or hematemesis; No diarrhea or constipation. No melena or hematochezia.  VASCULAR: No edema     PHYSICAL EXAM:  T(C): 36.7 (12-23-23 @ 00:02), Max: 36.7 (12-23-23 @ 00:02)  HR: 70 (12-23-23 @ 00:02) (70 - 77)  BP: 106/60 (12-23-23 @ 00:02) (106/60 - 114/73)  RR: 18 (12-23-23 @ 00:02) (16 - 18)  SpO2: 96% (12-23-23 @ 00:02) (96% - 96%)  Wt(kg): --  I&O's Summary    22 Dec 2023 07:01  -  23 Dec 2023 07:00  --------------------------------------------------------  IN: 150 mL / OUT: 1800 mL / NET: -1650 mL        Appearance: Normal	  Cardiovascular: Normal S1 S2,RRR, No JVD, No murmurs  Respiratory: Lungs clear to auscultation	  Gastrointestinal:  Soft, Non-tender, + BS	  Extremities: Normal range of motion, No clubbing, cyanosis or edema      Home Medications:  ascorbic acid 500 mg oral tablet: 1 tab(s) orally once a day (13 Dec 2023 16:41)  cholecalciferol 25 mcg (1000 intl units) oral tablet: 1 tab(s) orally once a day (13 Dec 2023 16:41)  DuoNeb 0.5 mg-2.5 mg/3 mL inhalation solution: 3 milliliter(s) by nebulizer every 6 hours as needed (13 Dec 2023 16:41)  ferrous sulfate 325 mg (65 mg elemental iron) oral tablet: 1 tab(s) orally once a day (13 Dec 2023 16:41)  Lexapro 10 mg oral tablet: 1 tab(s) orally once a day (in the evening) (13 Dec 2023 16:41)  Multiple Vitamins oral tablet: 1 tab(s) orally once a day (13 Dec 2023 16:41)  Xarelto 20 mg oral tablet: 1 tab(s) orally once a day (in the evening) (13 Dec 2023 16:41)      MEDICATIONS  (STANDING):  ascorbic acid 500 milliGRAM(s) Oral daily  chlorhexidine 2% Cloths 1 Application(s) Topical daily  escitalopram 10 milliGRAM(s) Oral at bedtime  ferrous    sulfate 325 milliGRAM(s) Oral daily  furosemide    Tablet 20 milliGRAM(s) Oral daily  rivaroxaban 20 milliGRAM(s) Oral with dinner  tamsulosin 0.4 milliGRAM(s) Oral at bedtime      TELEMETRY: 	    ECG:  	  RADIOLOGY:   DIAGNOSTIC TESTING:  [ ] Echocardiogram:  [ ]  Catheterization:  [ ] Stress Test:    OTHER: 	    LABS:	 	                            10.9   11.06 )-----------( 270      ( 23 Dec 2023 07:09 )             33.7                    CARDIOLOGY FOLLOW UP - Dr. Marrufo  DATE OF SERVICE: 12/23/23    CC  No CV complaints     REVIEW OF SYSTEMS:  CONSTITUTIONAL: No fever, weight loss, or fatigue  RESPIRATORY: No cough, wheezing, chills or hemoptysis; No Shortness of Breath  CARDIOVASCULAR: No chest pain, palpitations, passing out, dizziness, or leg swelling  GASTROINTESTINAL: No abdominal or epigastric pain. No nausea, vomiting, or hematemesis; No diarrhea or constipation. No melena or hematochezia.  VASCULAR: No edema     PHYSICAL EXAM:  T(C): 36.7 (12-23-23 @ 00:02), Max: 36.7 (12-23-23 @ 00:02)  HR: 70 (12-23-23 @ 00:02) (70 - 77)  BP: 106/60 (12-23-23 @ 00:02) (106/60 - 114/73)  RR: 18 (12-23-23 @ 00:02) (16 - 18)  SpO2: 96% (12-23-23 @ 00:02) (96% - 96%)  Wt(kg): --  I&O's Summary    22 Dec 2023 07:01  -  23 Dec 2023 07:00  --------------------------------------------------------  IN: 150 mL / OUT: 1800 mL / NET: -1650 mL        Appearance: Elderly male 	  Cardiovascular: Normal S1 S2,RRR, No JVD, No murmurs  Respiratory: Lungs clear to auscultation b/l   Gastrointestinal:  Soft, Non-tender, + BS	  Extremities: Normal range of motion, No clubbing, cyanosis or edema      Home Medications:  ascorbic acid 500 mg oral tablet: 1 tab(s) orally once a day (13 Dec 2023 16:41)  cholecalciferol 25 mcg (1000 intl units) oral tablet: 1 tab(s) orally once a day (13 Dec 2023 16:41)  DuoNeb 0.5 mg-2.5 mg/3 mL inhalation solution: 3 milliliter(s) by nebulizer every 6 hours as needed (13 Dec 2023 16:41)  ferrous sulfate 325 mg (65 mg elemental iron) oral tablet: 1 tab(s) orally once a day (13 Dec 2023 16:41)  Lexapro 10 mg oral tablet: 1 tab(s) orally once a day (in the evening) (13 Dec 2023 16:41)  Multiple Vitamins oral tablet: 1 tab(s) orally once a day (13 Dec 2023 16:41)  Xarelto 20 mg oral tablet: 1 tab(s) orally once a day (in the evening) (13 Dec 2023 16:41)      MEDICATIONS  (STANDING):  ascorbic acid 500 milliGRAM(s) Oral daily  chlorhexidine 2% Cloths 1 Application(s) Topical daily  escitalopram 10 milliGRAM(s) Oral at bedtime  ferrous    sulfate 325 milliGRAM(s) Oral daily  furosemide    Tablet 20 milliGRAM(s) Oral daily  rivaroxaban 20 milliGRAM(s) Oral with dinner  tamsulosin 0.4 milliGRAM(s) Oral at bedtime      TELEMETRY: 	    ECG:  	  RADIOLOGY:   DIAGNOSTIC TESTING:  [ ] Echocardiogram:  [ ]  Catheterization:  [ ] Stress Test:    OTHER: 	    LABS:	 	                            10.9   11.06 )-----------( 270      ( 23 Dec 2023 07:09 )             33.7

## 2023-12-23 NOTE — PROGRESS NOTE ADULT - ASSESSMENT
83y Male with PMHx of Afib on Xarelto, s/p L hip IMN (most recently s/p R hip IMN (Dr. 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 PET-CT 10/13/2023 with R vocal cord lesion abd BILATERAL vocal cord uptake with SUV 7.9 , non-specific activity at the posterior RIGHT nasopharyngeal wall is likely related to activity in the longus coli muscle and is otherwise nonspecific and an additional focus seen further posterolaterally is of indeterminate  significance due to beam hardening artifact.     Overall Impression: Mr. 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 He has not undergone further workup or resection given his ongoing medical comorbidities. At this time pts PET-CT from two months ago is not sufficient to assess patients malignancy especially in light of previous indeterminate increased areas of uptake. At this time I recommend continuing medical management and if pts clinical status improves to repeat PET-CT outpatient and determine stage and surgical candidacy with ENT if no distant disease is identified.     MRI neck w/ and w/o redemonstration of a nodular enhancing soft tissue   focus within the right true cord with edema signal which is partially   exophytic into the airway. Enhancement is seen infiltrating into the   anterior commissure and slightly crosses over to the contralateral side.   No gross subglottic extension is seen. Overall dimensions appear   unchanged in comparison to the prior neck CT exam. Previously seen   right-sided arytenoid sclerosis is nonspecific and is not well evaluated   on MR modality. No abnormal enhancement is seen in this location. No   gross extralaryngeal spread of tumor is seen.      # SCC of R vocal cord  - Moderately differentiated from biopsy 09/2023  - PET-CT without obvious distant mets  - CT scans this admission again describe known R vocal cord lesion without obvious LAD   - ENT follow up required, can be done as outpatient   - MRI neck this admission as above, no obvious gross change  - PET-CT as outpatient depending on pts clinical course     # Macrocytic anemia   -Hb 11.1, .5, macrocytosis improving since admission   - Normal liver and renal function   - Given age, nutritional deficiency vs primary bone marrow disorder  - Iron panel without LISA, Ferritin 414, Sat 24 some element of AOCD present  -, B12, Folate wnl     # AMS  - Unclear cause   - CTH 12/13/2023 without acute pathology     # Back pain, Upper arm pain  - CT C-L spine with degenerative changes  - US LE negative for DVT    # A.fib  - Continues on Xarleto 20mg daily    Thank you for allowing me to participate in the care of Mr. Youssef, please do not hesitate to call or text me if you have further questions or concerns.     Denton Jackson MD  [facility]   Division of Hematology/Oncology  [address], [address]  [place] [address]  P: [phone]  F: [phone]    Attestation:    ---- Pt evaluated Including face-to-face interaction in addition to chart review, reviewing treatment plan, and managing the patient’s chronic diagnoses as listed in the assessment---- 83y Male with PMHx of Afib on Xarelto, s/p L hip IMN (most recently s/p R hip IMN (Dr. 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 PET-CT 10/13/2023 with R vocal cord lesion abd BILATERAL vocal cord uptake with SUV 7.9 , non-specific activity at the posterior RIGHT nasopharyngeal wall is likely related to activity in the longus coli muscle and is otherwise nonspecific and an additional focus seen further posterolaterally is of indeterminate  significance due to beam hardening artifact.     Overall Impression: Mr. 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 He has not undergone further workup or resection given his ongoing medical comorbidities. At this time pts PET-CT from two months ago is not sufficient to assess patients malignancy especially in light of previous indeterminate increased areas of uptake. At this time I recommend continuing medical management and if pts clinical status improves to repeat PET-CT outpatient and determine stage and surgical candidacy with ENT if no distant disease is identified.     MRI neck w/ and w/o redemonstration of a nodular enhancing soft tissue   focus within the right true cord with edema signal which is partially   exophytic into the airway. Enhancement is seen infiltrating into the   anterior commissure and slightly crosses over to the contralateral side.   No gross subglottic extension is seen. Overall dimensions appear   unchanged in comparison to the prior neck CT exam. Previously seen   right-sided arytenoid sclerosis is nonspecific and is not well evaluated   on MR modality. No abnormal enhancement is seen in this location. No   gross extralaryngeal spread of tumor is seen.      # SCC of R vocal cord  - Moderately differentiated from biopsy 09/2023  - PET-CT without obvious distant mets  - CT scans this admission again describe known R vocal cord lesion without obvious LAD   - ENT follow up required, can be done as outpatient   - MRI neck this admission as above, no obvious gross change  - PET-CT as outpatient depending on pts clinical course     # Macrocytic anemia   -Hb 11.1, .5, macrocytosis improving since admission   - Normal liver and renal function   - Given age, nutritional deficiency vs primary bone marrow disorder  - Iron panel without LISA, Ferritin 414, Sat 24 some element of AOCD present  -, B12, Folate wnl     # AMS  - Unclear cause   - CTH 12/13/2023 without acute pathology     # Back pain, Upper arm pain  - CT C-L spine with degenerative changes  - US LE negative for DVT    # A.fib  - Continues on Xarleto 20mg daily    Thank you for allowing me to participate in the care of Mr. Youssef, please do not hesitate to call or text me if you have further questions or concerns.     Denton Jackson MD  [facility]   Division of Hematology/Oncology  [address], [address]  [place] [address]  P: [phone]  F: [phone]    Attestation:    ---- Pt evaluated Including face-to-face interaction in addition to chart review, reviewing treatment plan, and managing the patient’s chronic diagnoses as listed in the assessment----

## 2023-12-23 NOTE — PROGRESS NOTE ADULT - ASSESSMENT
A/p  83M PMHx afib on xarelto, w/ hx of ortho issues, s/p L hip IMN (most recently s/p R hip IMN (Dr. Godinez 10/31/19), mild depression p/w AMS, upper back shoulder and arm pain.      #AMS  -CTH no acute findings  -Infectious w/u per med, ID  -mri noted    #Right Sided HF  -improved  -Venous dopplers negative for DVT  -cont laisx 20mg po qd  -Echo with nml lv fxn, severely enlarged RV, mild MR     #Afib  -rate controlled off meds  -cont xarelto 20mg qd     #Back pain  -CT scan noted   -Mgmt per med

## 2023-12-23 NOTE — PROGRESS NOTE ADULT - SUBJECTIVE AND OBJECTIVE BOX
SUBJECTIVE / OVERNIGHT EVENTS:      No events noted overnight.       --------------------------------------------------------------------------------------------  LABS:            CAPILLARY BLOOD GLUCOSE                RADIOLOGY & ADDITIONAL TESTS:     Imaging Personally Reviewed:  [x] YES  [ ] NO    Consultant(s) Notes Reviewed:  [x] YES  [ ] NO    MEDICATIONS  (STANDING):  ascorbic acid 500 milliGRAM(s) Oral daily  chlorhexidine 2% Cloths 1 Application(s) Topical daily  escitalopram 10 milliGRAM(s) Oral at bedtime  ferrous    sulfate 325 milliGRAM(s) Oral daily  furosemide    Tablet 20 milliGRAM(s) Oral daily  rivaroxaban 20 milliGRAM(s) Oral with dinner  tamsulosin 0.4 milliGRAM(s) Oral at bedtime    MEDICATIONS  (PRN):  acetaminophen     Tablet .. 650 milliGRAM(s) Oral every 6 hours PRN Temp greater or equal to 38C (100.4F), Mild Pain (1 - 3)  melatonin 3 milliGRAM(s) Oral at bedtime PRN Insomnia      Care Discussed with Consultants/Other Providers [x] YES  [ ] NO    Vital Signs Last 24 Hrs  T(C): 36.7 (23 Dec 2023 00:02), Max: 36.7 (23 Dec 2023 00:02)  T(F): 98.1 (23 Dec 2023 00:02), Max: 98.1 (23 Dec 2023 00:02)  HR: 70 (23 Dec 2023 00:02) (70 - 77)  BP: 106/60 (23 Dec 2023 00:02) (106/60 - 114/73)  BP(mean): --  RR: 18 (23 Dec 2023 00:02) (16 - 18)  SpO2: 96% (23 Dec 2023 00:02) (96% - 96%)    Parameters below as of 23 Dec 2023 00:02  Patient On (Oxygen Delivery Method): room air      I&O's Summary    21 Dec 2023 07:01  -  22 Dec 2023 07:00  --------------------------------------------------------  IN: 150 mL / OUT: 800 mL / NET: -650 mL    22 Dec 2023 07:01  -  23 Dec 2023 06:32  --------------------------------------------------------  IN: 150 mL / OUT: 1800 mL / NET: -1650 mL      PHYSICAL EXAM:  GENERAL: NAD, Elderly, comfortable  HEAD:  Atraumatic, Normocephalic  EYES: EOMI, PERRLA, conjunctiva and sclera clear  NECK: Supple, No JVD  CHEST/LUNG: Diminished bilaterally; No wheeze  HEART: Regular rate and rhythm; No murmurs, rubs, or gallops  ABDOMEN: Soft, Nontender, Nondistended; Bowel sounds present  NEURO: AAO2- 3, no focal weakness, 5/5 b/l extremity strength, b/l knee no arthritis, no effusion   EXTREMITIES:  2+ Peripheral Pulses, No clubbing, cyanosis, or edema  SKIN: No rashes or lesions, + kiran

## 2023-12-23 NOTE — PROGRESS NOTE ADULT - SUBJECTIVE AND OBJECTIVE BOX
[facility]   HEMATOLOGY/ONCOLOGY INPATIENT PROGRESS NOTE     Interval Hx:   12/23/23    Meds:   MEDICATIONS  (STANDING):  ascorbic acid 500 milliGRAM(s) Oral daily  chlorhexidine 2% Cloths 1 Application(s) Topical daily  escitalopram 10 milliGRAM(s) Oral at bedtime  ferrous    sulfate 325 milliGRAM(s) Oral daily  furosemide    Tablet 20 milliGRAM(s) Oral daily  rivaroxaban 20 milliGRAM(s) Oral with dinner  tamsulosin 0.4 milliGRAM(s) Oral at bedtime    MEDICATIONS  (PRN):  acetaminophen     Tablet .. 650 milliGRAM(s) Oral every 6 hours PRN Temp greater or equal to 38C (100.4F), Mild Pain (1 - 3)  melatonin 3 milliGRAM(s) Oral at bedtime PRN Insomnia    Vital Signs Last 24 Hrs  T(C): 36.7 (23 Dec 2023 00:02), Max: 36.7 (23 Dec 2023 00:02)  T(F): 98.1 (23 Dec 2023 00:02), Max: 98.1 (23 Dec 2023 00:02)  HR: 70 (23 Dec 2023 00:02) (70 - 77)  BP: 106/60 (23 Dec 2023 00:02) (106/60 - 114/73)  BP(mean): --  RR: 18 (23 Dec 2023 00:02) (16 - 18)  SpO2: 96% (23 Dec 2023 00:02) (96% - 96%)    Parameters below as of 23 Dec 2023 00:02  Patient On (Oxygen Delivery Method): room air    Physical Exam:  Gen: NAD, slowed speech, hoarse voice    HEENT: moist mucous membranes, EOMI  Chest: equal chest rise  Cardiac: irregular   Abd: non tender, non distended, no hepatomegaly or splenomegaly  Ext: Trace edema  Neuro: AAOX3, slowed speech     Labs:               [facility]   HEMATOLOGY/ONCOLOGY INPATIENT PROGRESS NOTE     Interval Hx:   12/23/23: Mr. Youssef was seen at bedside this morning, alert. No overnight events. Spoke to patients daughter, Maria E yesterday, gave her updates and discussed eventualities and possible options outpatient, questions were answered to my ability    Meds:   MEDICATIONS  (STANDING):  ascorbic acid 500 milliGRAM(s) Oral daily  chlorhexidine 2% Cloths 1 Application(s) Topical daily  escitalopram 10 milliGRAM(s) Oral at bedtime  ferrous    sulfate 325 milliGRAM(s) Oral daily  furosemide    Tablet 20 milliGRAM(s) Oral daily  rivaroxaban 20 milliGRAM(s) Oral with dinner  tamsulosin 0.4 milliGRAM(s) Oral at bedtime    MEDICATIONS  (PRN):  acetaminophen     Tablet .. 650 milliGRAM(s) Oral every 6 hours PRN Temp greater or equal to 38C (100.4F), Mild Pain (1 - 3)  melatonin 3 milliGRAM(s) Oral at bedtime PRN Insomnia    Vital Signs Last 24 Hrs  T(C): 36.7 (23 Dec 2023 00:02), Max: 36.7 (23 Dec 2023 00:02)  T(F): 98.1 (23 Dec 2023 00:02), Max: 98.1 (23 Dec 2023 00:02)  HR: 70 (23 Dec 2023 00:02) (70 - 77)  BP: 106/60 (23 Dec 2023 00:02) (106/60 - 114/73)  BP(mean): --  RR: 18 (23 Dec 2023 00:02) (16 - 18)  SpO2: 96% (23 Dec 2023 00:02) (96% - 96%)    Parameters below as of 23 Dec 2023 00:02  Patient On (Oxygen Delivery Method): room air    Physical Exam:  Gen: NAD, slowed speech, hoarse voice    HEENT: moist mucous membranes, EOMI  Chest: equal chest rise  Cardiac: irregular   Abd: non tender, non distended, no hepatomegaly or splenomegaly  Ext: Trace edema  Neuro: AAOX3, slowed speech     Labs:

## 2023-12-24 LAB
HCT VFR BLD CALC: 35.6 % — LOW (ref 39–50)
HCT VFR BLD CALC: 35.6 % — LOW (ref 39–50)
HGB BLD-MCNC: 11.1 G/DL — LOW (ref 13–17)
HGB BLD-MCNC: 11.1 G/DL — LOW (ref 13–17)
MCHC RBC-ENTMCNC: 31.2 GM/DL — LOW (ref 32–36)
MCHC RBC-ENTMCNC: 31.2 GM/DL — LOW (ref 32–36)
MCHC RBC-ENTMCNC: 32.3 PG — SIGNIFICANT CHANGE UP (ref 27–34)
MCHC RBC-ENTMCNC: 32.3 PG — SIGNIFICANT CHANGE UP (ref 27–34)
MCV RBC AUTO: 103.5 FL — HIGH (ref 80–100)
MCV RBC AUTO: 103.5 FL — HIGH (ref 80–100)
NRBC # BLD: 0 /100 WBCS — SIGNIFICANT CHANGE UP (ref 0–0)
NRBC # BLD: 0 /100 WBCS — SIGNIFICANT CHANGE UP (ref 0–0)
PLATELET # BLD AUTO: 291 K/UL — SIGNIFICANT CHANGE UP (ref 150–400)
PLATELET # BLD AUTO: 291 K/UL — SIGNIFICANT CHANGE UP (ref 150–400)
RBC # BLD: 3.44 M/UL — LOW (ref 4.2–5.8)
RBC # BLD: 3.44 M/UL — LOW (ref 4.2–5.8)
RBC # FLD: 14.2 % — SIGNIFICANT CHANGE UP (ref 10.3–14.5)
RBC # FLD: 14.2 % — SIGNIFICANT CHANGE UP (ref 10.3–14.5)
WBC # BLD: 10.76 K/UL — HIGH (ref 3.8–10.5)
WBC # BLD: 10.76 K/UL — HIGH (ref 3.8–10.5)
WBC # FLD AUTO: 10.76 K/UL — HIGH (ref 3.8–10.5)
WBC # FLD AUTO: 10.76 K/UL — HIGH (ref 3.8–10.5)

## 2023-12-24 RX ADMIN — Medication 3 MILLIGRAM(S): at 21:17

## 2023-12-24 RX ADMIN — Medication 650 MILLIGRAM(S): at 21:17

## 2023-12-24 RX ADMIN — Medication 650 MILLIGRAM(S): at 23:00

## 2023-12-24 RX ADMIN — ESCITALOPRAM OXALATE 10 MILLIGRAM(S): 10 TABLET, FILM COATED ORAL at 21:18

## 2023-12-24 RX ADMIN — Medication 325 MILLIGRAM(S): at 11:24

## 2023-12-24 RX ADMIN — CHLORHEXIDINE GLUCONATE 1 APPLICATION(S): 213 SOLUTION TOPICAL at 11:25

## 2023-12-24 RX ADMIN — Medication 500 MILLIGRAM(S): at 11:24

## 2023-12-24 RX ADMIN — RIVAROXABAN 20 MILLIGRAM(S): KIT at 16:41

## 2023-12-24 RX ADMIN — TAMSULOSIN HYDROCHLORIDE 0.4 MILLIGRAM(S): 0.4 CAPSULE ORAL at 21:18

## 2023-12-24 RX ADMIN — Medication 20 MILLIGRAM(S): at 05:17

## 2023-12-24 NOTE — PROGRESS NOTE ADULT - ASSESSMENT
A/p  83M PMHx afib on xarelto, w/ hx of ortho issues, s/p L hip IMN (most recently s/p R hip IMN (Dr. Godinez 10/31/19), mild depression p/w AMS, upper back shoulder and arm pain.      #AMS  -CTH no acute findings  -Infectious w/u per med, ID  -mri noted    #Right Sided HF  -improved  -Venous dopplers negative for DVT  -cont lasix 20mg po qd  -Echo with nml lv fxn, severely enlarged RV, mild MR     #Afib  -rate controlled off meds  -cont xarelto 20mg qd     #Back pain  -CT scan noted   -Mgmt per med

## 2023-12-24 NOTE — PROGRESS NOTE ADULT - ASSESSMENT
83y Male with PMHx of Afib on Xarelto, s/p L hip IMN (most recently s/p R hip IMN (Dr. Godinez 10/31/19), mild depression who presented with AMS, upper back shoulder and arm pain who was previously diagnosed with SCC of the vocal cordPt was confused and obtaining history was difficult. 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 PET-CT 10/13/2023 with R vocal cord lesion abd BILATERAL vocal cord uptake with SUV 7.9 , non-specific activity at the posterior RIGHT nasopharyngeal wall is likely related to activity in the longus coli muscle and is otherwise nonspecific and an additional focus seen further posterolaterally is of indeterminate  significance due to beam hardening artifact.     Overall Impression: Mr. 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 He has not undergone further workup or resection given his ongoing medical comorbidities. At this time pts PET-CT from two months ago is not sufficient to assess patients malignancy especially in light of previous indeterminate increased areas of uptake. At this time I recommend continuing medical management and if pts clinical status improves to repeat PET-CT outpatient and determine stage and surgical candidacy with ENT if no distant disease is identified. Discussed findings with pts daughter, Samaria, through this admission     MRI neck w/ and w/o redemonstration of a nodular enhancing soft tissue   focus within the right true cord with edema signal which is partially   exophytic into the airway. Enhancement is seen infiltrating into the   anterior commissure and slightly crosses over to the contralateral side.   No gross subglottic extension is seen. Overall dimensions appear   unchanged in comparison to the prior neck CT exam. Previously seen   right-sided arytenoid sclerosis is nonspecific and is not well evaluated   on MR modality. No abnormal enhancement is seen in this location. No   gross extralaryngeal spread of tumor is seen.      # SCC of R vocal cord  - Moderately differentiated from biopsy 09/2023  - PET-CT without obvious distant mets  - CT scans this admission again describe known R vocal cord lesion without obvious LAD   - ENT follow up required, can be done as outpatient   - MRI neck this admission as above  - PET-CT as outpatient depending on pts clinical course     # Macrocytic anemia   -Hb 10.9, .4, macrocytosis improving since admission   - Normal liver and renal function   - Given age, nutritional deficiency vs primary bone marrow disorder can be worked outpatient   - Iron panel without LISA, Ferritin 414, Sat 24 some element of AOCD likely  - B12, Folate wnl     # AMS  - Unclear cause   - CTH 12/13/2023 without acute pathology     # Back pain, Upper arm pain  - CT C-L spine with degenerative changes  - US LE negative for DVT    # A.fib  - Continues on Xarleto 20mg daily    Thank you for allowing me to participate in the care of Mr. Youssef, please do not hesitate to call or text me if you have further questions or concerns.     Denton Jackson MD  [facility]   Division of Hematology/Oncology  [address], [address]  [place] [address]  P: [phone]  F: [phone]    Attestation:    ---- Pt evaluated Including face-to-face interaction in addition to chart review, reviewing treatment plan, and managing the patient’s chronic diagnoses as listed in the assessment---- 83y Male with PMHx of Afib on Xarelto, s/p L hip IMN (most recently s/p R hip IMN (Dr. Godinez 10/31/19), mild depression who presented with AMS, upper back shoulder and arm pain who was previously diagnosed with SCC of the vocal cordPt was confused and obtaining history was difficult. 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 PET-CT 10/13/2023 with R vocal cord lesion abd BILATERAL vocal cord uptake with SUV 7.9 , non-specific activity at the posterior RIGHT nasopharyngeal wall is likely related to activity in the longus coli muscle and is otherwise nonspecific and an additional focus seen further posterolaterally is of indeterminate  significance due to beam hardening artifact.     Overall Impression: Mr. 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 He has not undergone further workup or resection given his ongoing medical comorbidities. At this time pts PET-CT from two months ago is not sufficient to assess patients malignancy especially in light of previous indeterminate increased areas of uptake. At this time I recommend continuing medical management and if pts clinical status improves to repeat PET-CT outpatient and determine stage and surgical candidacy with ENT if no distant disease is identified. Discussed findings with pts daughter, Samaria, through this admission     MRI neck w/ and w/o redemonstration of a nodular enhancing soft tissue   focus within the right true cord with edema signal which is partially   exophytic into the airway. Enhancement is seen infiltrating into the   anterior commissure and slightly crosses over to the contralateral side.   No gross subglottic extension is seen. Overall dimensions appear   unchanged in comparison to the prior neck CT exam. Previously seen   right-sided arytenoid sclerosis is nonspecific and is not well evaluated   on MR modality. No abnormal enhancement is seen in this location. No   gross extralaryngeal spread of tumor is seen.      # SCC of R vocal cord  - Moderately differentiated from biopsy 09/2023  - PET-CT without obvious distant mets  - CT scans this admission again describe known R vocal cord lesion without obvious LAD   - ENT follow up required, can be done as outpatient   - MRI neck this admission as above  - PET-CT as outpatient depending on pts clinical course     # Macrocytic anemia   -Hb 10.9, .4, macrocytosis improving since admission   - Normal liver and renal function   - Given age, nutritional deficiency vs primary bone marrow disorder can be worked outpatient   - Iron panel without LISA, Ferritin 414, Sat 24 some element of AOCD likely  - B12, Folate wnl     # AMS  - Unclear cause   - CTH 12/13/2023 without acute pathology     # Back pain, Upper arm pain  - CT C-L spine with degenerative changes  - US LE negative for DVT    # A.fib  - Continues on Xarleto 20mg daily    Thank you for allowing me to participate in the care of Mr. Youssef, please do not hesitate to call or text me if you have further questions or concerns.     Denton Jackson MD  [facility]   Division of Hematology/Oncology  [address], [address]  [place] [address]  P: [phone]  F: [phone]    Attestation:    ---- Pt evaluated Including face-to-face interaction in addition to chart review, reviewing treatment plan, and managing the patient’s chronic diagnoses as listed in the assessment----

## 2023-12-24 NOTE — PROGRESS NOTE ADULT - SUBJECTIVE AND OBJECTIVE BOX
SUBJECTIVE / OVERNIGHT EVENTS:      Patient seen and examined at bedside. No events noted overnight. Resting comfortably in bed      --------------------------------------------------------------------------------------------  LABS:                        10.9   11.06 )-----------( 270      ( 23 Dec 2023 07:09 )             33.7             CAPILLARY BLOOD GLUCOSE                RADIOLOGY & ADDITIONAL TESTS:    Imaging Personally Reviewed:  [x] YES  [ ] NO    Consultant(s) Notes Reviewed:  [x] YES  [ ] NO    MEDICATIONS  (STANDING):  ascorbic acid 500 milliGRAM(s) Oral daily  chlorhexidine 2% Cloths 1 Application(s) Topical daily  escitalopram 10 milliGRAM(s) Oral at bedtime  ferrous    sulfate 325 milliGRAM(s) Oral daily  furosemide    Tablet 20 milliGRAM(s) Oral daily  rivaroxaban 20 milliGRAM(s) Oral with dinner  tamsulosin 0.4 milliGRAM(s) Oral at bedtime    MEDICATIONS  (PRN):  acetaminophen     Tablet .. 650 milliGRAM(s) Oral every 6 hours PRN Temp greater or equal to 38C (100.4F), Mild Pain (1 - 3)  melatonin 3 milliGRAM(s) Oral at bedtime PRN Insomnia      Care Discussed with Consultants/Other Providers [x] YES  [ ] NO    Vital Signs Last 24 Hrs  T(C): 36.6 (24 Dec 2023 05:53), Max: 36.7 (23 Dec 2023 09:04)  T(F): 97.9 (24 Dec 2023 05:53), Max: 98.1 (23 Dec 2023 09:04)  HR: 69 (24 Dec 2023 05:53) (62 - 79)  BP: 110/67 (24 Dec 2023 05:53) (98/62 - 122/77)  BP(mean): --  RR: 18 (24 Dec 2023 05:53) (18 - 18)  SpO2: 96% (24 Dec 2023 05:53) (94% - 97%)    Parameters below as of 24 Dec 2023 05:53  Patient On (Oxygen Delivery Method): room air      I&O's Summary    22 Dec 2023 07:01  -  23 Dec 2023 07:00  --------------------------------------------------------  IN: 150 mL / OUT: 1800 mL / NET: -1650 mL    23 Dec 2023 07:01  -  24 Dec 2023 06:32  --------------------------------------------------------  IN: 0 mL / OUT: 1000 mL / NET: -1000 mL    PHYSICAL EXAM:  GENERAL: NAD, Elderly, comfortable  HEAD:  Atraumatic, Normocephalic  EYES: EOMI, PERRLA, conjunctiva and sclera clear  NECK: Supple, No JVD  CHEST/LUNG: Diminished bilaterally; No wheeze  HEART: Regular rate and rhythm; No murmurs, rubs, or gallops  ABDOMEN: Soft, Nontender, Nondistended; Bowel sounds present  NEURO: AAO2- 3, no focal weakness, 5/5 b/l extremity strength, b/l knee no arthritis, no effusion   EXTREMITIES:  2+ Peripheral Pulses, No clubbing, cyanosis, or edema  SKIN: No rashes or lesions, + qiana

## 2023-12-24 NOTE — PROGRESS NOTE ADULT - SUBJECTIVE AND OBJECTIVE BOX
CARDIOLOGY FOLLOW UP - Dr. Marrufo  DATE OF SERVICE: 12/24/23    CC  No CV complaints     REVIEW OF SYSTEMS:  CONSTITUTIONAL: No fever, weight loss, or fatigue  RESPIRATORY: No cough, wheezing, chills or hemoptysis; No Shortness of Breath  CARDIOVASCULAR: No chest pain, palpitations, passing out, dizziness, or leg swelling  GASTROINTESTINAL: No abdominal or epigastric pain. No nausea, vomiting, or hematemesis; No diarrhea or constipation. No melena or hematochezia.  VASCULAR: No edema     PHYSICAL EXAM:  T(C): 36.6 (12-24-23 @ 05:53), Max: 36.7 (12-23-23 @ 09:04)  HR: 69 (12-24-23 @ 05:53) (62 - 79)  BP: 110/67 (12-24-23 @ 05:53) (98/62 - 122/77)  RR: 18 (12-24-23 @ 05:53) (18 - 18)  SpO2: 96% (12-24-23 @ 05:53) (94% - 97%)  Wt(kg): --  I&O's Summary    23 Dec 2023 07:01  -  24 Dec 2023 07:00  --------------------------------------------------------  IN: 0 mL / OUT: 1000 mL / NET: -1000 mL        Appearance: Elderly male 	  Cardiovascular: Normal S1 S2,RRR, No JVD, No murmurs  Respiratory: Lungs clear to auscultation b/l   Gastrointestinal:  Soft, Non-tender, + BS	  Extremities: Normal range of motion, No clubbing, cyanosis or edema      Home Medications:  ascorbic acid 500 mg oral tablet: 1 tab(s) orally once a day (13 Dec 2023 16:41)  cholecalciferol 25 mcg (1000 intl units) oral tablet: 1 tab(s) orally once a day (13 Dec 2023 16:41)  DuoNeb 0.5 mg-2.5 mg/3 mL inhalation solution: 3 milliliter(s) by nebulizer every 6 hours as needed (13 Dec 2023 16:41)  ferrous sulfate 325 mg (65 mg elemental iron) oral tablet: 1 tab(s) orally once a day (13 Dec 2023 16:41)  Lexapro 10 mg oral tablet: 1 tab(s) orally once a day (in the evening) (13 Dec 2023 16:41)  Multiple Vitamins oral tablet: 1 tab(s) orally once a day (13 Dec 2023 16:41)  Xarelto 20 mg oral tablet: 1 tab(s) orally once a day (in the evening) (13 Dec 2023 16:41)      MEDICATIONS  (STANDING):  ascorbic acid 500 milliGRAM(s) Oral daily  chlorhexidine 2% Cloths 1 Application(s) Topical daily  escitalopram 10 milliGRAM(s) Oral at bedtime  ferrous    sulfate 325 milliGRAM(s) Oral daily  furosemide    Tablet 20 milliGRAM(s) Oral daily  rivaroxaban 20 milliGRAM(s) Oral with dinner  tamsulosin 0.4 milliGRAM(s) Oral at bedtime      TELEMETRY: 	    ECG:  	  RADIOLOGY:   DIAGNOSTIC TESTING:  [ ] Echocardiogram:  [ ]  Catheterization:  [ ] Stress Test:    OTHER: 	    LABS:	 	                            11.1   10.76 )-----------( 291      ( 24 Dec 2023 06:50 )             35.6

## 2023-12-24 NOTE — PROGRESS NOTE ADULT - SUBJECTIVE AND OBJECTIVE BOX
[facility]   HEMATOLOGY/ONCOLOGY INPATIENT PROGRESS NOTE     Interval Hx:   12/24/2023: Mr. Youssef was seen at bedside this morning, no acute complaints, no overnight events noted, reviewed MRI findings again, notified I spoke with his daughter Samaria previously     Meds:   MEDICATIONS  (STANDING):  ascorbic acid 500 milliGRAM(s) Oral daily  chlorhexidine 2% Cloths 1 Application(s) Topical daily  escitalopram 10 milliGRAM(s) Oral at bedtime  ferrous    sulfate 325 milliGRAM(s) Oral daily  furosemide    Tablet 20 milliGRAM(s) Oral daily  rivaroxaban 20 milliGRAM(s) Oral with dinner  tamsulosin 0.4 milliGRAM(s) Oral at bedtime    MEDICATIONS  (PRN):  acetaminophen     Tablet .. 650 milliGRAM(s) Oral every 6 hours PRN Temp greater or equal to 38C (100.4F), Mild Pain (1 - 3)  melatonin 3 milliGRAM(s) Oral at bedtime PRN Insomnia    Vital Signs Last 24 Hrs  T(C): 36.6 (24 Dec 2023 05:53), Max: 36.7 (23 Dec 2023 09:04)  T(F): 97.9 (24 Dec 2023 05:53), Max: 98.1 (23 Dec 2023 09:04)  HR: 69 (24 Dec 2023 05:53) (62 - 79)  BP: 110/67 (24 Dec 2023 05:53) (98/62 - 122/77)  BP(mean): --  RR: 18 (24 Dec 2023 05:53) (18 - 18)  SpO2: 96% (24 Dec 2023 05:53) (94% - 97%)    Parameters below as of 24 Dec 2023 05:53  Patient On (Oxygen Delivery Method): room air    Physical Exam:  Gen: NAD, slowed speech, hoarse voice    HEENT: moist mucous membranes, EOMI  Chest: equal chest rise  Cardiac: irregular   Abd: non tender, non distended, no hepatomegaly or splenomegaly  Ext: Trace edema  Neuro: AAOX3, slowed speech     Labs:                        10.9   11.06 )-----------( 270      ( 23 Dec 2023 07:09 )             33.7     CBC Full  -  ( 23 Dec 2023 07:09 )  WBC Count : 11.06 K/uL  RBC Count : 3.29 M/uL  Hemoglobin : 10.9 g/dL  Hematocrit : 33.7 %  Platelet Count - Automated : 270 K/uL  Mean Cell Volume : 102.4 fl  Mean Cell Hemoglobin : 33.1 pg  Mean Cell Hemoglobin Concentration : 32.3 gm/dL

## 2023-12-25 RX ADMIN — RIVAROXABAN 20 MILLIGRAM(S): KIT at 17:04

## 2023-12-25 RX ADMIN — ESCITALOPRAM OXALATE 10 MILLIGRAM(S): 10 TABLET, FILM COATED ORAL at 21:37

## 2023-12-25 RX ADMIN — Medication 500 MILLIGRAM(S): at 11:06

## 2023-12-25 RX ADMIN — CHLORHEXIDINE GLUCONATE 1 APPLICATION(S): 213 SOLUTION TOPICAL at 11:07

## 2023-12-25 RX ADMIN — Medication 20 MILLIGRAM(S): at 05:01

## 2023-12-25 RX ADMIN — Medication 3 MILLIGRAM(S): at 21:37

## 2023-12-25 RX ADMIN — TAMSULOSIN HYDROCHLORIDE 0.4 MILLIGRAM(S): 0.4 CAPSULE ORAL at 21:37

## 2023-12-25 RX ADMIN — Medication 325 MILLIGRAM(S): at 11:06

## 2023-12-25 NOTE — PROGRESS NOTE ADULT - SUBJECTIVE AND OBJECTIVE BOX
CARDIOLOGY FOLLOW UP - Dr. Marrufo  DATE OF SERVICE: 12/25/23    CC  No CV complaints     REVIEW OF SYSTEMS:  CONSTITUTIONAL: No fever, weight loss, or fatigue  RESPIRATORY: No cough, wheezing, chills or hemoptysis; No Shortness of Breath  CARDIOVASCULAR: No chest pain, palpitations, passing out, dizziness, or leg swelling  GASTROINTESTINAL: No abdominal or epigastric pain. No nausea, vomiting, or hematemesis; No diarrhea or constipation. No melena or hematochezia.  VASCULAR: No edema     PHYSICAL EXAM:  T(C): 36.4 (12-25-23 @ 01:30), Max: 36.8 (12-24-23 @ 17:38)  HR: 64 (12-25-23 @ 01:30) (63 - 74)  BP: 121/66 (12-25-23 @ 01:30) (104/63 - 121/66)  RR: 18 (12-25-23 @ 01:30) (18 - 18)  SpO2: 98% (12-25-23 @ 01:30) (95% - 98%)  Wt(kg): --  I&O's Summary    24 Dec 2023 07:01  -  25 Dec 2023 07:00  --------------------------------------------------------  IN: 0 mL / OUT: 1100 mL / NET: -1100 mL        Appearance: Elderly male 	  Cardiovascular: Normal S1 S2,RRR, No JVD, No murmurs  Respiratory: Lungs clear to auscultation	  Gastrointestinal:  Soft, Non-tender, + BS	  Extremities: Normal range of motion, No clubbing, cyanosis or edema      Home Medications:  ascorbic acid 500 mg oral tablet: 1 tab(s) orally once a day (13 Dec 2023 16:41)  cholecalciferol 25 mcg (1000 intl units) oral tablet: 1 tab(s) orally once a day (13 Dec 2023 16:41)  DuoNeb 0.5 mg-2.5 mg/3 mL inhalation solution: 3 milliliter(s) by nebulizer every 6 hours as needed (13 Dec 2023 16:41)  ferrous sulfate 325 mg (65 mg elemental iron) oral tablet: 1 tab(s) orally once a day (13 Dec 2023 16:41)  Lexapro 10 mg oral tablet: 1 tab(s) orally once a day (in the evening) (13 Dec 2023 16:41)  Multiple Vitamins oral tablet: 1 tab(s) orally once a day (13 Dec 2023 16:41)  Xarelto 20 mg oral tablet: 1 tab(s) orally once a day (in the evening) (13 Dec 2023 16:41)      MEDICATIONS  (STANDING):  ascorbic acid 500 milliGRAM(s) Oral daily  chlorhexidine 2% Cloths 1 Application(s) Topical daily  escitalopram 10 milliGRAM(s) Oral at bedtime  ferrous    sulfate 325 milliGRAM(s) Oral daily  furosemide    Tablet 20 milliGRAM(s) Oral daily  rivaroxaban 20 milliGRAM(s) Oral with dinner  tamsulosin 0.4 milliGRAM(s) Oral at bedtime      TELEMETRY: 	    ECG:  	  RADIOLOGY:   DIAGNOSTIC TESTING:  [ ] Echocardiogram:  [ ]  Catheterization:  [ ] Stress Test:    OTHER: 	    LABS:	 	                            11.1   10.76 )-----------( 291      ( 24 Dec 2023 06:50 )             35.6

## 2023-12-25 NOTE — PROGRESS NOTE ADULT - TIME-BASED
[facility]  Neurology follow up     Date:  1/13/2023  Patient Name:  Emily Basilio  YOB: 1946  MRN: [id number]     PCP:  No primary care provider on file. Referring:  Patricia Dumont DO      Chief Complaint: AMS     History obtained from: Chart review    Assessment  Emily Basilio is a 68 y.o. male presenting with altered mental status in the setting of a right-parietal meningioma with mass effect and edema as well as a metabolic component from several metabolic derangements. Hx of alcohol abuse     Plan  Continue Keppra for now  Repeat EEG in the future to better determine need for longterm ASM therapy  Would recommend continuing steroids for 2 weeks with potential for weaning at that time if continued clinical improvement is noted. Continue to correct underlying medical conditions per ICU teams   Neurology will be available if needed, please re-consult if concerns for seizure activity     History of Present Illness:    Hospital course   Emily Basilio is a 68 y.o.  comatose male with a PMH significant for EtOH abuse, LETTY, COPD and vascular insufficiency who presents with AMS in the setting of a right-parietal meningioma with mass effect and edema. Per notes, patient had recurrent falls at home and decreased intake. Presented to [facility] and was subsequently transferred here after imaging revealed meningioma. Subjective:   MRI brain showed known meningioma with surrounding edema. No other new findings. More alert today. Not following commands, but does turn head towards examiner's voice. Withdrawing to pain more today. Labs still severely deranged     No seizure activity. Review of Systems:  Unable to obtain review of symptoms due to patients medical condition    Medical History:   History reviewed. No pertinent past medical history.      Surgical History:   Past Surgical History:   Procedure Laterality Date    NOSE SURGERY          Family History:   No family
history on file.     Social History:  Social History     Tobacco Use    Smoking status: Every Day     Packs/day: 1.50     Types: Cigarettes   Substance Use Topics    Alcohol use: Yes     Comment: [id number]    Drug use: No        Current Medications:      Current Facility-Administered Medications   Medication Dose Route Frequency Provider Last Rate Last Admin    potassium bicarb-citric acid (EFFER-K) effervescent tablet 40 mEq  40 mEq Oral BID Kennyth Jun, APRN - CNP   40 mEq at 01/13/23 0818    docusate sodium (COLACE) 150 MG/15ML liquid 100 mg  100 mg Oral Daily Kennyth Jun, APRN - CNP   100 mg at 01/13/23 0951    sennosides (SENOKOT) 8.8 MG/5ML syrup 5 mL  5 mL Oral Nightly Kennyth Jun, APRN - CNP        miconazole (MICOTIN) 2 % powder   Topical BID Vielka Vasquez MD   Given at 01/13/23 2997    white petrolatum ointment   Topical BID Vielka Vasquez MD   Given at 01/13/23 0820    And    white petrolatum ointment   Topical TID PRN Vielka Vasquez MD        ampicillin-sulbactam (UNASYN) 3,000 mg in sodium chloride 0.9 % 100 mL IVPB ([id number])  3,000 mg IntraVENous Q12H Vielka Vasquez MD   Stopped at 01/13/23 0850    zinc sulfate (ZINCATE) capsule 50 mg  50 mg Oral Daily Kennyth Jun, APRN - CNP   50 mg at 01/13/23 0818    ascorbic acid (VITAMIN C) tablet 500 mg  500 mg Oral Daily Kennyth Jun, APRN - CNP   500 mg at 01/13/23 0818    lidocaine 1 % injection 5 mL  5 mL IntraDERmal Once Kennyth Jun, APRN - CNP        sodium chloride flush 0.9 % injection 5-40 mL  5-40 mL IntraVENous 2 times per day Kennyth Jun, APRN - CNP   10 mL at 01/13/23 0850    sodium chloride flush 0.9 % injection 5-40 mL  5-40 mL IntraVENous PRN Kennyth Jun, APRN - CNP        0.9 % sodium chloride infusion   IntraVENous PRN Kennyth Jun, APRN - CNP        heparin flush 100 UNIT/ML injection 100 Units  1 mL IntraVENous 2 times per day Kennyth Jun, APRN - CNP        heparin flush 100 UNIT/ML injection 100 Units  1 mL IntraCATHeter PRN Yumiko Mann
APRN - CNP        bumetanide (BUMEX) 12.5 mg in sodium chloride 0.9 % 125 mL infusion  0.5 mg/hr IntraVENous Continuous Colby Swain MD 5 mL/hr at 01/13/23 0645 0.5 mg/hr at 01/13/23 0645    atropine injection 0.5 mg  0.5 mg IntraVENous PRN Jose Hu MD   0.5 mg at 01/11/23 0505    heparin (porcine) injection 5,700 Units  80 Units/kg IntraVENous PRN Lue Cristel, APRN - CNP        heparin (porcine) injection 2,850 Units  40 Units/kg IntraVENous PRN Lue Cristel, APRN - CNP        heparin 25,000 units in dextrose 5% 250 mL (premix) infusion  5-30 Units/kg/hr IntraVENous Continuous Lue Cristel, APRN - CNP 7.1 mL/hr at 01/13/23 0645 10 Units/kg/hr at 01/13/23 0645    chlorhexidine (PERIDEX) 0.12 % solution 15 mL  15 mL Mouth/Throat BID Lue Cristel, APRN - CNP   15 mL at 01/13/23 0819    polyvinyl alcohol (LIQUIFILM TEARS) 1.4 % ophthalmic solution 1 drop  1 drop Both Eyes Q4H Lue Cristel, APRN - CNP   1 drop at 01/13/23 0820    Or    lubrifresh P.M. (artificial tears) ophthalmic ointment   Both Eyes Q4H Lue Cristel, APRN - CNP   Given at 01/12/23 1324    levETIRAcetam (KEPPRA) 500 mg/100 mL IVPB  500 mg IntraVENous Q12H Lue Cristel, APRN - CNP   Stopped at 01/13/23 1004    dexamethasone (DECADRON) injection 4 mg  4 mg IntraVENous Q6H Lue Cristel, APRN - CNP   4 mg at 01/13/23 0845    ipratropium-albuterol (DUONEB) nebulizer solution 1 ampule  1 ampule Inhalation Q4H WA Lue Cristel, APRN - CNP   1 ampule at 01/13/23 0801    pantoprazole (PROTONIX) 40 mg in sodium chloride (PF) 0.9 % 10 mL injection  40 mg IntraVENous Daily Lue Cristel, APRN - CNP   40 mg at 01/13/23 0845    sodium chloride flush 0.9 % injection 5-40 mL  5-40 mL IntraVENous 2 times per day Darian Fam, APRN - CNP   10 mL at 01/12/23 2029    sodium chloride flush 0.9 % injection 5-40 mL  5-40 mL IntraVENous PRN Darian Cristel, APRN - CNP        0.9 % sodium chloride infusion   IntraVENous PRN Darian Cristel, APRN - CNP        thiamine tablet 100 mg
100 mg Oral Daily Noah Quintanilla APRN - CNP   100 mg at 01/13/23 0818    multivitamin 1 tablet  1 tablet Oral Daily Noah Quintanilla, APRN - CNP   1 tablet at 16/31/55 2105    folic acid injection 1 mg  1 mg IntraVENous Daily Noah Quintanilla, APRN - CNP   1 mg at 01/13/23 0845    nicotine (NICODERM CQ) 14 MG/24HR 1 patch  1 patch TransDERmal Daily Noah Quintanilla APRN - CNP   1 patch at 01/13/23 0819    ondansetron (ZOFRAN) injection 4 mg  4 mg IntraVENous Q6H PRN Noah Quintanilla, APRN - CNP        acetaminophen (TYLENOL) 160 MG/5ML solution 650 mg  650 mg Oral Q6H PRN Noah Smiths, APRN - CNP            Allergies:      No Known Allergies     Physical Examination  Vitals   Vitals:    01/13/23 0800 01/13/23 0801 01/13/23 0808 01/13/23 0835   BP: 113/62      Pulse: 74 75 78 84   Resp: 13 12 12 (!) 7   Temp: 98.3 °F (36.8 °C)      TempSrc: Tympanic      SpO2: 95% 95% 98% 95%   Weight:       Height:            General: Intubated. No distress on vent. HEENT: Normocephalic, atraumatic  Extremities/Peripheral vascular: edema noted in bilateral lower extremities with erythema and ulceration. Erythema also noted in distal bilateral upper extremities with multiple wounds present and lightly bleeding. Neurologic Examination    Mental Status  Intubated. Turns head and attempts to open eyes towards examiner's voice. Moving head side to side. Does not follow commands. More alert today.      Cranial Nerves  ESTELA  + response to peripheral threat  Turns head side to side  Face appears symmetric around ETT  Turns head towards voice     Motor    Spontaneously moves all limbs minimally    Tone: Increased in bilateral lower extremities    Bulk: Normal in all four limbs with no evidence of atrophy      Sensation  Withdraws to noxious stimuli today in all limbs     Reflexes    No babinski     Grasp reflex R hand     Coordination  No resting tremors observed     Gait  Unable to test due to AMS    Labs  Recent Labs     01/10/23  1908 01/10/23  1909
01/13/23  0412 01/13/23  0436      < > 146  --    K 5.2*   < > 3.0*  --    CL 98   < > 101  --    CO2 27   < > 34*  --    BUN 66*   < > 67*  --    CREATININE 2.5*   < > 2.3*  --    GLUCOSE 125*   < > 164*  --    CALCIUM 7.9*   < > 7.9*  --    PROT 5.1*   < > 5.2*  --    LABALBU 3.1*   < > 3.6  --    BILITOT 2.7*   < > 2.7*  --    ALKPHOS 50   < > 44  --    AST >7,000*   < > 605*  --    ALT 2,970*   < > 1,042*  --    WBC  --    < > 10.1  --    RBC  --    < > 4.49  --    HGB  --    < > 13.6  --    HCT  --    < > 40.7  --    MCV  --    < > 90.6  --    MCH  --    < > 30.3  --    MCHC  --    < > 33.4  --    RDW  --    < > 15.6*  --    PLT  --    < > 67*  --    MPV  --    < > 10.4  --    PH  --    < >  --  7.466*   PO2  --    < >  --  75.6   PCO2  --    < >  --  51.6*   HCO3  --    < >  --  36.4*   BE  --    < >  --  10.7*   O2SAT  --    < >  --  94.9   LACTA 2.2  --   --   --     < > = values in this interval not displayed. Imaging  MRI BRAIN WO CONTRAST   Final Result   1. 5.5 x 2.5 cm extra-axial mass along the right parietal convexity   consistent with meningioma. 2. Vasogenic edema is seen in the impinged underlying right parietal lobe. RECOMMENDATIONS:   Unavailable         XR CHEST PORTABLE   Final Result   Persistent bilateral airspace opacification, slightly improved aeration is   seen in comparison to the prior study. US ABDOMEN LIMITED   Final Result   1. Intravascular echogenic foci in the liver that appears to be in veins. Possibility of portal venous gas as well as some thrombus in the hepatic   veins cannot be excluded. Further evaluation with contrast enhanced CT of   the abdomen is suggested. 2. Small nonshadowing stone or polyp at the posterior aspect of the   gallbladder. 3. Marked gallbladder wall thickening that could be related to ascites or   chronic cholecystitis. No sonographic evidence of acute cholecystitis.    4. Equivocal 2.2 x 1.8 x 1.7 cm hypoechoic area in
the region of the stomach,   of uncertain etiology. The possibility of a gastric leiomyoma is considered. 5.  The findings were sent to the Radiology Results [address] at   3:09 pm on 1/11/2023 to be communicated to a licensed caregiver. RECOMMENDATIONS:   Unavailable         US DUP LOWER EXTREMITIES BILATERAL VENOUS   Final Result   There is evidence for deep venous thrombosis      ALERT:  THIS IS AN ABNORMAL REPORT               XR CHEST PORTABLE   Final Result   1. CHF changes with bilateral pleural effusions, larger on the right. 2. Asymmetric right-sided airspace opacity that could represent edema or   pneumonia. Overall, the appearance of the chest is slightly worse.          XR CHEST PORTABLE    (Results Pending)   US DUP UPPER EXTREMITIES BILATERAL VENOUS    (Results Pending)   XR CHEST PORTABLE    (Results Pending)         All labs and imaging studies reviewed independently today    Electronically signed by STEFANI Jeff, MS3 on 1/13/2023 at 10:41 AM
35

## 2023-12-25 NOTE — PROGRESS NOTE ADULT - SUBJECTIVE AND OBJECTIVE BOX
SUBJECTIVE / OVERNIGHT EVENTS:       No events noted overnight.      --------------------------------------------------------------------------------------------  LABS:                        11.1   10.76 )-----------( 291      ( 24 Dec 2023 06:50 )             35.6             CAPILLARY BLOOD GLUCOSE                RADIOLOGY & ADDITIONAL TESTS:     Imaging Personally Reviewed:  [x] YES  [ ] NO    Consultant(s) Notes Reviewed:  [x] YES  [ ] NO    MEDICATIONS  (STANDING):  ascorbic acid 500 milliGRAM(s) Oral daily  chlorhexidine 2% Cloths 1 Application(s) Topical daily  escitalopram 10 milliGRAM(s) Oral at bedtime  ferrous    sulfate 325 milliGRAM(s) Oral daily  furosemide    Tablet 20 milliGRAM(s) Oral daily  rivaroxaban 20 milliGRAM(s) Oral with dinner  tamsulosin 0.4 milliGRAM(s) Oral at bedtime    MEDICATIONS  (PRN):  acetaminophen     Tablet .. 650 milliGRAM(s) Oral every 6 hours PRN Temp greater or equal to 38C (100.4F), Mild Pain (1 - 3)  melatonin 3 milliGRAM(s) Oral at bedtime PRN Insomnia      Care Discussed with Consultants/Other Providers [x] YES  [ ] NO    Vital Signs Last 24 Hrs  T(C): 36.4 (25 Dec 2023 01:30), Max: 36.8 (24 Dec 2023 17:38)  T(F): 97.6 (25 Dec 2023 01:30), Max: 98.3 (24 Dec 2023 17:38)  HR: 64 (25 Dec 2023 01:30) (63 - 74)  BP: 121/66 (25 Dec 2023 01:30) (104/63 - 121/66)  BP(mean): --  RR: 18 (25 Dec 2023 01:30) (18 - 18)  SpO2: 98% (25 Dec 2023 01:30) (95% - 98%)    Parameters below as of 25 Dec 2023 01:30  Patient On (Oxygen Delivery Method): room air      I&O's Summary    23 Dec 2023 07:01  -  24 Dec 2023 07:00  --------------------------------------------------------  IN: 0 mL / OUT: 1000 mL / NET: -1000 mL        PHYSICAL EXAM:  GENERAL: NAD, Elderly, comfortable  HEAD:  Atraumatic, Normocephalic  EYES: EOMI, PERRLA, conjunctiva and sclera clear  NECK: Supple, No JVD  CHEST/LUNG: Diminished bilaterally; No wheeze  HEART: Regular rate and rhythm; No murmurs, rubs, or gallops  ABDOMEN: Soft, Nontender, Nondistended; Bowel sounds present  NEURO: AAO2- 3, no focal weakness, 5/5 b/l extremity strength, b/l knee no arthritis, no effusion   EXTREMITIES:  2+ Peripheral Pulses, No clubbing, cyanosis, or edema  SKIN: No rashes or lesions, + kiran

## 2023-12-25 NOTE — PROGRESS NOTE ADULT - ASSESSMENT
83M PMHx afib on xarelto, w/ hx of ortho issues, s/p L hip IMN (most recently s/p R hip IMN (Dr. Godinez 10/31/19), mild depression p/w acute encephalopathy, upper back shoulder and arm pain    Plan:  # Acute encephalopathy/ Aspiration pneumonia: Improving  - Pt AAOx3 but otherwise somewhat confused. Daughter states some of this has been ongoing, was placed on puree diet for dysphagia issues at rehab. Has decub now. Could be in setting of urinary retention and deconditioning  - CTH negative, infectious work up negative so far   - Speech/ swallow consult and dysphagia diet  - Falls precautions, aspiration precautions  - Completed total 5d course on 12/19 of Ceftriaxone   - PT consult  - UCx NGTD  - Monitor temps/WBC  - S/p Xray Cinesophagram 12/18 w/ Evidence of penetration and aspiration, rec'ed for Regular solids, mildly thick liquids  - ID following    # Lower extremity edema/ Acute hfpef:   - As per chart, daughter endorsing worse swelling in legs  - Elevated BNP  - Old echo with normal LVEF, w R sided enlargement and mod-severe TR  - Echo w/ EF 67%  - C/w Lasix per Cards   - LE Duplex negative for DVT  - Cards following    # Urinary retention:  - Kiran placed in ED by Urology  - Strict I/Os  - Hematuria noted - likely from traumatic kiran placement-- resolved   - Monitor urine color  - Monitor h/h  - C/w Flomax  - Urology following    # 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:  - Moderately differentiated from biopsy 09/2023  - PET-CT without obvious distant mets  - CT scans this admission again describe known R vocal cord lesion without obvious LAD   - S/p Xray Cinesophagram 12/18 w/ Evidence of penetration and aspiration, rec'ed for Regular solids, mildly thick liquids  - ENT consult noted  - MRI Head and Neck w/ and w/o contrast redemonstrate R vocal cord SCC crossing over to L, without other obvious LAD or masses noted  - PET-CT as outpatient depending on pts clinical course  - Mgmt per Heme/ Onc    # Afib:  - C/w Xarelto     # Anemia:  - Monitor CBC  - Transfuse PRN    # Back pain:   - Multiple areas of upper extremity pain including R shoulder, between shoulder blades and R arm for many days as per daughter. Thought to be 2/2 PT of upper extremities. Also now having LE weakness amongst other issues.   - Tibia/Fibula and Right shoulder x-ray negative for fx  - CT spine reviewed  - Pain control    # Depression:  - C/w current meds    # DVT ppx:  - IPCs  - Xarelto     [facility]

## 2023-12-25 NOTE — PROGRESS NOTE ADULT - TIME BILLING
agree with above  cv stable  change IVP lasix to 20mg  PO daily   Old echo with normal LVEF, w R sided enlargement and mod-severe TR  Repeat echo  Wean supplemental O2 as able  Afib rate controlled off meds  cont xarelto 20mg qd
Patient seen and examined.  Agree with above PA note.  cv stable  cont current tx  dcp
Patient seen and examined.  Agree with above PA note.  cv stable  cont current tx  dcp
agree with above  cv stable  cont PO daily   Afib rate controlled off meds  cont xarelto 20mg qd
Agree with above PA note.  cv stable  cont current tx

## 2023-12-25 NOTE — PROGRESS NOTE ADULT - SUBJECTIVE AND OBJECTIVE BOX
[facility]   HEMATOLOGY/ONCOLOGY INPATIENT PROGRESS NOTE     Interval Hx:   12/25/2023: Mr. Youssef was seen at bedside this morning, alert this morning, no overnight events noted     Meds:   MEDICATIONS  (STANDING):  ascorbic acid 500 milliGRAM(s) Oral daily  chlorhexidine 2% Cloths 1 Application(s) Topical daily  escitalopram 10 milliGRAM(s) Oral at bedtime  ferrous    sulfate 325 milliGRAM(s) Oral daily  furosemide    Tablet 20 milliGRAM(s) Oral daily  rivaroxaban 20 milliGRAM(s) Oral with dinner  tamsulosin 0.4 milliGRAM(s) Oral at bedtime    MEDICATIONS  (PRN):  acetaminophen     Tablet .. 650 milliGRAM(s) Oral every 6 hours PRN Temp greater or equal to 38C (100.4F), Mild Pain (1 - 3)  melatonin 3 milliGRAM(s) Oral at bedtime PRN Insomnia      Vital Signs Last 24 Hrs  T(C): 36.4 (25 Dec 2023 01:30), Max: 36.8 (24 Dec 2023 17:38)  T(F): 97.6 (25 Dec 2023 01:30), Max: 98.3 (24 Dec 2023 17:38)  HR: 64 (25 Dec 2023 01:30) (63 - 74)  BP: 121/66 (25 Dec 2023 01:30) (104/63 - 121/66)  BP(mean): --  RR: 18 (25 Dec 2023 01:30) (18 - 18)  SpO2: 98% (25 Dec 2023 01:30) (95% - 98%)    Parameters below as of 25 Dec 2023 01:30  Patient On (Oxygen Delivery Method): room air    Physical Exam:  Gen: NAD, slowed speech, hoarse voice    HEENT: moist mucous membranes, EOMI  Chest: equal chest rise  Cardiac: irregular   Abd: non tender, non distended, no hepatomegaly or splenomegaly  Ext: Trace edema  Neuro: AAOX3, slowed speech     Labs:                        11.1   10.76 )-----------( 291      ( 24 Dec 2023 06:50 )             35.6     CBC Full  -  ( 24 Dec 2023 06:50 )  WBC Count : 10.76 K/uL  RBC Count : 3.44 M/uL  Hemoglobin : 11.1 g/dL  Hematocrit : 35.6 %  Platelet Count - Automated : 291 K/uL  Mean Cell Volume : 103.5 fl  Mean Cell Hemoglobin : 32.3 pg  Mean Cell Hemoglobin Concentration : 31.2 gm/dL

## 2023-12-26 RX ADMIN — CHLORHEXIDINE GLUCONATE 1 APPLICATION(S): 213 SOLUTION TOPICAL at 12:51

## 2023-12-26 RX ADMIN — Medication 500 MILLIGRAM(S): at 12:51

## 2023-12-26 RX ADMIN — ESCITALOPRAM OXALATE 10 MILLIGRAM(S): 10 TABLET, FILM COATED ORAL at 21:50

## 2023-12-26 RX ADMIN — Medication 325 MILLIGRAM(S): at 12:50

## 2023-12-26 RX ADMIN — TAMSULOSIN HYDROCHLORIDE 0.4 MILLIGRAM(S): 0.4 CAPSULE ORAL at 21:50

## 2023-12-26 RX ADMIN — RIVAROXABAN 20 MILLIGRAM(S): KIT at 17:36

## 2023-12-26 RX ADMIN — Medication 20 MILLIGRAM(S): at 04:45

## 2023-12-26 NOTE — PROGRESS NOTE ADULT - SUBJECTIVE AND OBJECTIVE BOX
[facility]   HEMATOLOGY/ONCOLOGY INPATIENT PROGRESS NOTE     Interval Hx:   12/26/23    Meds:   MEDICATIONS  (STANDING):  ascorbic acid 500 milliGRAM(s) Oral daily  chlorhexidine 2% Cloths 1 Application(s) Topical daily  escitalopram 10 milliGRAM(s) Oral at bedtime  ferrous    sulfate 325 milliGRAM(s) Oral daily  furosemide    Tablet 20 milliGRAM(s) Oral daily  rivaroxaban 20 milliGRAM(s) Oral with dinner  tamsulosin 0.4 milliGRAM(s) Oral at bedtime    MEDICATIONS  (PRN):  acetaminophen     Tablet .. 650 milliGRAM(s) Oral every 6 hours PRN Temp greater or equal to 38C (100.4F), Mild Pain (1 - 3)  melatonin 3 milliGRAM(s) Oral at bedtime PRN Insomnia    Vital Signs Last 24 Hrs  T(C): 36.3 (26 Dec 2023 01:33), Max: 36.7 (25 Dec 2023 16:19)  T(F): 97.4 (26 Dec 2023 01:33), Max: 98 (25 Dec 2023 16:19)  HR: 58 (26 Dec 2023 01:33) (57 - 63)  BP: 133/78 (26 Dec 2023 01:33) (115/63 - 133/78)  BP(mean): --  RR: 18 (26 Dec 2023 01:33) (18 - 18)  SpO2: 98% (26 Dec 2023 01:33) (97% - 98%)    Parameters below as of 26 Dec 2023 01:33  Patient On (Oxygen Delivery Method): room air    Physical Exam:  Gen: NAD, slowed speech, hoarse voice    HEENT: moist mucous membranes, EOMI  Chest: equal chest rise  Cardiac: irregular   Abd: non tender, non distended, no hepatomegaly or splenomegaly  Ext: Trace edema  Neuro: AAOX3, slowed speech     Labs:                        11.1   10.76 )-----------( 291      ( 24 Dec 2023 06:50 )             35.6     CBC Full  -  ( 24 Dec 2023 06:50 )  WBC Count : 10.76 K/uL  RBC Count : 3.44 M/uL  Hemoglobin : 11.1 g/dL  Hematocrit : 35.6 %  Platelet Count - Automated : 291 K/uL  Mean Cell Volume : 103.5 fl  Mean Cell Hemoglobin : 32.3 pg  Mean Cell Hemoglobin Concentration : 31.2 gm/dL  Auto Neutrophil # : x  Auto Lymphocyte # : x  Auto Monocyte # : x  Auto Eosinophil # : x  Auto Basophil # : x  Auto Neutrophil % : x  Auto Lymphocyte % : x  Auto Monocyte % : x  Auto Eosinophil % : x  Auto Basophil % : x             [facility] [place]   HEMATOLOGY/ONCOLOGY INPATIENT PROGRESS NOTE     Interval Hx:   12/26/23: Mr. Youssef was seen at bedside this morning awake, alert, no acute complaints, feeling well, no overnight events noted, discharge is pending, re-reviewed his imaging findings, he acknowledges understanding of this information     Meds:   MEDICATIONS  (STANDING):  ascorbic acid 500 milliGRAM(s) Oral daily  chlorhexidine 2% Cloths 1 Application(s) Topical daily  escitalopram 10 milliGRAM(s) Oral at bedtime  ferrous    sulfate 325 milliGRAM(s) Oral daily  furosemide    Tablet 20 milliGRAM(s) Oral daily  rivaroxaban 20 milliGRAM(s) Oral with dinner  tamsulosin 0.4 milliGRAM(s) Oral at bedtime    MEDICATIONS  (PRN):  acetaminophen     Tablet .. 650 milliGRAM(s) Oral every 6 hours PRN Temp greater or equal to 38C (100.4F), Mild Pain (1 - 3)  melatonin 3 milliGRAM(s) Oral at bedtime PRN Insomnia    Vital Signs Last 24 Hrs  T(C): 36.3 (26 Dec 2023 01:33), Max: 36.7 (25 Dec 2023 16:19)  T(F): 97.4 (26 Dec 2023 01:33), Max: 98 (25 Dec 2023 16:19)  HR: 58 (26 Dec 2023 01:33) (57 - 63)  BP: 133/78 (26 Dec 2023 01:33) (115/63 - 133/78)  BP(mean): --  RR: 18 (26 Dec 2023 01:33) (18 - 18)  SpO2: 98% (26 Dec 2023 01:33) (97% - 98%)    Parameters below as of 26 Dec 2023 01:33  Patient On (Oxygen Delivery Method): room air    Physical Exam:  Gen: NAD, slowed speech, hoarse voice    HEENT: moist mucous membranes, EOMI  Chest: equal chest rise  Cardiac: irregular   Abd: non tender, non distended, no hepatomegaly or splenomegaly  Ext: Trace edema  Neuro: AAOX3, slowed speech     Labs:                        11.1   10.76 )-----------( 291      ( 24 Dec 2023 06:50 )             35.6     CBC Full  -  ( 24 Dec 2023 06:50 )  WBC Count : 10.76 K/uL  RBC Count : 3.44 M/uL  Hemoglobin : 11.1 g/dL  Hematocrit : 35.6 %  Platelet Count - Automated : 291 K/uL  Mean Cell Volume : 103.5 fl  Mean Cell Hemoglobin : 32.3 pg  Mean Cell Hemoglobin Concentration : 31.2 gm/dL             [facility] [place]   HEMATOLOGY/ONCOLOGY INPATIENT PROGRESS NOTE     Interval Hx:   12/26/23: Mr. Youssef was seen at bedside this morning awake, alert, no acute complaints, feeling well, no overnight events noted, discharge is pending, re-reviewed his imaging findings, he acknowledges understanding of this information     Meds:   MEDICATIONS  (STANDING):  ascorbic acid 500 milliGRAM(s) Oral daily  chlorhexidine 2% Cloths 1 Application(s) Topical daily  escitalopram 10 milliGRAM(s) Oral at bedtime  ferrous    sulfate 325 milliGRAM(s) Oral daily  furosemide    Tablet 20 milliGRAM(s) Oral daily  rivaroxaban 20 milliGRAM(s) Oral with dinner  tamsulosin 0.4 milliGRAM(s) Oral at bedtime    MEDICATIONS  (PRN):  acetaminophen     Tablet .. 650 milliGRAM(s) Oral every 6 hours PRN Temp greater or equal to 38C (100.4F), Mild Pain (1 - 3)  melatonin 3 milliGRAM(s) Oral at bedtime PRN Insomnia    Vital Signs Last 24 Hrs  T(C): 36.3 (26 Dec 2023 01:33), Max: 36.7 (25 Dec 2023 16:19)  T(F): 97.4 (26 Dec 2023 01:33), Max: 98 (25 Dec 2023 16:19)  HR: 58 (26 Dec 2023 01:33) (57 - 63)  BP: 133/78 (26 Dec 2023 01:33) (115/63 - 133/78)  BP(mean): --  RR: 18 (26 Dec 2023 01:33) (18 - 18)  SpO2: 98% (26 Dec 2023 01:33) (97% - 98%)    Parameters below as of 26 Dec 2023 01:33  Patient On (Oxygen Delivery Method): room air    Physical Exam:  Gen: NAD, slowed speech, hoarse voice    HEENT: moist mucous membranes, EOMI  Chest: equal chest rise  Cardiac: irregular   Abd: non tender, non distended, no hepatomegaly or splenomegaly  Ext: Trace edema  Neuro: AAOX3, slowed speech     Labs:                        11.1   10.76 )-----------( 291      ( 24 Dec 2023 06:50 )             35.6     CBC Full  -  ( 24 Dec 2023 06:50 )  WBC Count : 10.76 K/uL  RBC Count : 3.44 M/uL  Hemoglobin : 11.1 g/dL  Hematocrit : 35.6 %  Platelet Count - Automated : 291 K/uL  Mean Cell Volume : 103.5 fl  Mean Cell Hemoglobin : 32.3 pg  Mean Cell Hemoglobin Concentration : 31.2 gm/dL

## 2023-12-26 NOTE — PROGRESS NOTE ADULT - SUBJECTIVE AND OBJECTIVE BOX
CARDIOLOGY FOLLOW UP NOTE - DR. ALVES    Patient Name: MARY BO    Date of Service: 12-26-23 @ 13:49    Patient seen and examined    Subjective:    cv: denies chest pain, dyspnea, palpitations, dizziness  pulmonary: denies cough  GI: denies abdominal pain, nausea, vomiting  vascular/legs: no edema   skin: no rash  ROS: otherwise negative   overnight events:      PHYSICAL EXAM:  T(C): 36.6 (12-26-23 @ 09:17), Max: 36.7 (12-25-23 @ 16:19)  HR: 60 (12-26-23 @ 09:17) (58 - 63)  BP: 129/74 (12-26-23 @ 09:17) (115/63 - 133/78)  RR: 18 (12-26-23 @ 09:17) (18 - 18)  SpO2: 97% (12-26-23 @ 09:17) (97% - 98%)  Wt(kg): --  I&O's Summary    25 Dec 2023 07:01  -  26 Dec 2023 07:00  --------------------------------------------------------  IN: 0 mL / OUT: 600 mL / NET: -600 mL    26 Dec 2023 07:01  -  26 Dec 2023 13:49  --------------------------------------------------------  IN: 0 mL / OUT: 1400 mL / NET: -1400 mL      Daily     Daily     Appearance: Normal	  Cardiovascular: Normal S1 S2,RRR, No JVD, No murmurs  Respiratory: Lungs clear to auscultation	  Gastrointestinal:  Soft, Non-tender, + BS	  Extremities: Normal range of motion, No clubbing, cyanosis or edema      Home Medications:  ascorbic acid 500 mg oral tablet: 1 tab(s) orally once a day (13 Dec 2023 16:41)  cholecalciferol 25 mcg (1000 intl units) oral tablet: 1 tab(s) orally once a day (13 Dec 2023 16:41)  DuoNeb 0.5 mg-2.5 mg/3 mL inhalation solution: 3 milliliter(s) by nebulizer every 6 hours as needed (13 Dec 2023 16:41)  ferrous sulfate 325 mg (65 mg elemental iron) oral tablet: 1 tab(s) orally once a day (13 Dec 2023 16:41)  Lexapro 10 mg oral tablet: 1 tab(s) orally once a day (in the evening) (13 Dec 2023 16:41)  Multiple Vitamins oral tablet: 1 tab(s) orally once a day (13 Dec 2023 16:41)  Xarelto 20 mg oral tablet: 1 tab(s) orally once a day (in the evening) (13 Dec 2023 16:41)      MEDICATIONS  (STANDING):  ascorbic acid 500 milliGRAM(s) Oral daily  chlorhexidine 2% Cloths 1 Application(s) Topical daily  escitalopram 10 milliGRAM(s) Oral at bedtime  ferrous    sulfate 325 milliGRAM(s) Oral daily  furosemide    Tablet 20 milliGRAM(s) Oral daily  rivaroxaban 20 milliGRAM(s) Oral with dinner  tamsulosin 0.4 milliGRAM(s) Oral at bedtime      TELEMETRY: 	    ECG:  	  RADIOLOGY:   DIAGNOSTIC TESTING:  [ ] Echocardiogram:  [ ] Catheterization:  [ ] Stress Test:    OTHER: 	    LABS:	 	    CARDIAC MARKERS:                        proBNP:     Lipid Profile:   HgA1c:

## 2023-12-26 NOTE — PROGRESS NOTE ADULT - SUBJECTIVE AND OBJECTIVE BOX
SUBJECTIVE / OVERNIGHT EVENTS:    patient seen and examined  resting comfortably in bed    --------------------------------------------------------------------------------------------  LABS:            CAPILLARY BLOOD GLUCOSE                RADIOLOGY & ADDITIONAL TESTS:    Imaging Personally Reviewed:  [x] YES  [ ] NO    Consultant(s) Notes Reviewed:  [x] YES  [ ] NO    MEDICATIONS  (STANDING):  ascorbic acid 500 milliGRAM(s) Oral daily  chlorhexidine 2% Cloths 1 Application(s) Topical daily  escitalopram 10 milliGRAM(s) Oral at bedtime  ferrous    sulfate 325 milliGRAM(s) Oral daily  furosemide    Tablet 20 milliGRAM(s) Oral daily  rivaroxaban 20 milliGRAM(s) Oral with dinner  tamsulosin 0.4 milliGRAM(s) Oral at bedtime    MEDICATIONS  (PRN):  acetaminophen     Tablet .. 650 milliGRAM(s) Oral every 6 hours PRN Temp greater or equal to 38C (100.4F), Mild Pain (1 - 3)  melatonin 3 milliGRAM(s) Oral at bedtime PRN Insomnia      Care Discussed with Consultants/Other Providers [x] YES  [ ] NO    Vital Signs Last 24 Hrs  T(C): 36.6 (26 Dec 2023 09:17), Max: 36.7 (25 Dec 2023 16:19)  T(F): 97.9 (26 Dec 2023 09:17), Max: 98 (25 Dec 2023 16:19)  HR: 60 (26 Dec 2023 09:17) (57 - 63)  BP: 129/74 (26 Dec 2023 09:17) (115/63 - 133/78)  BP(mean): --  RR: 18 (26 Dec 2023 09:17) (18 - 18)  SpO2: 97% (26 Dec 2023 09:17) (97% - 98%)    Parameters below as of 26 Dec 2023 09:17  Patient On (Oxygen Delivery Method): room air      I&O's Summary    25 Dec 2023 07:01  -  26 Dec 2023 07:00  --------------------------------------------------------  IN: 0 mL / OUT: 600 mL / NET: -600 mL    PHYSICAL EXAM:  GENERAL: NAD, Elderly, comfortable  HEAD:  Atraumatic, Normocephalic  EYES: EOMI, PERRLA, conjunctiva and sclera clear  NECK: Supple, No JVD  CHEST/LUNG: Diminished bilaterally; No wheeze  HEART: Regular rate and rhythm; No murmurs, rubs, or gallops  ABDOMEN: Soft, Nontender, Nondistended; Bowel sounds present  NEURO: AAO2- 3, no focal weakness, 5/5 b/l extremity strength, b/l knee no arthritis, no effusion   EXTREMITIES:  2+ Peripheral Pulses, No clubbing, cyanosis, or edema  SKIN: No rashes or lesions, + kiran

## 2023-12-26 NOTE — PROGRESS NOTE ADULT - ASSESSMENT
A/p  83M PMHx afib on xarelto, w/ hx of ortho issues, s/p L hip IMN (most recently s/p R hip IMN (Dr. Godinez 10/31/19), mild depression p/w AMS, upper back shoulder and arm pain.      #AMS  -CTH no acute findings  -Infectious w/u per med, ID  -mri noted    #Right Sided HF  -improved  -Venous dopplers negative for DVT  -cont lasix 20mg po qd  -Echo with nml lv fxn, severely enlarged RV, mild MR     #Afib  -rate controlled off meds  -cont xarelto 20mg qd     #Back pain  -CT scan noted   -Mgmt per med    dcp    35 minutes spent on total encounter; more than 50% of the visit was spent counseling and/or coordinating care by the attending physician.

## 2023-12-26 NOTE — PROGRESS NOTE ADULT - ASSESSMENT
83y Male with PMHx of Afib on Xarelto, s/p L hip IMN (most recently s/p R hip IMN (Dr. Godinez 10/31/19), mild depression who presented with AMS, upper back shoulder and arm pain who was previously diagnosed with SCC of the vocal cordPt was confused and obtaining history was difficult. 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 PET-CT 10/13/2023 with R vocal cord lesion abd BILATERAL vocal cord uptake with SUV 7.9 , non-specific activity at the posterior RIGHT nasopharyngeal wall is likely related to activity in the longus coli muscle and is otherwise nonspecific and an additional focus seen further posterolaterally is of indeterminate  significance due to beam hardening artifact.     Overall Impression: Mr. 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 He has not undergone further workup or resection given his ongoing medical comorbidities. At this time pts PET-CT from two+ months ago is not sufficient to assess patients malignancy especially in light of previous indeterminate increased areas of uptake. At this time I recommend continuing medical management and if pts clinical status improves to repeat PET-CT outpatient and determine stage and surgical candidacy with ENT if no distant disease is identified. Discussed findings with pts daughter, Samaria, through this admission     MRI neck w/ and w/o redemonstration of a nodular enhancing soft tissue   focus within the right true cord with edema signal which is partially   exophytic into the airway. Enhancement is seen infiltrating into the   anterior commissure and slightly crosses over to the contralateral side.   No gross subglottic extension is seen. Overall dimensions appear   unchanged in comparison to the prior neck CT exam. Previously seen   right-sided arytenoid sclerosis is nonspecific and is not well evaluated   on MR modality. No abnormal enhancement is seen in this location. No   gross extralaryngeal spread of tumor is seen.      # SCC of R vocal cord  - Moderately differentiated from biopsy 09/2023  - PET-CT without obvious distant mets  - CT scans this admission again describe known R vocal cord lesion without obvious LAD   - ENT follow up required, can be done as outpatient   - MRI neck this admission as above  - PET-CT as outpatient depending on pts clinical course     # Macrocytic anemia   -Hb 11.1, .5, macrocytosis labile since admission   - Normal liver and renal function   - Given age, nutritional deficiency vs primary bone marrow disorder can be worked outpatient   - Iron panel without LISA, Ferritin 414, Sat 24 some element of AOCD likely  - B12, Folate wnl     # AMS  - Unclear cause, improvement since my initial evaluation    - CTH 12/13/2023 without acute pathology     # Back pain, Upper arm pain  - CT C-L spine with degenerative changes  - US LE negative for DVT    # A.fib  - Continues on Xarleto 20mg daily    Thank you for allowing me to participate in the care of Mr. Youssef, please do not hesitate to call or text me if you have further questions or concerns.     Denton Jackson MD  [facility]   Division of Hematology/Oncology  [facility], [address]  [place] [address]  P: [phone]  F: [phone]    Attestation:    ---- Pt evaluated Including face-to-face interaction in addition to chart review, reviewing treatment plan, and managing the patient’s chronic diagnoses as listed in the assessment---- 83y Male with PMHx of Afib on Xarelto, s/p L hip IMN (most recently s/p R hip IMN (Dr. Godinez 10/31/19), mild depression who presented with AMS, upper back shoulder and arm pain who was previously diagnosed with SCC of the vocal cordPt was confused and obtaining history was difficult. 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 PET-CT 10/13/2023 with R vocal cord lesion abd BILATERAL vocal cord uptake with SUV 7.9 , non-specific activity at the posterior RIGHT nasopharyngeal wall is likely related to activity in the longus coli muscle and is otherwise nonspecific and an additional focus seen further posterolaterally is of indeterminate  significance due to beam hardening artifact.     Overall Impression: Mr. 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 He has not undergone further workup or resection given his ongoing medical comorbidities. At this time pts PET-CT from two+ months ago is not sufficient to assess patients malignancy especially in light of previous indeterminate increased areas of uptake. At this time I recommend continuing medical management and if pts clinical status improves to repeat PET-CT outpatient and determine stage and surgical candidacy with ENT if no distant disease is identified. Discussed findings with pts daughter, Samaria, through this admission     MRI neck w/ and w/o redemonstration of a nodular enhancing soft tissue   focus within the right true cord with edema signal which is partially   exophytic into the airway. Enhancement is seen infiltrating into the   anterior commissure and slightly crosses over to the contralateral side.   No gross subglottic extension is seen. Overall dimensions appear   unchanged in comparison to the prior neck CT exam. Previously seen   right-sided arytenoid sclerosis is nonspecific and is not well evaluated   on MR modality. No abnormal enhancement is seen in this location. No   gross extralaryngeal spread of tumor is seen.      # SCC of R vocal cord  - Moderately differentiated from biopsy 09/2023  - PET-CT without obvious distant mets  - CT scans this admission again describe known R vocal cord lesion without obvious LAD   - ENT follow up required, can be done as outpatient   - MRI neck this admission as above  - PET-CT as outpatient depending on pts clinical course     # Macrocytic anemia   -Hb 11.1, .5, macrocytosis labile since admission   - Normal liver and renal function   - Given age, nutritional deficiency vs primary bone marrow disorder can be worked outpatient   - Iron panel without LISA, Ferritin 414, Sat 24 some element of AOCD likely  - B12, Folate wnl     # AMS  - Unclear cause, improvement since my initial evaluation    - CTH 12/13/2023 without acute pathology     # Back pain, Upper arm pain  - CT C-L spine with degenerative changes  - US LE negative for DVT    # A.fib  - Continues on Xarleto 20mg daily    Thank you for allowing me to participate in the care of Mr. Youssef, please do not hesitate to call or text me if you have further questions or concerns.     Denton Jackson MD  [facility]   Division of Hematology/Oncology  [facility], [address]  [place] [address]  P: [phone]  F: [phone]    Attestation:    ---- Pt evaluated Including face-to-face interaction in addition to chart review, reviewing treatment plan, and managing the patient’s chronic diagnoses as listed in the assessment---- 83y Male with PMHx of Afib on Xarelto, s/p L hip IMN (most recently s/p R hip IMN (Dr. Godinez 10/31/19), mild depression who presented with AMS, upper back shoulder and arm pain who was previously diagnosed with SCC of the vocal cordPt was confused and obtaining history was difficult. 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 PET-CT 10/13/2023 with R vocal cord lesion abd BILATERAL vocal cord uptake with SUV 7.9 , non-specific activity at the posterior RIGHT nasopharyngeal wall is likely related to activity in the longus coli muscle and is otherwise nonspecific and an additional focus seen further posterolaterally is of indeterminate  significance due to beam hardening artifact.     Overall Impression: Mr. 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 He has not undergone further workup or resection given his ongoing medical comorbidities. At this time pts PET-CT from two+ months ago is not sufficient to assess patients malignancy especially in light of previous indeterminate increased areas of uptake. At this time I recommend continuing medical management and if pts clinical status improves to repeat PET-CT outpatient and determine stage and surgical candidacy with ENT if no distant disease is identified. Discussed findings with pts daughter, Samaria, through this admission     MRI neck w/ and w/o redemonstration of a nodular enhancing soft tissue   focus within the right true cord with edema signal which is partially   exophytic into the airway. Enhancement is seen infiltrating into the   anterior commissure and slightly crosses over to the contralateral side.   No gross subglottic extension is seen. Overall dimensions appear   unchanged in comparison to the prior neck CT exam. Previously seen   right-sided arytenoid sclerosis is nonspecific and is not well evaluated   on MR modality. No abnormal enhancement is seen in this location. No   gross extralaryngeal spread of tumor is seen.      # SCC of R vocal cord  - Moderately differentiated from biopsy 09/2023  - PET-CT without obvious distant mets  - CT scans this admission again describe known R vocal cord lesion without obvious LAD   - ENT follow up required, can be done as outpatient   - MRI neck this admission as above  - PET-CT as outpatient depending on pts clinical course     # Macrocytic anemia   -Hb 11.1, .5, macrocytosis labile since admission   - Normal liver and renal function   - Given age, nutritional deficiency vs primary bone marrow disorder can be worked up as an outpatient   - Iron panel without LISA, Ferritin 414, Sat 24 some element of AOCD likely  - B12, Folate wnl     # AMS  - Unclear cause, improvement since my initial evaluation    - CTH 12/13/2023 without acute pathology     # Back pain, Upper arm pain  - CT C-L spine with degenerative changes  - US LE negative for DVT    # A.fib  - Continues on Xarleto 20mg daily    Thank you for allowing me to participate in the care of Mr. Youssef, please do not hesitate to call or text me if you have further questions or concerns.     Denton Jackson MD  [facility]   Division of Hematology/Oncology  [address] [place] [address], [address]  [place] [address]  P: [phone]  F: [phone]    Attestation:    ---- Pt evaluated Including face-to-face interaction in addition to chart review, reviewing treatment plan, and managing the patient’s chronic diagnoses as listed in the assessment---- 83y Male with PMHx of Afib on Xarelto, s/p L hip IMN (most recently s/p R hip IMN (Dr. Godinez 10/31/19), mild depression who presented with AMS, upper back shoulder and arm pain who was previously diagnosed with SCC of the vocal cordPt was confused and obtaining history was difficult. 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 PET-CT 10/13/2023 with R vocal cord lesion abd BILATERAL vocal cord uptake with SUV 7.9 , non-specific activity at the posterior RIGHT nasopharyngeal wall is likely related to activity in the longus coli muscle and is otherwise nonspecific and an additional focus seen further posterolaterally is of indeterminate  significance due to beam hardening artifact.     Overall Impression: Mr. 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 He has not undergone further workup or resection given his ongoing medical comorbidities. At this time pts PET-CT from two+ months ago is not sufficient to assess patients malignancy especially in light of previous indeterminate increased areas of uptake. At this time I recommend continuing medical management and if pts clinical status improves to repeat PET-CT outpatient and determine stage and surgical candidacy with ENT if no distant disease is identified. Discussed findings with pts daughter, Saamria, through this admission     MRI neck w/ and w/o redemonstration of a nodular enhancing soft tissue   focus within the right true cord with edema signal which is partially   exophytic into the airway. Enhancement is seen infiltrating into the   anterior commissure and slightly crosses over to the contralateral side.   No gross subglottic extension is seen. Overall dimensions appear   unchanged in comparison to the prior neck CT exam. Previously seen   right-sided arytenoid sclerosis is nonspecific and is not well evaluated   on MR modality. No abnormal enhancement is seen in this location. No   gross extralaryngeal spread of tumor is seen.      # SCC of R vocal cord  - Moderately differentiated from biopsy 09/2023  - PET-CT without obvious distant mets  - CT scans this admission again describe known R vocal cord lesion without obvious LAD   - ENT follow up required, can be done as outpatient   - MRI neck this admission as above  - PET-CT as outpatient depending on pts clinical course     # Macrocytic anemia   -Hb 11.1, .5, macrocytosis labile since admission   - Normal liver and renal function   - Given age, nutritional deficiency vs primary bone marrow disorder can be worked up as an outpatient   - Iron panel without LISA, Ferritin 414, Sat 24 some element of AOCD likely  - B12, Folate wnl     # AMS  - Unclear cause, improvement since my initial evaluation    - CTH 12/13/2023 without acute pathology     # Back pain, Upper arm pain  - CT C-L spine with degenerative changes  - US LE negative for DVT    # A.fib  - Continues on Xarleto 20mg daily    Thank you for allowing me to participate in the care of Mr. Youssef, please do not hesitate to call or text me if you have further questions or concerns.     Denton Jackson MD  [facility]   Division of Hematology/Oncology  [address] [place] [address], [address]  [place] [address]  P: [phone]  F: [phone]    Attestation:    ---- Pt evaluated Including face-to-face interaction in addition to chart review, reviewing treatment plan, and managing the patient’s chronic diagnoses as listed in the assessment----

## 2023-12-26 NOTE — PROGRESS NOTE ADULT - ASSESSMENT
83M PMHx afib on xarelto, w/ hx of ortho issues, s/p L hip IMN (most recently s/p R hip IMN (Dr. Godinez 10/31/19), mild depression p/w acute encephalopathy, upper back shoulder and arm pain    Plan:  # Acute encephalopathy/ Aspiration pneumonia: Improving  - Pt AAOx3 but otherwise somewhat confused. Daughter states some of this has been ongoing, was placed on puree diet for dysphagia issues at rehab. Has decub now. Could be in setting of urinary retention and deconditioning  - CTH negative, infectious work up negative so far   - Speech/ swallow consult and dysphagia diet  - Falls precautions, aspiration precautions  - Completed total 5d course on 12/19 of Ceftriaxone   - PT consult  - UCx NGTD  - Monitor temps/WBC  - S/p Xray Cinesophagram 12/18 w/ Evidence of penetration and aspiration, rec'ed for Regular solids, mildly thick liquids  - ID following    # Lower extremity edema/ Acute hfpef:   - As per chart, daughter endorsing worse swelling in legs  - Elevated BNP  - Old echo with normal LVEF, w R sided enlargement and mod-severe TR  - Echo w/ EF 67%  - C/w Lasix per Cards   - LE Duplex negative for DVT  - Cards following    # Urinary retention:  - Kiran placed in ED by Urology  - Strict I/Os  - Hematuria noted - likely from traumatic kiran placement-- resolved   - Monitor urine color  - Monitor h/h  - C/w Flomax  - Urology following    # 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:  - Moderately differentiated from biopsy 09/2023  - PET-CT without obvious distant mets  - CT scans this admission again describe known R vocal cord lesion without obvious LAD   - S/p Xray Cinesophagram 12/18 w/ Evidence of penetration and aspiration, rec'ed for Regular solids, mildly thick liquids  - ENT consult noted  - MRI Head and Neck w/ and w/o contrast redemonstrate R vocal cord SCC crossing over to L, without other obvious LAD or masses noted  - PET-CT as outpatient depending on pts clinical course  - Mgmt per Heme/ Onc    # Afib:  - C/w Xarelto     # Anemia:  - Monitor CBC  - Transfuse PRN    # Back pain:   - Multiple areas of upper extremity pain including R shoulder, between shoulder blades and R arm for many days as per daughter. Thought to be 2/2 PT of upper extremities. Also now having LE weakness amongst other issues.   - Tibia/Fibula and Right shoulder x-ray negative for fx  - CT spine reviewed  - Pain control    # Depression:  - C/w current meds    # DVT ppx:  - IPCs  - Xarelto     DC planning - 24H notice given    [facility]  [phone]   83M PMHx afib on xarelto, w/ hx of ortho issues, s/p L hip IMN (most recently s/p R hip IMN (Dr. Godinez 10/31/19), mild depression p/w acute encephalopathy, upper back shoulder and arm pain    Plan:  # Acute encephalopathy/ Aspiration pneumonia: Improving  - Pt AAOx3 but otherwise somewhat confused. Daughter states some of this has been ongoing, was placed on puree diet for dysphagia issues at rehab. Has decub now. Could be in setting of urinary retention and deconditioning  - CTH negative, infectious work up negative so far   - Speech/ swallow consult and dysphagia diet  - Falls precautions, aspiration precautions  - Completed total 5d course on 12/19 of Ceftriaxone   - PT consult  - UCx NGTD  - Monitor temps/WBC  - S/p Xray Cinesophagram 12/18 w/ Evidence of penetration and aspiration, rec'ed for Regular solids, mildly thick liquids  - ID following    # Lower extremity edema/ Acute hfpef:   - As per chart, daughter endorsing worse swelling in legs  - Elevated BNP  - Old echo with normal LVEF, w R sided enlargement and mod-severe TR  - Echo w/ EF 67%  - C/w Lasix per Cards   - LE Duplex negative for DVT  - Cards following    # Urinary retention:  - Kiran placed in ED by Urology  - Strict I/Os  - Hematuria noted - likely from traumatic kiran placement-- resolved   - Monitor urine color  - Monitor h/h  - C/w Flomax  - Urology following    # 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:  - Moderately differentiated from biopsy 09/2023  - PET-CT without obvious distant mets  - CT scans this admission again describe known R vocal cord lesion without obvious LAD   - S/p Xray Cinesophagram 12/18 w/ Evidence of penetration and aspiration, rec'ed for Regular solids, mildly thick liquids  - ENT consult noted  - MRI Head and Neck w/ and w/o contrast redemonstrate R vocal cord SCC crossing over to L, without other obvious LAD or masses noted  - PET-CT as outpatient depending on pts clinical course  - Mgmt per Heme/ Onc    # Afib:  - C/w Xarelto     # Anemia:  - Monitor CBC  - Transfuse PRN    # Back pain:   - Multiple areas of upper extremity pain including R shoulder, between shoulder blades and R arm for many days as per daughter. Thought to be 2/2 PT of upper extremities. Also now having LE weakness amongst other issues.   - Tibia/Fibula and Right shoulder x-ray negative for fx  - CT spine reviewed  - Pain control    # Depression:  - C/w current meds    # DVT ppx:  - IPCs  - Xarelto     DC planning - 24H notice given    [facility]  [phone]

## 2023-12-27 ENCOUNTER — TRANSCRIPTION ENCOUNTER (OUTPATIENT)
Age: 83
End: 2023-12-27

## 2023-12-27 VITALS
TEMPERATURE: 98 F | SYSTOLIC BLOOD PRESSURE: 145 MMHG | RESPIRATION RATE: 18 BRPM | HEART RATE: 93 BPM | OXYGEN SATURATION: 98 % | DIASTOLIC BLOOD PRESSURE: 77 MMHG

## 2023-12-27 PROCEDURE — 97162 PT EVAL MOD COMPLEX 30 MIN: CPT

## 2023-12-27 PROCEDURE — 70450 CT HEAD/BRAIN W/O DYE: CPT | Mod: MA

## 2023-12-27 PROCEDURE — 70553 MRI BRAIN STEM W/O & W/DYE: CPT

## 2023-12-27 PROCEDURE — 85014 HEMATOCRIT: CPT

## 2023-12-27 PROCEDURE — 92610 EVALUATE SWALLOWING FUNCTION: CPT

## 2023-12-27 PROCEDURE — 84295 ASSAY OF SERUM SODIUM: CPT

## 2023-12-27 PROCEDURE — 82947 ASSAY GLUCOSE BLOOD QUANT: CPT

## 2023-12-27 PROCEDURE — 73590 X-RAY EXAM OF LOWER LEG: CPT

## 2023-12-27 PROCEDURE — A9585: CPT

## 2023-12-27 PROCEDURE — 82728 ASSAY OF FERRITIN: CPT

## 2023-12-27 PROCEDURE — 85610 PROTHROMBIN TIME: CPT

## 2023-12-27 PROCEDURE — 84484 ASSAY OF TROPONIN QUANT: CPT

## 2023-12-27 PROCEDURE — 93356 MYOCRD STRAIN IMG SPCKL TRCK: CPT

## 2023-12-27 PROCEDURE — 87086 URINE CULTURE/COLONY COUNT: CPT

## 2023-12-27 PROCEDURE — 80048 BASIC METABOLIC PNL TOTAL CA: CPT

## 2023-12-27 PROCEDURE — 85730 THROMBOPLASTIN TIME PARTIAL: CPT

## 2023-12-27 PROCEDURE — 73030 X-RAY EXAM OF SHOULDER: CPT

## 2023-12-27 PROCEDURE — 82803 BLOOD GASES ANY COMBINATION: CPT

## 2023-12-27 PROCEDURE — 83540 ASSAY OF IRON: CPT

## 2023-12-27 PROCEDURE — 92526 ORAL FUNCTION THERAPY: CPT

## 2023-12-27 PROCEDURE — 97530 THERAPEUTIC ACTIVITIES: CPT

## 2023-12-27 PROCEDURE — 83880 ASSAY OF NATRIURETIC PEPTIDE: CPT

## 2023-12-27 PROCEDURE — 99285 EMERGENCY DEPT VISIT HI MDM: CPT | Mod: 25

## 2023-12-27 PROCEDURE — 36415 COLL VENOUS BLD VENIPUNCTURE: CPT

## 2023-12-27 PROCEDURE — 83605 ASSAY OF LACTIC ACID: CPT

## 2023-12-27 PROCEDURE — 87637 SARSCOV2&INF A&B&RSV AMP PRB: CPT

## 2023-12-27 PROCEDURE — 72125 CT NECK SPINE W/O DYE: CPT

## 2023-12-27 PROCEDURE — 97110 THERAPEUTIC EXERCISES: CPT

## 2023-12-27 PROCEDURE — 71250 CT THORAX DX C-: CPT

## 2023-12-27 PROCEDURE — 74230 X-RAY XM SWLNG FUNCJ C+: CPT

## 2023-12-27 PROCEDURE — 76376 3D RENDER W/INTRP POSTPROCES: CPT

## 2023-12-27 PROCEDURE — 70543 MRI ORBT/FAC/NCK W/O &W/DYE: CPT

## 2023-12-27 PROCEDURE — 82607 VITAMIN B-12: CPT

## 2023-12-27 PROCEDURE — 93971 EXTREMITY STUDY: CPT

## 2023-12-27 PROCEDURE — 83550 IRON BINDING TEST: CPT

## 2023-12-27 PROCEDURE — 85027 COMPLETE CBC AUTOMATED: CPT

## 2023-12-27 PROCEDURE — 84100 ASSAY OF PHOSPHORUS: CPT

## 2023-12-27 PROCEDURE — 85025 COMPLETE CBC W/AUTO DIFF WBC: CPT

## 2023-12-27 PROCEDURE — 83735 ASSAY OF MAGNESIUM: CPT

## 2023-12-27 PROCEDURE — 82746 ASSAY OF FOLIC ACID SERUM: CPT

## 2023-12-27 PROCEDURE — 93306 TTE W/DOPPLER COMPLETE: CPT

## 2023-12-27 PROCEDURE — 92611 MOTION FLUOROSCOPY/SWALLOW: CPT

## 2023-12-27 PROCEDURE — 82435 ASSAY OF BLOOD CHLORIDE: CPT

## 2023-12-27 PROCEDURE — 81001 URINALYSIS AUTO W/SCOPE: CPT

## 2023-12-27 PROCEDURE — 71045 X-RAY EXAM CHEST 1 VIEW: CPT

## 2023-12-27 PROCEDURE — 85018 HEMOGLOBIN: CPT

## 2023-12-27 PROCEDURE — 72131 CT LUMBAR SPINE W/O DYE: CPT

## 2023-12-27 PROCEDURE — 84132 ASSAY OF SERUM POTASSIUM: CPT

## 2023-12-27 PROCEDURE — 82330 ASSAY OF CALCIUM: CPT

## 2023-12-27 PROCEDURE — 80053 COMPREHEN METABOLIC PANEL: CPT

## 2023-12-27 RX ORDER — FUROSEMIDE 40 MG
1 TABLET ORAL
Qty: 0 | Refills: 0 | DISCHARGE
Start: 2023-12-27

## 2023-12-27 RX ORDER — LANOLIN ALCOHOL/MO/W.PET/CERES
1 CREAM (GRAM) TOPICAL
Qty: 0 | Refills: 0 | DISCHARGE
Start: 2023-12-27

## 2023-12-27 RX ORDER — FERROUS SULFATE 325(65) MG
1 TABLET ORAL
Refills: 0 | DISCHARGE

## 2023-12-27 RX ORDER — TAMSULOSIN HYDROCHLORIDE 0.4 MG/1
1 CAPSULE ORAL
Qty: 0 | Refills: 0 | DISCHARGE
Start: 2023-12-27

## 2023-12-27 RX ORDER — IPRATROPIUM/ALBUTEROL SULFATE 18-103MCG
3 AEROSOL WITH ADAPTER (GRAM) INHALATION
Refills: 0 | DISCHARGE

## 2023-12-27 RX ORDER — ASCORBIC ACID 60 MG
1 TABLET,CHEWABLE ORAL
Refills: 0 | DISCHARGE

## 2023-12-27 RX ORDER — FERROUS SULFATE 325(65) MG
1 TABLET ORAL
Qty: 0 | Refills: 0 | DISCHARGE
Start: 2023-12-27

## 2023-12-27 RX ORDER — ACETAMINOPHEN 500 MG
2 TABLET ORAL
Qty: 0 | Refills: 0 | DISCHARGE
Start: 2023-12-27

## 2023-12-27 RX ORDER — ASCORBIC ACID 60 MG
1 TABLET,CHEWABLE ORAL
Qty: 0 | Refills: 0 | DISCHARGE
Start: 2023-12-27

## 2023-12-27 RX ADMIN — Medication 500 MILLIGRAM(S): at 13:21

## 2023-12-27 RX ADMIN — Medication 325 MILLIGRAM(S): at 13:21

## 2023-12-27 RX ADMIN — Medication 20 MILLIGRAM(S): at 05:36

## 2023-12-27 NOTE — PROGRESS NOTE ADULT - SUBJECTIVE AND OBJECTIVE BOX
SUBJECTIVE / OVERNIGHT EVENTS:    patient seen and examined  resting comfortably in bed    --------------------------------------------------------------------------------------------  LABS:            CAPILLARY BLOOD GLUCOSE                RADIOLOGY & ADDITIONAL TESTS:    Imaging Personally Reviewed:  [x] YES  [ ] NO    Consultant(s) Notes Reviewed:  [x] YES  [ ] NO    MEDICATIONS  (STANDING):  ascorbic acid 500 milliGRAM(s) Oral daily  chlorhexidine 2% Cloths 1 Application(s) Topical daily  escitalopram 10 milliGRAM(s) Oral at bedtime  ferrous    sulfate 325 milliGRAM(s) Oral daily  furosemide    Tablet 20 milliGRAM(s) Oral daily  rivaroxaban 20 milliGRAM(s) Oral with dinner  tamsulosin 0.4 milliGRAM(s) Oral at bedtime    MEDICATIONS  (PRN):  acetaminophen     Tablet .. 650 milliGRAM(s) Oral every 6 hours PRN Temp greater or equal to 38C (100.4F), Mild Pain (1 - 3)  melatonin 3 milliGRAM(s) Oral at bedtime PRN Insomnia      Care Discussed with Consultants/Other Providers [x] YES  [ ] NO    Vital Signs Last 24 Hrs  T(C): 36.4 (27 Dec 2023 08:15), Max: 36.4 (26 Dec 2023 16:13)  T(F): 97.5 (27 Dec 2023 08:15), Max: 97.6 (26 Dec 2023 16:13)  HR: 67 (27 Dec 2023 08:15) (64 - 70)  BP: 106/67 (27 Dec 2023 08:15) (106/67 - 112/68)  BP(mean): --  RR: 18 (27 Dec 2023 08:15) (18 - 18)  SpO2: 99% (27 Dec 2023 08:15) (97% - 100%)    Parameters below as of 27 Dec 2023 08:15  Patient On (Oxygen Delivery Method): room air      I&O's Summary    26 Dec 2023 07:01  -  27 Dec 2023 07:00  --------------------------------------------------------  IN: 0 mL / OUT: 2400 mL / NET: -2400 mL    PHYSICAL EXAM:  GENERAL: NAD, Elderly, comfortable  HEAD:  Atraumatic, Normocephalic  EYES: EOMI, PERRLA, conjunctiva and sclera clear  NECK: Supple, No JVD  CHEST/LUNG: Diminished bilaterally; No wheeze  HEART: Regular rate and rhythm; No murmurs, rubs, or gallops  ABDOMEN: Soft, Nontender, Nondistended; Bowel sounds present  NEURO: AAO2- 3, no focal weakness, 5/5 b/l extremity strength, b/l knee no arthritis, no effusion   EXTREMITIES:  2+ Peripheral Pulses, No clubbing, cyanosis, or edema  SKIN: No rashes or lesions, + kiran

## 2023-12-27 NOTE — DISCHARGE NOTE PROVIDER - CARE PROVIDERS DIRECT ADDRESSES
[email or web address]

## 2023-12-27 NOTE — DISCHARGE NOTE PROVIDER - NSDCMRMEDTOKEN_GEN_ALL_CORE_FT
ascorbic acid 500 mg oral tablet: 1 tab(s) orally once a day  cholecalciferol 25 mcg (1000 intl units) oral tablet: 1 tab(s) orally once a day  DuoNeb 0.5 mg-2.5 mg/3 mL inhalation solution: 3 milliliter(s) by nebulizer every 6 hours as needed  ferrous sulfate 325 mg (65 mg elemental iron) oral tablet: 1 tab(s) orally once a day  Lexapro 10 mg oral tablet: 1 tab(s) orally once a day (in the evening)  Multiple Vitamins oral tablet: 1 tab(s) orally once a day  Xarelto 20 mg oral tablet: 1 tab(s) orally once a day (in the evening)   acetaminophen 325 mg oral tablet: 2 tab(s) orally every 6 hours As needed Temp greater or equal to 38C (100.4F), Mild Pain (1 - 3)  ascorbic acid 500 mg oral tablet: 1 tab(s) orally once a day  cholecalciferol 25 mcg (1000 intl units) oral tablet: 1 tab(s) orally once a day  ferrous sulfate 325 mg (65 mg elemental iron) oral tablet: 1 tab(s) orally once a day  furosemide 20 mg oral tablet: 1 tab(s) orally once a day  Lexapro 10 mg oral tablet: 1 tab(s) orally once a day (in the evening)  melatonin 3 mg oral tablet: 1 tab(s) orally once a day (at bedtime) As needed Insomnia  Multiple Vitamins oral tablet: 1 tab(s) orally once a day  tamsulosin 0.4 mg oral capsule: 1 cap(s) orally once a day (at bedtime)  Xarelto 20 mg oral tablet: 1 tab(s) orally once a day (in the evening)

## 2023-12-27 NOTE — DISCHARGE NOTE NURSING/CASE MANAGEMENT/SOCIAL WORK - NSDCVIVACCINE_GEN_ALL_CORE_FT
Tdap; 28-Aug-2018 16:56; Nicole Silva (RN); [employer]; [id number]; IntraMuscular; Deltoid Left.; 0.5 milliLiter(s); VIS (VIS Published: 09-May-2013, VIS Presented: 28-Aug-2018);    Tdap; 28-Aug-2018 16:56; Nicole Silva (RN); [employer]; [id number]; IntraMuscular; Deltoid Left.; 0.5 milliLiter(s); VIS (VIS Published: 09-May-2013, VIS Presented: 28-Aug-2018);

## 2023-12-27 NOTE — PROGRESS NOTE ADULT - NUTRITIONAL ASSESSMENT
This patient has been assessed with a concern for Malnutrition and has been determined to have a diagnosis/diagnoses of Severe protein-calorie malnutrition.    This patient is being managed with:   Diet Regular-  Mildly Thick Liquids (MILDTHICKLIQS)  Supplement Feeding Modality:  Oral  Ensure Plus High Protein Cans or Servings Per Day:  2       Frequency:  Daily  Entered: Dec 18 2023 10:58AM  
This patient has been assessed with a concern for Malnutrition and has been determined to have a diagnosis/diagnoses of Severe protein-calorie malnutrition.    This patient is being managed with:   Diet Regular-  Mildly Thick Liquids (MILDTHICKLIQS)  Supplement Feeding Modality:  Oral  Ensure Plus High Protein Cans or Servings Per Day:  2       Frequency:  Daily  Entered: Dec 18 2023 10:58AM  
[Brushes teeth, no help] : brushes teeth, no help
This patient has been assessed with a concern for Malnutrition and has been determined to have a diagnosis/diagnoses of Severe protein-calorie malnutrition.    This patient is being managed with:   Diet Regular-  Mildly Thick Liquids (MILDTHICKLIQS)  Supplement Feeding Modality:  Oral  Ensure Plus High Protein Cans or Servings Per Day:  2       Frequency:  Daily  Entered: Dec 18 2023 10:58AM  
This patient has been assessed with a concern for Malnutrition and has been determined to have a diagnosis/diagnoses of Severe protein-calorie malnutrition.    This patient is being managed with:   Diet Regular-  Mildly Thick Liquids (MILDTHICKLIQS)  Supplement Feeding Modality:  Oral  Ensure Plus High Protein Cans or Servings Per Day:  2       Frequency:  Daily  Entered: Dec 18 2023 10:58AM  
[Dresses self, no help] : dresses self, no help
This patient has been assessed with a concern for Malnutrition and has been determined to have a diagnosis/diagnoses of Severe protein-calorie malnutrition.    This patient is being managed with:   Diet Regular-  Mildly Thick Liquids (MILDTHICKLIQS)  Supplement Feeding Modality:  Oral  Ensure Plus High Protein Cans or Servings Per Day:  2       Frequency:  Daily  Entered: Dec 18 2023 10:58AM  
[Imaginative play] : imaginative play
This patient has been assessed with a concern for Malnutrition and has been determined to have a diagnosis/diagnoses of Severe protein-calorie malnutrition.    This patient is being managed with:   Diet Minced and Moist-  Mildly Thick Liquids (MILDTHICKLIQS)  Supplement Feeding Modality:  Oral  Ensure Plus High Protein Cans or Servings Per Day:  2       Frequency:  Daily  Entered: Dec 15 2023  1:41PM  
This patient has been assessed with a concern for Malnutrition and has been determined to have a diagnosis/diagnoses of Severe protein-calorie malnutrition.    This patient is being managed with:   Diet Regular-  Mildly Thick Liquids (MILDTHICKLIQS)  Supplement Feeding Modality:  Oral  Ensure Plus High Protein Cans or Servings Per Day:  2       Frequency:  Daily  Entered: Dec 18 2023 10:58AM  
This patient has been assessed with a concern for Malnutrition and has been determined to have a diagnosis/diagnoses of Severe protein-calorie malnutrition.    This patient is being managed with:   Diet Regular-  Mildly Thick Liquids (MILDTHICKLIQS)  Supplement Feeding Modality:  Oral  Ensure Plus High Protein Cans or Servings Per Day:  2       Frequency:  Daily  Entered: Dec 18 2023 10:58AM    This patient has been assessed with a concern for Malnutrition and has been determined to have a diagnosis/diagnoses of Severe protein-calorie malnutrition.    This patient is being managed with:   Diet Regular-  Mildly Thick Liquids (MILDTHICKLIQS)  Supplement Feeding Modality:  Oral  Ensure Plus High Protein Cans or Servings Per Day:  2       Frequency:  Daily  Entered: Dec 18 2023 10:58AM  
[Interacts with peers] : interacts with peers
[Prepares cereal] : prepares cereal
This patient has been assessed with a concern for Malnutrition and has been determined to have a diagnosis/diagnoses of Severe protein-calorie malnutrition.    This patient is being managed with:   Diet Minced and Moist-  Mildly Thick Liquids (MILDTHICKLIQS)  Supplement Feeding Modality:  Oral  Ensure Plus High Protein Cans or Servings Per Day:  2       Frequency:  Daily  Entered: Dec 15 2023  1:41PM  
[Copies a [place]] : copies a [place]
[Copies a cross] : copies a cross
[Draws person with 3 parts] : draws person with 3 parts
[Knows first & last name, age, gender] : knows first & last name, age, gender
[Understandable speech 100% of time] : understandable speech 100% of time
[Knows 2 opposites] : knows 2 opposites
[Knows 4 colors] : knows 4 colors
[Defines 5 words] : defines 5 words
[Knows 3 adjectives] : knows 3 adjectives
[Names 4 colors] : names 4 colors
[Understands 4 prepositions] : understands 4 prepositions
[Knows 4 actions] : knows 4 actions
[FreeTextEntry3] : GM: 5 yr 10 months\par FM: 5 yr. 7 months\par PS: 5 yr\par language: 5 yr 3 months

## 2023-12-27 NOTE — DISCHARGE NOTE NURSING/CASE MANAGEMENT/SOCIAL WORK - PATIENT PORTAL LINK FT
You can access the FollowMyHealth Patient Portal offered by [facility] by registering at the following website: [email or web address]. By joining [employer]’s FollowMyHealth portal, you will also be able to view your health information using other applications (apps) compatible with our system. You can access the FollowMyHealth Patient Portal offered by [facility] by registering at the following website: [email or web address]. By joining [employer]’s FollowMyHealth portal, you will also be able to view your health information using other applications (apps) compatible with our system.

## 2023-12-27 NOTE — DISCHARGE NOTE PROVIDER - NSDCCPCAREPLAN_GEN_ALL_CORE_FT
PRINCIPAL DISCHARGE DIAGNOSIS  Diagnosis: Weakness  Assessment and Plan of Treatment: Xray negative      SECONDARY DISCHARGE DIAGNOSES  Diagnosis: Back pain  Assessment and Plan of Treatment:     Diagnosis: Urinary retention  Assessment and Plan of Treatment: s/p kiran placement    Diagnosis: Lower extremity edema  Assessment and Plan of Treatment: s/p lasix    Diagnosis: Acute encephalopathy  Assessment and Plan of Treatment: resolved    Diagnosis: Acute CHF (congestive heart failure)  Assessment and Plan of Treatment:      PRINCIPAL DISCHARGE DIAGNOSIS  Diagnosis: Weakness  Assessment and Plan of Treatment: Xray negative, seen by physical therapy and recommended subacute rehab . Patient was treated  for aspiration pneumonia. Seen by speech and swallow and diet was modifed .      SECONDARY DISCHARGE DIAGNOSES  Diagnosis: Urinary retention  Assessment and Plan of Treatment: s/p kiran placement; please follow up with Dr Ortega from urology    Diagnosis: Vocal cord cancer  Assessment and Plan of Treatment: please follow up with oncology    Diagnosis: Anemia  Assessment and Plan of Treatment: please follow up with hematology for ongoing care    Diagnosis: Back pain  Assessment and Plan of Treatment: pain management    Diagnosis: Lower extremity edema  Assessment and Plan of Treatment: s/p lasix, Weigh yourself daily.  If you gain 3lbs in 3 days, or 5lbs in a week call your Health Care Provider.  Do not eat or drink foods containing more than 2000mg of salt (sodium) in your diet every day.  Call your Health Care Provider if you have any swelling or increased swelling in your feet, ankles, and/or stomach.  Take all of your medication as directed.  If you become dizzy call your Health Care Provider.      Diagnosis: Acute encephalopathy  Assessment and Plan of Treatment: resolved.    Diagnosis: Acute CHF (congestive heart failure)  Assessment and Plan of Treatment:

## 2023-12-27 NOTE — PROGRESS NOTE ADULT - SUBJECTIVE AND OBJECTIVE BOX
[facility]   HEMATOLOGY/ONCOLOGY INPATIENT PROGRESS NOTE     Interval Hx:   12/27/2023: Mr. Youssef was seen at bedside this morning, awake and alert, conversive, no overnight events, pt stable, pending discharge     Meds:   MEDICATIONS  (STANDING):  ascorbic acid 500 milliGRAM(s) Oral daily  chlorhexidine 2% Cloths 1 Application(s) Topical daily  escitalopram 10 milliGRAM(s) Oral at bedtime  ferrous    sulfate 325 milliGRAM(s) Oral daily  furosemide    Tablet 20 milliGRAM(s) Oral daily  rivaroxaban 20 milliGRAM(s) Oral with dinner  tamsulosin 0.4 milliGRAM(s) Oral at bedtime    MEDICATIONS  (PRN):  acetaminophen     Tablet .. 650 milliGRAM(s) Oral every 6 hours PRN Temp greater or equal to 38C (100.4F), Mild Pain (1 - 3)  melatonin 3 milliGRAM(s) Oral at bedtime PRN Insomnia    Vital Signs Last 24 Hrs  T(C): 36.3 (27 Dec 2023 01:40), Max: 36.6 (26 Dec 2023 09:17)  T(F): 97.3 (27 Dec 2023 01:40), Max: 97.9 (26 Dec 2023 09:17)  HR: 64 (27 Dec 2023 01:40) (60 - 70)  BP: 110/70 (27 Dec 2023 01:40) (110/70 - 129/74)  BP(mean): --  RR: 18 (27 Dec 2023 01:40) (18 - 18)  SpO2: 100% (27 Dec 2023 01:40) (97% - 100%)    Parameters below as of 27 Dec 2023 01:40  Patient On (Oxygen Delivery Method): room air    Physical Exam:  Gen: NAD, slowed speech, hoarse voice    HEENT: moist mucous membranes, EOMI  Chest: equal chest rise  Cardiac: irregular   Abd: non tender, non distended, no hepatomegaly or splenomegaly  Ext: Trace edema  Neuro: AAOX3, slowed speech     Labs:

## 2023-12-27 NOTE — DISCHARGE NOTE NURSING/CASE MANAGEMENT/SOCIAL WORK - NSDCFUADDAPPT_GEN_ALL_CORE_FT
APPTS ARE READY TO BE MADE: [ ] YES    Best Family or Patient Contact (if needed):    Additional Information about above appointments (if needed):    1: oncology  2: urology   3: pcp  4: ent   Other comments or requests:

## 2023-12-27 NOTE — PROGRESS NOTE ADULT - ASSESSMENT
83M PMHx afib on xarelto, w/ hx of ortho issues, s/p L hip IMN (most recently s/p R hip IMN (Dr. Godinez 10/31/19), mild depression p/w acute encephalopathy, upper back shoulder and arm pain    Plan:  # Acute encephalopathy/ Aspiration pneumonia: Improving  - Pt AAOx3 but otherwise somewhat confused. Daughter states some of this has been ongoing, was placed on puree diet for dysphagia issues at rehab. Has decub now. Could be in setting of urinary retention and deconditioning  - CTH negative, infectious work up negative so far   - Speech/ swallow consult and dysphagia diet  - Falls precautions, aspiration precautions  - Completed total 5d course on 12/19 of Ceftriaxone   - PT consult  - UCx NGTD  - Monitor temps/WBC  - S/p Xray Cinesophagram 12/18 w/ Evidence of penetration and aspiration, rec'ed for Regular solids, mildly thick liquids  - ID following    # Lower extremity edema/ Acute hfpef:   - As per chart, daughter endorsing worse swelling in legs  - Elevated BNP  - Old echo with normal LVEF, w R sided enlargement and mod-severe TR  - Echo w/ EF 67%  - C/w Lasix per Cards   - LE Duplex negative for DVT  - Cards following    # Urinary retention:  - Kiran placed in ED by Urology  - Strict I/Os  - Hematuria noted - likely from traumatic kiran placement-- resolved   - Monitor urine color  - Monitor h/h  - C/w Flomax  - Urology following    # 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:  - Moderately differentiated from biopsy 09/2023  - PET-CT without obvious distant mets  - CT scans this admission again describe known R vocal cord lesion without obvious LAD   - S/p Xray Cinesophagram 12/18 w/ Evidence of penetration and aspiration, rec'ed for Regular solids, mildly thick liquids  - ENT consult noted  - MRI Head and Neck w/ and w/o contrast redemonstrate R vocal cord SCC crossing over to L, without other obvious LAD or masses noted  - PET-CT as outpatient depending on pts clinical course  - Mgmt per Heme/ Onc    # Afib:  - C/w Xarelto     # Anemia:  - Monitor CBC  - Transfuse PRN    # Back pain:   - Multiple areas of upper extremity pain including R shoulder, between shoulder blades and R arm for many days as per daughter. Thought to be 2/2 PT of upper extremities. Also now having LE weakness amongst other issues.   - Tibia/Fibula and Right shoulder x-ray negative for fx  - CT spine reviewed  - Pain control    # Depression:  - C/w current meds    # DVT ppx:  - IPCs  - Xarelto     DC planning - 24H notice given    [facility]  [phone]   83M PMHx afib on xarelto, w/ hx of ortho issues, s/p L hip IMN (most recently s/p R hip IMN (Dr. Godinez 10/31/19), mild depression p/w acute encephalopathy, upper back shoulder and arm pain    Plan:  # Acute encephalopathy/ Aspiration pneumonia: Improving  - Pt AAOx3 but otherwise somewhat confused. Daughter states some of this has been ongoing, was placed on puree diet for dysphagia issues at rehab. Has decub now. Could be in setting of urinary retention and deconditioning  - CTH negative, infectious work up negative so far   - Speech/ swallow consult and dysphagia diet  - Falls precautions, aspiration precautions  - Completed total 5d course on 12/19 of Ceftriaxone   - PT consult  - UCx NGTD  - Monitor temps/WBC  - S/p Xray Cinesophagram 12/18 w/ Evidence of penetration and aspiration, rec'ed for Regular solids, mildly thick liquids  - ID following    # Lower extremity edema/ Acute hfpef:   - As per chart, daughter endorsing worse swelling in legs  - Elevated BNP  - Old echo with normal LVEF, w R sided enlargement and mod-severe TR  - Echo w/ EF 67%  - C/w Lasix per Cards   - LE Duplex negative for DVT  - Cards following    # Urinary retention:  - Kiran placed in ED by Urology  - Strict I/Os  - Hematuria noted - likely from traumatic kiran placement-- resolved   - Monitor urine color  - Monitor h/h  - C/w Flomax  - Urology following    # 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:  - Moderately differentiated from biopsy 09/2023  - PET-CT without obvious distant mets  - CT scans this admission again describe known R vocal cord lesion without obvious LAD   - S/p Xray Cinesophagram 12/18 w/ Evidence of penetration and aspiration, rec'ed for Regular solids, mildly thick liquids  - ENT consult noted  - MRI Head and Neck w/ and w/o contrast redemonstrate R vocal cord SCC crossing over to L, without other obvious LAD or masses noted  - PET-CT as outpatient depending on pts clinical course  - Mgmt per Heme/ Onc    # Afib:  - C/w Xarelto     # Anemia:  - Monitor CBC  - Transfuse PRN    # Back pain:   - Multiple areas of upper extremity pain including R shoulder, between shoulder blades and R arm for many days as per daughter. Thought to be 2/2 PT of upper extremities. Also now having LE weakness amongst other issues.   - Tibia/Fibula and Right shoulder x-ray negative for fx  - CT spine reviewed  - Pain control    # Depression:  - C/w current meds    # DVT ppx:  - IPCs  - Xarelto     DC planning - 24H notice given    [facility]  [phone]

## 2023-12-27 NOTE — DISCHARGE NOTE PROVIDER - HOSPITAL COURSE
HPI:  83M PMHx afib on xarelto, w/ hx of ortho issues, s/p L hip IMN (most recently s/p R hip IMN (Dr. Godinez 10/31/19), mild depression p/w AMS, upper back shoulder and arm pain, Admitted for pneumonia and human meta-pneumovirus in November for week at [place] then discharged to rehab at [facility]. Initially was on oxygen at rehab. Rehab admission complicated by UTI as per daughter. Also endorsing new level 2 sacral decub. Daughter feels patient did not have sufficient walking with PT and that shoulder pain is related to excessive upper extremity PT. Also endorsing some swallowing issues that prompted patient to be placed on puree diet. Denies fevers, chills or SOB. Today, daughter states patient endorsed much worse R shoulder pain radiating down R arm. Also noticed worsening LE swelling. She was concerned for cardiac issue and brought him to [facility] for evaluation. Also notes patient told her he could not pee today. Possible constipation but daughter states he had large BM in ER. Denies any known falls or trauma. No known urinary history but patient did see urologist briefly in past.       Hospital Course:  Patient admitted for Acute encephalopathy/ Aspiration pneumonia,  Acute hfpef and urinary retention. Patient s/p kiran placement by urology. Infectious disease consulted. CTH negative, UCx NGTD; infectious work up negative so far  Speech/ swallow consulted and dysphagia diet recommended. Patient S/p Xray Cinesophagram 12/18 w/ Evidence of penetration and aspiration. Cardiology consulted,  recommended lasix for LE edema. ECHO with EF 67% and LE Duplex negative for DVT. Hematology consulted. 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 Patient also c/o back pain and lE weakness  - Tibia/Fibula and Right shoulder x-ray negative for fx.        Important Medication Changes and Reason:    Active or Pending Issues Requiring Follow-up:    Advanced Directives:   [ ] Full code  [X ] DNR  [ ] Hospice    Discharge Diagnoses:  Acute encephalopathy   Aspiration pneumonia    Acute hfpef   urinary retention  LE weakness  Back pain         HPI:  83M PMHx afib on xarelto, w/ hx of ortho issues, s/p L hip IMN (most recently s/p R hip IMN (Dr. Godinez 10/31/19), mild depression p/w AMS, upper back shoulder and arm pain, Admitted for pneumonia and human meta-pneumovirus in November for week at [place] then discharged to rehab at [facility]. Initially was on oxygen at rehab. Rehab admission complicated by UTI as per daughter. Also endorsing new level 2 sacral decub. Daughter feels patient did not have sufficient walking with PT and that shoulder pain is related to excessive upper extremity PT. Also endorsing some swallowing issues that prompted patient to be placed on puree diet. Denies fevers, chills or SOB. Today, daughter states patient endorsed much worse R shoulder pain radiating down R arm. Also noticed worsening LE swelling. She was concerned for cardiac issue and brought him to [facility] for evaluation. Also notes patient told her he could not pee today. Possible constipation but daughter states he had large BM in ER. Denies any known falls or trauma. No known urinary history but patient did see urologist briefly in past.       Hospital Course:  Patient admitted for Acute encephalopathy/ Aspiration pneumonia,  Acute hfpef and urinary retention. Patient s/p kiran placement by urology. Infectious disease consulted. CTH negative, UCx NGTD; infectious work up negative so far  Speech/ swallow consulted and dysphagia diet recommended. Patient S/p Xray Cinesophagram 12/18 w/ Evidence of penetration and aspiration. Cardiology consulted,  recommended lasix for LE edema. ECHO with EF 67% and LE Duplex negative for DVT. Hematology consulted. 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 Patient also c/o back pain and lE weakness  - Tibia/Fibula and Right shoulder x-ray negative for fx.        Important Medication Changes and Reason:    Active or Pending Issues Requiring Follow-up:    Advanced Directives:   [ ] Full code  [X ] DNR  [ ] Hospice    Discharge Diagnoses:  Acute encephalopathy   Aspiration pneumonia    Acute hfpef   urinary retention  LE weakness  Back pain         HPI:  83M PMHx afib on xarelto, w/ hx of ortho issues, s/p L hip IMN (most recently s/p R hip IMN (Dr. Godinez 10/31/19), mild depression p/w AMS, upper back shoulder and arm pain, Admitted for pneumonia and human meta-pneumovirus in November for week at [place] then discharged to rehab at [facility]. Initially was on oxygen at rehab. Rehab admission complicated by UTI as per daughter. Also endorsing new level 2 sacral decub. Daughter feels patient did not have sufficient walking with PT and that shoulder pain is related to excessive upper extremity PT. Also endorsing some swallowing issues that prompted patient to be placed on puree diet. Denies fevers, chills or SOB. Today, daughter states patient endorsed much worse R shoulder pain radiating down R arm. Also noticed worsening LE swelling. She was concerned for cardiac issue and brought him to [facility] for evaluation. Also notes patient told her he could not pee today. Possible constipation but daughter states he had large BM in ER. Denies any known falls or trauma. No known urinary history but patient did see urologist briefly in past.       Hospital Course: Patient was p/w acute encephalopathy, upper back shoulder and arm pain   Acute encephalopathy/ Aspiration pneumonia: Improving  - Pt AAOx3 but otherwise somewhat confused. Daughter states some of this has been ongoing, was placed on puree diet for dysphagia issues at rehab. Has decub now. Could be in setting of urinary retention and deconditioning  - CTH negative, infectious work up negative so far   - Speech/ swallow consult and dysphagia diet  - Falls precautions, aspiration precautions  - Completed total 5d course on 12/19 of Ceftriaxone   - PT consult  - UCx NGTD  - Monitor temps/WBC  - S/p Xray Cinesophagram 12/18 w/ Evidence of penetration and aspiration, rec'ed for Regular solids, mildly thick liquids   Patient was seen by urology for hematuria and retention;  and recommended a kiran and to start flomax. Patient anticoagulation was temporarily stopped and hematuria resolved. Patient urine is now clear and Xarelto was resumed . Patient to be discharged with kiran and follow up with Dr Ortega outpatient for Kiran removal. Patient has SCC of the vocal cords; as per oncology  if pts clinical status improves to repeat PET-CT outpatient and determine stage and surgical candidacy with ENT if no distant disease is identified. Discussed findings with pts daughter, Samaria, through this admission . Patient to follow up outpatient with oncology for further care . Patient noted with macrocytosis anemia and was evaluated by hematology/ oncology as per recommendation   Hb 11.1, .5, macrocytosis labile since admission Normal liver and renal function   - Given age, nutritional deficiency vs primary bone marrow disorder can be worked up as an outpatient   - Iron panel without LISA, Ferritin 414, Sat 24 some element of AOCD likely  - B12, Folate wnl.    Patient with shoulder pain - Multiple areas of upper extremity pain including R shoulder, between shoulder blades and R arm for many days as per daughter. Thought to be 2/2 PT of upper extremities. Also now having LE weakness amongst other issues.   - Tibia/Fibula and Right shoulder x-ray negative for fx  - CT spine reviewed  - Pain control  PAtient also with right sided  heart failure and on lasix 20 mgs daily , seen by cardiology ; strict intake and output  Patient is now hemodynamically stable for discharge and outpatient follow up with urology, hematology and PCP                Important Medication Changes and Reason:none    Active or Pending Issues Requiring Follow-up: cardiology , oncology/ENT, PCP and urology     Advanced Directives:   [ ] Full code  [X ] DNR  [ ] Hospice    Discharge Diagnoses:  Acute encephalopathy   Aspiration pneumonia    Acute hfpef   urinary retention  LE weakness  Back pain         HPI:  83M PMHx afib on xarelto, w/ hx of ortho issues, s/p L hip IMN (most recently s/p R hip IMN (Dr. Godinez 10/31/19), mild depression p/w AMS, upper back shoulder and arm pain, Admitted for pneumonia and human meta-pneumovirus in November for week at [place] then discharged to rehab at [facility]. Initially was on oxygen at rehab. Rehab admission complicated by UTI as per daughter. Also endorsing new level 2 sacral decub. Daughter feels patient did not have sufficient walking with PT and that shoulder pain is related to excessive upper extremity PT. Also endorsing some swallowing issues that prompted patient to be placed on puree diet. Denies fevers, chills or SOB. Today, daughter states patient endorsed much worse R shoulder pain radiating down R arm. Also noticed worsening LE swelling. She was concerned for cardiac issue and brought him to [facility] for evaluation. Also notes patient told her he could not pee today. Possible constipation but daughter states he had large BM in ER. Denies any known falls or trauma. No known urinary history but patient did see urologist briefly in past.       Hospital Course: Patient was p/w acute encephalopathy, upper back shoulder and arm pain   Acute encephalopathy/ Aspiration pneumonia: Improving  - Pt AAOx3 but otherwise somewhat confused. Daughter states some of this has been ongoing, was placed on puree diet for dysphagia issues at rehab. Has decub now. Could be in setting of urinary retention and deconditioning  - CTH negative, infectious work up negative so far   - Speech/ swallow consult and dysphagia diet  - Falls precautions, aspiration precautions  - Completed total 5d course on 12/19 of Ceftriaxone   - PT consult  - UCx NGTD  - Monitor temps/WBC  - S/p Xray Cinesophagram 12/18 w/ Evidence of penetration and aspiration, rec'ed for Regular solids, mildly thick liquids   Patient was seen by urology for hematuria and retention;  and recommended a kiran and to start flomax. Patient anticoagulation was temporarily stopped and hematuria resolved. Patient urine is now clear and Xarelto was resumed . Patient to be discharged with kiran and follow up with Dr Ortega outpatient for Kiran removal. Patient has SCC of the vocal cords; as per oncology  if pts clinical status improves to repeat PET-CT outpatient and determine stage and surgical candidacy with ENT if no distant disease is identified. Discussed findings with pts daughter, Samaria, through this admission . Patient to follow up outpatient with oncology for further care . Patient noted with macrocytosis anemia and was evaluated by hematology/ oncology as per recommendation   Hb 11.1, .5, macrocytosis labile since admission Normal liver and renal function   - Given age, nutritional deficiency vs primary bone marrow disorder can be worked up as an outpatient   - Iron panel without LISA, Ferritin 414, Sat 24 some element of AOCD likely  - B12, Folate wnl.    Patient with shoulder pain - Multiple areas of upper extremity pain including R shoulder, between shoulder blades and R arm for many days as per daughter. Thought to be 2/2 PT of upper extremities. Also now having LE weakness amongst other issues.   - Tibia/Fibula and Right shoulder x-ray negative for fx  - CT spine reviewed  - Pain control  PAtient also with right sided  heart failure and on lasix 20 mgs daily , seen by cardiology ; strict intake and output  Patient is now hemodynamically stable for discharge and outpatient follow up with urology, hematology and PCP                Important Medication Changes and Reason:none    Active or Pending Issues Requiring Follow-up: cardiology , oncology/ENT, PCP and urology     Advanced Directives:   [ ] Full code  [X ] DNR  [ ] Hospice    Discharge Diagnoses:  Acute encephalopathy   Aspiration pneumonia    Acute hfpef   urinary retention  LE weakness  Back pain

## 2023-12-27 NOTE — DISCHARGE NOTE PROVIDER - NSDCFUADDAPPT_GEN_ALL_CORE_FT
Follow up with PMD in 3 days of discharge. APPTS ARE READY TO BE MADE: [ ] YES    Best Family or Patient Contact (if needed):    Additional Information about above appointments (if needed):    1: oncology  2: urology   3: pcp  4: ent   Other comments or requests:

## 2023-12-27 NOTE — DISCHARGE NOTE PROVIDER - NSDCFUSCHEDAPPT_GEN_ALL_CORE_FT
Yg Sesay  [facility]  [address]  Scheduled Appointment: 01/25/2024     Yg Sesay  [facility]  [address]  Scheduled Appointment: 01/25/2024

## 2023-12-27 NOTE — DISCHARGE NOTE PROVIDER - CARE PROVIDER_API CALL
Puneet Ortega)  Urology  [address] [place] [address], [address]  [place] [address]  Phone: [phone]  Fax: [phone]  Follow Up Time: 2 weeks    Denton Jackson  [facility]  [address], [address]  [place] [address]  Phone: [phone]  Fax: [phone]  Follow Up Time: 2 weeks   Puneet Ortega)  Urology  [address] [place] [address], [address]  [place] [address]  Phone: [phone]  Fax: [phone]  Follow Up Time: 2 weeks    Denton Jackson  [facility]  [address], [address]  [place] [address]  Phone: [phone]  Fax: [phone]  Follow Up Time: 2 weeks

## 2023-12-27 NOTE — DISCHARGE NOTE PROVIDER - DETAILS OF MALNUTRITION DIAGNOSIS/DIAGNOSES
This patient has been assessed with a concern for Malnutrition and was treated during this hospitalization for the following Nutrition diagnosis/diagnoses:     -  12/15/2023: Severe protein-calorie malnutrition

## 2023-12-27 NOTE — PROGRESS NOTE ADULT - SUBJECTIVE AND OBJECTIVE BOX
CARDIOLOGY FOLLOW UP NOTE - DR. ALVES    Patient Name: MARY BO    Date of Service: 23 @ 14:37    Patient seen and examined    Subjective:    cv: denies chest pain, dyspnea, palpitations, dizziness  pulmonary: denies cough  GI: denies abdominal pain, nausea, vomiting  vascular/legs: no edema   skin: no rash  ROS: otherwise negative   overnight events:      PHYSICAL EXAM:  T(C): 36.7 (23 @ 13:30), Max: 36.7 (23 @ 13:30)  HR: 93 (23 @ 13:30) (64 - 93)  BP: 145/77 (23 @ 13:30) (106/67 - 145/77)  RR: 18 (23 @ 13:30) (18 - 18)  SpO2: 98% (23 @ 13:30) (97% - 100%)  Wt(kg): --  I&O's Summary    26 Dec 2023 07:  -  27 Dec 2023 07:00  --------------------------------------------------------  IN: 0 mL / OUT: 2400 mL / NET: -2400 mL    27 Dec 2023 07:01  -  27 Dec 2023 14:37  --------------------------------------------------------  IN: 0 mL / OUT: 1200 mL / NET: -1200 mL      Daily     Daily Weight in k.5 (27 Dec 2023 08:15)    Appearance: Normal	  Cardiovascular: Normal S1 S2,RRR, No JVD, No murmurs  Respiratory: Lungs clear to auscultation	  Gastrointestinal:  Soft, Non-tender, + BS	  Extremities: Normal range of motion, No clubbing, cyanosis or edema      Home Medications:  acetaminophen 325 mg oral tablet: 2 tab(s) orally every 6 hours As needed Temp greater or equal to 38C (100.4F), Mild Pain (1 - 3) (27 Dec 2023 12:02)  ascorbic acid 500 mg oral tablet: 1 tab(s) orally once a day (27 Dec 2023 12:02)  cholecalciferol 25 mcg (1000 intl units) oral tablet: 1 tab(s) orally once a day (13 Dec 2023 16:41)  ferrous sulfate 325 mg (65 mg elemental iron) oral tablet: 1 tab(s) orally once a day (27 Dec 2023 12:02)  furosemide 20 mg oral tablet: 1 tab(s) orally once a day (27 Dec 2023 12:02)  Lexapro 10 mg oral tablet: 1 tab(s) orally once a day (in the evening) (13 Dec 2023 16:41)  melatonin 3 mg oral tablet: 1 tab(s) orally once a day (at bedtime) As needed Insomnia (27 Dec 2023 12:02)  Multiple Vitamins oral tablet: 1 tab(s) orally once a day (13 Dec 2023 16:41)  tamsulosin 0.4 mg oral capsule: 1 cap(s) orally once a day (at bedtime) (27 Dec 2023 12:02)  Xarelto 20 mg oral tablet: 1 tab(s) orally once a day (in the evening) (13 Dec 2023 16:41)      MEDICATIONS  (STANDING):  ascorbic acid 500 milliGRAM(s) Oral daily  chlorhexidine 2% Cloths 1 Application(s) Topical daily  escitalopram 10 milliGRAM(s) Oral at bedtime  ferrous    sulfate 325 milliGRAM(s) Oral daily  furosemide    Tablet 20 milliGRAM(s) Oral daily  rivaroxaban 20 milliGRAM(s) Oral with dinner  tamsulosin 0.4 milliGRAM(s) Oral at bedtime      TELEMETRY: 	    ECG:  	  RADIOLOGY:   DIAGNOSTIC TESTING:  [ ] Echocardiogram:  [ ] Catheterization:  [ ] Stress Test:    OTHER: 	    LABS:	 	    CARDIAC MARKERS:                        proBNP:     Lipid Profile:   HgA1c:

## 2023-12-27 NOTE — PROGRESS NOTE ADULT - REASON FOR ADMISSION
R shoulder pain, arm pain, back pain, AMS

## 2023-12-27 NOTE — PROGRESS NOTE ADULT - ASSESSMENT
83y Male with PMHx of Afib on Xarelto, s/p L hip IMN (most recently s/p R hip IMN (Dr. Godinez 10/31/19), mild depression who presented with AMS, upper back shoulder and arm pain who was previously diagnosed with SCC of the vocal cordPt was confused and obtaining history was difficult. 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 PET-CT 10/13/2023 with R vocal cord lesion abd BILATERAL vocal cord uptake with SUV 7.9 , non-specific activity at the posterior RIGHT nasopharyngeal wall is likely related to activity in the longus coli muscle and is otherwise nonspecific and an additional focus seen further posterolaterally is of indeterminate  significance due to beam hardening artifact.     Overall Impression: Mr. 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 He has not undergone further workup or resection given his ongoing medical comorbidities. At this time pts PET-CT from two+ months ago is not sufficient to assess patients malignancy especially in light of previous indeterminate increased areas of uptake. At this time I recommend continuing medical management and if pts clinical status improves to repeat PET-CT outpatient and determine stage and surgical candidacy with ENT if no distant disease is identified. Discussed findings with pts daughter, Samaria, through this admission     MRI neck w/ and w/o redemonstration of a nodular enhancing soft tissue   focus within the right true cord with edema signal which is partially   exophytic into the airway. Enhancement is seen infiltrating into the   anterior commissure and slightly crosses over to the contralateral side.   No gross subglottic extension is seen. Overall dimensions appear   unchanged in comparison to the prior neck CT exam. Previously seen   right-sided arytenoid sclerosis is nonspecific and is not well evaluated   on MR modality. No abnormal enhancement is seen in this location. No   gross extralaryngeal spread of tumor is seen.      # SCC of R vocal cord  - Moderately differentiated from biopsy 09/2023  - PET-CT without obvious distant mets  - CT scans this admission again describe known R vocal cord lesion without obvious LAD   - ENT follow up required, can be done as outpatient   - MRI neck this admission as above  - PET-CT as outpatient depending on pts clinical course     # Macrocytic anemia   -Hb 11.1, .5, macrocytosis labile since admission   - Normal liver and renal function   - Given age, nutritional deficiency vs primary bone marrow disorder can be worked up as an outpatient   - Iron panel without LISA, Ferritin 414, Sat 24 some element of AOCD likely  - B12, Folate wnl     # AMS  - Unclear cause, great improvement since my initial evaluation    - CT 12/13/2023 without acute pathology     # Back pain, Upper arm pain  - CT C-L spine with degenerative changes  - US LE negative for DVT    # A.fib  - Continues on Xarleto 20mg daily    Thank you for allowing me to participate in the care of Mr. Youssef, please do not hesitate to call or text me if you have further questions or concerns.     Denton Jackson MD  [facility]   Division of Hematology/Oncology  [address] [place] [address], [address]  [place] [address]  P: [phone]  F: [phone]    Attestation:    ---- Pt evaluated Including face-to-face interaction in addition to chart review, reviewing treatment plan, and managing the patient’s chronic diagnoses as listed in the assessment---- 83y Male with PMHx of Afib on Xarelto, s/p L hip IMN (most recently s/p R hip IMN (Dr. Godinez 10/31/19), mild depression who presented with AMS, upper back shoulder and arm pain who was previously diagnosed with SCC of the vocal cordPt was confused and obtaining history was difficult. 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 PET-CT 10/13/2023 with R vocal cord lesion abd BILATERAL vocal cord uptake with SUV 7.9 , non-specific activity at the posterior RIGHT nasopharyngeal wall is likely related to activity in the longus coli muscle and is otherwise nonspecific and an additional focus seen further posterolaterally is of indeterminate  significance due to beam hardening artifact.     Overall Impression: Mr. 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 He has not undergone further workup or resection given his ongoing medical comorbidities. At this time pts PET-CT from two+ months ago is not sufficient to assess patients malignancy especially in light of previous indeterminate increased areas of uptake. At this time I recommend continuing medical management and if pts clinical status improves to repeat PET-CT outpatient and determine stage and surgical candidacy with ENT if no distant disease is identified. Discussed findings with pts daughter, Samaria, through this admission     MRI neck w/ and w/o redemonstration of a nodular enhancing soft tissue   focus within the right true cord with edema signal which is partially   exophytic into the airway. Enhancement is seen infiltrating into the   anterior commissure and slightly crosses over to the contralateral side.   No gross subglottic extension is seen. Overall dimensions appear   unchanged in comparison to the prior neck CT exam. Previously seen   right-sided arytenoid sclerosis is nonspecific and is not well evaluated   on MR modality. No abnormal enhancement is seen in this location. No   gross extralaryngeal spread of tumor is seen.      # SCC of R vocal cord  - Moderately differentiated from biopsy 09/2023  - PET-CT without obvious distant mets  - CT scans this admission again describe known R vocal cord lesion without obvious LAD   - ENT follow up required, can be done as outpatient   - MRI neck this admission as above  - PET-CT as outpatient depending on pts clinical course     # Macrocytic anemia   -Hb 11.1, .5, macrocytosis labile since admission   - Normal liver and renal function   - Given age, nutritional deficiency vs primary bone marrow disorder can be worked up as an outpatient   - Iron panel without LISA, Ferritin 414, Sat 24 some element of AOCD likely  - B12, Folate wnl     # AMS  - Unclear cause, great improvement since my initial evaluation    - CT 12/13/2023 without acute pathology     # Back pain, Upper arm pain  - CT C-L spine with degenerative changes  - US LE negative for DVT    # A.fib  - Continues on Xarleto 20mg daily    Thank you for allowing me to participate in the care of Mr. Youssef, please do not hesitate to call or text me if you have further questions or concerns.     Denton Jackson MD  [facility]   Division of Hematology/Oncology  [address] [place] [address], [address]  [place] [address]  P: [phone]  F: [phone]    Attestation:    ---- Pt evaluated Including face-to-face interaction in addition to chart review, reviewing treatment plan, and managing the patient’s chronic diagnoses as listed in the assessment----

## 2023-12-27 NOTE — PROGRESS NOTE ADULT - PROVIDER SPECIALTY LIST ADULT
Cardiology
Cardiology
Heme/Onc
Heme/Onc
Infectious Disease
Internal Medicine
Cardiology
Cardiology
Heme/Onc
Heme/Onc
Internal Medicine
Internal Medicine
Cardiology
Cardiology
Internal Medicine
Urology
Cardiology
Cardiology
Heme/Onc
Cardiology
Heme/Onc
Internal Medicine
Internal Medicine
Cardiology
Heme/Onc
Heme/Onc
Infectious Disease
Internal Medicine

## 2023-12-27 NOTE — DISCHARGE NOTE NURSING/CASE MANAGEMENT/SOCIAL WORK - NSDCPEFALRISK_GEN_ALL_CORE
For information on Fall & Injury Prevention, visit: [email or web address] OR  [email or web address] OR  https://www.cdc.gov/steadi/patient.html For information on Fall & Injury Prevention, visit: [email or web address] OR  [email or web address] OR  https://www.cdc.gov/steadi/patient.html

## 2023-12-27 NOTE — DISCHARGE NOTE PROVIDER - PROVIDER TOKENS
PROVIDER:[TOKEN:[[id number]],FOLLOWUP:[2 weeks]],PROVIDER:[TOKEN:[[id number]],FOLLOWUP:[2 weeks]] PROVIDER:[TOKEN:[[id number]],FOLLOWUP:[2 weeks]],PROVIDER:[TOKEN:[[id number]],FOLLOWUP:[2 weeks]]

## 2024-01-01 NOTE — PHYSICAL THERAPY INITIAL EVALUATION ADULT - STANDING BALANCE: STATIC
COMMENTS:   Received 153 ml/kg/day for 122 kcal/kg/day. Gained weight - Weight change: 20 g (0.7 oz). Tolerating enteral feeds of MBM/DBM 24 kcal. Voiding and stooling adequately. Receiving Vitamin D supplementation. Serum sodium mildly low but urine sodium appropriate on 9/1.    PLANS:   - -160 ml/kg/day. Advance feeding volume to 24 ml Q3 - 160 ml/kg/d  - Continue current MBM 24 kcal feeds  - Repeat urine sodium and RFP in one week if growth not improved (would be due 9/8).  - Per Nutrition: Consider starting 4 mEq/kg NaCl d/ hyponatremia   - Continue Vitamin D and ferrous supplementation  - Follow growth velocity   poor plus

## 2024-01-02 NOTE — PHYSICAL THERAPY INITIAL EVALUATION ADULT - THERAPY FREQUENCY, PT EVAL
Refill Decision Note   Tom Tavarez  is requesting a refill authorization.  Brief Assessment and Rationale for Refill:  Approve     Medication Therapy Plan:         Comments:     Note composed:2:33 PM 01/02/2024             Appointments     Last Visit   12/4/2023 Adolfo Lemus MD   Next Visit   1/16/2024 Adolfo Lemus MD       
No care due was identified.  [facility] Embedded Care Due Messages. Reference number: [id number].   12/30/2023 2:30:50 PM CST  
1-2x/week

## 2024-01-17 ENCOUNTER — APPOINTMENT (OUTPATIENT)
Dept: UROLOGY | Facility: CLINIC | Age: 84
End: 2024-01-17
Payer: MEDICARE

## 2024-01-17 VITALS
SYSTOLIC BLOOD PRESSURE: 95 MMHG | RESPIRATION RATE: 17 BRPM | TEMPERATURE: 97.3 F | DIASTOLIC BLOOD PRESSURE: 67 MMHG | OXYGEN SATURATION: 97 % | HEART RATE: 73 BPM | WEIGHT: 210 LBS | HEIGHT: 74 IN | BODY MASS INDEX: 26.95 KG/M2

## 2024-01-17 DIAGNOSIS — Z63.4 DISAPPEARANCE AND DEATH OF FAMILY MEMBER: ICD-10-CM

## 2024-01-17 PROCEDURE — 99214 OFFICE O/P EST MOD 30 MIN: CPT

## 2024-01-17 RX ORDER — FINASTERIDE 5 MG/1
5 TABLET, FILM COATED ORAL DAILY
Qty: 90 | Refills: 3 | Status: ACTIVE | COMMUNITY
Start: 2024-01-17 | End: 1900-01-01

## 2024-01-17 SDOH — SOCIAL STABILITY - SOCIAL INSECURITY: DISSAPEARANCE AND DEATH OF FAMILY MEMBER: Z63.4

## 2024-01-17 NOTE — PHYSICAL EXAM
[Edema] : no peripheral edema [Exaggerated Use Of Accessory Muscles For Inspiration] : no accessory muscle use [Bowel Sounds] : normal bowel sounds [Abdomen Soft] : soft [Epididymis] : the epididymides were normal [Testes Tenderness] : no tenderness of the testes [Testes Mass (___cm)] : there were no testicular masses [Skin Color & Pigmentation] : normal skin color and pigmentation [FreeTextEntry1] : Wheelchair-bound [de-identified] : Phimotic band - urine leaking from penis with palpation of abdomen

## 2024-01-17 NOTE — HISTORY OF PRESENT ILLNESS
[FreeTextEntry1] : 83-year-old male with a history of squamous cancer of the larynx who presents for gross hematuria. History mostly obtained from daughter, Samaria, who is present during visit  Was recently admitted with acute encephalopathy, weakness, chest pain.  He was found to be in urinary retention in the emergency department status post Kiran placement, complicated by gross hematuria which was thought to be due to traumatic Kiran placement. His kiran has since been removed and he is voiding into a diaper.  PRIOR to hospitalization, he would void into a urinal. Was using a walker. He is currently in a wheel chair. No gross hematuria, UTIs PRIOR to hospitalization. Would not complain about his urination.   He was diagnosed with a UTI for foul smelling urine - unknown how it was collected.  Catheter has been out x 2 weeks. PVR today 700 mL

## 2024-01-17 NOTE — ASSESSMENT
[FreeTextEntry1] : 83-year-old male with history of squamous cancer of the larynx, recent hospitalization with weakness, metabolic encephalopathy who presents today with urinary tension, phimosis.  Random bladder scan today 700 mL.  This not a true postvoid residual, given that he is voiding into a diaper.  Was recently treated for UTI.  No complaints of suprapubic pain.  Last creatinine 0.79.  Discussed that in general, for bladder scans this high, we often favor placement of a place catheter. Discussed with patient and family. He is apprehensive to have this placed as he is asymptomatic. Daughter also concerned about how this may impact his recovery.  We did discuss ways to improve voiding, specifically double voiding, Crede maneuvers, timed voiding. Discussed increasing hydration and cranberry extract to reduce UTIs.  I will also add finasteride.  Discussed that this can take several months to work.  He is already on tamsulosin.  I will see him back for cystoscopy for evaluation of gross hematuria, in addition to the CT urogram.  I will perform another PVR at that visit.  Discussed if he is not retention, or he develops another UTI, we should place a catheter and consider suprapubic tube.  Questions were answered with patient and daughter.

## 2024-01-17 NOTE — REVIEW OF SYSTEMS
[Painful [place]] : painful [place] [Loss of interest] : loss of interest in sexual activity [Genital bacterial infection] : genital bacterial infection [Genital yeast infection] : genital yeast infection [Sexually Transmitted Disease (STD)] : sexually transmitted disease [Poor quality erections] : Poor quality erections [No erections] : no erections [Urine Infection (bladder/kidney)] : bladder/kidney infection [Urine retention] : urine retention [Depression] : depression [Negative] : Heme/Lymph

## 2024-01-18 ENCOUNTER — RESULT REVIEW (OUTPATIENT)
Age: 84
End: 2024-01-18

## 2024-01-25 ENCOUNTER — APPOINTMENT (OUTPATIENT)
Dept: OTOLARYNGOLOGY | Facility: CLINIC | Age: 84
End: 2024-01-25

## 2024-01-31 ENCOUNTER — OUTPATIENT (OUTPATIENT)
Dept: OUTPATIENT SERVICES | Facility: HOSPITAL | Age: 84
LOS: 1 days | End: 2024-01-31
Payer: MEDICARE

## 2024-01-31 ENCOUNTER — APPOINTMENT (OUTPATIENT)
Dept: CT IMAGING | Facility: IMAGING CENTER | Age: 84
End: 2024-01-31
Payer: MEDICARE

## 2024-01-31 DIAGNOSIS — S72.002A FRACTURE OF UNSPECIFIED PART OF NECK OF LEFT FEMUR, INITIAL ENCOUNTER FOR CLOSED FRACTURE: Chronic | ICD-10-CM

## 2024-01-31 DIAGNOSIS — R31.0 GROSS HEMATURIA: ICD-10-CM

## 2024-01-31 DIAGNOSIS — Z96.641 PRESENCE OF RIGHT ARTIFICIAL HIP JOINT: Chronic | ICD-10-CM

## 2024-01-31 DIAGNOSIS — H26.9 UNSPECIFIED CATARACT: Chronic | ICD-10-CM

## 2024-01-31 DIAGNOSIS — K40.90 UNILATERAL INGUINAL HERNIA, WITHOUT OBSTRUCTION OR GANGRENE, NOT SPECIFIED AS RECURRENT: Chronic | ICD-10-CM

## 2024-01-31 PROCEDURE — 74178 CT ABD&PLV WO CNTR FLWD CNTR: CPT

## 2024-01-31 PROCEDURE — 74178 CT ABD&PLV WO CNTR FLWD CNTR: CPT | Mod: 26,MH

## 2024-02-06 ENCOUNTER — APPOINTMENT (OUTPATIENT)
Dept: UROLOGY | Facility: CLINIC | Age: 84
End: 2024-02-06
Payer: MEDICARE

## 2024-02-06 DIAGNOSIS — R31.0 GROSS HEMATURIA: ICD-10-CM

## 2024-02-06 DIAGNOSIS — N47.1 PHIMOSIS: ICD-10-CM

## 2024-02-06 PROCEDURE — 99214 OFFICE O/P EST MOD 30 MIN: CPT

## 2024-02-06 NOTE — PHYSICAL EXAM
[Edema] : no peripheral edema [Bowel Sounds] : normal bowel sounds [Skin Color & Pigmentation] : normal skin color and pigmentation [] : no rash [FreeTextEntry1] : Bedbound [de-identified] : Tight phimosis, difficult to visualize glans.  Some purulence at glans

## 2024-02-06 NOTE — HISTORY OF PRESENT ILLNESS
[FreeTextEntry1] : 83-year-old male presents to follow-up  To review, he was recently admitted with urinary retention, hematuria after catheter placement, UTI.  He presents today for cystoscopy.  He has been voiding spontaneously into a diaper.  No complaints of gross hematuria, pain.  No flank pain  Creatinine 1/26/2024: 0.89 Urine culture 1/26/2024: Klebsiella ESBL Random bladder scan today: 300 mL  He underwent CT urogram 6 days ago  He is still very debilitated.  He walked 25 steps yesterday, which is his maximum

## 2024-02-06 NOTE — ASSESSMENT
[FreeTextEntry1] : 83-year-old male who presents as a follow-up with hematuria, retention -Random bladder scan today 300 mL -CT scan personally reviewed.  Mild bilateral fullness down to the level of the bladder.  His bladder scan is low today.  His creatinine is normal.  We discussed again catheter placement versus continuing medical management, and working on his mobility.  He is on finasteride, which I will continue.  Believe he is high risk for alpha-blocker -CT urogram also with possible left renal collecting system filling defects.  He has a history of hematuria and is a smoker.  Discussed this could represent malignancy.  I will await the final read.  Cystoscopy today was canceled, given that if these findings DO represent filling defects, he would likely need direct visualization with ureteroscopy, and cystoscopy could be performed at the same time in the operating room -Will call with the final results of the CT

## 2024-02-21 ENCOUNTER — APPOINTMENT (OUTPATIENT)
Dept: RADIATION ONCOLOGY | Facility: CLINIC | Age: 84
End: 2024-02-21
Payer: MEDICARE

## 2024-02-21 PROCEDURE — 99214 OFFICE O/P EST MOD 30 MIN: CPT

## 2024-02-22 NOTE — REASON FOR VISIT
[Emergent Follow-Up] : an emergent follow-up visit for [Head and Neck Cancer] : head and neck cancer [Family Member] : family member

## 2024-02-26 ENCOUNTER — APPOINTMENT (OUTPATIENT)
Dept: RADIATION ONCOLOGY | Facility: CLINIC | Age: 84
End: 2024-02-26
Payer: MEDICARE

## 2024-02-26 VITALS
HEART RATE: 83 BPM | TEMPERATURE: 95.2 F | WEIGHT: 173 LBS | RESPIRATION RATE: 17 BRPM | BODY MASS INDEX: 22.2 KG/M2 | DIASTOLIC BLOOD PRESSURE: 65 MMHG | HEIGHT: 74 IN | OXYGEN SATURATION: 96 % | SYSTOLIC BLOOD PRESSURE: 99 MMHG

## 2024-02-26 PROCEDURE — 99214 OFFICE O/P EST MOD 30 MIN: CPT | Mod: 25

## 2024-02-26 PROCEDURE — 99215 OFFICE O/P EST HI 40 MIN: CPT | Mod: 25

## 2024-02-26 PROCEDURE — 31575 DIAGNOSTIC LARYNGOSCOPY: CPT

## 2024-02-26 RX ORDER — MULTIVIT-MIN/FOLIC/VIT K/LYCOP 400-300MCG
500 TABLET ORAL
Refills: 0 | Status: ACTIVE | COMMUNITY

## 2024-02-26 RX ORDER — TAMSULOSIN HYDROCHLORIDE 0.4 MG/1
0.4 CAPSULE ORAL
Refills: 0 | Status: ACTIVE | COMMUNITY

## 2024-02-26 RX ORDER — ACETAMINOPHEN 325 MG/1
325 TABLET ORAL
Refills: 0 | Status: ACTIVE | COMMUNITY

## 2024-02-26 RX ORDER — FUROSEMIDE 20 MG/1
20 TABLET ORAL
Refills: 0 | Status: ACTIVE | COMMUNITY

## 2024-02-26 RX ORDER — MAGNESIUM HYDROXIDE 2400 MG/30ML
2400 SUSPENSION ORAL
Refills: 0 | Status: ACTIVE | COMMUNITY

## 2024-02-26 RX ORDER — MULTIVIT-MIN/FOLIC/VIT K/LYCOP 400-300MCG
TABLET ORAL
Refills: 0 | Status: ACTIVE | COMMUNITY

## 2024-02-26 RX ORDER — MULTIVIT-MIN/FOLIC/VIT K/LYCOP 400-300MCG
25 MCG TABLET ORAL
Refills: 0 | Status: ACTIVE | COMMUNITY

## 2024-02-26 RX ORDER — CHLORHEXIDINE GLUCONATE 4 %
325 (65 FE) LIQUID (ML) TOPICAL
Refills: 0 | Status: ACTIVE | COMMUNITY

## 2024-02-26 NOTE — VITALS
[Maximal Pain Intensity: 0/10] : 0/10 [Least Pain Intensity: 0/10] : 0/10 [40: Disabled, requires special care and assistance.] : 40: Disabled, requires special care and assistance. [ECOG Performance Status: 4 - Completely disabled. Cannot carry on any self care. Totally confined to bed or chair] : Performance Status: 4 - Completely disabled. Cannot carry on any self care. Totally confined to bed or chair

## 2024-02-29 NOTE — HISTORY OF PRESENT ILLNESS
[FreeTextEntry3] : Samaria Youssef [FreeTextEntry1] : Mr. Youssef is an 83 year old man with [id number] SCCa of the glottis  Initially presented to otolaryngologist with dx of dysphonia on 8/10/2023. Laryngoscopy done and bx recommended.  9/20/2023 biopsy of the R vocal cord lesion and anterior commissure of the larynx. Path showed well to moderately differentiated Squamous cell carcinoma at both sites.   10/18/23 CT neck showed Nodular soft tissue thickening involving the right vocal cord and anterior commissure with possible extension onto the anterior left vocal compatible with a glottic squamous cell neoplasm. No subglottic extension. Mild nonspecific sclerosis of the right arytenoid. No cervical adenopathy identified.   10/18/23 PET scan showed Findings at the vocal cords consistent with previously reported fiberoptic findings, consistent with malignant involvement. Activity at the posterior RIGHT nasopharyngeal wall is likely related to activity in the longus coli muscle and is otherwise nonspecific. No additional suspicious findings otherwise noted.  11/2/23 most recently seen by QUINN, laryngoscopy done showing presence of exophytic lesion of bilateral VC without extension to the sub or supra glottic larynx, bilateral vocal fold mobility full.  Patient was seen previously and recommended for EBRT, however was admitted with acute encephalopathy, weakness, chest pain. He was found to be in urinary retention in the emergency department status post Kiran placement, complicated by gross hematuria which was thought to be due to traumatic Kiran placement. His kiran has since been removed and he is voiding into a diaper.  2/21/24 patient re-presenting:

## 2024-02-29 NOTE — HISTORY OF PRESENT ILLNESS
[FreeTextEntry1] : Mr. Youssef is an 83 year old man with [id number] SCCa of the glottis  Initially presented to otolaryngologist with dx of dysphonia on 8/10/2023. Laryngoscopy done and bx recommended.  9/20/2023 biopsy of the R vocal cord lesion and anterior commissure of the larynx. Path showed well to moderately differentiated Squamous cell carcinoma at both sites.   10/18/23 CT neck showed Nodular soft tissue thickening involving the right vocal cord and anterior commissure with possible extension onto the anterior left vocal compatible with a glottic squamous cell neoplasm. No subglottic extension. Mild nonspecific sclerosis of the right arytenoid. No cervical adenopathy identified.   10/18/23 PET scan showed Findings at the vocal cords consistent with previously reported fiberoptic findings, consistent with malignant involvement. Activity at the posterior RIGHT nasopharyngeal wall is likely related to activity in the longus coli muscle and is otherwise nonspecific. No additional suspicious findings otherwise noted.  11/2/23 most recently seen by QUINN, laryngoscopy done showing presence of exophytic lesion of bilateral VC without extension to the sub or supra glottic larynx, bilateral vocal fold mobility full.  Patient was seen previously and recommended for EBRT, however was admitted with acute encephalopathy, weakness, chest pain. He was found to be in urinary retention in the emergency department status post Kiran placement, complicated by gross hematuria which was thought to be due to traumatic Kiran placement. His kiran has since been removed and he is voiding into a diaper.  2/21/2024 Re-evaluation via telehealth.   2/26/24 Presents today for emergent follow up in person. Having difficulty with swallowing, denies pain. Scoped, laryngeal lesion unchanged request patient to see a neurologist for cognitive ability with swallowing.

## 2024-02-29 NOTE — REVIEW OF SYSTEMS
[Loss of Hearing] : no loss of hearing [Nosebleeds] : no nosebleeds [Mucosal Pain] : no mucosal pain [Shortness Of Breath] : no shortness of breath [Wheezing] : no wheezing [SOB on Exertion] : no shortness of breath during exertion [Joint Pain] : no joint pain [Muscle Pain] : no muscle pain [Easy Bleeding] : no tendency for easy bleeding [Swollen Glands] : no swollen glands

## 2024-02-29 NOTE — REVIEW OF SYSTEMS
[Dysphagia] : dysphagia [Muscle Weakness] : muscle weakness [Disturbance Of Gait] : gait disturbance [Confused] : confusion [Difficulty Walking] : difficulty walking [Easy Bleeding] : a tendency for easy bleeding [Easy Bruising] : a tendency for easy bruising [Loss of Hearing] : no loss of hearing [Nosebleeds] : no nosebleeds [Hoarseness] : no hoarseness [Odynophagia] : no odynophagia [Mucosal Pain] : no mucosal pain [Joint Pain] : no joint pain [Swollen Glands] : no swollen glands [Muscle Pain] : no muscle pain

## 2024-03-28 NOTE — ED ADULT TRIAGE NOTE - NS ED NURSE AMBULANCES
[facility] impaired balance/pain/decreased ROM/decreased strength Siliq Counseling:  I discussed with the patient the risks of Siliq including but not limited to new or worsening depression, suicidal thoughts and behavior, immunosuppression, malignancy, posterior leukoencephalopathy syndrome, and serious infections.  The patient understands that monitoring is required including a PPD at baseline and must alert us or the primary physician if symptoms of infection or other concerning signs are noted. There is also a special program designed to monitor depression which is required with Siliq.

## 2024-04-03 NOTE — PHYSICAL THERAPY INITIAL EVALUATION ADULT - SITTING BALANCE: DYNAMIC
[FreeTextEntry1] : 72 yo man with a history of castration resistant metastatic prostate cancer initially diagnosed  on 2/1/2022 : Jammie 8 (4+4) and 9 (4+5) in all cores, +PNI. Patient is in the office with his sister and states he is in his usual state of health other than a complaint of severe right hip pain radiating down his leg. He states that he takes care of himself and is able to perform all of his ADL and IADL's. He does not walk long distance because of limited mobility due to his hip pain. He denies any constitutional symptoms, no bleeding diathesis, no CP, no MCCOY, no GI symptoms.  Patient has received since diagnosis: Radiation: s/p palliative RT to prostate bed for symptom management RT to progressive spinal mets Hormonal: lupron initiated on 3/8/22. Abiraterone 1000mg daily + prednisone 5mg 4/1/22 - 6/2023, now with POD Switch to xtandi 160mg daily 6/22/2023 C1D1 docetaxel 1/4/24 - experienced severe drug reaction C1D1 abraxane 1/11/24 C1D8 abraxane 1/18/24 C2D1 abraxane 2/1/24 C2D8 abraxane 2/8/24 C3D1 abraxane 2/22/24 C3D8 abraxane 2/29/24 with biochemical POD  good minus

## 2024-04-09 ENCOUNTER — APPOINTMENT (OUTPATIENT)
Dept: UROLOGY | Facility: CLINIC | Age: 84
End: 2024-04-09

## 2024-04-12 ENCOUNTER — NON-APPOINTMENT (OUTPATIENT)
Age: 84
End: 2024-04-12

## 2024-04-25 NOTE — DIETITIAN INITIAL EVALUATION ADULT. - DOB: +DATEOFBIRTH
Subjective   Patient ID: Juice is a 54 year old male.    Chief Complaint   Patient presents with    Follow-up       HPI    54 year old male with no significant past med history presents today with lower extremity swelling and pain.  The patient at this point is  homeless.     #Lower extremity edema  # Stasis dermatitis  # Blisters and ulcerations  -Per patient he has been having lower extremity edema and was admitted in the hospital in early March.  Currently they are less than what it was but still edematous.  -Since discharge from the hospital he gained about 6 to 7 kg.  -Per patient he ran out of prescription of Lasix and has not been taking that since 7 to 10 days.  -He completed a course of cephalexin for lower extremity cellulitis, currently does not look infected but does have first-degree ulcers.  -On exam bilateral lower extremity swelling.  -Patient advised to keep the legs elevated and was offered treatment at hospital versus restarting Lasix at home.  -He denies any fevers, lightheadedness, loss of consciousness.    Plan  -Restart Lasix, follow-up at wound care clinic.  -Follow-up in a week.  -Use Tylenol as needed for pain.    #Hypertension  -Blood pressure today 139/70 mmHg.  -Continue losartan 50 Mg, has not been taking it over the last few days.    Patient's medications, allergies, past medical, surgical, social and family histories were reviewed and updated as appropriate.    Review of Systems   Constitutional:  Negative for activity change and appetite change.   Eyes:  Negative for photophobia and visual disturbance.   Respiratory:  Negative for chest tightness and shortness of breath.    Cardiovascular:  Positive for leg swelling. Negative for chest pain.   Genitourinary:  Negative for difficulty urinating.   Musculoskeletal:  Negative for arthralgias and back pain.   Neurological:  Negative for dizziness and light-headedness.       No past medical history on file.  No past surgical history on  file.  Current Outpatient Medications   Medication Sig Dispense Refill    spironolactone (ALDACTONE) 25 MG tablet Take 1 tablet by mouth daily. 60 tablet 1    losartan (COZAAR) 50 MG tablet Take 1 tablet by mouth daily. 60 tablet 1    furosemide (LASIX) 40 MG tablet Take 1 tablet by mouth daily. 60 tablet 1    cephalexin (KEFLEX) 500 MG capsule [None received] (Patient not taking: Reported on 4/16/2024)      acetaminophen (TYLENOL) 500 MG tablet Take 1 tablet by mouth every 6 hours as needed for Pain. (Patient not taking: Reported on 4/25/2024) 20 tablet 0     No current facility-administered medications for this visit.     No family history on file.  Social History     Tobacco Use    Smoking status: Never    Smokeless tobacco: Never   Vaping Use    Vaping status: never used   Substance Use Topics    Alcohol use: Yes     Comment: social    Drug use: Never     No Known Allergies    Objective   Vitals:    04/25/24 1333   BP: 139/70   Pulse: 80   Resp: 16   Temp: 98 °F (36.7 °C)     Physical Exam  Constitutional:       Appearance: He is obese.   HENT:      Nose: Nose normal.      Mouth/Throat:      Mouth: Mucous membranes are moist.   Eyes:      Extraocular Movements: Extraocular movements intact.   Cardiovascular:      Rate and Rhythm: Normal rate and regular rhythm.   Pulmonary:      Effort: Pulmonary effort is normal. No respiratory distress.      Breath sounds: No wheezing.   Musculoskeletal:         General: Swelling present. Normal range of motion.      Right lower leg: Edema present.      Left lower leg: Edema present.   Skin:     General: Skin is warm.      Capillary Refill: Capillary refill takes less than 2 seconds.      Findings: Erythema and lesion present.   Neurological:      General: No focal deficit present.      Mental Status: He is alert and oriented to person, place, and time.         Assessment   Problem List Items Addressed This Visit          Cardiac and Vasculature    (HFpEF) heart failure with  preserved ejection fraction (CMD)    Relevant Medications    spironolactone (ALDACTONE) 25 MG tablet    losartan (COZAAR) 50 MG tablet    furosemide (LASIX) 40 MG tablet    Hypertension    Relevant Medications    spironolactone (ALDACTONE) 25 MG tablet    losartan (COZAAR) 50 MG tablet    furosemide (LASIX) 40 MG tablet       Musculoskeletal and Injuries    Venous stasis ulcer of right calf limited to breakdown of skin without varicose veins (CMD) - Primary    Relevant Orders    SERVICE TO WOUND CARE     Other Visit Diagnoses       Leg swelling        Stasis dermatitis  Previous workup in OSH:  Negative as per AVS(Duplex, Echo: Normal)  - Continue lasix 40 mg daily, will hold spironolactone    Relevant Medications    furosemide (LASIX) 40 MG tablet    Other Relevant Orders    SERVICE TO WOUND CARE            Discussed with Dr. Alasadi Mohan C Gudiwada, MD  PGY-3, IM resident   (please contact via perfect serve)     Statement Selected

## 2024-04-29 RX ORDER — AMOXICILLIN AND CLAVULANATE POTASSIUM 875; 125 MG/1; MG/1
875-125 TABLET, COATED ORAL
Qty: 10 | Refills: 0 | Status: ACTIVE | COMMUNITY
Start: 2024-04-29 | End: 1900-01-01

## 2024-04-30 NOTE — HISTORY OF PRESENT ILLNESS
[FreeTextEntry1] : 84-year-old male presents for follow-up  To review, he was recently admitted with urinary retention, hematuria after catheter placement, UTI.  Creatinine 1/26/2024: 0.89 Urine culture 1/26/2024: Klebsiella ESBL CTU 2/2024 -thickened bladder, status post TURP, mild bilateral hydronephrosis  He is still very debilitated. He walked 25 steps yesterday, which is his maximum  Had a catheter placed while admitted in March 2024  He presents for cystoscopy.  Has been on a course of Augmentin

## 2024-05-01 ENCOUNTER — APPOINTMENT (OUTPATIENT)
Dept: UROLOGY | Facility: CLINIC | Age: 84
End: 2024-05-01
Payer: OTHER MISCELLANEOUS

## 2024-05-01 ENCOUNTER — APPOINTMENT (OUTPATIENT)
Dept: UROLOGY | Facility: CLINIC | Age: 84
End: 2024-05-01

## 2024-05-01 DIAGNOSIS — N40.1 BENIGN PROSTATIC HYPERPLASIA WITH LOWER URINARY TRACT SYMPMS: ICD-10-CM

## 2024-05-01 DIAGNOSIS — R33.9 RETENTION OF URINE, UNSPECIFIED: ICD-10-CM

## 2024-05-01 DIAGNOSIS — N13.8 BENIGN PROSTATIC HYPERPLASIA WITH LOWER URINARY TRACT SYMPMS: ICD-10-CM

## 2024-05-01 PROCEDURE — 52000 CYSTOURETHROSCOPY: CPT

## 2024-05-02 PROBLEM — N40.1 BPH WITH OBSTRUCTION/LOWER URINARY TRACT SYMPTOMS: Status: ACTIVE | Noted: 2024-01-17

## 2024-05-02 PROBLEM — R33.9 URINARY RETENTION: Status: ACTIVE | Noted: 2024-01-17

## 2024-06-04 ENCOUNTER — APPOINTMENT (OUTPATIENT)
Dept: UROLOGY | Facility: CLINIC | Age: 84
End: 2024-06-04

## 2024-07-11 NOTE — ED PROVIDER NOTE - NS ED ATTENDING STATEMENT MOD
I have personally seen and examined this patient.  I have fully participated in the care of this patient. I have reviewed all pertinent clinical information, including history, physical exam, plan and the Resident’s note and agree except as noted. Average

## 2024-08-19 NOTE — PHYSICAL THERAPY INITIAL EVALUATION ADULT - ASSISTIVE DEVICE, REHAB EVAL
Give choices when able to encourage decision-making  Positive reinforcement when doing well    
bed rails

## 2025-02-21 NOTE — CONSULT NOTE ADULT - SUBJECTIVE AND OBJECTIVE BOX
Script was sent on 12/30/2024 with 90 TABS and 3 refills to [facility] pharmacy    Disp Refills Start End     buPROPion XL (WELLBUTRIN XL) 150 MG 24 hr tablet 90 tablet 3 12/30/2024 --    Sig - Route: Take 1 tablet by mouth daily. - Oral    Sent to pharmacy as: buPROPion HCl ER (XL) 150 MG Oral Tablet Extended Release 24 Hour (WELLBUTRIN XL)    Class: Eprescribe    E-Prescribing Status: Receipt confirmed by pharmacy (12/30/2024  1:53 PM CST)        Encourage patient to contact [facility] pharmacy if he is in need of refills.   [facility] DIV[facility] OF INFECTIOUS DISEASES  GIOVANY Ferguson S. Shah, Y. Patel, G. Delmer  [phone]  ([phone] - weekdays after 5pm and weekends)    MARY BO  83y, Male  [id number]    HPI:  Patient is a 83 year old male with PMH of Afib on xarelto, w/ hx of ortho issues, s/p L hip IMN (most recently s/p R hip IMN (Dr. Godinez 10/31/19), mild depression who presented with AMS, upper back shoulder and arm pain. Admitted for pneumonia and human meta-pneumovirus in November for week at [place] then discharged to rehab at [facility]. Initially was on oxygen at rehab. Rehab admission complicated by UTI as per daughter. Also endorsing new level 2 sacral decub. Daughter feels patient did not have sufficient walking with PT and that shoulder pain is related to excessive upper extremity PT. Also endorsing some swallowing issues that prompted patient to be placed on puree diet. Denies fevers, chills or SOB. Today, daughter states patient endorsed much worse R shoulder pain radiating down R arm. Also noticed worsening LE swelling. She was concerned for cardiac issue and brought him to [facility] for evaluation. Also notes patient told her he could not pee today. Possible constipation but daughter states he had large BM in ER. Denies any known falls or trauma. No known urinary history but patient did see urologist briefly in past. Daughter denies any significant cardiac history. In ER: Given LR 1L (13 Dec 2023 20:37)  Patient seen and examined at bedside this morning. Patient noted with hypoxia overnight, now on NC, denies chest pain or dyspnea. Denies fever, chills, abdominal pain, nausea, vomiting, diarrhea. Complains of pain and weakness in his legs. RN at bedside, noted patient with no issues with breakfast earlier, was seen by SLP and diet adjusted.   ROS: 14 point review of systems completed, pertinent positives and negatives as per HPI.    Allergies: No Known Allergies    PMH -- AF (atrial fibrillation)  Depression  Lesion of vocal cord  DVT, lower extremity    PSH -- Hernia, inguinal, right  S/P hip replacement, right  Fracture of neck of left femur  Cataract, right eye    FH -- No pertinent family history in first degree relatives  Social History -- no known tobacco, alcohol or illicit drug use    Physical Exam--  Vital Signs Last 24 Hrs  T(F): 97.8 (14 Dec 2023 08:32), Max: 99.1 (13 Dec 2023 16:23)  HR: 76 (14 Dec 2023 08:32) (68 - 79)  BP: 119/76 (14 Dec 2023 08:32) (113/73 - 124/79)  RR: 18 (14 Dec 2023 08:32) (16 - 18)  SpO2: 100% (14 Dec 2023 08:32) (79% - 100%)  General: no acute distress, NC  HEENT: NC/AT, EOMI, anicteric, neck supple  Lungs: decreased breath sounds at b/l bases   Heart: S1, S2 present, normal rate   Abdomen: Soft. Nondistended. Nontender. BS present.   Neuro: awake, alert, answers/follows simple commands  Extremities: No cyanosis. b/l LE edema R slightly more than L  Skin: Warm. Dry. No visible rash.   Lines: PIV; kiran with dark edelmira urine draining     Laboratory & Imaging Data--  CBC:                       11.1   8.61  )-----------( 211      ( 14 Dec 2023 07:02 )             36.7     WBC Count: 8.61 K/uL (12-14-23 @ 07:02)  WBC Count: 10.43 K/uL (12-13-23 @ 11:23)    CMP: 12-14    139  |  105  |  25<H>  ----------------------------<  93  4.0   |  25  |  0.94    Ca    9.0      14 Dec 2023 07:02  Phos  2.8     12-13  Mg     1.9     12-14    TPro  5.8<L>  /  Alb  2.9<L>  /  TBili  0.7  /  DBili  x   /  AST  12  /  ALT  9<L>  /  AlkPhos  139<H>  12-14    LIVER FUNCTIONS - ( 14 Dec 2023 07:02 )  Alb: 2.9 g/dL / Pro: 5.8 g/dL / ALK PHOS: 139 U/L / ALT: 9 U/L / AST: 12 U/L / GGT: x           Urinalysis + Microscopic Examination (12.13.23 @ 17:42)    pH Urine: 5.5   Urine Appearance: Clear   Color: Yellow   Specific Gravity: 1.012   Protein, Urine: Negative mg/dL   Glucose Qualitative, Urine: Negative mg/dL   Ketone - Urine: Negative mg/dL   Blood, Urine: Moderate   Bilirubin: Negative   Urobilinogen: 0.2 mg/dL   Leukocyte Esterase Concentration: Negative   Nitrite: Negative   White Blood Cell - Urine: 2 /HPF   Red Blood Cell - Urine: 14 /HPF   Bacteria: Negative /HPF   Cast: 0 /LPF   Epithelial Cells: 3 /HPF    Microbiology: reviewed  Flu With COVID-19 By LANDEN (12.13.23 @ 16:25)    SARS-CoV-2 Result: [facility]: This Respiratory Panel uses polymerase chain reaction (PCR) to detect for  influenza A; influenza B; respiratory syncytial virus; and SARS-CoV-2.  This test was validated by [facility] and is in use under the FDA  Emergency Use Authorization (EUA) for clinical labs CLIA-certified to  perform high complexity testing. Test results should be correlated with  clinical presentation, patient history, and epidemiology.   Influenza A Result: [facility]   Influenza B Result: [facility]   Resp Syn Virus Result: [facility]    Radiology--reviewed    < from: [facility] Duplex Lower Ext Vein Scan, Left (12.14.23 @ 10:08) >  IMPRESSION:  No evidence of left lower extremity deep venous thrombosis.    < end of copied text >    < from: CT Head No Cont (12.13.23 @ 15:12) >  IMPRESSION: No acute hemorrhage mass or mass effect.    < end of copied text >  < from: VA Duplex Lower Ext Vein Scan, Right (12.13.23 @ 12:52) >  IMPRESSION:  No evidence of right lower extremity deep venous thrombosis.    < end of copied text >  < from: Xray Shoulder 2 Views, Right (12.13.23 @ 12:50) >  IMPRESSION:  No fractures, dislocations, or AC separation.    Slightly hypertrophic AC joint arthrosis. Glenohumeral joint spacing   inadequately evaluated due to suboptimal positioning. Spinal degenerative   change with curvature apparent.    Mild posterior greater tuberosity enthesopathic change could correlate   with degree of underlying rotator cuff abnormality, MRI would be helpful   to further assess in this regard as warranted.    Generalized osteopenia otherwise no discrete lytic or blastic lesions.    < end of copied text >  < from: Xray Tibia + Fibula 2 Views, Right (12.13.23 @ 12:48) >  IMPRESSION:  Intact partially visualized distal end of a lateral femoral contoured   condylar buttress plate with fixation intercondylar screws. Underlying   anatomic alignment maintained. No dislocations acute appearing fractures   or knee joint effusion.    Superior and inferior patellar and peripheral lateral tibiofemoral   articular margin degenerative osteophytes otherwise relatively maintained   knee joint spaces. Congruent ankle mortise with smooth intact talar dome.   Preserved remaining visualized midfoot and hindfoot joint spaces and no   joint margin erosions.    Small calcaneal enthesophytes.    Generalized osteopenia otherwise no discrete lytic or blastic lesions.    < end of copied text >    < from: Xray Chest 1 View- PORTABLE-Urgent (12.13.23 @ 12:00) >  IMPRESSION:  Bibasilar hazy opacities which may represent atelectasis or small   effusions    < end of copied text >    Active Medications--  acetaminophen     Tablet .. 650 milliGRAM(s) Oral every 6 hours PRN  ascorbic acid 500 milliGRAM(s) Oral daily  chlorhexidine 2% Cloths 1 Application(s) Topical daily  escitalopram 10 milliGRAM(s) Oral at bedtime  ferrous    sulfate 325 milliGRAM(s) Oral daily  melatonin 3 milliGRAM(s) Oral at bedtime PRN  tamsulosin 0.4 milliGRAM(s) Oral at bedtime   [facility] INFECTIOUS DISEASES  GIOVANY Ferguson S. Shah, Y. Patel, G. Delmer  [phone]  ([phone] - weekdays after 5pm and weekends)    MARY BO  83y, Male  [id number]    HPI:  Patient is a 83 year old male with PMH of Afib on xarelto, w/ hx of ortho issues, s/p L hip IMN (most recently s/p R hip IMN (Dr. Godinez 10/31/19), mild depression who presented with AMS, upper back shoulder and arm pain. Admitted for pneumonia and human meta-pneumovirus in November for week at [place] then discharged to rehab at [facility]. Initially was on oxygen at rehab. Rehab admission complicated by UTI as per daughter. Also endorsing new level 2 sacral decub. Daughter feels patient did not have sufficient walking with PT and that shoulder pain is related to excessive upper extremity PT. Also endorsing some swallowing issues that prompted patient to be placed on puree diet. Denies fevers, chills or SOB. Today, daughter states patient endorsed much worse R shoulder pain radiating down R arm. Also noticed worsening LE swelling. She was concerned for cardiac issue and brought him to [facility] for evaluation. Also notes patient told her he could not pee today. Possible constipation but daughter states he had large BM in ER. Denies any known falls or trauma. No known urinary history but patient did see urologist briefly in past. Daughter denies any significant cardiac history. In ER: Given LR 1L (13 Dec 2023 20:37)  Patient seen and examined at bedside this morning. Patient noted with hypoxia overnight, now on NC, denies chest pain or dyspnea. Denies fever, chills, abdominal pain, nausea, vomiting, diarrhea. Complains of pain and weakness in his legs. RN at bedside, noted patient with no issues with breakfast earlier, was seen by SLP and diet adjusted.   ROS: 14 point review of systems completed, pertinent positives and negatives as per HPI.    Allergies: No Known Allergies    PMH -- AF (atrial fibrillation)  Depression  Lesion of vocal cord  DVT, lower extremity    PSH -- Hernia, inguinal, right  S/P hip replacement, right  Fracture of neck of left femur  Cataract, right eye    FH -- No pertinent family history in first degree relatives  Social History -- no known tobacco, alcohol or illicit drug use    Physical Exam--  Vital Signs Last 24 Hrs  T(F): 97.8 (14 Dec 2023 08:32), Max: 99.1 (13 Dec 2023 16:23)  HR: 76 (14 Dec 2023 08:32) (68 - 79)  BP: 119/76 (14 Dec 2023 08:32) (113/73 - 124/79)  RR: 18 (14 Dec 2023 08:32) (16 - 18)  SpO2: 100% (14 Dec 2023 08:32) (79% - 100%)  General: no acute distress, NC  HEENT: NC/AT, EOMI, anicteric, neck supple  Lungs: decreased breath sounds at b/l bases   Heart: S1, S2 present, normal rate   Abdomen: Soft. Nondistended. Nontender. BS present.   Neuro: awake, alert, answers/follows simple commands  Extremities: No cyanosis. b/l LE edema R slightly more than L  Skin: Warm. Dry. No visible rash.   Lines: PIV; kiran with dark edelmira urine draining     Laboratory & Imaging Data--  CBC:                       11.1   8.61  )-----------( 211      ( 14 Dec 2023 07:02 )             36.7     WBC Count: 8.61 K/uL (12-14-23 @ 07:02)  WBC Count: 10.43 K/uL (12-13-23 @ 11:23)    CMP: 12-14    139  |  105  |  25<H>  ----------------------------<  93  4.0   |  25  |  0.94    Ca    9.0      14 Dec 2023 07:02  Phos  2.8     12-13  Mg     1.9     12-14    TPro  5.8<L>  /  Alb  2.9<L>  /  TBili  0.7  /  DBili  x   /  AST  12  /  ALT  9<L>  /  AlkPhos  139<H>  12-14    LIVER FUNCTIONS - ( 14 Dec 2023 07:02 )  Alb: 2.9 g/dL / Pro: 5.8 g/dL / ALK PHOS: 139 U/L / ALT: 9 U/L / AST: 12 U/L / GGT: x           Urinalysis + Microscopic Examination (12.13.23 @ 17:42)    pH Urine: 5.5   Urine Appearance: Clear   Color: Yellow   Specific Gravity: 1.012   Protein, Urine: Negative mg/dL   Glucose Qualitative, Urine: Negative mg/dL   Ketone - Urine: Negative mg/dL   Blood, Urine: Moderate   Bilirubin: Negative   Urobilinogen: 0.2 mg/dL   Leukocyte Esterase Concentration: Negative   Nitrite: Negative   White Blood Cell - Urine: 2 /HPF   Red Blood Cell - Urine: 14 /HPF   Bacteria: Negative /HPF   Cast: 0 /LPF   Epithelial Cells: 3 /HPF    Microbiology: reviewed  Flu With COVID-19 By LANDEN (12.13.23 @ 16:25)    SARS-CoV-2 Result: [facility]: This Respiratory Panel uses polymerase chain reaction (PCR) to detect for  influenza A; influenza B; respiratory syncytial virus; and SARS-CoV-2.  This test was validated by [facility] and is in use under the FDA  Emergency Use Authorization (EUA) for clinical labs CLIA-certified to  perform high complexity testing. Test results should be correlated with  clinical presentation, patient history, and epidemiology.   Influenza A Result: [facility]   Influenza B Result: [facility]   Resp Syn Virus Result: [facility]    Radiology--reviewed    < from: VA Duplex Lower Ext Vein Scan, Left (12.14.23 @ 10:08) >  IMPRESSION:  No evidence of left lower extremity deep venous thrombosis.    < end of copied text >    < from: CT Head No Cont (12.13.23 @ 15:12) >  IMPRESSION: No acute hemorrhage mass or mass effect.    < end of copied text >  < from: VA Duplex Lower Ext Vein Scan, Right (12.13.23 @ 12:52) >  IMPRESSION:  No evidence of right lower extremity deep venous thrombosis.    < end of copied text >  < from: Xray Shoulder 2 Views, Right (12.13.23 @ 12:50) >  IMPRESSION:  No fractures, dislocations, or AC separation.    Slightly hypertrophic AC joint arthrosis. Glenohumeral joint spacing   inadequately evaluated due to suboptimal positioning. Spinal degenerative   change with curvature apparent.    Mild posterior greater tuberosity enthesopathic change could correlate   with degree of underlying rotator cuff abnormality, MRI would be helpful   to further assess in this regard as warranted.    Generalized osteopenia otherwise no discrete lytic or blastic lesions.    < end of copied text >  < from: Xray Tibia + Fibula 2 Views, Right (12.13.23 @ 12:48) >  IMPRESSION:  Intact partially visualized distal end of a lateral femoral contoured   condylar buttress plate with fixation intercondylar screws. Underlying   anatomic alignment maintained. No dislocations acute appearing fractures   or knee joint effusion.    Superior and inferior patellar and peripheral lateral tibiofemoral   articular margin degenerative osteophytes otherwise relatively maintained   knee joint spaces. Congruent ankle mortise with smooth intact talar dome.   Preserved remaining visualized midfoot and hindfoot joint spaces and no   joint margin erosions.    Small calcaneal enthesophytes.    Generalized osteopenia otherwise no discrete lytic or blastic lesions.    < end of copied text >    < from: Xray Chest 1 View- PORTABLE-Urgent (12.13.23 @ 12:00) >  IMPRESSION:  Bibasilar hazy opacities which may represent atelectasis or small   effusions    < end of copied text >    Active Medications--  acetaminophen     Tablet .. 650 milliGRAM(s) Oral every 6 hours PRN  ascorbic acid 500 milliGRAM(s) Oral daily  chlorhexidine 2% Cloths 1 Application(s) Topical daily  escitalopram 10 milliGRAM(s) Oral at bedtime  ferrous    sulfate 325 milliGRAM(s) Oral daily  melatonin 3 milliGRAM(s) Oral at bedtime PRN  tamsulosin 0.4 milliGRAM(s) Oral at bedtime

## 2025-05-15 NOTE — PROGRESS NOTE ADULT - ASSESSMENT
83y Male with PMHx of Afib on Xarelto, s/p L hip IMN (most recently s/p R hip IMN (Dr. Godinez 10/31/19), mild depression who presented with AMS, upper back shoulder and arm pain who was previously diagnosed with SCC of the vocal cordPt was confused and obtaining history was difficult. 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 PET-CT 10/13/2023 with R vocal cord lesion abd BILATERAL vocal cord uptake with SUV 7.9 , non-specific activity at the posterior RIGHT nasopharyngeal wall is likely related to activity in the longus coli muscle and is otherwise nonspecific and an additional focus seen further posterolaterally is of indeterminate  significance due to beam hardening artifact.     Overall Impression: Mr. 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 He has not undergone further workup or resection given his ongoing medical comorbidities. At this time pts PET-CT from two+ months ago is not sufficient to assess patients malignancy especially in light of previous indeterminate increased areas of uptake. At this time I recommend continuing medical management and if pts clinical status improves to repeat PET-CT outpatient and determine stage and surgical candidacy with ENT if no distant disease is identified. Discussed findings with pts daughter, Samaria, through this admission     MRI neck w/ and w/o redemonstration of a nodular enhancing soft tissue   focus within the right true cord with edema signal which is partially   exophytic into the airway. Enhancement is seen infiltrating into the   anterior commissure and slightly crosses over to the contralateral side.   No gross subglottic extension is seen. Overall dimensions appear   unchanged in comparison to the prior neck CT exam. Previously seen   right-sided arytenoid sclerosis is nonspecific and is not well evaluated   on MR modality. No abnormal enhancement is seen in this location. No   gross extralaryngeal spread of tumor is seen.      # SCC of R vocal cord  - Moderately differentiated from biopsy 09/2023  - PET-CT without obvious distant mets  - CT scans this admission again describe known R vocal cord lesion without obvious LAD   - ENT follow up required, can be done as outpatient   - MRI neck this admission as above  - PET-CT as outpatient depending on pts clinical course     # Macrocytic anemia   -Hb 11.1, .5, macrocytosis labile since admission   - Normal liver and renal function   - Given age, nutritional deficiency vs primary bone marrow disorder can be worked outpatient   - Iron panel without LISA, Ferritin 414, Sat 24 some element of AOCD likely  - B12, Folate wnl     # AMS  - Unclear cause, improvement since my initial evaluation    - CTH 12/13/2023 without acute pathology     # Back pain, Upper arm pain  - CT C-L spine with degenerative changes  - US LE negative for DVT    # A.fib  - Continues on Xarleto 20mg daily    Thank you for allowing me to participate in the care of Mr. Youssef, please do not hesitate to call or text me if you have further questions or concerns.     Detnon Jackson MD  [facility]   Division of Hematology/Oncology  [facility] [facility], [address]  [place] [address]  P: [phone]  F: [phone]    Attestation:    ---- Pt evaluated Including face-to-face interaction in addition to chart review, reviewing treatment plan, and managing the patient’s chronic diagnoses as listed in the assessment---- 83y Male with PMHx of Afib on Xarelto, s/p L hip IMN (most recently s/p R hip IMN (Dr. Godinez 10/31/19), mild depression who presented with AMS, upper back shoulder and arm pain who was previously diagnosed with SCC of the vocal cordPt was confused and obtaining history was difficult. 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 PET-CT 10/13/2023 with R vocal cord lesion abd BILATERAL vocal cord uptake with SUV 7.9 , non-specific activity at the posterior RIGHT nasopharyngeal wall is likely related to activity in the longus coli muscle and is otherwise nonspecific and an additional focus seen further posterolaterally is of indeterminate  significance due to beam hardening artifact.     Overall Impression: Mr. 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 He has not undergone further workup or resection given his ongoing medical comorbidities. At this time pts PET-CT from two+ months ago is not sufficient to assess patients malignancy especially in light of previous indeterminate increased areas of uptake. At this time I recommend continuing medical management and if pts clinical status improves to repeat PET-CT outpatient and determine stage and surgical candidacy with ENT if no distant disease is identified. Discussed findings with pts daughter, Samaria, through this admission     MRI neck w/ and w/o redemonstration of a nodular enhancing soft tissue   focus within the right true cord with edema signal which is partially   exophytic into the airway. Enhancement is seen infiltrating into the   anterior commissure and slightly crosses over to the contralateral side.   No gross subglottic extension is seen. Overall dimensions appear   unchanged in comparison to the prior neck CT exam. Previously seen   right-sided arytenoid sclerosis is nonspecific and is not well evaluated   on MR modality. No abnormal enhancement is seen in this location. No   gross extralaryngeal spread of tumor is seen.      # SCC of R vocal cord  - Moderately differentiated from biopsy 09/2023  - PET-CT without obvious distant mets  - CT scans this admission again describe known R vocal cord lesion without obvious LAD   - ENT follow up required, can be done as outpatient   - MRI neck this admission as above  - PET-CT as outpatient depending on pts clinical course     # Macrocytic anemia   -Hb 11.1, .5, macrocytosis labile since admission   - Normal liver and renal function   - Given age, nutritional deficiency vs primary bone marrow disorder can be worked outpatient   - Iron panel without LISA, Ferritin 414, Sat 24 some element of AOCD likely  - B12, Folate wnl     # AMS  - Unclear cause, improvement since my initial evaluation    - CTH 12/13/2023 without acute pathology     # Back pain, Upper arm pain  - CT C-L spine with degenerative changes  - US LE negative for DVT    # A.fib  - Continues on Xarleto 20mg daily    Thank you for allowing me to participate in the care of Mr. Youssef, please do not hesitate to call or text me if you have further questions or concerns.     Denton Jackson MD  [facility]   Division of Hematology/Oncology  [facility] [facility], [address]  [place] [address]  P: [phone]  F: [phone]    Attestation:    ---- Pt evaluated Including face-to-face interaction in addition to chart review, reviewing treatment plan, and managing the patient’s chronic diagnoses as listed in the assessment---- denies

## 2025-06-27 NOTE — ED ADULT TRIAGE NOTE - ADDITIONAL SAFETY/BANDS...
Additional Safety/Bands: Bleeding that does not stop/Swelling that gets worse/Pain not relieved by Medications/Fever greater than (need to indicate Fahrenheit or Celsius)/Wound/Surgical Site with redness, or foul smelling discharge or pus/Numbness, tingling, color or temperature change to extremity/Nausea and vomiting that does not stop

## 2025-06-28 NOTE — ED PROVIDER NOTE - ATTENDING CONTRIBUTION TO CARE
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  
Patient with R hip surgery on 10/31 presenting with bleeding from hip wound.  Had been drained in office by his surgeon Dr Godinez but continuing to bleed.  Sent from rehab for ongoing bleeding.  On exam patient with slow oozing from R hip incision site without surround erythema, small area of central wound breakdown.  Discussed with orthopedics, they are requesting pre-op workup with plan for admission for operative management.

## (undated) DEVICE — DRAPE MAYO STAND 30"

## (undated) DEVICE — LIJ-SATALOFF ANT COMMISSURE LARYNGOSCOPE: Type: DURABLE MEDICAL EQUIPMENT

## (undated) DEVICE — PACK HIP PINNING

## (undated) DEVICE — Device

## (undated) DEVICE — PREP BETADINE SPONGE STICKS

## (undated) DEVICE — DRAPE INSTRUMENT POUCH 6.75" X 11"

## (undated) DEVICE — GLV 7 PROTEXIS (WHITE)

## (undated) DEVICE — GLV 7.5 PROTEXIS (WHITE)

## (undated) DEVICE — LAP PAD 18 X 18"

## (undated) DEVICE — SYR LUER LOK 5CC

## (undated) DEVICE — NDL HYPO SAFE 22G X 1" (BLACK)

## (undated) DEVICE — DRSG AQUACEL 3.5 X 6"

## (undated) DEVICE — POSITIONER FOAM EGG CRATE ULNAR 2PCS (PINK)

## (undated) DEVICE — SUT SILK 2-0 30" SH

## (undated) DEVICE — STAPLER SKIN VISI-STAT 35 WIDE

## (undated) DEVICE — BLADE SCALPEL SAFETYLOCK #10

## (undated) DEVICE — DRSG STOCKINETTE TUBULAR COTTON 1PLY 6X72"

## (undated) DEVICE — SPECIMEN CONTAINER 100ML

## (undated) DEVICE — SUT VICRYL 3-0 27" SH UNDYED

## (undated) DEVICE — WARMING BLANKET LOWER ADULT

## (undated) DEVICE — DRSG CURITY GAUZE SPONGE 4 X 4" 12-PLY

## (undated) DEVICE — SOL IRR POUR H2O 1500ML

## (undated) DEVICE — DRSG TELFA 3 X 8

## (undated) DEVICE — GLV 8 PROTEXIS (WHITE)

## (undated) DEVICE — DRAPE 3/4 SHEET 52X76"

## (undated) DEVICE — SUT MONOCRYL 4-0 27" PS-2 UNDYED

## (undated) DEVICE — SOL IRR POUR H2O 1000ML

## (undated) DEVICE — DRAPE C ARM UNIVERSAL

## (undated) DEVICE — PACK TOTAL HIP (2 PACKS)

## (undated) DEVICE — DRSG AQUACEL 3.5 X 10"

## (undated) DEVICE — DRAPE STERI-DRAPE MEDIUM W APERTURE

## (undated) DEVICE — SOL IRR POUR H2O 250ML

## (undated) DEVICE — SUT PDS II 1 54" TP-1

## (undated) DEVICE — STRYKER INTERPULSE HANDPIECE W IRR SUCTION TUBE

## (undated) DEVICE — DRSG BENZOIN 0.6CC

## (undated) DEVICE — VENODYNE/SCD SLEEVE CALF MEDIUM

## (undated) DEVICE — DRSG TEGADERM 6"X8"

## (undated) DEVICE — BLADE SCALPEL SAFETYLOCK #15

## (undated) DEVICE — DRILL BIT SYNTHES ORTHO QC 3.2X145MM

## (undated) DEVICE — SYNTHES REAMING ROD WITH BALL TIP 2.5MM 950MM

## (undated) DEVICE — WARMING BLANKET UPPER ADULT

## (undated) DEVICE — SOL IRR POUR NS 0.9% 500ML

## (undated) DEVICE — VISITEC 4X4

## (undated) DEVICE — ELCTR BOVIE TIP NEEDLE INSULATED 2.8" EDGE

## (undated) DEVICE — DRILL BIT BIOMET RINGLOCK QUICK CONNECT 3.2MM X 30MM

## (undated) DEVICE — SUT TICRON 0 30" TIES

## (undated) DEVICE — DRSG AQUACEL 3.5 X 14"

## (undated) DEVICE — DRAPE SPLIT SHEET 77" X 120"

## (undated) DEVICE — HOOD FLYTE STRYKER HELMET SHIELD

## (undated) DEVICE — PACK HEAD & NECK

## (undated) DEVICE — DRSG STERISTRIPS 0.25 X 4"

## (undated) DEVICE — SUT POLYSORB 1 36" GS-21 UNDYED

## (undated) DEVICE — SUT POLYSORB 2-0 30" GS-21 UNDYED

## (undated) DEVICE — GLV 6.5 PROTEXIS (WHITE)

## (undated) DEVICE — DRAPE TOWEL BLUE 17" X 24"

## (undated) DEVICE — DRAPE MAGNETIC INSTRUMENT MEDIUM

## (undated) DEVICE — GOWN TRIMAX LG

## (undated) DEVICE — SUT ETHIBOND EXCEL 2 30" OS-4

## (undated) DEVICE — NDL HYPO REGULAR BEVEL 25G X 1.5" (BLUE)

## (undated) DEVICE — ELCTR GROUNDING PAD ADULT COVIDIEN

## (undated) DEVICE — SOL IRR BAG NS 0.9% 3000ML

## (undated) DEVICE — GOWN LG

## (undated) DEVICE — SYR LUER LOK 20CC

## (undated) DEVICE — MEDICATION LABELS W MARKER

## (undated) DEVICE — SYR LUER LOK 10CC

## (undated) DEVICE — SAW BLADE MICROAIRE OSCILATING 25.4MM X 90MM X 1.27MM

## (undated) DEVICE — POSITIONER FOAM ABDUCTION PILLOW MED (PINK)

## (undated) DEVICE — DRAIN JACKSON PRATT 3 SPRING RESERVOIR W 10FR PVC DRAIN

## (undated) DEVICE — FOLEY TRAY 16FR 5CC LTX UMETER CLOSED

## (undated) DEVICE — TRAP SPECIMEN SPUTUM 40CC

## (undated) DEVICE — MARKING PEN W RULER

## (undated) DEVICE — GLV 9 DURAPRENE

## (undated) DEVICE — KNIFE MEDTRONIC ENT MICROFRANCE SATALOFF SHARP

## (undated) DEVICE — TAPE SILK 3"

## (undated) DEVICE — TUBING SUCTION NONCONDUCTIVE 6MM X 12FT

## (undated) DEVICE — CANISTER DISPOSABLE THIN WALL 3000CC

## (undated) DEVICE — APPLICATOR COTTON TIP 6" STERLE

## (undated) DEVICE — PROTECTOR HEEL / ELBOW FLUFFY

## (undated) DEVICE — DRAPE SPLIT SHEET 77" X 108"

## (undated) DEVICE — SUT POLYSORB 0 30" GS-21 UNDYED

## (undated) DEVICE — DRSG STERISTRIPS 0.5 X 4"

## (undated) DEVICE — DRSG WEBRIL 6"

## (undated) DEVICE — SOL ANTI FOG

## (undated) DEVICE — LABELS BLANK W PEN

## (undated) DEVICE — GLV 8.5 PROTEXIS (WHITE)

## (undated) DEVICE — VENODYNE/SCD SLEEVE CALF LARGE

## (undated) DEVICE — DRILL BIT SYNTHES ORTHO 4.3MM